# Patient Record
Sex: MALE | Race: WHITE | Employment: OTHER | URBAN - METROPOLITAN AREA
[De-identification: names, ages, dates, MRNs, and addresses within clinical notes are randomized per-mention and may not be internally consistent; named-entity substitution may affect disease eponyms.]

---

## 2017-09-22 ENCOUNTER — HOSPITAL ENCOUNTER (OUTPATIENT)
Age: 67
Setting detail: SPECIMEN
Discharge: HOME | End: 2017-09-22
Payer: MEDICARE

## 2017-09-22 ENCOUNTER — OFFICE VISIT (OUTPATIENT)
Dept: INTERNAL MEDICINE CLINIC | Age: 67
End: 2017-09-22
Payer: MEDICARE

## 2017-09-22 VITALS
HEART RATE: 78 BPM | BODY MASS INDEX: 36.05 KG/M2 | DIASTOLIC BLOOD PRESSURE: 78 MMHG | SYSTOLIC BLOOD PRESSURE: 130 MMHG | HEIGHT: 73 IN | WEIGHT: 272 LBS | OXYGEN SATURATION: 96 %

## 2017-09-22 DIAGNOSIS — Z00.00 ROUTINE HEALTH MAINTENANCE: ICD-10-CM

## 2017-09-22 DIAGNOSIS — E66.01 MORBID OBESITY, UNSPECIFIED OBESITY TYPE (HCC): ICD-10-CM

## 2017-09-22 DIAGNOSIS — Z23 NEED FOR VACCINATION: Primary | ICD-10-CM

## 2017-09-22 DIAGNOSIS — J44.9 CHRONIC OBSTRUCTIVE PULMONARY DISEASE, UNSPECIFIED COPD TYPE (HCC): ICD-10-CM

## 2017-09-22 DIAGNOSIS — E11.9 TYPE 2 DIABETES MELLITUS WITHOUT COMPLICATION, WITHOUT LONG-TERM CURRENT USE OF INSULIN (HCC): ICD-10-CM

## 2017-09-22 DIAGNOSIS — I25.5 ISCHEMIC CARDIOMYOPATHY: ICD-10-CM

## 2017-09-22 DIAGNOSIS — F17.200 SMOKING: ICD-10-CM

## 2017-09-22 DIAGNOSIS — Z13.6 ENCOUNTER FOR ABDOMINAL AORTIC ANEURYSM (AAA) SCREENING: ICD-10-CM

## 2017-09-22 DIAGNOSIS — I50.22 CHRONIC SYSTOLIC CONGESTIVE HEART FAILURE (HCC): ICD-10-CM

## 2017-09-22 LAB
ALBUMIN SERPL-MCNC: 4.2 G/DL (ref 3.5–5.2)
ALBUMIN/GLOBULIN RATIO: 1.4 (ref 1–2.5)
ALP BLD-CCNC: 73 U/L (ref 40–129)
ALT SERPL-CCNC: 27 U/L (ref 5–41)
ANION GAP SERPL CALCULATED.3IONS-SCNC: 17 MMOL/L (ref 9–17)
AST SERPL-CCNC: 24 U/L
BILIRUB SERPL-MCNC: 0.92 MG/DL (ref 0.3–1.2)
BUN BLDV-MCNC: 11 MG/DL (ref 8–23)
BUN/CREAT BLD: ABNORMAL (ref 9–20)
CALCIUM SERPL-MCNC: 9.4 MG/DL (ref 8.6–10.4)
CHLORIDE BLD-SCNC: 98 MMOL/L (ref 98–107)
CHOLESTEROL/HDL RATIO: 2
CHOLESTEROL: 107 MG/DL
CO2: 22 MMOL/L (ref 20–31)
CREAT SERPL-MCNC: 0.88 MG/DL (ref 0.7–1.2)
GFR AFRICAN AMERICAN: >60 ML/MIN
GFR NON-AFRICAN AMERICAN: >60 ML/MIN
GFR SERPL CREATININE-BSD FRML MDRD: ABNORMAL ML/MIN/{1.73_M2}
GFR SERPL CREATININE-BSD FRML MDRD: ABNORMAL ML/MIN/{1.73_M2}
GLUCOSE BLD-MCNC: 116 MG/DL (ref 70–99)
HCT VFR BLD CALC: 46 % (ref 41–53)
HDLC SERPL-MCNC: 53 MG/DL
HEMOGLOBIN: 15.6 G/DL (ref 13.5–17.5)
LDL CHOLESTEROL: 35 MG/DL (ref 0–130)
MCH RBC QN AUTO: 33.4 PG (ref 26–34)
MCHC RBC AUTO-ENTMCNC: 33.9 G/DL (ref 31–37)
MCV RBC AUTO: 98.4 FL (ref 80–100)
PDW BLD-RTO: 15.2 % (ref 12.5–15.4)
PLATELET # BLD: 149 K/UL (ref 140–450)
PMV BLD AUTO: 9.2 FL (ref 6–12)
POTASSIUM SERPL-SCNC: 4.4 MMOL/L (ref 3.7–5.3)
RBC # BLD: 4.68 M/UL (ref 4.5–5.9)
SODIUM BLD-SCNC: 137 MMOL/L (ref 135–144)
TOTAL PROTEIN: 7.2 G/DL (ref 6.4–8.3)
TRIGL SERPL-MCNC: 97 MG/DL
VLDLC SERPL CALC-MCNC: NORMAL MG/DL (ref 1–30)
WBC # BLD: 10 K/UL (ref 3.5–11)

## 2017-09-22 PROCEDURE — G0009 ADMIN PNEUMOCOCCAL VACCINE: HCPCS | Performed by: INTERNAL MEDICINE

## 2017-09-22 PROCEDURE — G0439 PPPS, SUBSEQ VISIT: HCPCS | Performed by: INTERNAL MEDICINE

## 2017-09-22 PROCEDURE — 90662 IIV NO PRSV INCREASED AG IM: CPT | Performed by: INTERNAL MEDICINE

## 2017-09-22 PROCEDURE — G0008 ADMIN INFLUENZA VIRUS VAC: HCPCS | Performed by: INTERNAL MEDICINE

## 2017-09-22 PROCEDURE — 90732 PPSV23 VACC 2 YRS+ SUBQ/IM: CPT | Performed by: INTERNAL MEDICINE

## 2017-09-22 RX ORDER — GLUCOSAMINE HCL/CHONDROITIN SU 500-400 MG
1 CAPSULE ORAL 2 TIMES DAILY
Qty: 100 STRIP | Refills: 11 | Status: SHIPPED | OUTPATIENT
Start: 2017-09-22 | End: 2017-12-15 | Stop reason: SDUPTHER

## 2017-09-22 ASSESSMENT — ENCOUNTER SYMPTOMS
BACK PAIN: 0
ABDOMINAL PAIN: 0
FACIAL SWELLING: 0
APNEA: 0
SHORTNESS OF BREATH: 0
COLOR CHANGE: 0
WHEEZING: 0
CONSTIPATION: 0
DIARRHEA: 0
COUGH: 0
CHEST TIGHTNESS: 0
ABDOMINAL DISTENTION: 0

## 2017-09-22 ASSESSMENT — PATIENT HEALTH QUESTIONNAIRE - PHQ9: SUM OF ALL RESPONSES TO PHQ QUESTIONS 1-9: 0

## 2017-10-02 ENCOUNTER — HOSPITAL ENCOUNTER (OUTPATIENT)
Dept: VASCULAR LAB | Age: 67
Discharge: HOME | End: 2017-10-02
Payer: MEDICARE

## 2017-10-02 DIAGNOSIS — Z13.6 ENCOUNTER FOR ABDOMINAL AORTIC ANEURYSM (AAA) SCREENING: ICD-10-CM

## 2017-10-02 PROCEDURE — 93978 VASCULAR STUDY: CPT

## 2017-12-15 ENCOUNTER — OFFICE VISIT (OUTPATIENT)
Dept: INTERNAL MEDICINE CLINIC | Age: 67
End: 2017-12-15
Payer: MEDICARE

## 2017-12-15 VITALS
OXYGEN SATURATION: 95 % | HEART RATE: 89 BPM | SYSTOLIC BLOOD PRESSURE: 126 MMHG | WEIGHT: 271 LBS | DIASTOLIC BLOOD PRESSURE: 62 MMHG | HEIGHT: 73 IN | BODY MASS INDEX: 35.92 KG/M2

## 2017-12-15 DIAGNOSIS — I10 ESSENTIAL HYPERTENSION: Primary | ICD-10-CM

## 2017-12-15 DIAGNOSIS — I50.22 CHRONIC SYSTOLIC CONGESTIVE HEART FAILURE (HCC): ICD-10-CM

## 2017-12-15 DIAGNOSIS — E11.9 TYPE 2 DIABETES MELLITUS WITHOUT COMPLICATION, WITHOUT LONG-TERM CURRENT USE OF INSULIN (HCC): ICD-10-CM

## 2017-12-15 DIAGNOSIS — F17.200 SMOKING: ICD-10-CM

## 2017-12-15 LAB — HBA1C MFR BLD: 6.1 %

## 2017-12-15 PROCEDURE — 83036 HEMOGLOBIN GLYCOSYLATED A1C: CPT | Performed by: INTERNAL MEDICINE

## 2017-12-15 PROCEDURE — 99214 OFFICE O/P EST MOD 30 MIN: CPT | Performed by: INTERNAL MEDICINE

## 2017-12-15 RX ORDER — LANCETS
EACH MISCELLANEOUS
COMMUNITY
Start: 2017-09-29 | End: 2017-12-15 | Stop reason: SDUPTHER

## 2017-12-15 RX ORDER — GLUCOSAMINE HCL/CHONDROITIN SU 500-400 MG
1 CAPSULE ORAL 2 TIMES DAILY
Qty: 100 STRIP | Refills: 11 | Status: SHIPPED | OUTPATIENT
Start: 2017-12-15 | End: 2018-09-28

## 2017-12-15 RX ORDER — LISINOPRIL 5 MG/1
5 TABLET ORAL DAILY
COMMUNITY
End: 2019-08-29 | Stop reason: ALTCHOICE

## 2017-12-15 RX ORDER — LANCETS
100 EACH MISCELLANEOUS 2 TIMES DAILY
Qty: 100 EACH | Refills: 3 | Status: SHIPPED | OUTPATIENT
Start: 2017-12-15 | End: 2018-09-28

## 2017-12-15 RX ORDER — CARVEDILOL 12.5 MG/1
12.5 TABLET ORAL 2 TIMES DAILY
Status: ON HOLD | COMMUNITY
End: 2018-12-10 | Stop reason: HOSPADM

## 2017-12-15 ASSESSMENT — ENCOUNTER SYMPTOMS
DIARRHEA: 0
CONSTIPATION: 0
APNEA: 0
COUGH: 0
COLOR CHANGE: 0
FACIAL SWELLING: 0
ABDOMINAL PAIN: 0
CHEST TIGHTNESS: 0
ABDOMINAL DISTENTION: 0
BACK PAIN: 0
SHORTNESS OF BREATH: 0
WHEEZING: 0

## 2017-12-15 NOTE — PROGRESS NOTES
Subjective:      Patient ID: Hal Doe is a 79 y.o. male. Chronic Disease Visit Information    BP Readings from Last 3 Encounters:   12/15/17 126/62   09/22/17 130/78   12/07/16 130/80          Hemoglobin A1C (%)   Date Value   12/07/2016 6.5 (H)   09/07/2014 5.6     LDL Cholesterol (mg/dL)   Date Value   09/22/2017 35     HDL (mg/dL)   Date Value   09/22/2017 53     BUN (mg/dL)   Date Value   09/22/2017 11     CREATININE (mg/dL)   Date Value   09/22/2017 0.88     Glucose (mg/dL)   Date Value   09/22/2017 116 (H)            Have you changed or started any medications since your last visit including any over-the-counter medicines, vitamins, or herbal medicines? no   Are you having any side effects from any of your medications? -  no  Have you stopped taking any of your medications? Is so, why? -  no    Have you seen any other physician or provider since your last visit? No  Have you had any other diagnostic tests since your last visit? No  Have you been seen in the emergency room and/or had an admission to a hospital since we last saw you? No  Have you had your annual diabetic retinal (eye) exam? Yes - Records Obtained  Have you had your routine dental cleaning in the past 6 months? yes - Fall 2017    Have you activated your [employer] account? If not, what are your barriers?  Yes     Patient Care Team:  Gela Cervantes MD as PCP - Yin Ley MD as PCP - S Attributed Provider  Jesenia Orozco MD as Consulting Physician (Gastroenterology)         Medical History Review  Past Medical, Family, and Social History reviewed and does not contribute to the patient presenting condition    Health Maintenance   Topic Date Due    DTaP/Tdap/Td vaccine (1 - Tdap) 08/15/1969    Colon Cancer Screen FIT/FOBT  09/09/2015    Diabetes screen  12/07/2019    Lipid screen  09/22/2022    Zostavax vaccine  Addressed    Flu vaccine  Completed    Pneumococcal low/med risk  Completed    AAA screen  Completed    Hepatitis C screen  Completed     Chief Complaint   Patient presents with    Diabetes     management pt due for A1C check result was 6.1    Results     pt had abdominal us done in October HPI-patient is here for follow-up examination. He has multiple medical problems which include diabetes, hypertension, hyperlipidemia. He had fractured bones of his right forearm, had a plate put in. He is going to have revision surgery in [place] . He still feels pain and swelling in his right forearm. No other complaints    Review of Systems   Constitutional: Negative for activity change, appetite change, chills and diaphoresis. HENT: Negative for congestion, dental problem, ear discharge, facial swelling and hearing loss. Respiratory: Negative for apnea, cough, chest tightness, shortness of breath and wheezing. Cardiovascular: Negative for chest pain and leg swelling. Gastrointestinal: Negative for abdominal distention, abdominal pain, constipation and diarrhea. Genitourinary: Negative for difficulty urinating, dysuria, enuresis, flank pain and frequency. Musculoskeletal: Positive for arthralgias (Pain present in the right wrist) and joint swelling (Right wrist joint). Negative for back pain and gait problem. Skin: Negative for color change, pallor and rash. Neurological: Negative for dizziness, seizures, facial asymmetry, light-headedness, numbness and headaches. Psychiatric/Behavioral: Negative for agitation, behavioral problems, confusion, decreased concentration and dysphoric mood. Objective:   Physical Exam   Constitutional: He is oriented to person, place, and time. He appears well-developed and well-nourished. No distress. HENT:   Head: Normocephalic and atraumatic. Mouth/Throat: No oropharyngeal exudate. Eyes: Conjunctivae are normal. Pupils are equal, round, and reactive to light. Right eye exhibits no discharge. Left eye exhibits no discharge. No scleral icterus.    Neck:

## 2017-12-15 NOTE — PROGRESS NOTES
Subjective:      Patient ID: Mehrdad Aguilar is a 79 y.o. male. HPI-  Patient is here for Follow up examination. He has DM, HTN, CHF . Patient mentioned that his symptoms are stable , No new complains . He had US of abdomen for AAA , which was negative     Review of Systems   Constitutional: Negative for activity change, appetite change, chills and diaphoresis. HENT: Negative for congestion, dental problem, ear discharge, facial swelling and hearing loss. Respiratory: Negative for apnea, cough, chest tightness, shortness of breath and wheezing. Cardiovascular: Negative for chest pain and leg swelling. Gastrointestinal: Negative for abdominal distention, abdominal pain, constipation and diarrhea. Genitourinary: Negative for difficulty urinating, dysuria, enuresis, flank pain and frequency. Musculoskeletal: Negative for arthralgias, back pain, gait problem and joint swelling. Skin: Negative for color change, pallor and rash. Neurological: Negative for dizziness, seizures, facial asymmetry, light-headedness, numbness and headaches. Psychiatric/Behavioral: Negative for agitation, behavioral problems, confusion, decreased concentration and dysphoric mood. Objective:   Physical Exam   Constitutional: He is oriented to person, place, and time. He appears well-developed and well-nourished. No distress. HENT:   Head: Normocephalic and atraumatic. Mouth/Throat: No oropharyngeal exudate. Eyes: Conjunctivae are normal. Pupils are equal, round, and reactive to light. Right eye exhibits no discharge. Left eye exhibits no discharge. No scleral icterus. Neck: Normal range of motion. Neck supple. No JVD present. No tracheal deviation present. No thyromegaly present. Cardiovascular: Normal rate and normal heart sounds. Exam reveals no gallop. No murmur heard. Pulmonary/Chest: Effort normal and breath sounds normal. No stridor. No respiratory distress. He has no wheezes. He has no rales.

## 2018-05-04 ENCOUNTER — TELEPHONE (OUTPATIENT)
Dept: INTERNAL MEDICINE CLINIC | Age: 68
End: 2018-05-04

## 2018-05-15 DIAGNOSIS — E11.9 TYPE 2 DIABETES MELLITUS WITHOUT COMPLICATION, WITHOUT LONG-TERM CURRENT USE OF INSULIN (HCC): ICD-10-CM

## 2018-06-15 ENCOUNTER — HOSPITAL ENCOUNTER (OUTPATIENT)
Age: 68
Setting detail: SPECIMEN
Discharge: HOME | End: 2018-06-15
Payer: MEDICARE

## 2018-06-15 ENCOUNTER — OFFICE VISIT (OUTPATIENT)
Dept: INTERNAL MEDICINE CLINIC | Age: 68
End: 2018-06-15
Payer: MEDICARE

## 2018-06-15 VITALS
HEART RATE: 76 BPM | DIASTOLIC BLOOD PRESSURE: 70 MMHG | BODY MASS INDEX: 35.52 KG/M2 | WEIGHT: 268 LBS | HEIGHT: 73 IN | SYSTOLIC BLOOD PRESSURE: 110 MMHG

## 2018-06-15 DIAGNOSIS — I50.22 CHRONIC SYSTOLIC CONGESTIVE HEART FAILURE (HCC): ICD-10-CM

## 2018-06-15 DIAGNOSIS — L84 CORN OF FOOT: ICD-10-CM

## 2018-06-15 DIAGNOSIS — J44.9 CHRONIC OBSTRUCTIVE PULMONARY DISEASE, UNSPECIFIED COPD TYPE (HCC): ICD-10-CM

## 2018-06-15 DIAGNOSIS — F17.200 SMOKING: ICD-10-CM

## 2018-06-15 DIAGNOSIS — E11.9 TYPE 2 DIABETES MELLITUS WITHOUT COMPLICATION, WITHOUT LONG-TERM CURRENT USE OF INSULIN (HCC): ICD-10-CM

## 2018-06-15 DIAGNOSIS — I10 ESSENTIAL HYPERTENSION: Primary | ICD-10-CM

## 2018-06-15 DIAGNOSIS — Z12.5 PROSTATE CANCER SCREENING: ICD-10-CM

## 2018-06-15 DIAGNOSIS — F10.10 ALCOHOL ABUSE: ICD-10-CM

## 2018-06-15 DIAGNOSIS — N40.0 BENIGN PROSTATIC HYPERPLASIA WITHOUT LOWER URINARY TRACT SYMPTOMS: ICD-10-CM

## 2018-06-15 LAB
FOLATE: 18.9 NG/ML
HBA1C MFR BLD: 6.6 %
PROSTATE SPECIFIC ANTIGEN: 3.44 UG/L
VITAMIN B-12: 862 PG/ML (ref 232–1245)

## 2018-06-15 PROCEDURE — 3288F FALL RISK ASSESSMENT DOCD: CPT | Performed by: INTERNAL MEDICINE

## 2018-06-15 PROCEDURE — 99214 OFFICE O/P EST MOD 30 MIN: CPT | Performed by: INTERNAL MEDICINE

## 2018-06-15 PROCEDURE — G8510 SCR DEP NEG, NO PLAN REQD: HCPCS | Performed by: INTERNAL MEDICINE

## 2018-06-15 PROCEDURE — 83036 HEMOGLOBIN GLYCOSYLATED A1C: CPT | Performed by: INTERNAL MEDICINE

## 2018-06-15 RX ORDER — TAMSULOSIN HYDROCHLORIDE 0.4 MG/1
0.4 CAPSULE ORAL DAILY
Qty: 30 CAPSULE | Refills: 3 | Status: SHIPPED | OUTPATIENT
Start: 2018-06-15 | End: 2018-09-28 | Stop reason: SDUPTHER

## 2018-06-15 ASSESSMENT — PATIENT HEALTH QUESTIONNAIRE - PHQ9
2. FEELING DOWN, DEPRESSED OR HOPELESS: 0
SUM OF ALL RESPONSES TO PHQ QUESTIONS 1-9: 0
1. LITTLE INTEREST OR PLEASURE IN DOING THINGS: 0
SUM OF ALL RESPONSES TO PHQ9 QUESTIONS 1 & 2: 0

## 2018-06-15 ASSESSMENT — ENCOUNTER SYMPTOMS
APNEA: 0
DIARRHEA: 0
ABDOMINAL DISTENTION: 0
WHEEZING: 0
CONSTIPATION: 0
COLOR CHANGE: 0
BACK PAIN: 0
CHEST TIGHTNESS: 0
COUGH: 0
FACIAL SWELLING: 0
ABDOMINAL PAIN: 0
SHORTNESS OF BREATH: 1

## 2018-07-23 ENCOUNTER — OFFICE VISIT (OUTPATIENT)
Dept: PODIATRY | Age: 68
End: 2018-07-23
Payer: MEDICARE

## 2018-07-23 VITALS
BODY MASS INDEX: 34.85 KG/M2 | HEIGHT: 73 IN | WEIGHT: 263 LBS | HEART RATE: 71 BPM | SYSTOLIC BLOOD PRESSURE: 121 MMHG | DIASTOLIC BLOOD PRESSURE: 71 MMHG

## 2018-07-23 DIAGNOSIS — M79.672 LEFT FOOT PAIN: ICD-10-CM

## 2018-07-23 DIAGNOSIS — L84 CORNS AND CALLOSITIES: ICD-10-CM

## 2018-07-23 DIAGNOSIS — M79.674 PAIN OF TOES OF BOTH FEET: ICD-10-CM

## 2018-07-23 DIAGNOSIS — E11.42 DIABETIC POLYNEUROPATHY ASSOCIATED WITH TYPE 2 DIABETES MELLITUS (HCC): ICD-10-CM

## 2018-07-23 DIAGNOSIS — M24.572 EQUINUS CONTRACTURE OF LEFT ANKLE: ICD-10-CM

## 2018-07-23 DIAGNOSIS — E11.51 TYPE 2 DIABETES MELLITUS WITH PERIPHERAL VASCULAR DISEASE (HCC): ICD-10-CM

## 2018-07-23 DIAGNOSIS — M76.62 ACHILLES TENDINITIS OF LEFT LOWER EXTREMITY: Primary | ICD-10-CM

## 2018-07-23 DIAGNOSIS — M79.675 PAIN OF TOES OF BOTH FEET: ICD-10-CM

## 2018-07-23 DIAGNOSIS — M79.671 PAIN IN RIGHT FOOT: ICD-10-CM

## 2018-07-23 DIAGNOSIS — B35.1 ONYCHOMYCOSIS OF TOENAIL: ICD-10-CM

## 2018-07-23 DIAGNOSIS — M77.30 RETROCALCANEAL BONE SPUR: ICD-10-CM

## 2018-07-23 PROCEDURE — 11056 PARNG/CUTG B9 HYPRKR LES 2-4: CPT | Performed by: PODIATRIST

## 2018-07-23 PROCEDURE — 73630 X-RAY EXAM OF FOOT: CPT | Performed by: PODIATRIST

## 2018-07-23 PROCEDURE — 11721 DEBRIDE NAIL 6 OR MORE: CPT | Performed by: PODIATRIST

## 2018-07-23 PROCEDURE — 99203 OFFICE O/P NEW LOW 30 MIN: CPT | Performed by: PODIATRIST

## 2018-07-23 RX ORDER — TIZANIDINE 4 MG/1
4 TABLET ORAL EVERY 6 HOURS PRN
COMMUNITY
End: 2019-02-28

## 2018-07-23 ASSESSMENT — ENCOUNTER SYMPTOMS
BACK PAIN: 0
NAUSEA: 0
COLOR CHANGE: 0
DIARRHEA: 0
SHORTNESS OF BREATH: 0

## 2018-07-23 NOTE — PROGRESS NOTES
hallux of the right foot. There is no prominence noted to the first metatarsal head without abduction of the hallux of the left foot. There is soft tissue swelling to the posterior aspect of the left calcaneus at the insertion of the achilles tendon. There is pain with palpation to this area. There is no palpable defect noted to the left achilles tendon. Shoe examination was performed. Biomechanical Exam: normal bilaterally. X-ray's taken: Lateral, Lateral Oblique, and Medial Oblique of the left foot. Findings: There is only a slight amount of bone spur noted to the posterior aspect of the calcaneus. Asessment: Patient is a 79 y.o. male with:    Diagnosis Orders   1. Achilles tendinitis of left lower extremity  Ambulatory referral to Physical Therapy    XR FOOT LEFT (MIN 3 VIEWS)   2. Equinus contracture of left ankle  Ambulatory referral to Physical Therapy    XR FOOT LEFT (MIN 3 VIEWS)   3. Left foot pain  Ambulatory referral to Physical Therapy    XR FOOT LEFT (MIN 3 VIEWS)   4. Retrocalcaneal bone spur  XR FOOT LEFT (MIN 3 VIEWS)   5. Onychomycosis of toenail  UT DEBRIDEMENT OF NAILS, 6 OR MORE    HM DIABETES FOOT EXAM   6. Corns and callosities  TRIM BENIGN HYPERKERATOTIC SKIN LESION,2-4   7. Pain of toes of both feet  UT DEBRIDEMENT OF NAILS, 6 OR MORE    HM DIABETES FOOT EXAM   8. Pain in right foot  TRIM BENIGN HYPERKERATOTIC SKIN LESION,2-4   9. Type 2 diabetes mellitus with peripheral vascular disease (HCC)  UT DEBRIDEMENT OF NAILS, 6 OR MORE    HM DIABETES FOOT EXAM    TRIM BENIGN HYPERKERATOTIC SKIN LESION,2-4   10. Diabetic polyneuropathy associated with type 2 diabetes mellitus (HCC)  UT DEBRIDEMENT OF NAILS, 6 OR MORE    HM DIABETES FOOT EXAM    TRIM BENIGN HYPERKERATOTIC SKIN LESION,2-4       Plan:  1. Clinical evaluation of the patient. 2. There is pain with palpation and debridement of toenails 1-5 of the right foot and 1-5 of the left foot.  The lesion(s) to the right foot debrided with a 15 blade without event. Patient instructed on proper diabetic foot care. Patient informed that I am recommending diabetic shoes and he will be contacted for fitting. Patient given a referral for physical therapy for his achilles tendonitis. 3. Contact office with any questions/problems/concerns. Return in about 9 weeks (around 9/24/2018) for At risk diabetic foot care.    7/23/2018      Berta Souza DPM

## 2018-07-23 NOTE — LETTER
identifying patients at risk and referring them for footwear and inserts when indicated. Sincerely,          [facility]  Karyna Becker D.P.M. Yesenia Monreal D.P.M.  [address]   [address]  Ph: [phone]  Fax: [phone]    Statement of Certifying Physician    [address] [place] [address]     9/18/7603  I certify that all of the following statements are true:    1. This patient has diabetes mellitus. ICD-10 Code: E11.9 (250.00)    2. This patient has one of the following conditions: (check all that may apply)     []  History of partial or complete amputation of the foot     [x]  Peripheral neuropathy with evidence of callus formation E11.42 (250.60)      []  History of previous foot ulceration Z86.31     []  Foot deformity M21.969 (736.70)    []  History of pre-ulcerative callus     [x]  Poor circulation E11.59 (250.70)    3. I am treating this patient under a comprehensive plan and care for  his/her diabetes. 4. This patient needs special shoes (depth or custom-molded) and/or  inserts because of his/her diabetic condition.     Certifying Physician Information: (must be signed by a MD or DO)       Signature:_____________________________  Date:___________      Name:________________________________________________

## 2018-07-26 ENCOUNTER — HOSPITAL ENCOUNTER (OUTPATIENT)
Dept: PHYSICAL THERAPY | Age: 68
Setting detail: THERAPIES SERIES
Discharge: HOME | End: 2018-07-26
Payer: MEDICARE

## 2018-07-26 PROCEDURE — G8979 MOBILITY GOAL STATUS: HCPCS

## 2018-07-26 PROCEDURE — 97110 THERAPEUTIC EXERCISES: CPT

## 2018-07-26 PROCEDURE — 97016 VASOPNEUMATIC DEVICE THERAPY: CPT

## 2018-07-26 PROCEDURE — G8978 MOBILITY CURRENT STATUS: HCPCS

## 2018-07-26 PROCEDURE — 97161 PT EVAL LOW COMPLEX 20 MIN: CPT

## 2018-07-26 NOTE — CONSULTS
Tender left AT insert at calc   Sensation [] [] []    Edema [] [x] [] Bilateral ankle   Neurological [] [] []    Patellar Mobility [] [] []    Patellar Orientation [] [] []    Gait [] [] [] Analysis:  Antalgic gait due to left AT pain         FUNCTION Normal Difficult Unable   Sitting [] [] []   Standing [] [x] []   Ambulation [] [x] []   Groom/Dress [] [] []   Lift/Carry [] [] []   Stairs [] [x] []   Bending [] [] []   Squat [] [] []   Kneel [] [] []           Comments:  Assessment:  Problems:    [x] ? Pain:  Left AT pain   [x] ? ROM: Limited LE ROM and flexibility   [x] ? Strength: Decrease bilateral LE strength   [x] ? Function: LEFS 62%   [x] ? Balance   [x] Edema:  [] Postural Deviations  [x] Gait Deviations  [] Other:      STG: (to be met in 10 treatments)  1. ? Pain:  Left AT pain 2/10 walking more than 150 feet  2. ? ROM:  Left ankle, hip ROM 90% normal  3. ? Strength:  Bilateral LE and core 5/5  4. ? Function:  LEFS 50%  5. Independent with Home Exercise Programs  6. Demonstrate Knowledge of fall prevention  LTG: (to be met in 24 treatments)  1. AT pain 0-2/10 with cutting lawn and shopping  2. LEFS 25% or less                   Patient goals:  Do grocery chopping and cut lawn without limping or pain    G-CODE    Functional Limitation: Mobility walking and moving around  Functional Assessment Used: LEFS  Current Status Modifier: CL  Goal Status Modifier: CI  Discharge Status Modifier:     Rehab Potential:  [] Good  [x] Fair  [] Poor  Chronicity, decreased blood supply at AT  Suggested Professional Referral:  [x] No  [] Yes:  Barriers to Goal Achievement[de-identified]  [x] No  [] Yes:  Domestic Concerns:  [x] No  [] Yes:    Pt. Education:  [x] Plans/Goals, Risks/Benefits discussed  [] Home exercise program    Method of Education: [x] Verbal  [x] Demo  [x] Written  Comprehension of Education:  [x] Verbalizes understanding. [x] Demonstrates understanding. [x] Needs Review.   [] Demonstrates/verbalizes understanding of

## 2018-07-26 NOTE — FLOWSHEET NOTE
[facility] Fall Risk Assessment    Patient Name:  Jonathan Mendez  : 1950        Risk Factor Scale  Score   History of Falls [] Yes  [x] No 25  0    Secondary Diagnosis [] Yes  [x] No 15  0    Ambulatory Aid [] Furniture  [] Crutches/cane/walker  [] None/bedrest/wheelchair/nurse 30  15  0    IV/Heparin Lock [] Yes  [x] No 20  0    Gait/Transferring [] Impaired  [] Weak  [x] Normal/bedrest/immobile 20  10  0    Mental Status [] Forgets limitations  [x] Oriented to own ability 15  0       Total: 0     Based on the Assessment score: check the appropriate box.     [x]  No intervention needed   Low =   Score of 0-24    []  Use standard prevention interventions Moderate =  Score of 24-44   [] Give patient handout and discuss fall prevention strategies   [] Establish goal of education for patient/family RE: fall prevention strategies    []  Use high risk prevention interventions High = Score of 45 and higher   [] Give patient handout and discuss fall prevention strategies   [] Establish goal of education for patient/family Re: fall prevention strategies   [] Discuss lifeline / other resources    Electronically signed by:   Tatiana Calvo, PT  Date: 2018 127

## 2018-08-01 ENCOUNTER — HOSPITAL ENCOUNTER (OUTPATIENT)
Dept: PHYSICAL THERAPY | Age: 68
Setting detail: THERAPIES SERIES
Discharge: HOME | End: 2018-08-01
Payer: MEDICARE

## 2018-08-01 PROCEDURE — 97140 MANUAL THERAPY 1/> REGIONS: CPT

## 2018-08-01 PROCEDURE — 97016 VASOPNEUMATIC DEVICE THERAPY: CPT

## 2018-08-01 PROCEDURE — 97110 THERAPEUTIC EXERCISES: CPT

## 2018-08-03 ENCOUNTER — HOSPITAL ENCOUNTER (OUTPATIENT)
Dept: PHYSICAL THERAPY | Age: 68
Setting detail: THERAPIES SERIES
Discharge: HOME | End: 2018-08-03
Payer: MEDICARE

## 2018-08-03 PROCEDURE — 97016 VASOPNEUMATIC DEVICE THERAPY: CPT

## 2018-08-03 PROCEDURE — 97110 THERAPEUTIC EXERCISES: CPT

## 2018-08-07 ENCOUNTER — HOSPITAL ENCOUNTER (OUTPATIENT)
Dept: PHYSICAL THERAPY | Age: 68
Setting detail: THERAPIES SERIES
Discharge: HOME | End: 2018-08-07
Payer: MEDICARE

## 2018-08-07 PROCEDURE — 97140 MANUAL THERAPY 1/> REGIONS: CPT

## 2018-08-07 PROCEDURE — 97016 VASOPNEUMATIC DEVICE THERAPY: CPT

## 2018-08-07 PROCEDURE — 97110 THERAPEUTIC EXERCISES: CPT

## 2018-08-07 NOTE — FLOWSHEET NOTE
Supraorbital []  []  []  []  []  []    Infraorbital  []  []  []  []  []  []    Lateral Pectoral []  []  []  []  []  []    Saphenous  []  []  []  []  []  []    Common Fibular (Peroneal) []  [x]  []  [x]  []  []    Tibial []  [x]  []  [x]  []  []    Deep Fibular (Peroneal) []  []  []  []  []  []    Greater Occipital []  []  []  []  []  []    Greater Auricular []  []  []  []  []  []    Spinal Accessory []  []  []  []  []  []    Dorsal Scapular []  []  []  []  []  []    Suprascapular (Infraspinatus) []  []  []  []  []  []    Lateral Antebrachial Cutaneous  []  []  []  []  []  []    Deep Radial []  []  []  []  []  []    Superior Cluneal []  []  []  []  []  []    Inferior Gluteal  []  []  []  []  []  []    Superficial Radial []  []  []  []  []  []    Iliotibial []  []  []  []  []  []    Lateral Popliteal  []  []  []  []  []  []    Sural []  [x]  []  [x]  []  []    Posterior Cutaneous of T6  []  []  []  []  []  []    Spinous Process of T7 -- centrally placed   [] []  []  []  []    Posterior Cutaneous of L2 []  []  []  []  []  []    Posterior Cutaneous L5 []  []  []  []  []  []        Symptomatic Points Right Left 0.5\" needle 1.0\" needle 2.0\" needle 3.0\" needle   Left AT and Joseph symptomatic  5 needles []  [x]  [x]  []  []  []     []  []  []  []  []  []     []  []  []  []  []  []     []  []  []  []  []  []     []  []  []  []  []  []     []  []  []  []  []  []     []  []  []  []  []  []     []  []  []  []  []  []     []  []  []  []  []  []     []  []  []  []  []  []     []  []  []  []  []  []              Specific Instructions for next treatment:  DN, Xfx massage left AT, KT, left ankle bilateral LE strength, flexibility, proprioception/balance       Treatment Charges: Mins Units   [x]  Modalities vasocompression 15 1   [x]  Ther Exercise 15 1   [x]  Manual Therapy 30 2   []  Ther Activities     []  Aquatics     []  Neuromuscular     []  Other     Total Treatment time 60 4       Assessment: [x] Progressing toward goals. Patient states left ankle and AT doesn't bother him as much as lack of mobility. Tolerated initial DN no complaints. Supination emphasis with mobility in weight bear to reduce everted foot. Continue joint mobs, flexibility, balance, added ankle strength but needs cues for eversion and inversion. Left AT pain post tx:  0/10        [] No change. [] Other:    STG/LTG     STG: (to be met in 10 treatments)  1. ? Pain:  Left AT pain 2/10 walking more than 150 feet  2. ? ROM:  Left ankle, hip ROM 90% normal  3. ? Strength:  Bilateral LE and core 5/5  4. ? Function:  LEFS 50%  5. Independent with Home Exercise Programs  6. Demonstrate Knowledge of fall prevention  LTG: (to be met in 24 treatments)  1. AT pain 0-2/10 with cutting lawn and shopping  2. LEFS 25% or less                    Patient goals:  Do grocery shopping and cut lawn without limping or pain     G-CODE     Functional Limitation: Mobility walking and moving around  Functional Assessment Used: LEFS  Current Status Modifier: CL  Goal Status Modifier: CI  Discharge Status Modifier:        Pt. Education:  [x] Yes  [] No  [x] Reviewed Prior HEP/Ed. Patient states he does not perform HEP at home. Will work with him to transition to HEP with reinforcement, education on progression and benefit. Method of Education: [x] Verbal  [] Demo  [] Written  Comprehension of Education:  [x] Verbalizes understanding. [] Demonstrates understanding. [x] Needs review. [] Demonstrates/verbalizes HEP/Ed previously given. Plan: [x] Continue per plan of care.    [] Other:      Time [id number]           Time Out: 10 Carlita Francisco    Electronically signed by:  Helena Castro, PT

## 2018-08-09 ENCOUNTER — HOSPITAL ENCOUNTER (OUTPATIENT)
Dept: PHYSICAL THERAPY | Age: 68
Setting detail: THERAPIES SERIES
Discharge: HOME | End: 2018-08-09
Payer: MEDICARE

## 2018-08-09 PROCEDURE — 97110 THERAPEUTIC EXERCISES: CPT

## 2018-08-09 PROCEDURE — 97016 VASOPNEUMATIC DEVICE THERAPY: CPT

## 2018-08-09 PROCEDURE — 97140 MANUAL THERAPY 1/> REGIONS: CPT

## 2018-08-09 NOTE — FLOWSHEET NOTE
[]  []    Lateral Pectoral []  []  []  []  []  []    Saphenous  []  []  []  []  []  []    Common Fibular (Peroneal) []  [x]  []  [x]  []  []    Tibial []  [x]  []  [x]  []  []    Deep Fibular (Peroneal) []  []  []  []  []  []    Greater Occipital []  []  []  []  []  []    Greater Auricular []  []  []  []  []  []    Spinal Accessory []  []  []  []  []  []    Dorsal Scapular []  []  []  []  []  []    Suprascapular (Infraspinatus) []  []  []  []  []  []    Lateral Antebrachial Cutaneous  []  []  []  []  []  []    Deep Radial []  []  []  []  []  []    Superior Cluneal []  []  []  []  []  []    Inferior Gluteal  []  []  []  []  []  []    Superficial Radial []  []  []  []  []  []    Iliotibial []  []  []  []  []  []    Lateral Popliteal  []  []  []  []  []  []    Sural []  [x]  []  [x]  []  []    Posterior Cutaneous of T6  []  []  []  []  []  []    Spinous Process of T7 -- centrally placed   [] []  []  []  []    Posterior Cutaneous of L2 []  []  []  []  []  []    Posterior Cutaneous L5 []  []  []  []  []  []        Symptomatic Points Right Left 0.5\" needle 1.0\" needle 2.0\" needle 3.0\" needle   Left AT and Joseph symptomatic  6 needles []  [x]  [x]  []  []  []     []  []  []  []  []  []     []  []  []  []  []  []     []  []  []  []  []  []     []  []  []  []  []  []     []  []  []  []  []  []     []  []  []  []  []  []     []  []  []  []  []  []     []  []  []  []  []  []     []  []  []  []  []  []     []  []  []  []  []  []        Specific Instructions for next treatment:  DN, Xfx massage left AT, KT, left ankle bilateral LE strength, flexibility, proprioception/balance       Treatment Charges: Mins Units   [x]  Modalities vasocompression 15 1   [x]  Ther Exercise 15 1   [x]  Manual Therapy 30 2   []  Ther Activities     []  Aquatics     []  Neuromuscular     []  Other     Total Treatment time 60 4       Assessment: [x] Progressing toward goals.    Patient states left ankle and AT doesn't bother him as much as lack of

## 2018-08-13 ENCOUNTER — HOSPITAL ENCOUNTER (OUTPATIENT)
Dept: PHYSICAL THERAPY | Age: 68
Setting detail: THERAPIES SERIES
Discharge: HOME | End: 2018-08-13
Payer: MEDICARE

## 2018-08-13 PROCEDURE — 97140 MANUAL THERAPY 1/> REGIONS: CPT

## 2018-08-13 PROCEDURE — 97016 VASOPNEUMATIC DEVICE THERAPY: CPT

## 2018-08-13 PROCEDURE — 97110 THERAPEUTIC EXERCISES: CPT

## 2018-08-13 NOTE — FLOWSHEET NOTE
[address] [facility]   [address] [facility] [address]   Phone: [phone]   Fax: [phone]    Physical Therapy Daily Treatment Note      Date:  2018  Patient Name:  Jonathan Mendez    :    MRN: [id number]  Physician: Smita Edwards DPM                         Insurance: [place] [employer]              Eligibility Status: Percell Odor     DOS: 18  # of visits allowed/remaining: based on medical necessity  Source: Phone  Spoke Shreya Nava [phone]  Reference: [id number]  Auth: NPRe     Electronically signed by Humphrey Gordon on 18 at 2:32 PM   Medical Diagnosis: Rehab Codes:   M76.62 (ICD-10-CM) - Achilles tendinitis of left lower extremity   M24.572 (ICD-10-CM) - Equinus contracture of left ankle   M79.672 (ICD-10-CM) - Left foot pain                                Onset date: 17               Next Dr's appt.: 18    Visit# / total visits: -  Cancels/No Shows: 0/0    MEDICARE CAP VISIT MAINTAINED IN NAVIGATOR    Subjective:  Still have restricted left ankle. Not so much pain as restricted ankle. Pain:  [] Yes  [] No Location: Left AT, Foot N/A Pain Rating: (0-10 scale) 1/10  Pain altered Tx:  [] No  [] Yes  Action:  Comments:  DN POC signed. DNA signed and witnessed.     Objective:  Modalities: Vasocompression left ankle, ankle sleeve, 34 degrees F, 15 min  Precautions:  Pacemaker -   Exercises:  Exercise Reps/ Time Weight/ Level Comments   NuStep      Eccentric Heel raise and lower 8 count x 10                 Triceps surae stretch 30 sec    KE/KF   Halo CW/CCW 3 each        3 way ankle  15    red band c cues   Lunge forward 15   c inversion          Manual:  TCJ, STJ, 1st MTP, distal tib fib grade 3  Dry Needle (see below)      Other: Supination emphasis with stretches and lunges for ankle mob      Homeostatic Point Right Left 0.5\" needle 1.0\" needle 2.0\" needle 3.0\" needle   Supraorbital []  []  []  []  []  []    Infraorbital  [] []  []  []  []  []    Lateral Pectoral []  []  []  []  []  []    Saphenous  []  []  []  []  []  []    Common Fibular (Peroneal) []  [x]  []  [x]  []  []    Tibial []  [x]  []  [x]  []  []    Deep Fibular (Peroneal) []  []  []  []  []  []    Greater Occipital []  []  []  []  []  []    Greater Auricular []  []  []  []  []  []    Spinal Accessory []  []  []  []  []  []    Dorsal Scapular []  []  []  []  []  []    Suprascapular (Infraspinatus) []  []  []  []  []  []    Lateral Antebrachial Cutaneous  []  []  []  []  []  []    Deep Radial []  []  []  []  []  []    Superior Cluneal []  []  []  []  []  []    Inferior Gluteal  []  []  []  []  []  []    Superficial Radial []  []  []  []  []  []    Iliotibial []  []  []  []  []  []    Lateral Popliteal  []  []  []  []  []  []    Sural []  [x]  []  [x]  []  []    Posterior Cutaneous of T6  []  []  []  []  []  []    Spinous Process of T7 -- centrally placed   [] []  []  []  []    Posterior Cutaneous of L2 []  []  []  []  []  []    Posterior Cutaneous L5 []  []  []  []  []  []        Symptomatic Points Right Left 0.5\" needle 1.0\" needle 2.0\" needle 3.0\" needle   Left AT and Joseph symptomatic  6 needles []  [x]  [x]  []  []  []     []  []  []  []  []  []     []  []  []  []  []  []     []  []  []  []  []  []     []  []  []  []  []  []     []  []  []  []  []  []     []  []  []  []  []  []     []  []  []  []  []  []     []  []  []  []  []  []     []  []  []  []  []  []     []  []  []  []  []  []        Specific Instructions for next treatment:  DN, Xfx massage left AT, KT, left ankle bilateral LE strength, flexibility, proprioception/balance       Treatment Charges: Mins Units   [x]  Modalities vasocompression 15 1   [x]  Ther Exercise 15 1   [x]  Manual Therapy 25 2   []  Ther Activities     []  Aquatics     []  Neuromuscular     []  Other     Total Treatment time 60 4       Assessment: [x] Progressing toward goals. Improved TCJ mobs. Tolerated  DN no complaints.  Added 3 way

## 2018-08-15 ENCOUNTER — HOSPITAL ENCOUNTER (OUTPATIENT)
Dept: PHYSICAL THERAPY | Age: 68
Setting detail: THERAPIES SERIES
Discharge: HOME | End: 2018-08-15
Payer: MEDICARE

## 2018-08-15 PROCEDURE — 97016 VASOPNEUMATIC DEVICE THERAPY: CPT

## 2018-08-15 PROCEDURE — 97110 THERAPEUTIC EXERCISES: CPT

## 2018-08-15 PROCEDURE — 97140 MANUAL THERAPY 1/> REGIONS: CPT

## 2018-08-15 NOTE — FLOWSHEET NOTE
[]  []    Lateral Pectoral []  []  []  []  []  []    Saphenous  []  []  []  []  []  []    Common Fibular (Peroneal) []  [x]  []  [x]  []  []    Tibial []  [x]  []  [x]  []  []    Deep Fibular (Peroneal) []  []  []  []  []  []    Greater Occipital []  []  []  []  []  []    Greater Auricular []  []  []  []  []  []    Spinal Accessory []  []  []  []  []  []    Dorsal Scapular []  []  []  []  []  []    Suprascapular (Infraspinatus) []  []  []  []  []  []    Lateral Antebrachial Cutaneous  []  []  []  []  []  []    Deep Radial []  []  []  []  []  []    Superior Cluneal []  []  []  []  []  []    Inferior Gluteal  []  []  []  []  []  []    Superficial Radial []  []  []  []  []  []    Iliotibial []  []  []  []  []  []    Lateral Popliteal  []  []  []  []  []  []    Sural []  [x]  []  [x]  []  []    Posterior Cutaneous of T6  []  []  []  []  []  []    Spinous Process of T7 -- centrally placed   [] []  []  []  []    Posterior Cutaneous of L2 []  []  []  []  []  []    Posterior Cutaneous L5 []  []  []  []  []  []        Symptomatic Points Right Left 0.5\" needle 1.0\" needle 2.0\" needle 3.0\" needle   Left AT and Joseph symptomatic  6 needles []  [x]  [x]  []  []  []     []  []  []  []  []  []     []  []  []  []  []  []     []  []  []  []  []  []     []  []  []  []  []  []     []  []  []  []  []  []     []  []  []  []  []  []     []  []  []  []  []  []     []  []  []  []  []  []     []  []  []  []  []  []     []  []  []  []  []  []        Specific Instructions for next treatment:  DN, Xfx massage left AT, KT, left ankle bilateral LE strength, flexibility, proprioception/balance       Treatment Charges: Mins Units   [x]  Modalities vasocompression 15 1   [x]  Ther Exercise 15 1   [x]  Manual Therapy 25 2   []  Ther Activities     []  Aquatics     []  Neuromuscular     []  Other     Total Treatment time 60 4       Assessment: [x] Progressing toward goals. Improved TCJ mobs. Tolerated  DN no complaints.  Cues with 3 way ankle to

## 2018-08-16 DIAGNOSIS — E78.5 HYPERLIPIDEMIA: ICD-10-CM

## 2018-08-16 DIAGNOSIS — I50.32 CHRONIC DIASTOLIC CONGESTIVE HEART FAILURE (HCC): ICD-10-CM

## 2018-08-16 RX ORDER — SPIRONOLACTONE 25 MG/1
TABLET ORAL
Qty: 90 TABLET | Refills: 3 | Status: SHIPPED | OUTPATIENT
Start: 2018-08-16 | End: 2019-07-20 | Stop reason: SDUPTHER

## 2018-08-16 RX ORDER — ATORVASTATIN CALCIUM 40 MG/1
TABLET, FILM COATED ORAL
Qty: 90 TABLET | Refills: 3 | Status: SHIPPED | OUTPATIENT
Start: 2018-08-16 | End: 2019-07-20 | Stop reason: SDUPTHER

## 2018-08-16 RX ORDER — FUROSEMIDE 20 MG/1
TABLET ORAL
Qty: 90 TABLET | Refills: 3 | Status: SHIPPED | OUTPATIENT
Start: 2018-08-16 | End: 2019-05-09 | Stop reason: SDUPTHER

## 2018-08-20 ENCOUNTER — HOSPITAL ENCOUNTER (OUTPATIENT)
Dept: PHYSICAL THERAPY | Age: 68
Setting detail: THERAPIES SERIES
Discharge: HOME | End: 2018-08-20
Payer: MEDICARE

## 2018-08-20 PROCEDURE — 97016 VASOPNEUMATIC DEVICE THERAPY: CPT

## 2018-08-20 PROCEDURE — 97110 THERAPEUTIC EXERCISES: CPT

## 2018-08-20 PROCEDURE — 97140 MANUAL THERAPY 1/> REGIONS: CPT

## 2018-08-20 NOTE — FLOWSHEET NOTE
Lateral Pectoral []  []  []  []  []  []    Saphenous  []  []  []  []  []  []    Common Fibular (Peroneal) []  [x]  []  [x]  []  []    Tibial []  [x]  []  [x]  []  []    Deep Fibular (Peroneal) []  []  []  []  []  []    Greater Occipital []  []  []  []  []  []    Greater Auricular []  []  []  []  []  []    Spinal Accessory []  []  []  []  []  []    Dorsal Scapular []  []  []  []  []  []    Suprascapular (Infraspinatus) []  []  []  []  []  []    Lateral Antebrachial Cutaneous  []  []  []  []  []  []    Deep Radial []  []  []  []  []  []    Superior Cluneal []  []  []  []  []  []    Inferior Gluteal  []  []  []  []  []  []    Superficial Radial []  []  []  []  []  []    Iliotibial []  []  []  []  []  []    Lateral Popliteal  []  []  []  []  []  []    Sural []  [x]  []  [x]  []  []    Posterior Cutaneous of T6  []  []  []  []  []  []    Spinous Process of T7 -- centrally placed   [] []  []  []  []    Posterior Cutaneous of L2 []  []  []  []  []  []    Posterior Cutaneous L5 []  []  []  []  []  []        Symptomatic Points Right Left 0.5\" needle 1.0\" needle 2.0\" needle 3.0\" needle   Left AT and Joseph symptomatic  6 needles []  [x]  [x]  []  []  []     []  []  []  []  []  []     []  []  []  []  []  []     []  []  []  []  []  []     []  []  []  []  []  []     []  []  []  []  []  []     []  []  []  []  []  []     []  []  []  []  []  []     []  []  []  []  []  []     []  []  []  []  []  []     []  []  []  []  []  []        Specific Instructions for next treatment:  DN, Xfx massage left AT, KT, left ankle bilateral LE strength, flexibility, proprioception/balance       Treatment Charges: Mins Units   [x]  Modalities vasocompression 15 1   [x]  Ther Exercise 15 1   [x]  Manual Therapy 25 2   []  Ther Activities     []  Aquatics     []  Neuromuscular     []  Other     Total Treatment time 60 4       Assessment: [x] Progressing toward goals. Improved ankle joint mobility. Soft tissue restrictions need work.   Tolerated  DN no complaints. Left AT pain post tx:  0/10. [] No change. [] Other:    STG/LTG     STG: (to be met in 10 treatments)  1. ? Pain:  Left AT pain 2/10 walking more than 150 feet  2. ? ROM:  Left ankle, hip ROM 90% normal  3. ? Strength:  Bilateral LE and core 5/5  4. ? Function:  LEFS 50%  5. Independent with Home Exercise Programs  6. Demonstrate Knowledge of fall prevention  LTG: (to be met in 24 treatments)  1. AT pain 0-2/10 with cutting lawn and shopping  2. LEFS 25% or less                    Patient goals:  Do grocery shopping and cut lawn without limping or pain     G-CODE     Functional Limitation: Mobility walking and moving around  Functional Assessment Used: LEFS  Current Status Modifier: CL  Goal Status Modifier: CI  Discharge Status Modifier:        Pt. Education:  [x] Yes  [] No  [x] Reviewed Prior HEP/Ed. Patient states he does not perform HEP at home. Will work with him to transition to HEP with reinforcement, education on progression and benefit. Method of Education: [x] Verbal  [] Demo  [] Written  Comprehension of Education:  [x] Verbalizes understanding. [] Demonstrates understanding. [x] Needs review. [] Demonstrates/verbalizes HEP/Ed previously given. Plan: [x] Continue per plan of care.    [] Other:      Time [id number]         Time Out: [facility]    Electronically signed by:  Mathew Strickland, PT

## 2018-08-22 ENCOUNTER — HOSPITAL ENCOUNTER (OUTPATIENT)
Dept: PHYSICAL THERAPY | Age: 68
Setting detail: THERAPIES SERIES
Discharge: HOME | End: 2018-08-22
Payer: MEDICARE

## 2018-08-22 PROCEDURE — 97016 VASOPNEUMATIC DEVICE THERAPY: CPT

## 2018-08-22 PROCEDURE — 97140 MANUAL THERAPY 1/> REGIONS: CPT

## 2018-08-22 PROCEDURE — 97110 THERAPEUTIC EXERCISES: CPT

## 2018-08-27 ENCOUNTER — HOSPITAL ENCOUNTER (OUTPATIENT)
Dept: PHYSICAL THERAPY | Age: 68
Setting detail: THERAPIES SERIES
Discharge: HOME | End: 2018-08-27
Payer: MEDICARE

## 2018-08-27 PROCEDURE — G8978 MOBILITY CURRENT STATUS: HCPCS

## 2018-08-27 PROCEDURE — 97140 MANUAL THERAPY 1/> REGIONS: CPT

## 2018-08-27 PROCEDURE — G8979 MOBILITY GOAL STATUS: HCPCS

## 2018-08-27 PROCEDURE — 97016 VASOPNEUMATIC DEVICE THERAPY: CPT

## 2018-08-27 PROCEDURE — 97110 THERAPEUTIC EXERCISES: CPT

## 2018-08-29 ENCOUNTER — HOSPITAL ENCOUNTER (OUTPATIENT)
Dept: PHYSICAL THERAPY | Age: 68
Setting detail: THERAPIES SERIES
Discharge: HOME | End: 2018-08-29
Payer: MEDICARE

## 2018-08-29 PROCEDURE — 97110 THERAPEUTIC EXERCISES: CPT

## 2018-08-29 PROCEDURE — 97140 MANUAL THERAPY 1/> REGIONS: CPT

## 2018-08-29 PROCEDURE — 97016 VASOPNEUMATIC DEVICE THERAPY: CPT

## 2018-08-29 NOTE — PROGRESS NOTES
[address] [facility]   [address]   Phone: [phone]   Fax: [phone]    Physical Therapy Daily Treatment Note      Date:  2018  Patient Name:  Chintan Ramirez    :  [id number]  MRN: [id number]  Physician: Nicole Rodríguez DPM                         Insurance: [facility] Medicare              Eligibility Status: Fercho Ring     DOS: 18  # of visits allowed/remaining: based on medical necessity  Source: Phone  Spoke Malik Turner [phone]  Reference: [id number]  Auth: NPRe     Electronically signed by Lion Barkley on 18 at 2:32 PM   Medical Diagnosis: Rehab Codes:   M76.62 (ICD-10-CM) - Achilles tendinitis of left lower extremity   M24.572 (ICD-10-CM) - Equinus contracture of left ankle   M79.672 (ICD-10-CM) - Left foot pain                                Onset date: 17               Next Dr's appt.: 18    Visit# / total visits: -  Cancels/No Shows: 0/0    MEDICARE CAP VISIT MAINTAINED IN NAVIGATOR    Subjective:   No falls. Still has mild irritation left AT, restricted ankle with ambulation. Pain:  [x] Yes  [] No Location: Left AT, Foot N/A Pain Rating: (0-10 scale) 1/10  Pain altered Tx:  [] No  [] Yes  Action:  Comments:  DN POC signed. DNA signed and witnessed.     Objective:  Modalities: Vasocompression left ankle, ankle sleeve, 34 degrees F, 15 min  Precautions:  Pacemaker -   Exercises:  Exercise Reps/ Time Weight/ Level Comments   NuStep      Eccentric Heel raise and lower 8 count x 10                 Triceps surae stretch 30 sec    KE/KF   Halo CW/CCW 3 each        3 way ankle  15    red band c cues   Lunge forward 15   c inversion          Manual:  TCJ, STJ, 1st MTP, distal tib fib grade 3  Dry Needle (see below) 25 min     Other:  Cues for supination emphasis with stretches and lunges for ankle mob      Homeostatic Point Right Left 0.5\" needle 1.0\" needle 2.0\" needle 3.0\" needle   Supraorbital []  []  []  [] []  []    Infraorbital  []  []  []  []  []  []    Lateral Pectoral []  []  []  []  []  []    Saphenous  []  []  []  []  []  []    Common Fibular (Peroneal) []  [x]  []  [x]  []  []    Tibial []  [x]  []  [x]  []  []    Deep Fibular (Peroneal) []  []  []  []  []  []    Greater Occipital []  []  []  []  []  []    Greater Auricular []  []  []  []  []  []    Spinal Accessory []  []  []  []  []  []    Dorsal Scapular []  []  []  []  []  []    Suprascapular (Infraspinatus) []  []  []  []  []  []    Lateral Antebrachial Cutaneous  []  []  []  []  []  []    Deep Radial []  []  []  []  []  []    Superior Cluneal []  []  []  []  []  []    Inferior Gluteal  []  []  []  []  []  []    Superficial Radial []  []  []  []  []  []    Iliotibial []  []  []  []  []  []    Lateral Popliteal  []  []  []  []  []  []    Sural []  [x]  []  [x]  []  []    Posterior Cutaneous of T6  []  []  []  []  []  []    Spinous Process of T7 -- centrally placed   [] []  []  []  []    Posterior Cutaneous of L2 []  []  []  []  []  []    Posterior Cutaneous L5 []  []  []  []  []  []        Symptomatic Points Right Left 0.5\" needle 1.0\" needle 2.0\" needle 3.0\" needle   Left AT and Joseph symptomatic  6 needles []  [x]  [x]  []  []  []     []  []  []  []  []  []     []  []  []  []  []  []     []  []  []  []  []  []     []  []  []  []  []  []     []  []  []  []  []  []     []  []  []  []  []  []     []  []  []  []  []  []     []  []  []  []  []  []     []  []  []  []  []  []     []  []  []  []  []  []        Specific Instructions for next treatment:  DN, Xfx massage left AT, KT, left ankle bilateral LE strength, flexibility, proprioception/balance       Treatment Charges: Mins Units   [x]  Modalities vasocompression 15 1   [x]  Ther Exercise 15 1   [x]  Manual Therapy 25 2   []  Ther Activities     []  Aquatics     []  Neuromuscular     []  Other     Total Treatment time 60 4       Assessment: [x] Progressing toward goals. Tolerates treatment well.   Doing well with therex in gym. Soft tissue restrictions, versus TCJ hypomobility. Reinforce importance of HEP compliance. Left AT pain post tx:  0/10. [] No change. [] Other:    STG/LTG     STG: (to be met in 10 treatments)  1. ? Pain:  Left AT pain 2/10 walking more than 150 feet. Goal met  2. ? ROM:  Left ankle, hip ROM 90% normal.  Not met  3. ? Strength:  Bilateral LE and core 5/5. Not met  4. ? Function:  LEFS 50%. Not met  5. Independent with Home Exercise Programs  6. Demonstrate Knowledge of fall prevention  LTG: (to be met in 24 treatments)  1. AT pain 0-2/10 with cutting lawn and shopping. Ongoing  2. LEFS 25% or less. Ongoing                    Patient goals:  Do grocery shopping and cut lawn without limping or pain     G-CODE     Functional Limitation: Mobility walking and moving around  Functional Assessment Used: LEFS - 65%  Current Status Modifier: CL  Goal Status Modifier: CI  Discharge Status Modifier:        Pt. Education:  [x] Yes  [] No  [x] Reviewed Prior HEP/Ed. Patient states he does not perform HEP at home. Will work with him to transition to HEP with reinforcement, education on progression and benefit. Method of Education: [x] Verbal  [] Demo  [] Written  Comprehension of Education:  [x] Verbalizes understanding. [] Demonstrates understanding. [x] Needs review. [] Demonstrates/verbalizes HEP/Ed previously given. Plan: [x] Continue per plan of care.    [] Other:      Time DQ:1904         Time Out: [address]    Electronically signed by:  Jarrell Gaona, PT

## 2018-08-30 ENCOUNTER — APPOINTMENT (OUTPATIENT)
Dept: CARDIAC CATH/INVASIVE PROCEDURES | Age: 68
DRG: 287 | End: 2018-08-30
Payer: MEDICARE

## 2018-08-30 ENCOUNTER — HOSPITAL ENCOUNTER (INPATIENT)
Age: 68
LOS: 2 days | Discharge: HOME | DRG: 287 | End: 2018-09-01
Attending: EMERGENCY MEDICINE | Admitting: INTERNAL MEDICINE
Payer: MEDICARE

## 2018-08-30 ENCOUNTER — APPOINTMENT (OUTPATIENT)
Dept: GENERAL RADIOLOGY | Age: 68
DRG: 287 | End: 2018-08-30
Payer: MEDICARE

## 2018-08-30 DIAGNOSIS — I47.20 VENTRICULAR TACHYCARDIA: Primary | ICD-10-CM

## 2018-08-30 LAB
ABSOLUTE EOS #: 0.12 K/UL (ref 0–0.44)
ABSOLUTE IMMATURE GRANULOCYTE: 0.05 K/UL (ref 0–0.3)
ABSOLUTE LYMPH #: 2.1 K/UL (ref 1.1–3.7)
ABSOLUTE MONO #: 1.19 K/UL (ref 0.1–1.2)
ALBUMIN SERPL-MCNC: 4.2 G/DL (ref 3.5–5.2)
ALBUMIN/GLOBULIN RATIO: 1.3 (ref 1–2.5)
ALLEN TEST: ABNORMAL
ALP BLD-CCNC: 90 U/L (ref 40–129)
ALT SERPL-CCNC: 30 U/L (ref 5–41)
ANION GAP SERPL CALCULATED.3IONS-SCNC: 16 MMOL/L (ref 9–17)
ANION GAP: 14 MMOL/L (ref 7–16)
AST SERPL-CCNC: 33 U/L
BASOPHILS # BLD: 1 % (ref 0–2)
BASOPHILS ABSOLUTE: 0.07 K/UL (ref 0–0.2)
BILIRUB SERPL-MCNC: 0.61 MG/DL (ref 0.3–1.2)
BILIRUBIN DIRECT: 0.18 MG/DL
BILIRUBIN, INDIRECT: 0.43 MG/DL (ref 0–1)
BNP INTERPRETATION: ABNORMAL
BUN BLDV-MCNC: 20 MG/DL (ref 8–23)
BUN/CREAT BLD: ABNORMAL (ref 9–20)
CALCIUM SERPL-MCNC: 9.9 MG/DL (ref 8.6–10.4)
CHLORIDE BLD-SCNC: 100 MMOL/L (ref 98–107)
CO2: 21 MMOL/L (ref 20–31)
CREAT SERPL-MCNC: 1.1 MG/DL (ref 0.7–1.2)
DIFFERENTIAL TYPE: ABNORMAL
EOSINOPHILS RELATIVE PERCENT: 1 % (ref 1–4)
ESTIMATED AVERAGE GLUCOSE: 143 MG/DL
FIO2: ABNORMAL
GFR AFRICAN AMERICAN: >60 ML/MIN
GFR NON-AFRICAN AMERICAN: >60 ML/MIN
GFR NON-AFRICAN AMERICAN: >60 ML/MIN
GFR SERPL CREATININE-BSD FRML MDRD: >60 ML/MIN
GFR SERPL CREATININE-BSD FRML MDRD: ABNORMAL ML/MIN/{1.73_M2}
GFR SERPL CREATININE-BSD FRML MDRD: ABNORMAL ML/MIN/{1.73_M2}
GFR SERPL CREATININE-BSD FRML MDRD: NORMAL ML/MIN/{1.73_M2}
GLOBULIN: NORMAL G/DL (ref 1.5–3.8)
GLUCOSE BLD-MCNC: 125 MG/DL (ref 75–110)
GLUCOSE BLD-MCNC: 134 MG/DL (ref 75–110)
GLUCOSE BLD-MCNC: 162 MG/DL (ref 74–100)
GLUCOSE BLD-MCNC: 167 MG/DL (ref 70–99)
GLUCOSE BLD-MCNC: 228 MG/DL (ref 75–110)
HBA1C MFR BLD: 6.6 % (ref 4–6)
HCO3 VENOUS: 20.1 MMOL/L (ref 22–29)
HCT VFR BLD CALC: 47.4 % (ref 40.7–50.3)
HEMOGLOBIN: 16 G/DL (ref 13–17)
IMMATURE GRANULOCYTES: 0 %
LYMPHOCYTES # BLD: 14 % (ref 24–43)
MAGNESIUM: 1.9 MG/DL (ref 1.6–2.6)
MCH RBC QN AUTO: 33.7 PG (ref 25.2–33.5)
MCHC RBC AUTO-ENTMCNC: 33.8 G/DL (ref 28.4–34.8)
MCV RBC AUTO: 99.8 FL (ref 82.6–102.9)
MODE: ABNORMAL
MONOCYTES # BLD: 8 % (ref 3–12)
NEGATIVE BASE EXCESS, VEN: 4 (ref 0–2)
NRBC AUTOMATED: 0 PER 100 WBC
O2 DEVICE/FLOW/%: ABNORMAL
O2 SAT, VEN: 84 % (ref 60–85)
PARTIAL THROMBOPLASTIN TIME: 23.3 SEC (ref 20.5–30.5)
PATIENT TEMP: ABNORMAL
PCO2, VEN: 33.8 MM HG (ref 41–51)
PDW BLD-RTO: 13.7 % (ref 11.8–14.4)
PH VENOUS: 7.38 (ref 7.32–7.43)
PLATELET # BLD: 180 K/UL (ref 138–453)
PLATELET ESTIMATE: ABNORMAL
PMV BLD AUTO: 10.7 FL (ref 8.1–13.5)
PO2, VEN: 49.1 MM HG (ref 30–50)
POC CHLORIDE: 106 MMOL/L (ref 98–107)
POC CREATININE: 1.06 MG/DL (ref 0.51–1.19)
POC HEMATOCRIT: 43 % (ref 41–53)
POC HEMOGLOBIN: 14.7 G/DL (ref 13.5–17.5)
POC IONIZED CALCIUM: 1.23 MMOL/L (ref 1.15–1.33)
POC LACTIC ACID: 3.16 MMOL/L (ref 0.56–1.39)
POC PCO2 TEMP: ABNORMAL MM HG
POC PH TEMP: ABNORMAL
POC PO2 TEMP: ABNORMAL MM HG
POC POTASSIUM: 4.7 MMOL/L (ref 3.5–4.5)
POC SODIUM: 140 MMOL/L (ref 138–146)
POC TROPONIN I: 0.99 NG/ML (ref 0–0.1)
POC TROPONIN INTERP: ABNORMAL
POSITIVE BASE EXCESS, VEN: ABNORMAL (ref 0–3)
POTASSIUM SERPL-SCNC: 5.2 MMOL/L (ref 3.7–5.3)
PRO-BNP: 4126 PG/ML
RBC # BLD: 4.75 M/UL (ref 4.21–5.77)
RBC # BLD: ABNORMAL 10*6/UL
SAMPLE SITE: ABNORMAL
SEG NEUTROPHILS: 76 % (ref 36–65)
SEGMENTED NEUTROPHILS ABSOLUTE COUNT: 11.8 K/UL (ref 1.5–8.1)
SODIUM BLD-SCNC: 137 MMOL/L (ref 135–144)
TOTAL CO2, VENOUS: 21 MMOL/L (ref 23–30)
TOTAL PROTEIN: 7.4 G/DL (ref 6.4–8.3)
TROPONIN INTERP: ABNORMAL
TROPONIN INTERP: ABNORMAL
TROPONIN T: 0.1 NG/ML
TROPONIN T: 0.11 NG/ML
WBC # BLD: 15.3 K/UL (ref 3.5–11.3)
WBC # BLD: ABNORMAL 10*3/UL

## 2018-08-30 PROCEDURE — 96375 TX/PRO/DX INJ NEW DRUG ADDON: CPT

## 2018-08-30 PROCEDURE — 93005 ELECTROCARDIOGRAM TRACING: CPT

## 2018-08-30 PROCEDURE — 6360000004 HC RX CONTRAST MEDICATION

## 2018-08-30 PROCEDURE — 80048 BASIC METABOLIC PNL TOTAL CA: CPT

## 2018-08-30 PROCEDURE — C1769 GUIDE WIRE: HCPCS

## 2018-08-30 PROCEDURE — 83880 ASSAY OF NATRIURETIC PEPTIDE: CPT

## 2018-08-30 PROCEDURE — 80076 HEPATIC FUNCTION PANEL: CPT

## 2018-08-30 PROCEDURE — 92960 CARDIOVERSION ELECTRIC EXT: CPT

## 2018-08-30 PROCEDURE — 85014 HEMATOCRIT: CPT

## 2018-08-30 PROCEDURE — 2500000003 HC RX 250 WO HCPCS: Performed by: EMERGENCY MEDICINE

## 2018-08-30 PROCEDURE — 2580000003 HC RX 258: Performed by: EMERGENCY MEDICINE

## 2018-08-30 PROCEDURE — 94762 N-INVAS EAR/PLS OXIMTRY CONT: CPT

## 2018-08-30 PROCEDURE — 6370000000 HC RX 637 (ALT 250 FOR IP): Performed by: STUDENT IN AN ORGANIZED HEALTH CARE EDUCATION/TRAINING PROGRAM

## 2018-08-30 PROCEDURE — 84295 ASSAY OF SERUM SODIUM: CPT

## 2018-08-30 PROCEDURE — 83036 HEMOGLOBIN GLYCOSYLATED A1C: CPT

## 2018-08-30 PROCEDURE — 84484 ASSAY OF TROPONIN QUANT: CPT

## 2018-08-30 PROCEDURE — 93458 L HRT ARTERY/VENTRICLE ANGIO: CPT | Performed by: INTERNAL MEDICINE

## 2018-08-30 PROCEDURE — C1894 INTRO/SHEATH, NON-LASER: HCPCS

## 2018-08-30 PROCEDURE — B2151ZZ FLUOROSCOPY OF LEFT HEART USING LOW OSMOLAR CONTRAST: ICD-10-PCS | Performed by: INTERNAL MEDICINE

## 2018-08-30 PROCEDURE — 96365 THER/PROPH/DIAG IV INF INIT: CPT

## 2018-08-30 PROCEDURE — 85730 THROMBOPLASTIN TIME PARTIAL: CPT

## 2018-08-30 PROCEDURE — 82565 ASSAY OF CREATININE: CPT

## 2018-08-30 PROCEDURE — 6360000002 HC RX W HCPCS: Performed by: INTERNAL MEDICINE

## 2018-08-30 PROCEDURE — 6360000002 HC RX W HCPCS: Performed by: EMERGENCY MEDICINE

## 2018-08-30 PROCEDURE — 83605 ASSAY OF LACTIC ACID: CPT

## 2018-08-30 PROCEDURE — 4A023N7 MEASUREMENT OF CARDIAC SAMPLING AND PRESSURE, LEFT HEART, PERCUTANEOUS APPROACH: ICD-10-PCS | Performed by: INTERNAL MEDICINE

## 2018-08-30 PROCEDURE — 99285 EMERGENCY DEPT VISIT HI MDM: CPT

## 2018-08-30 PROCEDURE — 6370000000 HC RX 637 (ALT 250 FOR IP): Performed by: INTERNAL MEDICINE

## 2018-08-30 PROCEDURE — 5A2204Z RESTORATION OF CARDIAC RHYTHM, SINGLE: ICD-10-PCS | Performed by: EMERGENCY MEDICINE

## 2018-08-30 PROCEDURE — 85025 COMPLETE CBC W/AUTO DIFF WBC: CPT

## 2018-08-30 PROCEDURE — 82435 ASSAY OF BLOOD CHLORIDE: CPT

## 2018-08-30 PROCEDURE — C1760 CLOSURE DEV, VASC: HCPCS

## 2018-08-30 PROCEDURE — 84132 ASSAY OF SERUM POTASSIUM: CPT

## 2018-08-30 PROCEDURE — 6360000002 HC RX W HCPCS

## 2018-08-30 PROCEDURE — 82947 ASSAY GLUCOSE BLOOD QUANT: CPT

## 2018-08-30 PROCEDURE — 2500000003 HC RX 250 WO HCPCS

## 2018-08-30 PROCEDURE — 2709999900 HC NON-CHARGEABLE SUPPLY

## 2018-08-30 PROCEDURE — 82803 BLOOD GASES ANY COMBINATION: CPT

## 2018-08-30 PROCEDURE — 2060000000 HC ICU INTERMEDIATE R&B

## 2018-08-30 PROCEDURE — B2111ZZ FLUOROSCOPY OF MULTIPLE CORONARY ARTERIES USING LOW OSMOLAR CONTRAST: ICD-10-PCS | Performed by: INTERNAL MEDICINE

## 2018-08-30 PROCEDURE — 96367 TX/PROPH/DG ADDL SEQ IV INF: CPT

## 2018-08-30 PROCEDURE — 83735 ASSAY OF MAGNESIUM: CPT

## 2018-08-30 PROCEDURE — 71045 X-RAY EXAM CHEST 1 VIEW: CPT

## 2018-08-30 PROCEDURE — 82330 ASSAY OF CALCIUM: CPT

## 2018-08-30 RX ORDER — PROPOFOL 10 MG/ML
40 INJECTION, EMULSION INTRAVENOUS ONCE
Status: DISCONTINUED | OUTPATIENT
Start: 2018-08-30 | End: 2018-08-30

## 2018-08-30 RX ORDER — SOTALOL HYDROCHLORIDE 80 MG/1
80 TABLET ORAL 2 TIMES DAILY
Status: DISCONTINUED | OUTPATIENT
Start: 2018-08-30 | End: 2018-09-01 | Stop reason: HOSPADM

## 2018-08-30 RX ORDER — ACETAMINOPHEN 325 MG/1
650 TABLET ORAL EVERY 4 HOURS PRN
Status: DISCONTINUED | OUTPATIENT
Start: 2018-08-30 | End: 2018-09-01 | Stop reason: HOSPADM

## 2018-08-30 RX ORDER — DEXTROSE MONOHYDRATE 50 MG/ML
100 INJECTION, SOLUTION INTRAVENOUS PRN
Status: DISCONTINUED | OUTPATIENT
Start: 2018-08-30 | End: 2018-09-01 | Stop reason: HOSPADM

## 2018-08-30 RX ORDER — HEPARIN SODIUM 1000 [USP'U]/ML
2000 INJECTION, SOLUTION INTRAVENOUS; SUBCUTANEOUS PRN
Status: DISCONTINUED | OUTPATIENT
Start: 2018-08-30 | End: 2018-08-30

## 2018-08-30 RX ORDER — HEPARIN SODIUM 5000 [USP'U]/ML
5000 INJECTION, SOLUTION INTRAVENOUS; SUBCUTANEOUS EVERY 8 HOURS SCHEDULED
Status: DISCONTINUED | OUTPATIENT
Start: 2018-08-30 | End: 2018-09-01 | Stop reason: HOSPADM

## 2018-08-30 RX ORDER — HEPARIN SODIUM 10000 [USP'U]/100ML
1000 INJECTION, SOLUTION INTRAVENOUS CONTINUOUS
Status: DISCONTINUED | OUTPATIENT
Start: 2018-08-30 | End: 2018-08-30

## 2018-08-30 RX ORDER — HEPARIN SODIUM 1000 [USP'U]/ML
4000 INJECTION, SOLUTION INTRAVENOUS; SUBCUTANEOUS ONCE
Status: COMPLETED | OUTPATIENT
Start: 2018-08-30 | End: 2018-08-30

## 2018-08-30 RX ORDER — MAGNESIUM SULFATE 1 G/100ML
1 INJECTION INTRAVENOUS
Status: DISPENSED | OUTPATIENT
Start: 2018-08-30 | End: 2018-08-30

## 2018-08-30 RX ORDER — NICOTINE POLACRILEX 4 MG
15 LOZENGE BUCCAL PRN
Status: DISCONTINUED | OUTPATIENT
Start: 2018-08-30 | End: 2018-09-01 | Stop reason: HOSPADM

## 2018-08-30 RX ORDER — PROPOFOL 10 MG/ML
40 INJECTION, EMULSION INTRAVENOUS ONCE
Status: COMPLETED | OUTPATIENT
Start: 2018-08-30 | End: 2018-08-30

## 2018-08-30 RX ORDER — METOPROLOL TARTRATE 5 MG/5ML
5 INJECTION INTRAVENOUS ONCE
Status: COMPLETED | OUTPATIENT
Start: 2018-08-30 | End: 2018-08-30

## 2018-08-30 RX ORDER — HEPARIN SODIUM 1000 [USP'U]/ML
4000 INJECTION, SOLUTION INTRAVENOUS; SUBCUTANEOUS PRN
Status: DISCONTINUED | OUTPATIENT
Start: 2018-08-30 | End: 2018-08-30

## 2018-08-30 RX ORDER — DEXTROSE MONOHYDRATE 25 G/50ML
12.5 INJECTION, SOLUTION INTRAVENOUS PRN
Status: DISCONTINUED | OUTPATIENT
Start: 2018-08-30 | End: 2018-09-01 | Stop reason: HOSPADM

## 2018-08-30 RX ADMIN — SOTALOL HYDROCHLORIDE 80 MG: 80 TABLET ORAL at 20:41

## 2018-08-30 RX ADMIN — ACETAMINOPHEN 650 MG: 325 TABLET ORAL at 15:39

## 2018-08-30 RX ADMIN — PROPOFOL 40 MG: 10 INJECTION, EMULSION INTRAVENOUS at 09:49

## 2018-08-30 RX ADMIN — AMIODARONE HYDROCHLORIDE 1 MG/MIN: 50 INJECTION, SOLUTION INTRAVENOUS at 09:13

## 2018-08-30 RX ADMIN — METOPROLOL TARTRATE 5 MG: 5 INJECTION, SOLUTION INTRAVENOUS at 09:02

## 2018-08-30 RX ADMIN — HEPARIN SODIUM 4000 UNITS: 1000 INJECTION, SOLUTION INTRAVENOUS; SUBCUTANEOUS at 09:09

## 2018-08-30 RX ADMIN — HEPARIN SODIUM 5000 UNITS: 5000 INJECTION, SOLUTION INTRAVENOUS; SUBCUTANEOUS at 15:40

## 2018-08-30 RX ADMIN — HEPARIN SODIUM AND DEXTROSE 1000 UNITS/HR: 10000; 5 INJECTION INTRAVENOUS at 09:09

## 2018-08-30 RX ADMIN — MAGNESIUM SULFATE IN DEXTROSE 1 G: 10 INJECTION, SOLUTION INTRAVENOUS at 10:06

## 2018-08-30 RX ADMIN — INSULIN LISPRO 2 UNITS: 100 INJECTION, SOLUTION INTRAVENOUS; SUBCUTANEOUS at 20:44

## 2018-08-30 RX ADMIN — AMIODARONE HYDROCHLORIDE 150 MG: 50 INJECTION, SOLUTION INTRAVENOUS at 09:00

## 2018-08-30 RX ADMIN — HEPARIN SODIUM 5000 UNITS: 5000 INJECTION, SOLUTION INTRAVENOUS; SUBCUTANEOUS at 21:45

## 2018-08-30 ASSESSMENT — ENCOUNTER SYMPTOMS
VOMITING: 0
NAUSEA: 0
EYE DISCHARGE: 0
SORE THROAT: 0
COUGH: 0
EYE PAIN: 0
SHORTNESS OF BREATH: 1
ABDOMINAL PAIN: 0
DIARRHEA: 0

## 2018-08-30 ASSESSMENT — PAIN SCALES - GENERAL
PAINLEVEL_OUTOF10: 4
PAINLEVEL_OUTOF10: 8
PAINLEVEL_OUTOF10: 0

## 2018-08-30 ASSESSMENT — PAIN DESCRIPTION - LOCATION: LOCATION: CHEST

## 2018-08-30 ASSESSMENT — PAIN DESCRIPTION - ORIENTATION: ORIENTATION: MID

## 2018-08-30 ASSESSMENT — PAIN DESCRIPTION - PAIN TYPE: TYPE: ACUTE PAIN

## 2018-08-30 NOTE — ED NOTES
Bed: 12  Expected date:   Expected time:   Means of arrival:   Comments:     Wilton iRley RN  08/30/18 101

## 2018-08-30 NOTE — PLAN OF CARE
Problem: Safety:  Goal: Free from accidental physical injury  Free from accidental physical injury   Outcome: Ongoing  Remains injury free, this shift. Problem: Pain:  Goal: Patient's pain/discomfort is manageable  Patient's pain/discomfort is manageable   Outcome: Ongoing  Medicated for pain, from being shocked, as ordered. Problem: Skin Integrity:  Goal: Skin integrity will stabilize  Skin integrity will stabilize   Outcome: Ongoing  No skin breakdown noted, this shift.

## 2018-08-30 NOTE — OP NOTE
healthcare facility. Witnessed     [] Yes   [x] No     Arrest after arrival of EMS  [] Yes   [x] No   [] Cardiac Arrest at other Facility. [] Yes   [x] No    Pre-Procedure Information. Heart Failure       [x] Yes    [] No    Class  [] I      [x] II  [] III    [] IV. New Diagnosis    [] Yes  [] No    HF Type      [x] Systolic   [] Diastolic          [] Unknown. Diagnostic Test:   EKG       [] Normal   [x] Abnormal    New antiarrhythmia medications:    [] Yes   [] No   New onset atrial fibrillation / Flutter     [] Yes   [] No   ECG Abnormalities:      [] V. Fib   [x] Dorita V. Tach           [] NS V. T   [] New LBBB           [] T. Inv  []  ST dev > 0.5 mm         [] PVC's freq  [] PVC's infrequent  Stress Test:   Type:    [] Stress Echo   [] Exercise Stress Test (no imaging)      [] Stress Nuclear  [] Stress Imaging   Results  [] Negative   [] Positive        [] Indeterminate  [] Unavailable     If Positive/ Risk / Extent of Ischemia:       [] Low  [] Intermediate         [] High  [] Unavailable      Cardiac CTA Performed:   Results   [] CAD   [] Non obstructive CAD      [] No CAD   [] Uncertain      [] Unknown   [] Structural Disease. Pre Procedure Medications:      [x] ASA [x] Beta Blockers      [] Nitrate [] Ca Channel Blockers      [] Ranolazine [x] Statin       [] Plavix/Others antiplatelets        Electronically signed by Ninfa Yu MD on 8/30/2018 at 11:40 AM  Cardiology Fellow. Attending Physician Statement  I have discussed the case of Regulo Wynne including pertinent history and exam findings with the resident. I have seen and examined the patient and the key elements of the encounter have been performed by me. I agree with the assessment, plan and orders as documented by the resident With changes made to the note. Procedure performed by me.     Electronically signed by La Bishop MD on 8/31/2018 at 10:33 AM.    [facility]      [phone]

## 2018-08-30 NOTE — ED NOTES
Awake alert and orient. No chest pressure and feels much better. To go to cath lab this evening. Awaiting bed number.   [facility]  08/30/18 1016

## 2018-08-30 NOTE — ED PROVIDER NOTES
pitting edema  No calf tenderness   Neurological: He is alert and oriented to person, place, and time. He exhibits normal muscle tone. 5/5 strength and sensation intact to bilateral upper and lower extremities   Skin: Skin is warm and dry. No rash noted. Psychiatric: He has a normal mood and affect. Thought content normal.   Nursing note and vitals reviewed.       DIFFERENTIAL  DIAGNOSIS     PLAN (LABS / IMAGING / EKG):  Orders Placed This Encounter   Procedures    XR CHEST PORTABLE    CBC WITH AUTO DIFFERENTIAL    BASIC METABOLIC PANEL    HEPATIC FUNCTION PANEL    Brain Natriuretic Peptide    Magnesium    Troponin    Hemoglobin and hematocrit, blood    SODIUM (POC)    POTASSIUM (POC)    CHLORIDE (POC)    CALCIUM, IONIC (POC)    APTT    Troponin    Troponin    Hemoglobin A1C    DIET CARB CONTROL;    Telemetry monitoring    Notify Provider    HYPOGLYCEMIA TREATMENT: blood glucose less than 50 mg/dL and patient  ALERT and TOLERATING PO    HYPOGLYCEMIA TREATMENT: blood glucose less than 70 mg/dL and patient ALERT and TOLERATING PO    HYPOGLYCEMIA TREATMENT: blood glucose less than 70 mg/dL and patient NOT ALERT or NPO    Inpatient consult to Cardiology    Inpatient consult to Cardiology    Pulse oximetry, continuous    POCT Troponin I    POC Blood Gas and Chemistry    POCT troponin    Venous Blood Gas, POC    Creatinine W/GFR Point of Care    Lactic Acid, POC    POCT Glucose    Anion Gap (Calc) POC    POCT glucose    POCT Glucose    EKG 12 Lead    EKG 12 Lead    EKG 12 Lead    ECHO Complete 2D W Doppler W Color    Insert peripheral IV    PATIENT STATUS (FROM ED OR OR/PROCEDURAL) Inpatient       MEDICATIONS ORDERED:  Orders Placed This Encounter   Medications    amiodarone (CORDARONE) 150 mg in dextrose 5 % 100 mL bolus    heparin (porcine) injection 4,000 Units    DISCONTD: heparin (porcine) injection 4,000 Units    DISCONTD: heparin (porcine) injection 2,000 Units    BUN 20 8 - 23 mg/dL    CREATININE 1.10 0.70 - 1.20 mg/dL    Bun/Cre Ratio NOT REPORTED 9 - 20    Calcium 9.9 8.6 - 10.4 mg/dL    Sodium 137 135 - 144 mmol/L    Potassium 5.2 3.7 - 5.3 mmol/L    Chloride 100 98 - 107 mmol/L    CO2 21 20 - 31 mmol/L    Anion Gap 16 9 - 17 mmol/L    GFR Non-African American >60 >60 mL/min    GFR African American >60 >60 mL/min    GFR Comment          GFR Staging NOT REPORTED    HEPATIC FUNCTION PANEL   Result Value Ref Range    Alb 4.2 3.5 - 5.2 g/dL    Alkaline Phosphatase 90 40 - 129 U/L    ALT 30 5 - 41 U/L    AST 33 <40 U/L    Total Bilirubin 0.61 0.3 - 1.2 mg/dL    Bilirubin, Direct 0.18 <0.31 mg/dL    Bilirubin, Indirect 0.43 0.00 - 1.00 mg/dL    Total Protein 7.4 6.4 - 8.3 g/dL    Globulin NOT REPORTED 1.5 - 3.8 g/dL    Albumin/Globulin Ratio 1.3 1.0 - 2.5   Brain Natriuretic Peptide   Result Value Ref Range    Pro-BNP 4,126 (H) <300 pg/mL    BNP Interpretation         Magnesium   Result Value Ref Range    Magnesium 1.9 1.6 - 2.6 mg/dL   Hemoglobin and hematocrit, blood   Result Value Ref Range    POC Hemoglobin 14.7 13.5 - 17.5 g/dL    POC Hematocrit 43 41 - 53 %   SODIUM (POC)   Result Value Ref Range    POC Sodium 140 138 - 146 mmol/L   POTASSIUM (POC)   Result Value Ref Range    POC Potassium 4.7 (H) 3.5 - 4.5 mmol/L   CHLORIDE (POC)   Result Value Ref Range    POC Chloride 106 98 - 107 mmol/L   CALCIUM, IONIC (POC)   Result Value Ref Range    POC Ionized Calcium 1.23 1.15 - 1.33 mmol/L   APTT   Result Value Ref Range    PTT 23.3 20.5 - 30.5 sec   Troponin   Result Value Ref Range    Troponin T 0.10 (HH) <0.03 ng/mL    Troponin Interp         Troponin   Result Value Ref Range    Troponin T 0.11 (HH) <0.03 ng/mL    Troponin Interp         Hemoglobin A1C   Result Value Ref Range    Hemoglobin A1C 6.6 (H) 4.0 - 6.0 %    Estimated Avg Glucose 143 mg/dL   POCT troponin   Result Value Ref Range    POC Troponin I 0.99 () 0.00 - 0.10 ng/mL    POC Troponin Interp       The

## 2018-08-30 NOTE — H&P
history. He has never used smokeless tobacco. He reports that he does not drink alcohol or use drugs. Family History: family history includes Diabetes in his father; Heart Disease in his father, maternal aunt, maternal uncle, paternal aunt, and paternal uncle; High Blood Pressure in his father. No h/o sudden cardiac death. No for premature CAD    REVIEW OF SYSTEMS:    · Constitutional: there has been no unanticipated weight loss. There's been No change in energy level, No change in activity level. · Eyes: No visual changes or diplopia. No scleral icterus. · ENT: No Headaches  · Cardiovascular: Remaining as above  · Respiratory: No previous pulmonary problems, No cough  · Gastrointestinal: No abdominal pain. No change in bowel or bladder habits. · Genitourinary: No dysuria, trouble voiding, or hematuria. · Musculoskeletal:  No gait disturbance, No weakness or joint complaints. · Integumentary: No rash or pruritis. · Neurological: No headache, diplopia, change in muscle strength, numbness or tingling. No change in gait, balance, coordination, mood, affect, memory, mentation, behavior. · Psychiatric: No anxiety, or depression. · Endocrine: No temperature intolerance. No excessive thirst, fluid intake, or urination. No tremor. · Hematologic/Lymphatic: No abnormal bruising or bleeding, blood clots or swollen lymph nodes. · Allergic/Immunologic: No nasal congestion or hives. PHYSICAL EXAM:      /83   Pulse 73   Temp 98.1 °F (36.7 °C) (Oral)   Resp 13   Wt 269 lb (122 kg)   SpO2 96%   BMI 35.66 kg/m²    No intake or output data in the 24 hours ending 08/30/18 1132      Constitutional and General Appearance: alert, cooperative, no distress and appears stated age  HEENT: PERRL, no cervical lymphadenopathy. No masses palpable. Normal oral mucosa  Respiratory:  · Normal excursion and expansion without use of accessory muscles  · Resp Auscultation: Good respiratory effort.  No for increased work of breathing. On auscultation: clear to auscultation bilaterally  Cardiovascular:  · The apical impulse is not displaced  · Heart tones are crisp and normal. regular S1 and S2.  · Jugular venous pulsation Normal  · The carotid upstroke is normal in amplitude and contour without delay or bruit  · Peripheral pulses are symmetrical and full     Abdomen:   · No masses or tenderness  · Bowel sounds present  Extremities:  ·  No Cyanosis or Clubbing  ·  Lower extremity edema: No  ·  Skin: Warm and dry  Neurological:  · Alert and oriented. · Moves all extremities well  · No abnormalities of mood, affect, memory, mentation, or behavior are noted    DATA:    Diagnostics:    EKG: Monomorphic VT  NSR after cardioversion      ECHO: 9/8/14  Left ventricular dilatation. Left ventricular systolic function was hard to  assess but appears severely reduced. Global LV dysfunction. Ejection  fraction is estimated at 25%. Stress Test: Oct 2017  Infarct with no reversible ischemia  Cardiac Angiography: 9/9/2014  2v CAD  LMCA: distal 20%     LAD: has ostial/proximal 100% occlusion . It gets left to left and right to  left collaterals  The D1 is seen via collaterals.     LCx: The distal LCX/LPL is occluded and seen via collaterals. The proximal  and mid portion is normal.  The OM1 is normal.        Labs:     CBC:   Recent Labs      08/30/18   0746   WBC  15.3*   HGB  16.0   HCT  47.4   PLT  180     BMP:   Recent Labs      08/30/18   0746  08/30/18   0810   NA  137   --    K  5.2   --    CO2  21   --    BUN  20   --    CREATININE  1.10  1.06   LABGLOM  >60  >60   GLUCOSE  167*   --      Pro-BNP:    Recent Labs      08/30/18   0746   PROBNP  4,126*     BNP: No results for input(s): BNP in the last 72 hours. PT/INR: No results for input(s): PROTIME, INR in the last 72 hours.   APTT:  Recent Labs      08/30/18   0746   APTT  23.3     CARDIAC ENZYMES:  Recent Labs      08/30/18   0728   TROPONINI  0.99*     Recent Labs      08/30/18

## 2018-08-31 LAB
ANION GAP SERPL CALCULATED.3IONS-SCNC: 13 MMOL/L (ref 9–17)
BUN BLDV-MCNC: 16 MG/DL (ref 8–23)
BUN/CREAT BLD: ABNORMAL (ref 9–20)
CALCIUM SERPL-MCNC: 8.7 MG/DL (ref 8.6–10.4)
CHLORIDE BLD-SCNC: 101 MMOL/L (ref 98–107)
CO2: 21 MMOL/L (ref 20–31)
CREAT SERPL-MCNC: 0.9 MG/DL (ref 0.7–1.2)
EKG ATRIAL RATE: 159 BPM
EKG ATRIAL RATE: 63 BPM
EKG ATRIAL RATE: 65 BPM
EKG ATRIAL RATE: 67 BPM
EKG ATRIAL RATE: 74 BPM
EKG P AXIS: 45 DEGREES
EKG P AXIS: 59 DEGREES
EKG P AXIS: 6 DEGREES
EKG P-R INTERVAL: 212 MS
EKG P-R INTERVAL: 212 MS
EKG P-R INTERVAL: 230 MS
EKG P-R INTERVAL: 238 MS
EKG Q-T INTERVAL: 336 MS
EKG Q-T INTERVAL: 382 MS
EKG Q-T INTERVAL: 432 MS
EKG Q-T INTERVAL: 436 MS
EKG Q-T INTERVAL: 450 MS
EKG QRS DURATION: 156 MS
EKG QRS DURATION: 92 MS
EKG QRS DURATION: 96 MS
EKG QRS DURATION: 98 MS
EKG QRS DURATION: 98 MS
EKG QTC CALCULATION (BAZETT): 424 MS
EKG QTC CALCULATION (BAZETT): 453 MS
EKG QTC CALCULATION (BAZETT): 456 MS
EKG QTC CALCULATION (BAZETT): 460 MS
EKG QTC CALCULATION (BAZETT): 543 MS
EKG R AXIS: -141 DEGREES
EKG R AXIS: 137 DEGREES
EKG R AXIS: 15 DEGREES
EKG R AXIS: 20 DEGREES
EKG R AXIS: 34 DEGREES
EKG T AXIS: -104 DEGREES
EKG T AXIS: -162 DEGREES
EKG T AXIS: -69 DEGREES
EKG T AXIS: 30 DEGREES
EKG T AXIS: 46 DEGREES
EKG VENTRICULAR RATE: 157 BPM
EKG VENTRICULAR RATE: 63 BPM
EKG VENTRICULAR RATE: 65 BPM
EKG VENTRICULAR RATE: 67 BPM
EKG VENTRICULAR RATE: 74 BPM
GFR AFRICAN AMERICAN: >60 ML/MIN
GFR NON-AFRICAN AMERICAN: >60 ML/MIN
GFR SERPL CREATININE-BSD FRML MDRD: ABNORMAL ML/MIN/{1.73_M2}
GFR SERPL CREATININE-BSD FRML MDRD: ABNORMAL ML/MIN/{1.73_M2}
GLUCOSE BLD-MCNC: 110 MG/DL (ref 75–110)
GLUCOSE BLD-MCNC: 132 MG/DL (ref 75–110)
GLUCOSE BLD-MCNC: 143 MG/DL (ref 70–99)
GLUCOSE BLD-MCNC: 148 MG/DL (ref 75–110)
GLUCOSE BLD-MCNC: 162 MG/DL (ref 75–110)
GLUCOSE BLD-MCNC: 172 MG/DL (ref 75–110)
HCT VFR BLD CALC: 41.6 % (ref 40.7–50.3)
HEMOGLOBIN: 13.9 G/DL (ref 13–17)
LV EF: 25 %
LVEF MODALITY: NORMAL
MCH RBC QN AUTO: 33.7 PG (ref 25.2–33.5)
MCHC RBC AUTO-ENTMCNC: 33.4 G/DL (ref 28.4–34.8)
MCV RBC AUTO: 101 FL (ref 82.6–102.9)
NRBC AUTOMATED: 0 PER 100 WBC
PDW BLD-RTO: 13.7 % (ref 11.8–14.4)
PLATELET # BLD: 120 K/UL (ref 138–453)
PMV BLD AUTO: 10.4 FL (ref 8.1–13.5)
POTASSIUM SERPL-SCNC: 4.4 MMOL/L (ref 3.7–5.3)
RBC # BLD: 4.12 M/UL (ref 4.21–5.77)
SODIUM BLD-SCNC: 135 MMOL/L (ref 135–144)
TROPONIN INTERP: ABNORMAL
TROPONIN T: 0.31 NG/ML
WBC # BLD: 9.6 K/UL (ref 3.5–11.3)

## 2018-08-31 PROCEDURE — 84484 ASSAY OF TROPONIN QUANT: CPT

## 2018-08-31 PROCEDURE — 2060000000 HC ICU INTERMEDIATE R&B

## 2018-08-31 PROCEDURE — 82947 ASSAY GLUCOSE BLOOD QUANT: CPT

## 2018-08-31 PROCEDURE — 6370000000 HC RX 637 (ALT 250 FOR IP): Performed by: INTERNAL MEDICINE

## 2018-08-31 PROCEDURE — 6360000002 HC RX W HCPCS: Performed by: INTERNAL MEDICINE

## 2018-08-31 PROCEDURE — 94762 N-INVAS EAR/PLS OXIMTRY CONT: CPT

## 2018-08-31 PROCEDURE — 93306 TTE W/DOPPLER COMPLETE: CPT

## 2018-08-31 PROCEDURE — 85027 COMPLETE CBC AUTOMATED: CPT

## 2018-08-31 PROCEDURE — 80048 BASIC METABOLIC PNL TOTAL CA: CPT

## 2018-08-31 PROCEDURE — 93005 ELECTROCARDIOGRAM TRACING: CPT

## 2018-08-31 PROCEDURE — 2580000003 HC RX 258: Performed by: INTERNAL MEDICINE

## 2018-08-31 PROCEDURE — 36415 COLL VENOUS BLD VENIPUNCTURE: CPT

## 2018-08-31 PROCEDURE — 6370000000 HC RX 637 (ALT 250 FOR IP): Performed by: STUDENT IN AN ORGANIZED HEALTH CARE EDUCATION/TRAINING PROGRAM

## 2018-08-31 RX ORDER — SODIUM CHLORIDE 0.9 % (FLUSH) 0.9 %
10 SYRINGE (ML) INJECTION PRN
Status: DISCONTINUED | OUTPATIENT
Start: 2018-08-31 | End: 2018-09-01 | Stop reason: HOSPADM

## 2018-08-31 RX ORDER — CARVEDILOL 12.5 MG/1
12.5 TABLET ORAL 2 TIMES DAILY WITH MEALS
Status: DISCONTINUED | OUTPATIENT
Start: 2018-08-31 | End: 2018-09-01 | Stop reason: HOSPADM

## 2018-08-31 RX ORDER — SPIRONOLACTONE 25 MG/1
25 TABLET ORAL DAILY
Status: DISCONTINUED | OUTPATIENT
Start: 2018-08-31 | End: 2018-09-01 | Stop reason: HOSPADM

## 2018-08-31 RX ORDER — ACETAMINOPHEN 325 MG/1
650 TABLET ORAL EVERY 4 HOURS PRN
Status: DISCONTINUED | OUTPATIENT
Start: 2018-08-31 | End: 2018-09-01 | Stop reason: HOSPADM

## 2018-08-31 RX ORDER — ONDANSETRON 2 MG/ML
4 INJECTION INTRAMUSCULAR; INTRAVENOUS EVERY 6 HOURS PRN
Status: DISCONTINUED | OUTPATIENT
Start: 2018-08-31 | End: 2018-08-31

## 2018-08-31 RX ORDER — ASPIRIN 81 MG/1
81 TABLET, CHEWABLE ORAL DAILY
Status: DISCONTINUED | OUTPATIENT
Start: 2018-08-31 | End: 2018-09-01 | Stop reason: HOSPADM

## 2018-08-31 RX ORDER — SODIUM CHLORIDE 0.9 % (FLUSH) 0.9 %
10 SYRINGE (ML) INJECTION EVERY 12 HOURS SCHEDULED
Status: DISCONTINUED | OUTPATIENT
Start: 2018-08-31 | End: 2018-09-01 | Stop reason: HOSPADM

## 2018-08-31 RX ORDER — NITROGLYCERIN 0.4 MG/1
0.4 TABLET SUBLINGUAL EVERY 5 MIN PRN
Status: DISCONTINUED | OUTPATIENT
Start: 2018-08-31 | End: 2018-09-01 | Stop reason: HOSPADM

## 2018-08-31 RX ORDER — CARVEDILOL 12.5 MG/1
12.5 TABLET ORAL 2 TIMES DAILY
Status: DISCONTINUED | OUTPATIENT
Start: 2018-08-31 | End: 2018-08-31

## 2018-08-31 RX ORDER — LISINOPRIL 5 MG/1
5 TABLET ORAL DAILY
Status: DISCONTINUED | OUTPATIENT
Start: 2018-08-31 | End: 2018-09-01 | Stop reason: HOSPADM

## 2018-08-31 RX ORDER — TAMSULOSIN HYDROCHLORIDE 0.4 MG/1
0.4 CAPSULE ORAL DAILY
Status: DISCONTINUED | OUTPATIENT
Start: 2018-08-31 | End: 2018-09-01 | Stop reason: HOSPADM

## 2018-08-31 RX ORDER — ATORVASTATIN CALCIUM 40 MG/1
40 TABLET, FILM COATED ORAL NIGHTLY
Status: DISCONTINUED | OUTPATIENT
Start: 2018-08-31 | End: 2018-09-01 | Stop reason: HOSPADM

## 2018-08-31 RX ORDER — FUROSEMIDE 20 MG/1
20 TABLET ORAL DAILY
Status: DISCONTINUED | OUTPATIENT
Start: 2018-08-31 | End: 2018-09-01 | Stop reason: HOSPADM

## 2018-08-31 RX ORDER — SODIUM CHLORIDE 9 MG/ML
INJECTION, SOLUTION INTRAVENOUS CONTINUOUS
Status: DISCONTINUED | OUTPATIENT
Start: 2018-08-31 | End: 2018-09-01 | Stop reason: HOSPADM

## 2018-08-31 RX ORDER — TIZANIDINE 4 MG/1
4 TABLET ORAL EVERY 6 HOURS PRN
Status: DISCONTINUED | OUTPATIENT
Start: 2018-08-31 | End: 2018-09-01 | Stop reason: HOSPADM

## 2018-08-31 RX ADMIN — HEPARIN SODIUM 5000 UNITS: 5000 INJECTION, SOLUTION INTRAVENOUS; SUBCUTANEOUS at 21:22

## 2018-08-31 RX ADMIN — SOTALOL HYDROCHLORIDE 80 MG: 80 TABLET ORAL at 10:31

## 2018-08-31 RX ADMIN — METFORMIN HYDROCHLORIDE 500 MG: 500 TABLET ORAL at 10:30

## 2018-08-31 RX ADMIN — DESMOPRESSIN ACETATE 40 MG: 0.2 TABLET ORAL at 21:22

## 2018-08-31 RX ADMIN — LISINOPRIL 5 MG: 5 TABLET ORAL at 10:30

## 2018-08-31 RX ADMIN — INSULIN LISPRO 2 UNITS: 100 INJECTION, SOLUTION INTRAVENOUS; SUBCUTANEOUS at 10:37

## 2018-08-31 RX ADMIN — CARVEDILOL 12.5 MG: 12.5 TABLET, FILM COATED ORAL at 18:12

## 2018-08-31 RX ADMIN — Medication 10 ML: at 21:22

## 2018-08-31 RX ADMIN — FUROSEMIDE 20 MG: 20 TABLET ORAL at 10:31

## 2018-08-31 RX ADMIN — SOTALOL HYDROCHLORIDE 80 MG: 80 TABLET ORAL at 21:22

## 2018-08-31 RX ADMIN — TIZANIDINE 4 MG: 4 TABLET ORAL at 10:30

## 2018-08-31 RX ADMIN — HEPARIN SODIUM 5000 UNITS: 5000 INJECTION, SOLUTION INTRAVENOUS; SUBCUTANEOUS at 14:49

## 2018-08-31 RX ADMIN — CARVEDILOL 12.5 MG: 12.5 TABLET, FILM COATED ORAL at 10:35

## 2018-08-31 RX ADMIN — HEPARIN SODIUM 5000 UNITS: 5000 INJECTION, SOLUTION INTRAVENOUS; SUBCUTANEOUS at 05:18

## 2018-08-31 RX ADMIN — SPIRONOLACTONE 25 MG: 25 TABLET ORAL at 10:30

## 2018-08-31 RX ADMIN — ASPIRIN 81 MG: 81 TABLET, CHEWABLE ORAL at 10:30

## 2018-08-31 RX ADMIN — TAMSULOSIN HYDROCHLORIDE 0.4 MG: 0.4 CAPSULE ORAL at 14:49

## 2018-08-31 ASSESSMENT — PAIN SCALES - GENERAL
PAINLEVEL_OUTOF10: 0
PAINLEVEL_OUTOF10: 0

## 2018-08-31 NOTE — PROGRESS NOTES
[facility]   Progress Note                    Date:   8/31/2018  Patient name:  Aaliyah Stovall  Date of admission:  8/30/2018  7:16 AM  MRN:   [id number]  YOB: 1950  PCP:    Devonte Pop MD    Reason for Admission:  Ventricular tachyarrhythmia ([address] [I47.2]    Subjective:       Clinical Changes / Abnormalities: The patient was seen and examined . Doing well this am, no further episodes of VT overnight. I/O last 3 completed shifts: In: 1160 [P.O.:450; I.V.:710]  Out: 1275 [Urine:1275]  No intake/output data recorded. In: 1060 [P.O.:450;  I.V.:610]  Out: 800 [Urine:800]      Intake/Output Summary (Last 24 hours) at 08/31/18 0843  Last data filed at 08/31/18 0520   Gross per 24 hour   Intake             1160 ml   Output             1275 ml   Net             -115 ml         I/O since admission:  liters    Medications:   Scheduled Meds:   sodium chloride flush  10 mL Intravenous 2 times per day    aspirin  81 mg Oral Daily    lisinopril  5 mg Oral Daily    metFORMIN  500 mg Oral BID WC    tamsulosin  0.4 mg Oral Daily    furosemide  20 mg Oral Daily    atorvastatin  40 mg Oral Nightly    spironolactone  25 mg Oral Daily    sodium chloride flush  10 mL Intravenous 2 times per day    carvedilol  12.5 mg Oral BID WC    heparin (porcine)  5,000 Units Subcutaneous 3 times per day    insulin lispro  0-12 Units Subcutaneous TID WC    insulin lispro  0-6 Units Subcutaneous Nightly    sotalol  80 mg Oral BID     Continuous Infusions:   sodium chloride      dextrose       CBC:   Recent Labs      08/30/18   0746   WBC  15.3*   HGB  16.0   PLT  180     BMP:  Recent Labs      08/30/18   0746  08/30/18   0810   NA  137   --    K  5.2   --    CL  100   --    CO2  21   --    BUN  20   --    CREATININE  1.10  1.06   GLUCOSE  167*   --      Hepatic: Recent Labs      08/30/18   0746   AST  33   ALT  30   BILITOT  0.61   ALKPHOS  90     Troponin:   Recent Labs 9/15/14: Medtronic- Primary prophylaxis. YK     CATH 9/09/14: Left main: Distal 20%.             LAD: Very proximal/ostial 100%, L-L and R-L collaterals. D1 seen via collaterals.              LCX: LPL occlusion (collaterals seen). OM1 normal.              RCA: NL.              The LV gram was performed in the MONDRAGON 30 position.               LVEF: 15%. LV Wall Motion: Severely global hypo to akinesia.      TTE 9/08/14: EF 25%. LA mildly dilated. Mild MR. Patient's Active Problem List   Active Problems:    Ventricular tachyarrhythmia ([address]  Resolved Problems:    * No resolved hospital problems. *        Assessment / Acute Cardiac Problems:   1. Sustained monomorphic ventricula r tachycardia, resistant to amio bolus s/p successful cardioversion with synchronized 250 J  2. Severe ischemic cardiomyopathy with last LVEF 25%   3. Coronary artery disease with proximal 100% occlusion of LAD supplied by collaterals, nonobstructive disease in RCA, diag and OM  4. Status post single-chamber Medtronic ICD in situ with a single VT detection zone of 188  5. Troponin elevation, mild, likely type 2 MI from tachycardia         Plan of Treatment:   1. S/p cardiac cath yesterday with no change since 2014  2. Seen by Dr Kym Alexis yesterday  3. Started on Sotalol 80 mg bid  4. Continue home Coreg 12.5 mg bid  5. Device has been reprogrammed to a two-zone detection scheme ( 430/ 320 msec) to allow for detection of slower VT, with initial therapies including antitachycardia pacing  6.    2D echo pending  7. Discharge tomorrow if no further episodes of VT, outpatient follow up with Dr Kym Alexis in one week        Dhruv Restrepo MD  Fellow, [address]            Please note that part of this chart were generated using voice recognition  dictation software.   Although every effort was made to ensure the accuracy of this automated transcription, some errors in transcription may have occurred. Attending Physician Statement  I have discussed the care of [facility] AND [facility], including pertinent history and exam findings,  with the resident. I have seen and examined the patient and the key elements of all parts of the encounter have been performed by me. I agree with the assessment, plan and orders as documented by the resident.

## 2018-08-31 NOTE — PLAN OF CARE
Problem: Safety:  Goal: Free from accidental physical injury  Free from accidental physical injury   Outcome: Ongoing  Up to bathroom with standby assist, no injury incured    Problem: Pain:  Goal: Patient's pain/discomfort is manageable  Patient's pain/discomfort is manageable   Outcome: Ongoing  medicate for comfort    Problem: Skin Integrity:  Goal: Skin integrity will stabilize  Skin integrity will stabilize   Outcome: Not Met This Shift  No new skin breakdown noted

## 2018-08-31 NOTE — CONSULTS
allergies. Social History:   reports that he has been smoking Cigarettes. He has a 55.00 pack-year smoking history. He has never used smokeless tobacco. He reports that he does not drink alcohol or use drugs. Family History:   Positive for early CAD    REVIEW OF SYSTEMS:    · Constitutional: there has been no unanticipated weight loss. There's been No change in energy level, No change in activity level. · Eyes: No visual changes or diplopia. No scleral icterus. · ENT: No Headaches, hearing loss or vertigo. No mouth sores or sore throat. · Cardiovascular: No problem  · Respiratory: No previous reported problems  · Gastrointestinal: No abdominal pain, appetite loss, blood in stools. No change in bowel or bladder habits. · Genitourinary: No dysuria, trouble voiding, or hematuria. · Musculoskeletal:  No gait disturbance, No weakness or joint complaints. · Integumentary: No rash or pruritis. · Neurological: No headache, diplopia, change in muscle strength, numbness or tingling. No change in gait, balance, coordination, mood, affect, memory, mentation, behavior. · Psychiatric: No anxiety, or depression. · Endocrine: No temperature intolerance. No excessive thirst, fluid intake, or urination. No tremor. · Hematologic/Lymphatic: No abnormal bruising or bleeding, blood clots or swollen lymph nodes. · Allergic/Immunologic: No nasal congestion or hives. PHYSICAL EXAM:    Physical Examination:    BP (!) 129/115   Pulse 62   Temp 97.9 °F (36.6 °C) (Oral)   Resp 15   Wt 269 lb (122 kg)   SpO2 94%   BMI 35.66 kg/m²    Constitutional and General Appearance: alert, cooperative, no distress and appears stated age  HEENT: PERRL, no cervical lymphadenopathy. No masses palpable. Normal oral mucosa  Respiratory:  · Normal excursion and expansion without use of accessory muscles  · Resp Auscultation: Good respiratory effort. No for increased work of breathing.  On auscultation: Decreased throughout but clear

## 2018-09-01 VITALS
RESPIRATION RATE: 18 BRPM | WEIGHT: 264.55 LBS | SYSTOLIC BLOOD PRESSURE: 116 MMHG | DIASTOLIC BLOOD PRESSURE: 67 MMHG | BODY MASS INDEX: 35.07 KG/M2 | HEART RATE: 56 BPM | TEMPERATURE: 98.4 F | OXYGEN SATURATION: 94 %

## 2018-09-01 LAB
GLUCOSE BLD-MCNC: 145 MG/DL (ref 75–110)
GLUCOSE BLD-MCNC: 180 MG/DL (ref 75–110)
TROPONIN INTERP: ABNORMAL
TROPONIN T: 0.35 NG/ML
TROPONIN T: 0.36 NG/ML
TROPONIN T: 0.39 NG/ML

## 2018-09-01 PROCEDURE — 6360000002 HC RX W HCPCS: Performed by: INTERNAL MEDICINE

## 2018-09-01 PROCEDURE — 6370000000 HC RX 637 (ALT 250 FOR IP): Performed by: INTERNAL MEDICINE

## 2018-09-01 PROCEDURE — 2580000003 HC RX 258: Performed by: INTERNAL MEDICINE

## 2018-09-01 PROCEDURE — 6370000000 HC RX 637 (ALT 250 FOR IP): Performed by: STUDENT IN AN ORGANIZED HEALTH CARE EDUCATION/TRAINING PROGRAM

## 2018-09-01 PROCEDURE — 84484 ASSAY OF TROPONIN QUANT: CPT

## 2018-09-01 PROCEDURE — 82947 ASSAY GLUCOSE BLOOD QUANT: CPT

## 2018-09-01 PROCEDURE — 36415 COLL VENOUS BLD VENIPUNCTURE: CPT

## 2018-09-01 PROCEDURE — 93005 ELECTROCARDIOGRAM TRACING: CPT

## 2018-09-01 RX ORDER — SOTALOL HYDROCHLORIDE 80 MG/1
80 TABLET ORAL 2 TIMES DAILY
Qty: 60 TABLET | Refills: 3 | Status: ON HOLD | OUTPATIENT
Start: 2018-09-01 | End: 2018-12-10 | Stop reason: HOSPADM

## 2018-09-01 RX ADMIN — CARVEDILOL 12.5 MG: 12.5 TABLET, FILM COATED ORAL at 08:46

## 2018-09-01 RX ADMIN — FUROSEMIDE 20 MG: 20 TABLET ORAL at 08:46

## 2018-09-01 RX ADMIN — TIZANIDINE 4 MG: 4 TABLET ORAL at 08:47

## 2018-09-01 RX ADMIN — LISINOPRIL 5 MG: 5 TABLET ORAL at 08:46

## 2018-09-01 RX ADMIN — Medication 10 ML: at 08:51

## 2018-09-01 RX ADMIN — SOTALOL HYDROCHLORIDE 80 MG: 80 TABLET ORAL at 08:49

## 2018-09-01 RX ADMIN — TAMSULOSIN HYDROCHLORIDE 0.4 MG: 0.4 CAPSULE ORAL at 08:49

## 2018-09-01 RX ADMIN — SPIRONOLACTONE 25 MG: 25 TABLET ORAL at 08:46

## 2018-09-01 RX ADMIN — ASPIRIN 81 MG: 81 TABLET, CHEWABLE ORAL at 08:48

## 2018-09-01 RX ADMIN — Medication 10 ML: at 08:55

## 2018-09-01 RX ADMIN — HEPARIN SODIUM 5000 UNITS: 5000 INJECTION, SOLUTION INTRAVENOUS; SUBCUTANEOUS at 06:07

## 2018-09-01 ASSESSMENT — PAIN SCALES - GENERAL: PAINLEVEL_OUTOF10: 0

## 2018-09-01 NOTE — DISCHARGE INSTR - DIET
 Good nutrition is important when healing from an illness, injury, or surgery. Follow any nutrition recommendations given to you during your hospital stay.  If you were given an oral nutrition supplement while in the hospital, continue to take this supplement at home. You can take it with meals, in-between meals, and/or before bedtime. These supplements can be purchased at most local grocery stores, pharmacies, and chain super-stores.  If you have any questions about your diet or nutrition, call the hospital and ask for the dietitian. Heart-Healthy Diet   Sodium, Fat, and Cholesterol Controlled Diet       What Is a Heart Healthy Diet? A heart-healthy diet is one that limits sodium , certain types of fat , and cholesterol . This type of diet is recommended for:   · People with any form of cardiovascular disease (eg, coronary heart disease , peripheral vascular disease , previous heart attack , previous stroke )   · People with risk factors for cardiovascular disease, such as high blood pressure , high cholesterol , or diabetes   · Anyone who wants to lower their risk of developing cardiovascular disease   Sodium    Sodium is a mineral found in many foods. In general, most people consume much more sodium than they need. Diets high in sodium can increase blood pressure and lead to edema (water retention). On a heart-healthy diet, you should consume no more than 2,300 mg (milligrams) of sodium per dayabout the amount in one teaspoon of table salt. The foods highest in sodium include table salt (about 50% sodium), processed foods, convenience foods, and preserved foods. Cholesterol    Cholesterol is a fat-like, waxy substance in your blood. Our bodies make some cholesterol. It is also found in animal products, with the highest amounts in fatty meat, egg yolks, whole milk, cheese, shellfish, and organ meats. On a heart-healthy diet, you should limit your cholesterol intake to less than 200 mg per day.    It is normal and important to have some cholesterol in your bloodstream. But too much cholesterol can cause plaque to build up within your arteries, which can eventually lead to a heart attack or stroke. The two types of cholesterol that are most commonly referred to are:   · Low-density lipoprotein (LDL) cholesterol  Also known as bad cholesterol, this is the cholesterol that tends to build up along your arteries. Bad cholesterol levels are increased by eating fats that are saturated or hydrogenated. Optimal level of this cholesterol is less than 100. Over 130 starts to get risky for heart disease. · High-density lipoprotein (HDL) cholesterol  Also known as good cholesterol, this type of cholesterol actually carries cholesterol away from your arteries and may, therefore, help lower your risk of having a heart attack. You want this level to be high (ideally greater than 60). It is a risk to have a level less than 40. You can raise this good cholesterol by eating olive oil, canola oil, avocados, or nuts. Exercise raises this level, too. Fat    Fat is calorie dense and packs a lot of calories into a small amount of food. Even though fats should be limited due to their high calorie content, not all fats are bad. In fact, some fats are quite healthful. Fat can be broken down into four main types.    · The good-for-you fats are:   ¨ Monounsaturated fat  found in oils such as olive and canola, avocados, and nuts and natural nut butters; can decrease cholesterol levels, while keeping levels of HDL cholesterol high   ¨ Polyunsaturated fat  found in oils such as safflower, sunflower, soybean, corn, and sesame; can decrease total cholesterol and LDL cholesterol   ¨ Omega-3 fatty acids  particularly those found in fatty fish (such as salmon, trout, tuna, mackerel, herring, and sardines); can decrease risk of arrhythmias, decrease triglyceride levels, and slightly lower blood pressure   · The fats that you want to limit are: ¨ Saturated fat  found in animal products, many fast foods, and a few vegetables; increases total blood cholesterol, including LDL levels   § Animal fats that are saturated include: butter, lard, whole-milk dairy products, meat fat, and poultry skin   § Vegetable fats that are saturated include: hydrogenated shortening, palm oil, coconut oil, cocoa butter   ¨ Hydrogenated or trans fat  found in margarine and vegetable shortening, most shelf stable snack foods, and fried foods; increases LDL and decreases HDL     It is generally recommended that you limit your total fat for the day to less than 30% of your total calories. If you follow an 1800-calorie heart healthy diet, for example, this would mean 60 grams of fat or less per day. Saturated fat and trans fat in your diet raises your blood cholesterol the most, much more than dietary cholesterol does. For this reason, on a heart-healthy diet, less than 7% of your calories should come from saturated fat and ideally 0% from trans fat. On an 1800-calorie diet, this translates into less than 14 grams of saturated fat per day, leaving 46 grams of fat to come from mono- and polyunsaturated fats.    Food Choices on a Heart Healthy Diet   Food Category   Foods Recommended   Foods to Avoid   Grains   Breads and rolls without salted tops Most dry and cooked cereals Unsalted crackers and breadsticks Low-sodium or homemade breadcrumbs or stuffing All rice and pastas   Breads, rolls, and crackers with salted tops High-fat baked goods (eg, muffins, donuts, pastries) Quick breads, self-rising flour, and biscuit mixes Regular bread crumbs Instant hot cereals Commercially prepared rice, pasta, or stuffing mixes   Vegetables   Most fresh, frozen, and low-sodium canned vegetables Low-sodium and salt-free vegetable juices Canned vegetables if unsalted or rinsed   Regular canned vegetables and juices, including sauerkraut and pickled vegetables Frozen vegetables with sauces

## 2018-09-01 NOTE — DISCHARGE SUMMARY
[facility]  Discharge Note                 Name:  David Carrizales  YOB: 1950  Social Security Number:  [id number]  Medical Record Number:  [id number]    Date of Admission:  8/30/2018  Date of Discharge:  9/1/2018    Admitting physician: Michelle Mendez MD    Discharge Attending: Chaitanya Forman MD  Primary Care Physician: John Arias MD  Consultants: Cardiology  Discharge to [address] [place] [address] LIST:  Patient Active Problem List   Diagnosis    Unstable angina ([address]    Alcohol abuse    Smoking    CHF (congestive heart failure) ([address]    COPD (chronic obstructive pulmonary disease) ([address]    Obesity    Shortness of breath    Ischemic cardiomyopathy    SOB (shortness of breath)    Anxiety    Hoarseness    Colon polyps    Back pain    Essential hypertension    Type 2 diabetes mellitus without complication, without long-term current use of insulin ([address]    Ventricular tachyarrhythmia [facility])         Procedures:cardiac catheterization    HOSPITAL COURSE :   The patient was admitted for: Monomorphic VT  Hospital Procedures if any: Cardiac Cath  Medications changes recommendation: As noted below  Follow Up Plan: In clinic in 1 week    H/O severe ischemic cardiomyopathy s/p single chamber ICD placement in 2014. Presented with palpitations and lightheadedness. Noted to be in sustained monomorphic VT with rate in the 150s in the ER. Underwent synchronized cardioversion with a single 250 J shock. Underwent cardiac cath which showed unchanged disease from last cath in 2014 (% stenosis). Seen by Dr Brock Vieyra, who added Sotalol. Device was reprogrammed to allow detection of slower VT.  TTE was unchanged from prior study. No further VT, with rare PVCs. Being discharged in stable condition with F/U in clinic in 1 week.       Discharge exam:   Vitals:    09/01/18 0732   BP: 116/67   Pulse: 56   Resp: 18   Temp: 98.4 °F (36.9 °C)   SpO2: 94%     Neuro: normal  Chest: Clear to ausculation. No wheezing. Cardiac: Cor RRR  Abdomen/groin: soft, non-tender, without masses or organomegaly  Lower extremity edema: none     Follow up with primary care provider 1 week  Follow up with cardiology 4 weeks  Follow up with other consultant physicians at their directions. Discharge Medications:   Liliya Gibson   Home Medication Instructions DMM:[id number]    Printed on:09/01/18 9684   Medication Information                      ACCU-CHEK SOFTCLIX LANCETS MISC  100 each by In Vitro route 2 times daily             aspirin 81 MG tablet  Take 81 mg by mouth daily. atorvastatin (LIPITOR) 40 MG tablet  TAKE 1 TABLET DAILY             carvedilol (COREG) 12.5 MG tablet  Take 12.5 mg by mouth 2 times daily             furosemide (LASIX) 20 MG tablet  TAKE 1 TABLET DAILY             Glucose Blood (BLOOD GLUCOSE TEST STRIPS) STRP  1 Device by In Vitro route 2 times daily             Lancets Misc. (ACCU-CHEK MULTICLIX LANCET DEV) KIT  1 Device by Does not apply route 2 times daily             lisinopril (PRINIVIL;ZESTRIL) 5 MG tablet  Take 5 mg by mouth daily             metFORMIN (GLUCOPHAGE) 500 MG tablet  Take 1 tablet by mouth 2 times daily (with meals)             nitroGLYCERIN (NITROSTAT) 0.4 MG SL tablet  Place 1 tablet under the tongue every 5 minutes as needed for Chest pain.              spironolactone (ALDACTONE) 25 MG tablet  TAKE 1 TABLET DAILY             tamsulosin (FLOMAX) 0.4 MG capsule  Take 1 capsule by mouth daily             tiZANidine (ZANAFLEX) 4 MG tablet  Take 4 mg by mouth every 6 hours as needed                Current Facility-Administered Medications: sodium chloride flush 0.9 % injection 10 mL, 10 mL, Intravenous, 2 times per day  sodium chloride flush 0.9 % injection 10 mL, 10 mL, Intravenous, PRN  magnesium hydroxide (MILK OF MAGNESIA) 400 MG/5ML suspension 30 mL, 30 mL, Oral, Daily PRN  aspirin chewable tablet 81 mg, 81 mg, Oral, Daily  nitroGLYCERIN (NITROSTAT) SL tablet 0.4 mg, 0.4 mg, Sublingual, Q5 Min PRN  lisinopril (PRINIVIL;ZESTRIL) tablet 5 mg, 5 mg, Oral, Daily  metFORMIN (GLUCOPHAGE) tablet 500 mg, 500 mg, Oral, BID WC  tamsulosin (FLOMAX) capsule 0.4 mg, 0.4 mg, Oral, Daily  tiZANidine (ZANAFLEX) tablet 4 mg, 4 mg, Oral, Q6H PRN  furosemide (LASIX) tablet 20 mg, 20 mg, Oral, Daily  atorvastatin (LIPITOR) tablet 40 mg, 40 mg, Oral, Nightly  spironolactone (ALDACTONE) tablet 25 mg, 25 mg, Oral, Daily  0.9 % sodium chloride infusion, , Intravenous, Continuous  sodium chloride flush 0.9 % injection 10 mL, 10 mL, Intravenous, 2 times per day  sodium chloride flush 0.9 % injection 10 mL, 10 mL, Intravenous, PRN  acetaminophen (TYLENOL) tablet 650 mg, 650 mg, Oral, Q4H PRN  magnesium hydroxide (MILK OF MAGNESIA) 400 MG/5ML suspension 30 mL, 30 mL, Oral, Daily PRN  carvedilol (COREG) tablet 12.5 mg, 12.5 mg, Oral, BID WC  heparin (porcine) injection 5,000 Units, 5,000 Units, Subcutaneous, 3 times per day  glucose (GLUTOSE) 40 % oral gel 15 g, 15 g, Oral, PRN  dextrose 50 % solution 12.5 g, 12.5 g, Intravenous, PRN  glucagon (rDNA) injection 1 mg, 1 mg, Intramuscular, PRN  dextrose 5 % solution, 100 mL/hr, Intravenous, PRN  insulin lispro (HUMALOG) injection vial 0-12 Units, 0-12 Units, Subcutaneous, TID WC  insulin lispro (HUMALOG) injection vial 0-6 Units, 0-6 Units, Subcutaneous, Nightly  sotalol (BETAPACE) tablet 80 mg, 80 mg, Oral, BID  acetaminophen (TYLENOL) tablet 650 mg, 650 mg, Oral, Q4H PRN    Coronary Discharge Core Measure: Please indicate the medication given by X, and if not the reasons not given:    Not Given Reason  Given      Beta Blockers Y      ACE-I Y      Statins Y      ASA Y    No current indication, stable chronic CAD. OAP (Plavix/Effient/Brilinta) N       Will discuss with rounding attending Dr. Yisel Mc for final recommendations.     Sheldon Hagen MD  Cardiology Fellow    Attending Cardiologist Addendum: I have reviewed and performed the history, physical, subjective, objective, assessment, and plan with the resident/fellow and agree with the note. I performed the history and physical personally. I have made changes to the note above as needed. Thank you for allowing me to participate in the care of this patient, please do not hesitate to call if you have any questions. Aaron Kennedy DO, [facility], [address] [facility]  [facility]  [phone]

## 2018-09-01 NOTE — CARE COORDINATION
Discharge [facility] Case Management Department  Written by: Wendie Pate RN    Patient Name: Girma Stubbs  Attending Provider: No att. providers found  Admit Date: 2018  7:16 AM  MRN: [id number]  Account: [de-identified]                     : 1950  Discharge Date: 2018      Disposition: home    Wendie Pate RN

## 2018-09-02 ENCOUNTER — CARE COORDINATION (OUTPATIENT)
Dept: CASE MANAGEMENT | Age: 68
End: 2018-09-02

## 2018-09-02 DIAGNOSIS — I47.20 VENTRICULAR TACHYARRHYTHMIA: Primary | ICD-10-CM

## 2018-09-02 NOTE — CARE COORDINATION
[address] Transitions Initial Follow Up Call    Call within 2 business days of discharge: Yes    Patient: Regulo Wynne Patient : 1950   MRN: <[id number]>  Reason for Admission: -2018 [facility]  Ventricular tachyarrhythmia  Discharge Date: 18 RARS: Readmission Risk Score: 12 CM 4     Spoke with: Casimiro Winters report no concerning symptoms. He is in good spirits and grateful for his hospital care. Denies any chest pain/pressure, palpitations, or dizziness. States he always has some exertional SOB and at baseline. Is performing daily home BP checks. Reviewed what to report to physician. Reports he went to ED because he checked his vitals and found his HR at 160. He states he had no symptoms at the time but later did develop some chest discomfort. Started home Sotalol and having no concerning side effects. TC appt  and will self schedule cardiology appt after the holiday. Has his meds 7 no cost concerns. Denies any home needs. Spouse is helping. Med rec & 1111F comcpleted. START taking:  sotalol (BETAPACE)    Non-face-to-face services provided:  Obtained and reviewed discharge summary and/or continuity of care documents  Education of patient/family/caregiver/guardian to support self-management-Reviewed s/s arrhythmia to report to physician. Reviewed s/s Acute Mi to seek emergent attention for. Reviewed sotalol.     Care Transitions 24 Hour Call    Do you have any ongoing symptoms?:  No  Do you have a copy of your discharge instructions?:  Yes  Do you have all of your prescriptions and are they filled?:  Yes  Have you been contacted by a [address]?:  No  Have you scheduled your follow up appointment?:  Yes  How are you going to get to your appointment?:  Car - family or friend to transport  Were you discharged with any Home Care or Post Acute Services:  No  Do you feel like you have everything you need to keep you well at home?:  Yes  Care Transitions Interventions

## 2018-09-05 ENCOUNTER — HOSPITAL ENCOUNTER (OUTPATIENT)
Dept: PHYSICAL THERAPY | Age: 68
Setting detail: THERAPIES SERIES
Discharge: HOME | End: 2018-09-05
Payer: MEDICARE

## 2018-09-05 LAB
EKG ATRIAL RATE: 53 BPM
EKG P AXIS: 27 DEGREES
EKG P-R INTERVAL: 220 MS
EKG Q-T INTERVAL: 446 MS
EKG QRS DURATION: 104 MS
EKG QTC CALCULATION (BAZETT): 418 MS
EKG R AXIS: 20 DEGREES
EKG T AXIS: 85 DEGREES
EKG VENTRICULAR RATE: 53 BPM

## 2018-09-05 PROCEDURE — 97110 THERAPEUTIC EXERCISES: CPT

## 2018-09-05 PROCEDURE — 97016 VASOPNEUMATIC DEVICE THERAPY: CPT

## 2018-09-05 PROCEDURE — 97140 MANUAL THERAPY 1/> REGIONS: CPT

## 2018-09-05 NOTE — PROGRESS NOTES
[facility]   [address]   Phone: [phone]   Fax: [phone]    Physical Therapy Daily Treatment Note      Date:  2018  Patient Name:  Osiel Yin    :  [birth year]  MRN: [id number]  Physician: Jarrod Hammond DPM                         Insurance: [place] [employer]              Eligibility Status: Janet Abdulaziz     DOS: 18  # of visits allowed/remaining: based on medical necessity  Source: Phone  Spoke June Flower [phone]  Reference: [id number]  Auth: NPRe     Electronically signed by Maria Teresa Nj on 18 at 2:32 PM   Medical Diagnosis: Rehab Codes:   M76.62 (ICD-10-CM) - Achilles tendinitis of left lower extremity   M24.572 (ICD-10-CM) - Equinus contracture of left ankle   M79.672 (ICD-10-CM) - Left foot pain                                Onset date: 17               Next Dr's appt.: 18    Visit# / total visits: -  Cancels/No Shows: 0/0    MEDICARE CAP VISIT MAINTAINED IN NAVIGATOR    Subjective:   No falls. Went through heart cath over the weekend. Will proceed cautionsly with anything that can aggravate port insertion site. Pain:  [x] Yes  [] No Location: Left AT, Foot N/A Pain Rating: (0-10 scale) 1/10  Pain altered Tx:  [] No  [] Yes  Action:  Comments:  DN POC signed. DNA signed and witnessed.     Objective:  Modalities: Vasocompression left ankle, ankle sleeve, 34 degrees F, 15 min  Precautions:  Pacemaker -   Exercises:  Exercise Reps/ Time Weight/ Level Comments   NuStep      Eccentric Heel raise and lower 8 count x 10                 Triceps surae stretch 30 sec    KE/KF   Halo CW/CCW 3 each        3 way ankle  15    red band c cues   Lunge forward 15   c inversion          Manual:  TCJ, STJ, 1st MTP, distal tib fib grade 3  Dry Needle (see below) 25 min     Other:  Cues for supination emphasis with stretches and lunges for ankle mob      Homeostatic Point Right Left 0.5\" needle 1.0\" needle 2.0\"

## 2018-09-06 ENCOUNTER — TELEPHONE (OUTPATIENT)
Dept: INTERNAL MEDICINE CLINIC | Age: 68
End: 2018-09-06

## 2018-09-07 ENCOUNTER — HOSPITAL ENCOUNTER (OUTPATIENT)
Dept: PHYSICAL THERAPY | Age: 68
Setting detail: THERAPIES SERIES
Discharge: HOME | End: 2018-09-07
Payer: MEDICARE

## 2018-09-07 ENCOUNTER — CARE COORDINATION (OUTPATIENT)
Dept: CASE MANAGEMENT | Age: 68
End: 2018-09-07

## 2018-09-07 PROCEDURE — 97016 VASOPNEUMATIC DEVICE THERAPY: CPT

## 2018-09-07 PROCEDURE — 97110 THERAPEUTIC EXERCISES: CPT

## 2018-09-07 PROCEDURE — 97140 MANUAL THERAPY 1/> REGIONS: CPT

## 2018-09-07 NOTE — PROGRESS NOTES
[address] [facility] to [facility]   [facility] [address] [facility] [address]   Phone: [phone]   Fax: [phone]    Physical Therapy Daily Treatment Note      Date:  2018  Patient Name:  Girma Stubbs    :    MRN: [id number]  Physician: Bianca Anders DPM                         Insurance: [place] Medicare              Eligibility Status: Caity Sin     DOS: 18  # of visits allowed/remaining: based on medical necessity  Source: Phone  Spoke Siobhan Garcia [phone]  Reference: [id number]  Auth: NPRe     Electronically signed by Antonio Soto on 18 at 2:32 PM   Medical Diagnosis: Rehab Codes:   M76.62 (ICD-10-CM) - Achilles tendinitis of left lower extremity   M24.572 (ICD-10-CM) - Equinus contracture of left ankle   M79.672 (ICD-10-CM) - Left foot pain                                Onset date: 17               Next Dr's appt.: 18    Visit# / total visits: -  Cancels/No Shows: 0/0    MEDICARE CAP VISIT MAINTAINED IN NAVIGATOR    Subjective:   No falls. No complication from heart cath from the weekend. Still on hold for driving. No other complaints. Pain:  [x] Yes  [] No Location: Left AT, Foot N/A Pain Rating: (0-10 scale) 1/10  Pain altered Tx:  [] No  [] Yes  Action:  Comments:  DN POC signed. DNA signed and witnessed.     Objective:  Modalities: Vasocompression left ankle, ankle sleeve, 34 degrees F, 15 min  Precautions:  Pacemaker -   Exercises:  Exercise Reps/ Time Weight/ Level Comments   NuStep      Eccentric Heel raise and lower 8 count x 10                 Triceps surae stretch 30 sec    KE/KF   Halo CW/CCW 3 each        3 way ankle  15    red band c cues   Lunge forward 15   c inversion          Manual:  TCJ, STJ, 1st MTP, distal tib fib grade 3  Dry Needle (see below) 25 min     Other:  Cues for supination emphasis with stretches and lunges for ankle mob      Homeostatic Point Right Left 0.5\" needle 1.0\" needle 2.0\" needle 3.0\" needle Supraorbital []  []  []  []  []  []    Infraorbital  []  []  []  []  []  []    Lateral Pectoral []  []  []  []  []  []    Saphenous  []  []  []  []  []  []    Common Fibular (Peroneal) []  [x]  []  [x]  []  []    Tibial []  [x]  []  [x]  []  []    Deep Fibular (Peroneal) []  []  []  []  []  []    Greater Occipital []  []  []  []  []  []    Greater Auricular []  []  []  []  []  []    Spinal Accessory []  []  []  []  []  []    Dorsal Scapular []  []  []  []  []  []    Suprascapular (Infraspinatus) []  []  []  []  []  []    Lateral Antebrachial Cutaneous  []  []  []  []  []  []    Deep Radial []  []  []  []  []  []    Superior Cluneal []  []  []  []  []  []    Inferior Gluteal  []  []  []  []  []  []    Superficial Radial []  []  []  []  []  []    Iliotibial []  []  []  []  []  []    Lateral Popliteal  []  []  []  []  []  []    Sural []  [x]  []  [x]  []  []    Posterior Cutaneous of T6  []  []  []  []  []  []    Spinous Process of T7 -- centrally placed   [] []  []  []  []    Posterior Cutaneous of L2 []  []  []  []  []  []    Posterior Cutaneous L5 []  []  []  []  []  []        Symptomatic Points Right Left 0.5\" needle 1.0\" needle 2.0\" needle 3.0\" needle   Left AT and Joseph symptomatic  6 needles []  [x]  [x]  []  []  []     []  []  []  []  []  []     []  []  []  []  []  []     []  []  []  []  []  []     []  []  []  []  []  []     []  []  []  []  []  []     []  []  []  []  []  []     []  []  []  []  []  []     []  []  []  []  []  []     []  []  []  []  []  []     []  []  []  []  []  []        Specific Instructions for next treatment:  DN, Xfx massage left AT, KT, left ankle bilateral LE strength, flexibility, proprioception/balance       Treatment Charges: Mins Units   [x]  Modalities vasocompression 15 1   [x]  Ther Exercise 15 1   [x]  Manual Therapy 25 2   []  Ther Activities     []  Aquatics     []  Neuromuscular     []  Other     Total Treatment time 55 4       Assessment: [x] Progressing toward goals. Tolerates treatment well. Performed ankle therex and manual gastroc stetch. Added STM gastrocs, along margins of AT. Good tolerance with treatment. Left AT pain post tx:  0/10. [] No change. [] Other:    STG/LTG     STG: (to be met in 10 treatments)  1. ? Pain:  Left AT pain 2/10 walking more than 150 feet. Goal met  2. ? ROM:  Left ankle, hip ROM 90% normal.  Not met  3. ? Strength:  Bilateral LE and core 5/5. Not met  4. ? Function:  LEFS 50%. Not met  5. Independent with Home Exercise Programs  6. Demonstrate Knowledge of fall prevention  LTG: (to be met in 24 treatments)  1. AT pain 0-2/10 with cutting lawn and shopping. Ongoing  2. LEFS 25% or less. Ongoing                    Patient goals:  Do grocery shopping and cut lawn without limping or pain     G-CODE     Functional Limitation: Mobility walking and moving around  Functional Assessment Used: LEFS - 65%  Current Status Modifier: CL  Goal Status Modifier: CI  Discharge Status Modifier:        Pt. Education:  [x] Yes  [] No  [x] Reviewed Prior HEP/Ed. Patient states he does not perform HEP at home. Will work with him to transition to HEP with reinforcement, education on progression and benefit. Method of Education: [x] Verbal  [] Demo  [] Written  Comprehension of Education:  [x] Verbalizes understanding. [] Demonstrates understanding. [x] Needs review. [] Demonstrates/verbalizes HEP/Ed previously given. Plan: [x] Continue per plan of care.    [] Other:      Time SA:3527         Time Out: 8854    Electronically signed by:  Pierre Owens PT

## 2018-09-10 ENCOUNTER — HOSPITAL ENCOUNTER (OUTPATIENT)
Dept: PHYSICAL THERAPY | Age: 68
Setting detail: THERAPIES SERIES
Discharge: HOME | End: 2018-09-10
Payer: MEDICARE

## 2018-09-10 PROCEDURE — 97016 VASOPNEUMATIC DEVICE THERAPY: CPT

## 2018-09-10 PROCEDURE — 97110 THERAPEUTIC EXERCISES: CPT

## 2018-09-10 PROCEDURE — 97140 MANUAL THERAPY 1/> REGIONS: CPT

## 2018-09-10 RX ORDER — CARVEDILOL 25 MG/1
TABLET ORAL
Qty: 180 TABLET | Refills: 3 | Status: SHIPPED | OUTPATIENT
Start: 2018-09-10 | End: 2018-09-24 | Stop reason: DRUGHIGH

## 2018-09-10 NOTE — PROGRESS NOTES
[address] [facility]   [address]   Phone: [phone]   Fax: [phone]    Physical Therapy Daily Treatment Note      Date:  9/10/2018  Patient Name:  Girma Stubbs    :    MRN: [id number]  Physician: Bianca Anders DPM                         Insurance: [employer] Medicare              Eligibility Status: Caity Sin     DOS: 18  # of visits allowed/remaining: based on medical necessity  Source: Phone  Spoke Siobhan Garcia [phone]  Reference: [id number]  Auth: NPRe     Electronically signed by Antonio Soto on 18 at 2:32 PM   Medical Diagnosis: Rehab Codes:   M76.62 (ICD-10-CM) - Achilles tendinitis of left lower extremity   M24.572 (ICD-10-CM) - Equinus contracture of left ankle   M79.672 (ICD-10-CM) - Left foot pain                                Onset date: 17               Next Dr's appt.: 18    Visit# / total visits: -  Cancels/No Shows: 0/0    MEDICARE CAP VISIT MAINTAINED IN NAVIGATOR    Subjective:   No falls. Very sedentary this weekend. No new complaints. Pain:  [x] Yes  [] No Location: Left AT, Foot N/A Pain Rating: (0-10 scale) 1/10  Pain altered Tx:  [] No  [] Yes  Action:  Comments:  DN POC signed. DNA signed and witnessed.     Objective:  Modalities: Vasocompression left ankle, ankle sleeve, 34 degrees F, 15 min  Precautions:  Pacemaker -   Exercises:  Exercise Reps/ Time Weight/ Level Comments   NuStep      Eccentric Heel raise and lower 8 count x 10                 Triceps surae stretch 30 sec    KE/KF   Halo CW/CCW 3 each        3 way ankle  15    red band c cues   Lunge forward 15   c inversion          Manual:  TCJ, STJ, 1st MTP, distal tib fib grade 3  Dry Needle (see below) 25 min     Other:  Cues for supination emphasis with stretches and lunges for ankle mob      Homeostatic Point Right Left 0.5\" needle 1.0\" needle 2.0\" needle 3.0\" needle   Supraorbital []  []  []  []  []  [] Infraorbital  []  []  []  []  []  []    Lateral Pectoral []  []  []  []  []  []    Saphenous  []  []  []  []  []  []    Common Fibular (Peroneal) []  [x]  []  [x]  []  []    Tibial []  [x]  []  [x]  []  []    Deep Fibular (Peroneal) []  []  []  []  []  []    Greater Occipital []  []  []  []  []  []    Greater Auricular []  []  []  []  []  []    Spinal Accessory []  []  []  []  []  []    Dorsal Scapular []  []  []  []  []  []    Suprascapular (Infraspinatus) []  []  []  []  []  []    Lateral Antebrachial Cutaneous  []  []  []  []  []  []    Deep Radial []  []  []  []  []  []    Superior Cluneal []  []  []  []  []  []    Inferior Gluteal  []  []  []  []  []  []    Superficial Radial []  []  []  []  []  []    Iliotibial []  []  []  []  []  []    Lateral Popliteal  []  []  []  []  []  []    Sural []  [x]  []  [x]  []  []    Posterior Cutaneous of T6  []  []  []  []  []  []    Spinous Process of T7 -- centrally placed   [] []  []  []  []    Posterior Cutaneous of L2 []  []  []  []  []  []    Posterior Cutaneous L5 []  []  []  []  []  []        Symptomatic Points Right Left 0.5\" needle 1.0\" needle 2.0\" needle 3.0\" needle   Left AT and Joseph symptomatic  6 needles []  [x]  [x]  []  []  []     []  []  []  []  []  []     []  []  []  []  []  []     []  []  []  []  []  []     []  []  []  []  []  []     []  []  []  []  []  []     []  []  []  []  []  []     []  []  []  []  []  []     []  []  []  []  []  []     []  []  []  []  []  []     []  []  []  []  []  []        Specific Instructions for next treatment:  DN, Xfx massage left AT, KT, left ankle bilateral LE strength, flexibility, proprioception/balance       Treatment Charges: Mins Units   [x]  Modalities vasocompression 15 1   [x]  Ther Exercise 15 1   [x]  Manual Therapy 25 2   []  Ther Activities     []  Aquatics     []  Neuromuscular     []  Other     Total Treatment time 55 4       Assessment: [x] Progressing toward goals. Tolerates treatment well.   Performed ankle

## 2018-09-12 ENCOUNTER — HOSPITAL ENCOUNTER (OUTPATIENT)
Dept: PHYSICAL THERAPY | Age: 68
Setting detail: THERAPIES SERIES
Discharge: HOME | End: 2018-09-12
Payer: MEDICARE

## 2018-09-12 PROCEDURE — G8980 MOBILITY D/C STATUS: HCPCS

## 2018-09-12 PROCEDURE — G8979 MOBILITY GOAL STATUS: HCPCS

## 2018-09-12 PROCEDURE — 97140 MANUAL THERAPY 1/> REGIONS: CPT

## 2018-09-12 PROCEDURE — G8978 MOBILITY CURRENT STATUS: HCPCS

## 2018-09-12 PROCEDURE — 97016 VASOPNEUMATIC DEVICE THERAPY: CPT

## 2018-09-12 PROCEDURE — 97110 THERAPEUTIC EXERCISES: CPT

## 2018-09-12 NOTE — PROGRESS NOTES
education on progression and benefit. Method of Education: [x] Verbal  [] Demo  [] Written  Comprehension of Education:  [x] Verbalizes understanding. [] Demonstrates understanding. [x] Needs review. [] Demonstrates/verbalizes HEP/Ed previously given. Plan: [x] Continue per plan of care. [x] Other:  See assessment - Please advise. Recommend DC to independent HEP and return to PT should he have recurrence of symptoms.       Time In:1030         Time Out: 1130    Electronically signed by:  Fawn Leblanc PT

## 2018-09-24 ENCOUNTER — OFFICE VISIT (OUTPATIENT)
Dept: PODIATRY | Age: 68
End: 2018-09-24
Payer: MEDICARE

## 2018-09-24 VITALS
HEIGHT: 73 IN | SYSTOLIC BLOOD PRESSURE: 93 MMHG | HEART RATE: 67 BPM | WEIGHT: 265 LBS | BODY MASS INDEX: 35.12 KG/M2 | DIASTOLIC BLOOD PRESSURE: 61 MMHG

## 2018-09-24 DIAGNOSIS — M76.62 ACHILLES TENDINITIS OF LEFT LOWER EXTREMITY: ICD-10-CM

## 2018-09-24 DIAGNOSIS — M77.30 RETROCALCANEAL BONE SPUR: ICD-10-CM

## 2018-09-24 DIAGNOSIS — M79.675 PAIN OF TOES OF BOTH FEET: ICD-10-CM

## 2018-09-24 DIAGNOSIS — B35.1 ONYCHOMYCOSIS OF TOENAIL: ICD-10-CM

## 2018-09-24 DIAGNOSIS — M24.572 EQUINUS CONTRACTURE OF LEFT ANKLE: ICD-10-CM

## 2018-09-24 DIAGNOSIS — M79.671 PAIN IN RIGHT FOOT: ICD-10-CM

## 2018-09-24 DIAGNOSIS — M79.672 LEFT FOOT PAIN: ICD-10-CM

## 2018-09-24 DIAGNOSIS — M79.674 PAIN OF TOES OF BOTH FEET: ICD-10-CM

## 2018-09-24 DIAGNOSIS — E11.42 DIABETIC POLYNEUROPATHY ASSOCIATED WITH TYPE 2 DIABETES MELLITUS (HCC): ICD-10-CM

## 2018-09-24 DIAGNOSIS — L84 CORNS AND CALLOSITIES: ICD-10-CM

## 2018-09-24 DIAGNOSIS — E11.51 TYPE 2 DIABETES MELLITUS WITH PERIPHERAL VASCULAR DISEASE (HCC): Primary | ICD-10-CM

## 2018-09-24 PROCEDURE — 11056 PARNG/CUTG B9 HYPRKR LES 2-4: CPT | Performed by: PODIATRIST

## 2018-09-24 PROCEDURE — 11721 DEBRIDE NAIL 6 OR MORE: CPT | Performed by: PODIATRIST

## 2018-09-24 PROCEDURE — 99213 OFFICE O/P EST LOW 20 MIN: CPT | Performed by: PODIATRIST

## 2018-09-24 ASSESSMENT — ENCOUNTER SYMPTOMS
BACK PAIN: 0
NAUSEA: 0
DIARRHEA: 0
SHORTNESS OF BREATH: 0
COLOR CHANGE: 0

## 2018-09-28 ENCOUNTER — HOSPITAL ENCOUNTER (OUTPATIENT)
Age: 68
Setting detail: SPECIMEN
Discharge: HOME | End: 2018-09-28
Payer: MEDICARE

## 2018-09-28 ENCOUNTER — OFFICE VISIT (OUTPATIENT)
Dept: INTERNAL MEDICINE CLINIC | Age: 68
End: 2018-09-28
Payer: MEDICARE

## 2018-09-28 VITALS
WEIGHT: 271 LBS | SYSTOLIC BLOOD PRESSURE: 112 MMHG | BODY MASS INDEX: 35.92 KG/M2 | DIASTOLIC BLOOD PRESSURE: 75 MMHG | HEIGHT: 73 IN

## 2018-09-28 DIAGNOSIS — Z13.220 SCREENING FOR HYPERLIPIDEMIA: Primary | ICD-10-CM

## 2018-09-28 DIAGNOSIS — Z23 NEED FOR VACCINATION: ICD-10-CM

## 2018-09-28 DIAGNOSIS — N40.0 BENIGN PROSTATIC HYPERPLASIA WITHOUT LOWER URINARY TRACT SYMPTOMS: ICD-10-CM

## 2018-09-28 DIAGNOSIS — I47.20 VENTRICULAR TACHYARRHYTHMIA: ICD-10-CM

## 2018-09-28 DIAGNOSIS — E11.9 TYPE 2 DIABETES MELLITUS WITHOUT COMPLICATION, WITHOUT LONG-TERM CURRENT USE OF INSULIN (HCC): ICD-10-CM

## 2018-09-28 DIAGNOSIS — Z13.220 SCREENING FOR HYPERLIPIDEMIA: ICD-10-CM

## 2018-09-28 LAB
CHOLESTEROL/HDL RATIO: 2.2
CHOLESTEROL: 101 MG/DL
CREATININE URINE: 103.8 MG/DL (ref 39–259)
HDLC SERPL-MCNC: 46 MG/DL
LDL CHOLESTEROL: 34 MG/DL (ref 0–130)
MICROALBUMIN/CREAT 24H UR: 49 MG/L
MICROALBUMIN/CREAT UR-RTO: 47 MCG/MG CREAT
TRIGL SERPL-MCNC: 106 MG/DL
VLDLC SERPL CALC-MCNC: NORMAL MG/DL (ref 1–30)

## 2018-09-28 PROCEDURE — 99214 OFFICE O/P EST MOD 30 MIN: CPT | Performed by: INTERNAL MEDICINE

## 2018-09-28 PROCEDURE — G0008 ADMIN INFLUENZA VIRUS VAC: HCPCS | Performed by: INTERNAL MEDICINE

## 2018-09-28 PROCEDURE — 90662 IIV NO PRSV INCREASED AG IM: CPT | Performed by: INTERNAL MEDICINE

## 2018-09-28 RX ORDER — TAMSULOSIN HYDROCHLORIDE 0.4 MG/1
0.4 CAPSULE ORAL DAILY
Qty: 90 CAPSULE | Refills: 3 | Status: SHIPPED | OUTPATIENT
Start: 2018-09-28 | End: 2019-01-07 | Stop reason: ALTCHOICE

## 2018-09-28 ASSESSMENT — ENCOUNTER SYMPTOMS
COUGH: 0
APNEA: 0
DIARRHEA: 0
BACK PAIN: 0
ABDOMINAL PAIN: 0
CONSTIPATION: 0
WHEEZING: 0
COLOR CHANGE: 0
FACIAL SWELLING: 0
CHEST TIGHTNESS: 0
SHORTNESS OF BREATH: 1
ABDOMINAL DISTENTION: 0

## 2018-09-28 NOTE — PROGRESS NOTES
Subjective:      Patient ID: Parrish Kruse is a 76 y.o. male. Chronic Disease Visit Information    BP Readings from Last 3 Encounters:   09/24/18 93/61   09/01/18 116/67   07/23/18 121/71          Hemoglobin A1C (%)   Date Value   08/30/2018 6.6 (H)   06/15/2018 6.6   12/15/2017 6.1     LDL Cholesterol (mg/dL)   Date Value   09/22/2017 35     HDL (mg/dL)   Date Value   09/22/2017 53     BUN (mg/dL)   Date Value   08/31/2018 16     CREATININE (mg/dL)   Date Value   08/31/2018 0.90     Glucose (mg/dL)   Date Value   08/31/2018 143 (H)            Have you changed or started any medications since your last visit including any over-the-counter medicines, vitamins, or herbal medicines? no   Are you having any side effects from any of your medications? -  no  Have you stopped taking any of your medications? Is so, why? -  no    Have you seen any other physician or provider since your last visit? Yes - Records Obtained  Have you had any other diagnostic tests since your last visit? Yes - Records Obtained  Have you been seen in the emergency room and/or had an admission to a hospital since we last saw you? Yes - Records Obtained  Have you had your annual diabetic retinal (eye) exam? No  Have you had your routine dental cleaning in the past 6 months? no    Have you activated your [employer] account? If not, what are your barriers?  Yes     Patient Care Team:  Shivam Blankenship MD as PCP - Yelena Cade MD as PCP - S Attributed Provider  Osiel Torres MD as Consulting Physician (Gastroenterology)         Medical History Review  Past Medical, Family, and Social History reviewed and does contribute to the patient presenting condition    Health Maintenance   Topic Date Due    Diabetic retinal exam  08/15/1960    Diabetic microalbuminuria test  08/15/1968    DTaP/Tdap/Td vaccine (1 - Tdap) 08/15/1969    Shingles Vaccine (1 of 2 - 2 Dose Series) 08/15/2000    Low dose CT lung screening  08/15/2005    Flu

## 2018-10-17 ENCOUNTER — HOSPITAL ENCOUNTER (OUTPATIENT)
Dept: PHYSICAL THERAPY | Age: 68
Setting detail: THERAPIES SERIES
Discharge: HOME | End: 2018-10-17
Payer: MEDICARE

## 2018-10-17 NOTE — DISCHARGE SUMMARY
[] [facility] ([facility] @ [facility]  [address], [address] [address]  Phone [phone]  Fax [phone]    Physical Therapy Discharge Note    Date: 10/17/2018      Patient: Sam Harrington  :   MRN: [id number]    Physician: Jolie Montes DPM                       Insurance: [employer] Medicare              Eligibility Status: Chilo Horne     DOS: 18  # of visits allowed/remaining: based on medical necessity  Source: Phone  Spoke Ivan Rasmussen [phone]  Reference: [id number]  Auth: NPRe     Electronically signed by Maurilio Laura on 18 at 2:32 PM   Medical Diagnosis: Rehab Codes:   M76.62 (ICD-10-CM) - Achilles tendinitis of left lower extremity   M24.572 (ICD-10-CM) - Equinus contracture of left ankle   M79.672 (ICD-10-CM) - Left foot pain                                Onset date: 17               Next Dr's appt.: 18       Total visits attended: 15  Cancels/No shows: 0/0  Date of initial visit:       18            [] Patient recovered from conditions. Treatment goals were met. [] Patient received maximum benefit. No further therapy indicated at this time. [] Patient demonstrated improvement from condition with  ** Of  ** Short term goals met. []Patient demonstrated improvement from condition with **   Of **  Long term goals met. [] Patient to continue exercise/home instructions independently. [] Therapy interrupted due to:    [] Patient has 2 or more no shows/cancels, is discontinued per our policy. [] Patient has completed prescribed number of treatment sessions. [] Other:      Pain level at evaluation was       /10 and at discharge was       /10    It Is My Understanding That The:  [] Patient returned to work. [] Patient demonstrated improved level of function. [] Patient returned to previous functional level.   [] Patient's current functional status is unknown due to no-shows  [] Other:     Recommendations/Comments:     Assessment:  [x] Progressing toward goals.            Patient seen for initial evaluation 7/26/18      dx  Medical Diagnosis: Rehab Codes:   M76.62 (ICD-10-CM) - Achilles tendinitis of left lower extremity   M24.572 (ICD-10-CM) - Equinus contracture of left ankle   M79.672 (ICD-10-CM) - Left foot pain      Patient seen for 15 visits per POC. Patient states he feels he's progressed with PT. He states he has not pain left ankle/   He still has a limp with ambulation more related to soft tissue restriction. He has HEP for stretching, but is probably not consistent with compliance per his admission. Patient to followup with Dr. Cristo Urena. Will likely DC PT at this time to HEP unless there are any other deficits/impairments PT needs to address. Please advise.     Left AT pain post tx:  0/10. STG: (to be met in 10 treatments)  1. ? Pain:  Left AT pain 2/10 walking more than 150 feet. Goal met  2. ? ROM:  Left ankle, hip ROM 90% normal.  Not met, DF still restricted more soft tissue than TCJ, STJ.  3. ? Strength:  Bilateral LE and core 5/5. Goal met  4. ? Function:  LEFS 50%. Not met  5. Independent with Home Exercise Programs. Goal met  6. Demonstrate Knowledge of fall prevention  LTG: (to be met in 24 treatments)  1. AT pain 0-2/10 with cutting lawn and shopping. Goal met  2. LEFS 25% or less.   Ongoing        Patient goals:  Do grocery shopping and cut lawn without limping or pain     G-CODE     Functional Limitation: Mobility walking and moving around  Functional Assessment Used: LEFS - 65%  Current Status Modifier: CL  Goal Status Modifier: CI  Discharge Status Modifier:          Treatment Included:  [x] Therapeutic Exercise             [x] Modalities:  [] Therapeutic Activity               [] Ultrasound                  [] Electrical Stimulation  [x] Gait Training                          [] Massage        [] Lumbar/Cervical Traction  [x] Neuromuscular

## 2018-11-26 ENCOUNTER — OFFICE VISIT (OUTPATIENT)
Dept: PODIATRY | Age: 68
End: 2018-11-26
Payer: MEDICARE

## 2018-11-26 VITALS — WEIGHT: 270 LBS | HEIGHT: 73 IN | BODY MASS INDEX: 35.78 KG/M2

## 2018-11-26 DIAGNOSIS — M79.674 PAIN OF TOES OF BOTH FEET: ICD-10-CM

## 2018-11-26 DIAGNOSIS — M79.671 PAIN IN RIGHT FOOT: ICD-10-CM

## 2018-11-26 DIAGNOSIS — B35.1 ONYCHOMYCOSIS OF TOENAIL: Primary | ICD-10-CM

## 2018-11-26 DIAGNOSIS — E11.42 DIABETIC POLYNEUROPATHY ASSOCIATED WITH TYPE 2 DIABETES MELLITUS (HCC): ICD-10-CM

## 2018-11-26 DIAGNOSIS — E11.51 TYPE 2 DIABETES MELLITUS WITH PERIPHERAL VASCULAR DISEASE (HCC): ICD-10-CM

## 2018-11-26 DIAGNOSIS — M79.675 PAIN OF TOES OF BOTH FEET: ICD-10-CM

## 2018-11-26 DIAGNOSIS — L84 CORNS AND CALLOSITIES: ICD-10-CM

## 2018-11-26 PROCEDURE — 99999 PR OFFICE/OUTPT VISIT,PROCEDURE ONLY: CPT | Performed by: PODIATRIST

## 2018-11-26 PROCEDURE — 11056 PARNG/CUTG B9 HYPRKR LES 2-4: CPT | Performed by: PODIATRIST

## 2018-11-26 PROCEDURE — 11721 DEBRIDE NAIL 6 OR MORE: CPT | Performed by: PODIATRIST

## 2018-11-26 RX ORDER — AMOXICILLIN 500 MG/1
500 CAPSULE ORAL 3 TIMES DAILY
Status: ON HOLD | COMMUNITY
End: 2018-12-10 | Stop reason: HOSPADM

## 2018-11-26 RX ORDER — GUAIFENESIN 400 MG/1
400 TABLET ORAL 4 TIMES DAILY PRN
COMMUNITY
End: 2019-02-28

## 2018-11-26 RX ORDER — BUDESONIDE AND FORMOTEROL FUMARATE DIHYDRATE 80; 4.5 UG/1; UG/1
2 AEROSOL RESPIRATORY (INHALATION) 2 TIMES DAILY
COMMUNITY
End: 2019-03-14 | Stop reason: SDUPTHER

## 2018-11-28 ASSESSMENT — ENCOUNTER SYMPTOMS
DIARRHEA: 0
BACK PAIN: 0
NAUSEA: 0
SHORTNESS OF BREATH: 0
COLOR CHANGE: 0

## 2018-12-03 ENCOUNTER — APPOINTMENT (OUTPATIENT)
Dept: GENERAL RADIOLOGY | Age: 68
DRG: 309 | End: 2018-12-03
Payer: MEDICARE

## 2018-12-03 ENCOUNTER — HOSPITAL ENCOUNTER (INPATIENT)
Age: 68
LOS: 7 days | Discharge: HOME HEALTH | DRG: 309 | End: 2018-12-10
Attending: EMERGENCY MEDICINE | Admitting: INTERNAL MEDICINE
Payer: MEDICARE

## 2018-12-03 DIAGNOSIS — I47.20 V-TACH: Primary | ICD-10-CM

## 2018-12-03 DIAGNOSIS — I51.3 LV (LEFT VENTRICULAR) MURAL THROMBUS: ICD-10-CM

## 2018-12-03 PROBLEM — N17.9 AKI (ACUTE KIDNEY INJURY) (HCC): Status: ACTIVE | Noted: 2018-12-03

## 2018-12-03 LAB
ABSOLUTE EOS #: 0.09 K/UL (ref 0–0.44)
ABSOLUTE IMMATURE GRANULOCYTE: 0.04 K/UL (ref 0–0.3)
ABSOLUTE LYMPH #: 1.73 K/UL (ref 1.1–3.7)
ABSOLUTE MONO #: 0.68 K/UL (ref 0.1–1.2)
AMPHETAMINE SCREEN URINE: NEGATIVE
ANION GAP SERPL CALCULATED.3IONS-SCNC: 12 MMOL/L (ref 9–17)
BARBITURATE SCREEN URINE: NEGATIVE
BASOPHILS # BLD: 1 % (ref 0–2)
BASOPHILS ABSOLUTE: 0.06 K/UL (ref 0–0.2)
BENZODIAZEPINE SCREEN, URINE: POSITIVE
BNP INTERPRETATION: ABNORMAL
BUN BLDV-MCNC: 23 MG/DL (ref 8–23)
BUN/CREAT BLD: ABNORMAL (ref 9–20)
BUPRENORPHINE URINE: ABNORMAL
CALCIUM SERPL-MCNC: 9.7 MG/DL (ref 8.6–10.4)
CANNABINOID SCREEN URINE: NEGATIVE
CHLORIDE BLD-SCNC: 101 MMOL/L (ref 98–107)
CO2: 23 MMOL/L (ref 20–31)
COCAINE METABOLITE, URINE: NEGATIVE
CREAT SERPL-MCNC: 1.21 MG/DL (ref 0.7–1.2)
DIFFERENTIAL TYPE: ABNORMAL
EOSINOPHILS RELATIVE PERCENT: 1 % (ref 1–4)
GFR AFRICAN AMERICAN: >60 ML/MIN
GFR NON-AFRICAN AMERICAN: 60 ML/MIN
GFR SERPL CREATININE-BSD FRML MDRD: ABNORMAL ML/MIN/{1.73_M2}
GFR SERPL CREATININE-BSD FRML MDRD: ABNORMAL ML/MIN/{1.73_M2}
GLUCOSE BLD-MCNC: 111 MG/DL (ref 75–110)
GLUCOSE BLD-MCNC: 183 MG/DL (ref 70–99)
GLUCOSE BLD-MCNC: 98 MG/DL (ref 75–110)
HCT VFR BLD CALC: 43.5 % (ref 40.7–50.3)
HEMOGLOBIN: 15.2 G/DL (ref 13–17)
IMMATURE GRANULOCYTES: 0 %
LYMPHOCYTES # BLD: 15 % (ref 24–43)
MAGNESIUM: 1.6 MG/DL (ref 1.6–2.6)
MCH RBC QN AUTO: 34.5 PG (ref 25.2–33.5)
MCHC RBC AUTO-ENTMCNC: 34.9 G/DL (ref 28.4–34.8)
MCV RBC AUTO: 98.9 FL (ref 82.6–102.9)
MDMA URINE: ABNORMAL
METHADONE SCREEN, URINE: NEGATIVE
METHAMPHETAMINE, URINE: ABNORMAL
MONOCYTES # BLD: 6 % (ref 3–12)
NRBC AUTOMATED: 0 PER 100 WBC
OPIATES, URINE: NEGATIVE
OXYCODONE SCREEN URINE: NEGATIVE
PDW BLD-RTO: 13.3 % (ref 11.8–14.4)
PHENCYCLIDINE, URINE: NEGATIVE
PLATELET # BLD: 152 K/UL (ref 138–453)
PLATELET ESTIMATE: ABNORMAL
PMV BLD AUTO: 10.7 FL (ref 8.1–13.5)
POC TROPONIN I: 0.06 NG/ML (ref 0–0.1)
POC TROPONIN I: 0.07 NG/ML (ref 0–0.1)
POC TROPONIN INTERP: NORMAL
POC TROPONIN INTERP: NORMAL
POTASSIUM SERPL-SCNC: 4.7 MMOL/L (ref 3.7–5.3)
PRO-BNP: 5153 PG/ML
PROPOXYPHENE, URINE: ABNORMAL
RBC # BLD: 4.4 M/UL (ref 4.21–5.77)
RBC # BLD: ABNORMAL 10*6/UL
SEG NEUTROPHILS: 77 % (ref 36–65)
SEGMENTED NEUTROPHILS ABSOLUTE COUNT: 9.37 K/UL (ref 1.5–8.1)
SODIUM BLD-SCNC: 136 MMOL/L (ref 135–144)
TEST INFORMATION: ABNORMAL
TRICYCLIC ANTIDEPRESSANTS, UR: ABNORMAL
TROPONIN INTERP: NORMAL
TROPONIN INTERP: NORMAL
TROPONIN T: <0.03 NG/ML
TROPONIN T: <0.03 NG/ML
TSH SERPL DL<=0.05 MIU/L-ACNC: 3.16 MIU/L (ref 0.3–5)
WBC # BLD: 12 K/UL (ref 3.5–11.3)
WBC # BLD: ABNORMAL 10*3/UL

## 2018-12-03 PROCEDURE — 85025 COMPLETE CBC W/AUTO DIFF WBC: CPT

## 2018-12-03 PROCEDURE — 99285 EMERGENCY DEPT VISIT HI MDM: CPT

## 2018-12-03 PROCEDURE — 83880 ASSAY OF NATRIURETIC PEPTIDE: CPT

## 2018-12-03 PROCEDURE — 6360000002 HC RX W HCPCS

## 2018-12-03 PROCEDURE — 6370000000 HC RX 637 (ALT 250 FOR IP): Performed by: HOSPITALIST

## 2018-12-03 PROCEDURE — 83735 ASSAY OF MAGNESIUM: CPT

## 2018-12-03 PROCEDURE — 82947 ASSAY GLUCOSE BLOOD QUANT: CPT

## 2018-12-03 PROCEDURE — 84484 ASSAY OF TROPONIN QUANT: CPT

## 2018-12-03 PROCEDURE — 84443 ASSAY THYROID STIM HORMONE: CPT

## 2018-12-03 PROCEDURE — 2000000000 HC ICU R&B

## 2018-12-03 PROCEDURE — 6360000002 HC RX W HCPCS: Performed by: EMERGENCY MEDICINE

## 2018-12-03 PROCEDURE — 93005 ELECTROCARDIOGRAM TRACING: CPT

## 2018-12-03 PROCEDURE — 6360000002 HC RX W HCPCS: Performed by: HOSPITALIST

## 2018-12-03 PROCEDURE — 80307 DRUG TEST PRSMV CHEM ANLYZR: CPT

## 2018-12-03 PROCEDURE — 80048 BASIC METABOLIC PNL TOTAL CA: CPT

## 2018-12-03 PROCEDURE — 6370000000 HC RX 637 (ALT 250 FOR IP): Performed by: EMERGENCY MEDICINE

## 2018-12-03 PROCEDURE — 71045 X-RAY EXAM CHEST 1 VIEW: CPT

## 2018-12-03 RX ORDER — NICOTINE POLACRILEX 4 MG
15 LOZENGE BUCCAL PRN
Status: DISCONTINUED | OUTPATIENT
Start: 2018-12-03 | End: 2018-12-10 | Stop reason: HOSPADM

## 2018-12-03 RX ORDER — TAMSULOSIN HYDROCHLORIDE 0.4 MG/1
0.4 CAPSULE ORAL DAILY
Status: DISCONTINUED | OUTPATIENT
Start: 2018-12-03 | End: 2018-12-10 | Stop reason: HOSPADM

## 2018-12-03 RX ORDER — MIDAZOLAM HYDROCHLORIDE 1 MG/ML
1 INJECTION INTRAMUSCULAR; INTRAVENOUS ONCE
Status: COMPLETED | OUTPATIENT
Start: 2018-12-03 | End: 2018-12-03

## 2018-12-03 RX ORDER — MAGNESIUM SULFATE 1 G/100ML
1 INJECTION INTRAVENOUS PRN
Status: DISCONTINUED | OUTPATIENT
Start: 2018-12-03 | End: 2018-12-10 | Stop reason: HOSPADM

## 2018-12-03 RX ORDER — ATORVASTATIN CALCIUM 80 MG/1
40 TABLET, FILM COATED ORAL DAILY
Status: DISCONTINUED | OUTPATIENT
Start: 2018-12-03 | End: 2018-12-10 | Stop reason: HOSPADM

## 2018-12-03 RX ORDER — NITROGLYCERIN 0.4 MG/1
0.4 TABLET SUBLINGUAL EVERY 5 MIN PRN
Status: DISCONTINUED | OUTPATIENT
Start: 2018-12-03 | End: 2018-12-10 | Stop reason: HOSPADM

## 2018-12-03 RX ORDER — POTASSIUM CHLORIDE 20MEQ/15ML
40 LIQUID (ML) ORAL PRN
Status: DISCONTINUED | OUTPATIENT
Start: 2018-12-03 | End: 2018-12-04

## 2018-12-03 RX ORDER — SODIUM CHLORIDE 0.9 % (FLUSH) 0.9 %
10 SYRINGE (ML) INJECTION EVERY 12 HOURS SCHEDULED
Status: DISCONTINUED | OUTPATIENT
Start: 2018-12-03 | End: 2018-12-10 | Stop reason: HOSPADM

## 2018-12-03 RX ORDER — SOTALOL HYDROCHLORIDE 80 MG/1
80 TABLET ORAL 2 TIMES DAILY
Status: DISCONTINUED | OUTPATIENT
Start: 2018-12-03 | End: 2018-12-03

## 2018-12-03 RX ORDER — POTASSIUM CHLORIDE 20MEQ/15ML
40 LIQUID (ML) ORAL PRN
Status: DISCONTINUED | OUTPATIENT
Start: 2018-12-03 | End: 2018-12-10 | Stop reason: HOSPADM

## 2018-12-03 RX ORDER — ALBUTEROL SULFATE 90 UG/1
2 AEROSOL, METERED RESPIRATORY (INHALATION) EVERY 6 HOURS PRN
Status: DISCONTINUED | OUTPATIENT
Start: 2018-12-03 | End: 2018-12-10 | Stop reason: HOSPADM

## 2018-12-03 RX ORDER — DEXTROSE MONOHYDRATE 25 G/50ML
12.5 INJECTION, SOLUTION INTRAVENOUS PRN
Status: DISCONTINUED | OUTPATIENT
Start: 2018-12-03 | End: 2018-12-10 | Stop reason: HOSPADM

## 2018-12-03 RX ORDER — CARVEDILOL 12.5 MG/1
6.25 TABLET ORAL 2 TIMES DAILY
Status: DISCONTINUED | OUTPATIENT
Start: 2018-12-04 | End: 2018-12-06

## 2018-12-03 RX ORDER — POTASSIUM CHLORIDE 7.45 MG/ML
10 INJECTION INTRAVENOUS PRN
Status: DISCONTINUED | OUTPATIENT
Start: 2018-12-03 | End: 2018-12-10 | Stop reason: HOSPADM

## 2018-12-03 RX ORDER — POTASSIUM CHLORIDE 7.45 MG/ML
10 INJECTION INTRAVENOUS PRN
Status: DISCONTINUED | OUTPATIENT
Start: 2018-12-03 | End: 2018-12-04

## 2018-12-03 RX ORDER — POTASSIUM CHLORIDE 20 MEQ/1
40 TABLET, EXTENDED RELEASE ORAL PRN
Status: DISCONTINUED | OUTPATIENT
Start: 2018-12-03 | End: 2018-12-10 | Stop reason: HOSPADM

## 2018-12-03 RX ORDER — ASPIRIN 81 MG/1
81 TABLET ORAL DAILY
Status: DISCONTINUED | OUTPATIENT
Start: 2018-12-03 | End: 2018-12-10 | Stop reason: HOSPADM

## 2018-12-03 RX ORDER — SODIUM CHLORIDE 0.9 % (FLUSH) 0.9 %
10 SYRINGE (ML) INJECTION PRN
Status: DISCONTINUED | OUTPATIENT
Start: 2018-12-03 | End: 2018-12-10 | Stop reason: HOSPADM

## 2018-12-03 RX ORDER — LIDOCAINE HYDROCHLORIDE ANHYDROUS AND DEXTROSE MONOHYDRATE .4; 5 G/100ML; G/100ML
1 INJECTION, SOLUTION INTRAVENOUS CONTINUOUS
Status: DISCONTINUED | OUTPATIENT
Start: 2018-12-03 | End: 2018-12-04

## 2018-12-03 RX ORDER — LABETALOL HYDROCHLORIDE 5 MG/ML
10 INJECTION, SOLUTION INTRAVENOUS EVERY 4 HOURS PRN
Status: DISCONTINUED | OUTPATIENT
Start: 2018-12-03 | End: 2018-12-10 | Stop reason: HOSPADM

## 2018-12-03 RX ORDER — POTASSIUM CHLORIDE 20 MEQ/1
40 TABLET, EXTENDED RELEASE ORAL PRN
Status: DISCONTINUED | OUTPATIENT
Start: 2018-12-03 | End: 2018-12-04

## 2018-12-03 RX ORDER — NICOTINE 21 MG/24HR
1 PATCH, TRANSDERMAL 24 HOURS TRANSDERMAL DAILY
Status: DISCONTINUED | OUTPATIENT
Start: 2018-12-03 | End: 2018-12-10 | Stop reason: HOSPADM

## 2018-12-03 RX ORDER — SPIRONOLACTONE 25 MG/1
25 TABLET ORAL DAILY
Status: DISCONTINUED | OUTPATIENT
Start: 2018-12-04 | End: 2018-12-10 | Stop reason: HOSPADM

## 2018-12-03 RX ORDER — MAGNESIUM SULFATE 1 G/100ML
1 INJECTION INTRAVENOUS ONCE
Status: COMPLETED | OUTPATIENT
Start: 2018-12-03 | End: 2018-12-03

## 2018-12-03 RX ORDER — FUROSEMIDE 20 MG/1
20 TABLET ORAL DAILY
Status: DISCONTINUED | OUTPATIENT
Start: 2018-12-03 | End: 2018-12-10 | Stop reason: HOSPADM

## 2018-12-03 RX ORDER — DEXTROSE MONOHYDRATE 50 MG/ML
100 INJECTION, SOLUTION INTRAVENOUS PRN
Status: DISCONTINUED | OUTPATIENT
Start: 2018-12-03 | End: 2018-12-10 | Stop reason: HOSPADM

## 2018-12-03 RX ORDER — IBUPROFEN 800 MG/1
800 TABLET ORAL ONCE
Status: COMPLETED | OUTPATIENT
Start: 2018-12-03 | End: 2018-12-03

## 2018-12-03 RX ADMIN — IBUPROFEN 800 MG: 800 TABLET ORAL at 07:02

## 2018-12-03 RX ADMIN — FUROSEMIDE 20 MG: 20 TABLET ORAL at 15:27

## 2018-12-03 RX ADMIN — MAGNESIUM SULFATE HEPTAHYDRATE 1 G: 1 INJECTION, SOLUTION INTRAVENOUS at 08:54

## 2018-12-03 RX ADMIN — TAMSULOSIN HYDROCHLORIDE 0.4 MG: 0.4 CAPSULE ORAL at 15:27

## 2018-12-03 RX ADMIN — MIDAZOLAM HYDROCHLORIDE 2 MG: 1 INJECTION, SOLUTION INTRAMUSCULAR; INTRAVENOUS at 06:44

## 2018-12-03 RX ADMIN — ENOXAPARIN SODIUM 30 MG: 30 INJECTION SUBCUTANEOUS at 15:28

## 2018-12-03 RX ADMIN — MAGNESIUM SULFATE HEPTAHYDRATE 1 G: 1 INJECTION, SOLUTION INTRAVENOUS at 15:28

## 2018-12-03 RX ADMIN — LIDOCAINE HYDROCHLORIDE 150 MG: 20 INJECTION INTRAVENOUS at 06:25

## 2018-12-03 RX ADMIN — LIDOCAINE HYDROCHLORIDE 1 MG/MIN: 4 INJECTION, SOLUTION INTRAVENOUS at 07:08

## 2018-12-03 RX ADMIN — ASPIRIN 81 MG: 81 TABLET ORAL at 15:27

## 2018-12-03 ASSESSMENT — PAIN SCALES - GENERAL: PAINLEVEL_OUTOF10: 5

## 2018-12-03 NOTE — ED PROVIDER NOTES
Colonoscopy (3/2015). Social History     Social History    Marital status:      Spouse name: N/A    Number of children: N/A    Years of education: N/A     Occupational History    Not on file. Social History Main Topics    Smoking status: Current Every Day Smoker     Packs/day: 1.50     Years: 55.00     Types: Cigarettes     Start date: 4/1/1964     Last attempt to quit: 9/9/2014    Smokeless tobacco: Never Used      Comment: There have been smokeless periods during lifetime    Alcohol use 0.0 oz/week     6 Cans of beer per week      Comment: quit drinking     Drug use: No    Sexual activity: Not on file     Other Topics Concern    Not on file     Social History Narrative    No narrative on file       Family History   Problem Relation Age of Onset    Heart Disease Father     High Blood Pressure Father     Diabetes Father     Heart Disease Maternal Aunt     Heart Disease Maternal Uncle     Heart Disease Paternal Aunt     Heart Disease Paternal Uncle        Allergies:  Patient has no known allergies. Home Medications:  Prior to Admission medications    Medication Sig Start Date End Date Taking? Authorizing Provider   Acetaminophen 500 MG CAPS Take by mouth    Historical Provider, MD   guaiFENesin 400 MG tablet Take 400 mg by mouth 4 times daily as needed for Cough    Historical Provider, MD   budesonide-formoterol (SYMBICORT) 80-4.5 MCG/ACT AERO Inhale 2 puffs into the lungs 2 times daily    Historical Provider, MD   amoxicillin (AMOXIL) 500 MG capsule Take 500 mg by mouth 3 times daily    Historical Provider, MD   traMADol-acetaminophen (ULTRACET) 37.5-325 MG per tablet Take 1 tablet by mouth every 6 hours as needed for Pain. Kailash Fear     Historical Provider, MD   tamsulosin (FLOMAX) 0.4 MG capsule Take 1 capsule by mouth daily 9/28/18   Jersey Benjamin MD   sotalol (BETAPACE) 80 MG tablet Take 1 tablet by mouth 2 times daily 9/1/18   Yanci Whitten MD   furosemide (LASIX) 20 MG tablet TAKE 1 Ur NEGATIVE NEG    Benzodiazepine Screen, Urine POSITIVE (A) NEG    Cocaine Metabolite, Urine NEGATIVE NEG    Methadone Screen, Urine NEGATIVE NEG    Opiates, Urine NEGATIVE NEG    Phencyclidine, Urine NEGATIVE NEG    Propoxyphene, Urine NOT REPORTED NEG    Cannabinoid Scrn, Ur NEGATIVE NEG    Oxycodone Screen, Ur NEGATIVE NEG    Methamphetamine, Urine NOT REPORTED NEG    Tricyclic Antidepressants, Urine NOT REPORTED NEG    MDMA, Urine NOT REPORTED NEG    Buprenorphine Urine NOT REPORTED NEG    Test Information       Assay provides medical screening only. The absence of expected drug(s) and/or   TSH with Reflex   Result Value Ref Range    TSH 3.16 0.30 - 5.00 mIU/L   Troponin   Result Value Ref Range    Troponin T <0.03 <0.03 ng/mL    Troponin Interp         POCT troponin   Result Value Ref Range    POC Troponin I 0.06 0.00 - 0.10 ng/mL    POC Troponin Interp       The Troponin-I (POC) results cannot be compared to the Troponin-T results. POCT troponin   Result Value Ref Range    POC Troponin I 0.07 0.00 - 0.10 ng/mL    POC Troponin Interp       The Troponin-I (POC) results cannot be compared to the Troponin-T results. IMPRESSION: Ventricular tachycardia status post electrical cardioversion into normal sinus rhythm    RADIOLOGY:  No results found. Xr Chest Portable    Result Date: 12/3/2018  EXAMINATION: SINGLE XRAY VIEW OF THE CHEST 12/3/2018 7:26 am COMPARISON: None. HISTORY: ORDERING SYSTEM PROVIDED HISTORY: r/o fluid overload TECHNOLOGIST PROVIDED HISTORY: r/o fluid overload FINDINGS: Single lead left transvenous pacemaker remains in place. Stable cardiomediastinal silhouette. Borderline cardiomegaly. No focal consolidation. Pulmonary vascularity normal.  No pleural effusion or pneumothorax. Bones grossly intact. Old right lower rib fractures noted. Borderline cardiomegaly unchanged. No pulmonary edema.        EKG  Ventricular tachycardia rate of 132    All EKG's are interpreted by the mis-transcribed.  Whenever words are used in this note in any gender, they shall be construed as though they were used in the gender appropriate to the circumstances; and whenever words are used in this note in the singular or plural form, they shall be construed as though they were used in the form appropriate to the circumstances.)       Karen Alvarado,   Resident  12/03/18 7019

## 2018-12-03 NOTE — H&P
S2 normal, no murmur  · Abdomen: soft, non-tender; bowel sounds normal; no masses,  no organomegaly  · Extremities: extremities normal, atraumatic, no cyanosis or edema  · Neurological:  Awake, alert, oriented to name, place and time. Cranial nerves II-XII are grossly intact. Reflexes normal and symmetric. Sensation grossly normal  · Eye no icterus no redness  · Psych-normal affect       LABS:  CBC:   Recent Labs      12/03/18 0627   WBC  12.0*   RBC  4.40   HGB  15.2   HCT  43.5   MCV  98.9   RDW  13.3   PLT  152     BMP:   Recent Labs      12/03/18 0627   NA  136   K  4.7   CL  101   CO2  23   BUN  23   CREATININE  1.21*     ANEMIA STUDIES  No results for input(s): LABIRON, TIBC, FERRITIN, [id number], FOLATE, OCCULTBLD in the last 72 hours. BNP: No results for input(s): BNP in the last 72 hours. PT/INR: No results for input(s): PROTIME, INR in the last 72 hours. APTT: No results for input(s): APTT in the last 72 hours. CARDIAC ENZYMES:   Recent Labs      12/03/18   0623 12/03/18   0854   TROPONINI  0.06  0.07     FASTING LIPID PANEL:  Lab Results   Component Value Date    CHOL 101 09/28/2018    HDL 46 09/28/2018    TRIG 106 09/28/2018     LFTS  No results for input(s): ALKPHOS, ALT, AST, BILITOT, BILIDIR, LABALBU in the last 72 hours. AMYLASE/LIPASE/AMMONIA  No results for input(s): AMYLASE, LIPASE, AMMONIA in the last 72 hours. ASSESSMENT:     Principal Problem:    V tach ([facility] [address]  Active Problems:    Smoking    CHF (congestive heart failure) ([facility])    COPD (chronic obstructive pulmonary disease) ([facility])    Ischemic cardiomyopathy    Type 2 diabetes mellitus without complication, without long-term current use of insulin ([facility])    MADISON (acute kidney injury) ([facility] [address]  Resolved Problems:    * No resolved hospital problems. *        PLAN:     Sustained monomorphic V tach ([facility])Status post cardioversion: Continue home medications including Coreg 12.5 mg. Cardiology consult. Continuous telemetry.   Admit to cardiac ICU. Give 2 g of mag and follow Mag tomorrow. Trend troponins. Continue IV lidocaine for now and hold sotalol while on lidocaine. EP evaluation for possible ablation. Ischemic cardiomyopathy with last EF 25% in Aug S/P AICD placement in 2014: Resume home dose Lasix 20 mg. Continue aspirin, statin, beta blocker, Aldactone. Hold lisinopril because MADISON, monitor blood pressure and creatinine. MADISON (acute kidney injury) likely prerenal because of hypotension: Monitor creatinine. Hold lisinopril. Chroic systolic CHF (congestive heart failure)-stable: Start on Lasix from tomorrow. Monitor blood pressure. Type 2 diabetes mellitus without complication, without long-term current use of insulin: Hold metformin, medium dose sliding scale. By mouth CT glucose before meals and at bedtime. COPD (chronic obstructive pulmonary disease): Albuterol PRN, patient states he is not taking Symbicort anymore and he will discuss with his PCP to restart Symbicort. Patient states he never had PFTs done. Smoking: He quit in patch    ? Sleep apnea: patient relates sleep study outpatient    DVT prophylaxis: lovenox  Diet: Cardiac  PT/OT evaluation  Disposition: Home pending current evaluation and recommendations    Greyson Aiken MD  PGY-3, Internal medicine resident  [place], [place]  12/3/2018, 3:05 PM

## 2018-12-03 NOTE — ED NOTES
Writer introduced self to pt. Report received from [facility]. Pt resting on cot, alert, oriented, no distress noted, wife at bedside. Pt states he is feeling much better, comfortable. Pt updated on plan for admission, will continue to monitor.       Bishop Maria Fernanda RN  12/03/18 4000

## 2018-12-03 NOTE — ED NOTES
Pt wife arrives back to bedside. Pt updated on bed change from [facility] 1, awaiting new bed placement. Meal tray ordered, pt denies any further needs, will continue to monitor.       Marla Del Angel RN  12/03/18 [id number]

## 2018-12-04 PROBLEM — I47.20 V-TACH (HCC): Status: ACTIVE | Noted: 2018-12-04

## 2018-12-04 LAB
ABSOLUTE EOS #: 0.1 K/UL (ref 0–0.44)
ABSOLUTE IMMATURE GRANULOCYTE: <0.03 K/UL (ref 0–0.3)
ABSOLUTE LYMPH #: 1.49 K/UL (ref 1.1–3.7)
ABSOLUTE MONO #: 0.68 K/UL (ref 0.1–1.2)
ALBUMIN SERPL-MCNC: 3.9 G/DL (ref 3.5–5.2)
ALBUMIN/GLOBULIN RATIO: 1.6 (ref 1–2.5)
ALP BLD-CCNC: 64 U/L (ref 40–129)
ALT SERPL-CCNC: 22 U/L (ref 5–41)
ANION GAP SERPL CALCULATED.3IONS-SCNC: 10 MMOL/L (ref 9–17)
AST SERPL-CCNC: 19 U/L
BASOPHILS # BLD: 1 % (ref 0–2)
BASOPHILS ABSOLUTE: 0.04 K/UL (ref 0–0.2)
BILIRUB SERPL-MCNC: 0.98 MG/DL (ref 0.3–1.2)
BILIRUBIN DIRECT: 0.31 MG/DL
BILIRUBIN, INDIRECT: 0.67 MG/DL (ref 0–1)
BUN BLDV-MCNC: 26 MG/DL (ref 8–23)
BUN/CREAT BLD: ABNORMAL (ref 9–20)
CALCIUM SERPL-MCNC: 8.7 MG/DL (ref 8.6–10.4)
CHLORIDE BLD-SCNC: 104 MMOL/L (ref 98–107)
CO2: 25 MMOL/L (ref 20–31)
CREAT SERPL-MCNC: 1.14 MG/DL (ref 0.7–1.2)
DIFFERENTIAL TYPE: ABNORMAL
EKG ATRIAL RATE: 51 BPM
EKG ATRIAL RATE: 66 BPM
EKG P AXIS: 37 DEGREES
EKG P-R INTERVAL: 236 MS
EKG Q-T INTERVAL: 390 MS
EKG Q-T INTERVAL: 412 MS
EKG QRS DURATION: 160 MS
EKG QRS DURATION: 92 MS
EKG QTC CALCULATION (BAZETT): 431 MS
EKG QTC CALCULATION (BAZETT): 577 MS
EKG R AXIS: -116 DEGREES
EKG R AXIS: 11 DEGREES
EKG T AXIS: -52 DEGREES
EKG T AXIS: 67 DEGREES
EKG VENTRICULAR RATE: 132 BPM
EKG VENTRICULAR RATE: 66 BPM
EOSINOPHILS RELATIVE PERCENT: 1 % (ref 1–4)
GFR AFRICAN AMERICAN: >60 ML/MIN
GFR NON-AFRICAN AMERICAN: >60 ML/MIN
GFR SERPL CREATININE-BSD FRML MDRD: ABNORMAL ML/MIN/{1.73_M2}
GFR SERPL CREATININE-BSD FRML MDRD: ABNORMAL ML/MIN/{1.73_M2}
GLOBULIN: ABNORMAL G/DL (ref 1.5–3.8)
GLUCOSE BLD-MCNC: 139 MG/DL (ref 75–110)
GLUCOSE BLD-MCNC: 144 MG/DL (ref 70–99)
GLUCOSE BLD-MCNC: 144 MG/DL (ref 75–110)
GLUCOSE BLD-MCNC: 154 MG/DL (ref 75–110)
GLUCOSE BLD-MCNC: 194 MG/DL (ref 75–110)
GLUCOSE BLD-MCNC: 215 MG/DL (ref 75–110)
HCT VFR BLD CALC: 40.2 % (ref 40.7–50.3)
HEMOGLOBIN: 13.6 G/DL (ref 13–17)
IMMATURE GRANULOCYTES: 0 %
LYMPHOCYTES # BLD: 21 % (ref 24–43)
MAGNESIUM: 2 MG/DL (ref 1.6–2.6)
MCH RBC QN AUTO: 33.7 PG (ref 25.2–33.5)
MCHC RBC AUTO-ENTMCNC: 33.8 G/DL (ref 28.4–34.8)
MCV RBC AUTO: 99.5 FL (ref 82.6–102.9)
MONOCYTES # BLD: 10 % (ref 3–12)
MRSA, DNA, NASAL: NORMAL
NRBC AUTOMATED: 0 PER 100 WBC
PARTIAL THROMBOPLASTIN TIME: 26.4 SEC (ref 20.5–30.5)
PDW BLD-RTO: 13.2 % (ref 11.8–14.4)
PLATELET # BLD: 123 K/UL (ref 138–453)
PLATELET ESTIMATE: ABNORMAL
PMV BLD AUTO: 10.9 FL (ref 8.1–13.5)
POTASSIUM SERPL-SCNC: 4.5 MMOL/L (ref 3.7–5.3)
RBC # BLD: 4.04 M/UL (ref 4.21–5.77)
RBC # BLD: ABNORMAL 10*6/UL
SEG NEUTROPHILS: 67 % (ref 36–65)
SEGMENTED NEUTROPHILS ABSOLUTE COUNT: 4.64 K/UL (ref 1.5–8.1)
SODIUM BLD-SCNC: 139 MMOL/L (ref 135–144)
SPECIMEN DESCRIPTION: NORMAL
TOTAL PROTEIN: 6.4 G/DL (ref 6.4–8.3)
WBC # BLD: 7 K/UL (ref 3.5–11.3)
WBC # BLD: ABNORMAL 10*3/UL

## 2018-12-04 PROCEDURE — 6370000000 HC RX 637 (ALT 250 FOR IP): Performed by: HOSPITALIST

## 2018-12-04 PROCEDURE — 85730 THROMBOPLASTIN TIME PARTIAL: CPT

## 2018-12-04 PROCEDURE — 93308 TTE F-UP OR LMTD: CPT

## 2018-12-04 PROCEDURE — 97530 THERAPEUTIC ACTIVITIES: CPT

## 2018-12-04 PROCEDURE — 2580000003 HC RX 258: Performed by: HOSPITALIST

## 2018-12-04 PROCEDURE — G8980 MOBILITY D/C STATUS: HCPCS

## 2018-12-04 PROCEDURE — 6360000002 HC RX W HCPCS: Performed by: HOSPITALIST

## 2018-12-04 PROCEDURE — 2580000003 HC RX 258: Performed by: EMERGENCY MEDICINE

## 2018-12-04 PROCEDURE — 36415 COLL VENOUS BLD VENIPUNCTURE: CPT

## 2018-12-04 PROCEDURE — 2580000003 HC RX 258: Performed by: INTERNAL MEDICINE

## 2018-12-04 PROCEDURE — G8978 MOBILITY CURRENT STATUS: HCPCS

## 2018-12-04 PROCEDURE — 2060000000 HC ICU INTERMEDIATE R&B

## 2018-12-04 PROCEDURE — 6360000002 HC RX W HCPCS: Performed by: INTERNAL MEDICINE

## 2018-12-04 PROCEDURE — 97161 PT EVAL LOW COMPLEX 20 MIN: CPT

## 2018-12-04 PROCEDURE — G8979 MOBILITY GOAL STATUS: HCPCS

## 2018-12-04 PROCEDURE — 99291 CRITICAL CARE FIRST HOUR: CPT | Performed by: INTERNAL MEDICINE

## 2018-12-04 PROCEDURE — 80048 BASIC METABOLIC PNL TOTAL CA: CPT

## 2018-12-04 PROCEDURE — 87641 MR-STAPH DNA AMP PROBE: CPT

## 2018-12-04 PROCEDURE — 6370000000 HC RX 637 (ALT 250 FOR IP): Performed by: EMERGENCY MEDICINE

## 2018-12-04 PROCEDURE — 85025 COMPLETE CBC W/AUTO DIFF WBC: CPT

## 2018-12-04 PROCEDURE — 83735 ASSAY OF MAGNESIUM: CPT

## 2018-12-04 PROCEDURE — 80076 HEPATIC FUNCTION PANEL: CPT

## 2018-12-04 PROCEDURE — 6360000002 HC RX W HCPCS

## 2018-12-04 RX ORDER — HEPARIN SODIUM 10000 [USP'U]/100ML
18 INJECTION, SOLUTION INTRAVENOUS CONTINUOUS
Status: DISCONTINUED | OUTPATIENT
Start: 2018-12-04 | End: 2018-12-10

## 2018-12-04 RX ORDER — THIAMINE MONONITRATE (VIT B1) 100 MG
100 TABLET ORAL DAILY
Status: DISCONTINUED | OUTPATIENT
Start: 2018-12-04 | End: 2018-12-10 | Stop reason: HOSPADM

## 2018-12-04 RX ORDER — HEPARIN SODIUM 1000 [USP'U]/ML
80 INJECTION, SOLUTION INTRAVENOUS; SUBCUTANEOUS PRN
Status: DISCONTINUED | OUTPATIENT
Start: 2018-12-04 | End: 2018-12-10 | Stop reason: HOSPADM

## 2018-12-04 RX ORDER — FOLIC ACID 1 MG/1
1 TABLET ORAL DAILY
Status: DISCONTINUED | OUTPATIENT
Start: 2018-12-04 | End: 2018-12-10 | Stop reason: HOSPADM

## 2018-12-04 RX ORDER — HEPARIN SODIUM 1000 [USP'U]/ML
40 INJECTION, SOLUTION INTRAVENOUS; SUBCUTANEOUS PRN
Status: DISCONTINUED | OUTPATIENT
Start: 2018-12-04 | End: 2018-12-10 | Stop reason: HOSPADM

## 2018-12-04 RX ADMIN — Medication 10 ML: at 10:04

## 2018-12-04 RX ADMIN — TAMSULOSIN HYDROCHLORIDE 0.4 MG: 0.4 CAPSULE ORAL at 10:01

## 2018-12-04 RX ADMIN — ASPIRIN 81 MG: 81 TABLET ORAL at 10:01

## 2018-12-04 RX ADMIN — AMIODARONE HYDROCHLORIDE 0.5 MG/MIN: 50 INJECTION, SOLUTION INTRAVENOUS at 18:53

## 2018-12-04 RX ADMIN — FUROSEMIDE 20 MG: 20 TABLET ORAL at 10:01

## 2018-12-04 RX ADMIN — ATORVASTATIN CALCIUM 40 MG: 80 TABLET, FILM COATED ORAL at 10:02

## 2018-12-04 RX ADMIN — CARVEDILOL 6.25 MG: 12.5 TABLET, FILM COATED ORAL at 20:36

## 2018-12-04 RX ADMIN — SPIRONOLACTONE 25 MG: 25 TABLET ORAL at 10:55

## 2018-12-04 RX ADMIN — INSULIN LISPRO 1 UNITS: 100 INJECTION, SOLUTION INTRAVENOUS; SUBCUTANEOUS at 20:36

## 2018-12-04 RX ADMIN — ENOXAPARIN SODIUM 30 MG: 30 INJECTION SUBCUTANEOUS at 01:06

## 2018-12-04 RX ADMIN — Medication 10 ML: at 10:03

## 2018-12-04 RX ADMIN — INSULIN LISPRO 2 UNITS: 100 INJECTION, SOLUTION INTRAVENOUS; SUBCUTANEOUS at 18:56

## 2018-12-04 RX ADMIN — AMIODARONE HYDROCHLORIDE 150 MG: 50 INJECTION, SOLUTION INTRAVENOUS at 13:30

## 2018-12-04 RX ADMIN — ENOXAPARIN SODIUM 30 MG: 30 INJECTION SUBCUTANEOUS at 09:59

## 2018-12-04 RX ADMIN — INSULIN LISPRO 4 UNITS: 100 INJECTION, SOLUTION INTRAVENOUS; SUBCUTANEOUS at 13:22

## 2018-12-04 RX ADMIN — FOLIC ACID 1 MG: 1 TABLET ORAL at 10:01

## 2018-12-04 RX ADMIN — Medication 10 ML: at 20:36

## 2018-12-04 RX ADMIN — AMIODARONE HYDROCHLORIDE 1 MG/MIN: 50 INJECTION, SOLUTION INTRAVENOUS at 14:06

## 2018-12-04 RX ADMIN — Medication 10 ML: at 20:44

## 2018-12-04 RX ADMIN — HEPARIN SODIUM 9820 UNITS: 1000 INJECTION, SOLUTION INTRAVENOUS; SUBCUTANEOUS at 20:27

## 2018-12-04 RX ADMIN — HEPARIN SODIUM AND DEXTROSE 16 UNITS/KG/HR: 10000; 5 INJECTION INTRAVENOUS at 20:27

## 2018-12-04 RX ADMIN — Medication 100 MG: at 10:01

## 2018-12-04 RX ADMIN — CARVEDILOL 6.25 MG: 12.5 TABLET, FILM COATED ORAL at 10:03

## 2018-12-04 ASSESSMENT — PAIN SCALES - GENERAL
PAINLEVEL_OUTOF10: 0

## 2018-12-04 NOTE — PROGRESS NOTES
Physical Therapy    [address]  Initial Assessment    NAME: [facility]  :   MRN: [id number]    Date of Service: 2018   The patient is a 76 y.o.  male who is admitted to the hospital for evaluation of elevated HR. The patient was apparently raking his leaves all day on Saturday and felt tired doing it. He felt like he \"hit a wall' a couple times but denies having any palpitations, lightheadedness, dizziness, or syncope on that day. He woke up the next morning and, as he does every morning, took his vitals and his pulse rate was in the 130s according to him. He was completely asymptomatic at that time. He was brought over to the ED, where he was found to be monomorphic VT at a rate of 130. He was given a lidocaine bolus wihtout any change of rhythm so he was cardioverted in the ED with 200 J synchronized with restoration of sinus rhythm. At that point, he was started on lidocaine infusion at 1 mg/min. Acute MI has been ruled out with serial troponins. His initial magnesium was 1.6 and that has been corrected. Discharge Recommendations:  Home with assist PRN   PT Equipment Recommendations  Equipment Needed: No    Patient Diagnosis(es): The encounter diagnosis was V-tach [facility]). has a past medical history of Alcohol abuse; Anxiety; CHF (congestive heart failure) ([address] Colon polyps; COPD (chronic obstructive pulmonary disease) ([address] Diabetes mellitus ([address] Dupuytren contracture; Hypertension; ICD (implantable cardioverter-defibrillator) battery depletion; Ischemic cardiomyopathy; Obesity; SOB (shortness of breath); Tendonitis, Achilles, left; Unstable angina ([address] and Ventricular tachyarrhythmia ([address] has a past surgical history that includes Cardiac defibrillator placement; Cardiac catheterization; Cardiac defibrillator placement (Left, 2014); and Colonoscopy (3/2015).     Restrictions  Restrictions/Precautions  Restrictions/Precautions: Up as Tolerated,

## 2018-12-04 NOTE — H&P
270 lb 1 oz (122.5 kg)   SpO2 96%   BMI 35.79 kg/m²     Body weight:   Wt Readings from Last 3 Encounters:   12/03/18 270 lb 1 oz (122.5 kg)   11/26/18 270 lb (122.5 kg)   09/28/18 271 lb (122.9 kg)       Body Mass Index : Body mass index is 35.79 kg/m². PHYSICAL EXAMINATION :  Constitutional: Appears well, in no distress  EENT: PERRLA, EOMI, sclera clear, anicteric, oropharynx clear, no lesions, neck supple with midline trachea. Neck: Supple, symmetrical, trachea midline, no adenopathy, thyroid symmetric, no jvd skin normal  Respiratory: clear to auscultation, no wheezes or rales and unlabored breathing. No intercostal tenderness  Cardiovascular: regular rate and rhythm, normal S1, S2, no murmur noted and 2+ pulses throughout  Abdomen: soft, nontender, nondistended, no masses or organomegaly  Neurological:  Extremities:  peripheral pulses normal, no pedal edema, no clubbing or cyanosis      Laboratory findings:-    CBC:   Recent Labs      12/03/18   0627   WBC  12.0*   HGB  15.2   PLT  152     BMP:    Recent Labs      12/03/18   0627   NA  136   K  4.7   CL  101   CO2  23   BUN  23   CREATININE  1.21*   GLUCOSE  183*     S. Calcium:  Recent Labs      12/03/18 0627   CALCIUM  9.7     S. Ionized Calcium:No results for input(s): IONCA in the last 72 hours. S. Magnesium:  Recent Labs      12/03/18   0627   MG  1.6     S. Phosphorus:No results for input(s): PHOS in the last 72 hours. S. Glucose:  Recent Labs      12/03/18   1533  12/03/18   2317   POCGLU  98  111*     Glycosylated hemoglobin A1C: No results for input(s): LABA1C in the last 72 hours. INR: No results for input(s): INR in the last 72 hours. Hepatic functions: No results for input(s): ALKPHOS, ALT, AST, PROT, BILITOT, BILIDIR, LABALBU in the last 72 hours. Pancreatic functions:No results for input(s): LACTA, AMYLASE in the last 72 hours. S. Lactic Acid: No results for input(s): LACTA in the last 72 hours.   Cardiac enzymes:  Recent Labs 12/03/18   0623  12/03/18   0854   TROPONINI  0.06  0.07     BNP:No results for input(s): BNP in the last 72 hours. Lipid profile: No results for input(s): CHOL, TRIG, HDL, LDLCALC in the last 72 hours. Invalid input(s): LDL  Blood Gases: No results found for: PH, PCO2, PO2, HCO3, O2SAT  Thyroid functions:   Lab Results   Component Value Date    TSH 3.16 12/03/2018        Urinalysis:     Microbiology:  Cultures during this admission:     Blood cultures:                 [x] None drawn      [] Negative             []  Positive (Details:  )  Urine Culture:                   [x] None drawn      [] Negative             []  Positive (Details:  )  Sputum Culture:               [x] None drawn       [] Negative             []  Positive (Details:  )   Endotracheal aspirate:     [x] None drawn       [] Negative             []  Positive (Details:  )         -----------------------------------------------------------------  Radiological reports:    Xr Chest Portable    Result Date: 12/3/2018  EXAMINATION: SINGLE XRAY VIEW OF THE CHEST 12/3/2018 7:26 am COMPARISON: None. HISTORY: ORDERING SYSTEM PROVIDED HISTORY: r/o fluid overload TECHNOLOGIST PROVIDED HISTORY: r/o fluid overload FINDINGS: Single lead left transvenous pacemaker remains in place. Stable cardiomediastinal silhouette. Borderline cardiomegaly. No focal consolidation. Pulmonary vascularity normal.  No pleural effusion or pneumothorax. Bones grossly intact. Old right lower rib fractures noted. Borderline cardiomegaly unchanged. No pulmonary edema.             Assessment and Plan     Patient Active Problem List   Diagnosis    Unstable angina (HCC)    Alcohol abuse    Smoking    CHF (congestive heart failure) (HCC)    COPD (chronic obstructive pulmonary disease) (HCC)    Obesity    Shortness of breath    Ischemic cardiomyopathy    SOB (shortness of breath)    Anxiety    Hoarseness    Colon polyps    Back pain    Essential hypertension

## 2018-12-04 NOTE — ED NOTES
Admitting orders reviewed. Admitting meds reviewed and requested from pharmacy.        Chema Sequeira RN  12/03/18 6796

## 2018-12-04 NOTE — CONSULTS
Dr. Cosmo Araujo. In August of 2018, he also presented with monomorphic VT to the ED with a HR of 150. At that time, he also required DC cardioversion with 250 J. He was then started on Sotalol 80 mg BID and his device was re-programmed to two-zone detection scheme (430/320 msec). He had repeat echocardiogram and coronary angiography that were essentially unchanged. Device interrogation during this admission showed 9 episodes of V-tach since 11/19/18, the longest being 5 hours and 27 minutes. Past Medical History:   has a past medical history of Alcohol abuse; Anxiety; CHF (congestive heart failure) ([address] Colon polyps; COPD (chronic obstructive pulmonary disease) ([address] Diabetes mellitus ([address] Dupuytren contracture; Hypertension; ICD (implantable cardioverter-defibrillator) battery depletion; Ischemic cardiomyopathy; Obesity; SOB (shortness of breath); Tendonitis, Achilles, left; Unstable angina ([address] and Ventricular tachyarrhythmia ([facility] Past Surgical History:   has a past surgical history that includes Cardiac defibrillator placement; Cardiac catheterization; Cardiac defibrillator placement (Left, 9/2014); and Colonoscopy (3/2015). Home Medications:    Prior to Admission medications    Medication Sig Start Date End Date Taking? Authorizing Provider   Acetaminophen 500 MG CAPS Take by mouth    Historical Provider, MD   guaiFENesin 400 MG tablet Take 400 mg by mouth 4 times daily as needed for Cough    Historical Provider, MD   budesonide-formoterol (SYMBICORT) 80-4.5 MCG/ACT AERO Inhale 2 puffs into the lungs 2 times daily    Historical Provider, MD   amoxicillin (AMOXIL) 500 MG capsule Take 500 mg by mouth 3 times daily    Historical Provider, MD   traMADol-acetaminophen (ULTRACET) 37.5-325 MG per tablet Take 1 tablet by mouth every 6 hours as needed for Pain. Kailash Fear     Historical Provider, MD   tamsulosin (FLOMAX) 0.4 MG capsule Take 1 capsule by mouth daily 9/28/18   Jersey Benjamin MD   sotalol g, 12.5 g, Intravenous, PRN  glucagon (rDNA) injection 1 mg, 1 mg, Intramuscular, PRN  dextrose 5 % solution, 100 mL/hr, Intravenous, PRN  nitroGLYCERIN (NITROSTAT) SL tablet 0.4 mg, 0.4 mg, Sublingual, Q5 Min PRN  spironolactone (ALDACTONE) tablet 25 mg, 25 mg, Oral, Daily  tamsulosin (FLOMAX) capsule 0.4 mg, 0.4 mg, Oral, Daily  sodium chloride flush 0.9 % injection 10 mL, 10 mL, Intravenous, 2 times per day  sodium chloride flush 0.9 % injection 10 mL, 10 mL, Intravenous, PRN  magnesium hydroxide (MILK OF MAGNESIA) 400 MG/5ML suspension 30 mL, 30 mL, Oral, Daily PRN  potassium chloride (KLOR-CON M) extended release tablet 40 mEq, 40 mEq, Oral, PRN **OR** potassium chloride 20 MEQ/15ML (10%) oral solution 40 mEq, 40 mEq, Oral, PRN **OR** potassium chloride 10 mEq/100 mL IVPB (Peripheral Line), 10 mEq, Intravenous, PRN  magnesium sulfate 1 g in dextrose 5% 100 mL IVPB, 1 g, Intravenous, PRN  nicotine (NICODERM CQ) 14 MG/24HR 1 patch, 1 patch, Transdermal, Daily  enoxaparin (LOVENOX) injection 30 mg, 30 mg, Subcutaneous, BID  albuterol sulfate  (90 Base) MCG/ACT inhaler 2 puff, 2 puff, Inhalation, Q6H PRN    Allergies:  Patient has no known allergies. Social History:   reports that he has been smoking Cigarettes. He started smoking about 54 years ago. He has a 82.50 pack-year smoking history. He has never used smokeless tobacco. He reports that he drinks alcohol. He reports that he does not use drugs. Family History: family history includes Diabetes in his father; Heart Disease in his father, maternal aunt, maternal uncle, paternal aunt, and paternal uncle; High Blood Pressure in his father. No h/o sudden cardiac death. Yes for premature CAD    REVIEW OF SYSTEMS:    · Constitutional: there has been no unanticipated weight loss. There's been a change in energy level, No change in activity level. · Eyes: No visual changes or diplopia. No scleral icterus. · ENT: No Headaches  · Cardiovascular:   As

## 2018-12-04 NOTE — CARE COORDINATION
Case Management Initial Discharge Plan  [address] [facility],             Met with:patient to discuss discharge plans. Information verified: address, contacts, phone number, , insurance Yes  PCP: Maxim Saucedo MD  Date of last visit: past  [facility]. Provider: [employer]    Discharge Planning    Living Arrangements:      Support Systems:       Home has 1 1/2 stories  few stairs to climb to get into front door, partial flight stairs to climb to reach second floor  Location of bedroom/bathroom in home main    Patient able to perform ADL's:Independent    Current Services (outpatient & in home) none   DME equipment: none  DME provider:  n/a    Ph[facility]: [facility]   Potential Assistance Purchasing Medications:     Does patient want to participate in local refill/ meds to beds program?       Potential Assistance Needed:       Patient agreeable to home care: No  [place] of choice provided:  n/a    Prior SNF/Rehab Placement and Facility:   Agreeable to SNF/Rehab: No  [place] of choice provided: n/a   Evaluation: no    Expected Discharge date:     Patient expects to be discharged to: Follow Up Appointment: Best Day/ Time:      Transportation provider: family  Transportation arrangements needed for discharge: Yes    Readmission Risk              Risk of Unplanned Readmission:        16             Does patient have a readmission risk score greater than 14?: Yes  If yes, follow-up appointment must be made within 7 days of discharge.      Discharge Plan: home independently          Electronically signed by Coates RN on 18 at 1:06 PM

## 2018-12-04 NOTE — ED NOTES
Patient up to [facility] (it was too small for him) for BM, he was unable to go. Transported to ICU.       Jennifer Encinas RN  12/04/18 4350

## 2018-12-04 NOTE — ED NOTES
Requested a hospital bed from housekeeping and charge nurse multiple times since 7pm.  Sts there are no beds available due to all of the admits in ER.       Haydee Townsend RN  12/04/18 1437

## 2018-12-05 LAB
ABSOLUTE EOS #: 0.21 K/UL (ref 0–0.44)
ABSOLUTE IMMATURE GRANULOCYTE: <0.03 K/UL (ref 0–0.3)
ABSOLUTE LYMPH #: 2.12 K/UL (ref 1.1–3.7)
ABSOLUTE MONO #: 0.7 K/UL (ref 0.1–1.2)
ANION GAP SERPL CALCULATED.3IONS-SCNC: 12 MMOL/L (ref 9–17)
BASOPHILS # BLD: 1 % (ref 0–2)
BASOPHILS ABSOLUTE: 0.05 K/UL (ref 0–0.2)
BUN BLDV-MCNC: 19 MG/DL (ref 8–23)
BUN/CREAT BLD: ABNORMAL (ref 9–20)
CALCIUM IONIZED: 1.13 MMOL/L (ref 1.13–1.33)
CALCIUM SERPL-MCNC: 8.7 MG/DL (ref 8.6–10.4)
CHLORIDE BLD-SCNC: 105 MMOL/L (ref 98–107)
CO2: 21 MMOL/L (ref 20–31)
CREAT SERPL-MCNC: 0.95 MG/DL (ref 0.7–1.2)
DIFFERENTIAL TYPE: ABNORMAL
EOSINOPHILS RELATIVE PERCENT: 3 % (ref 1–4)
GFR AFRICAN AMERICAN: >60 ML/MIN
GFR NON-AFRICAN AMERICAN: >60 ML/MIN
GFR SERPL CREATININE-BSD FRML MDRD: ABNORMAL ML/MIN/{1.73_M2}
GFR SERPL CREATININE-BSD FRML MDRD: ABNORMAL ML/MIN/{1.73_M2}
GLUCOSE BLD-MCNC: 137 MG/DL (ref 75–110)
GLUCOSE BLD-MCNC: 149 MG/DL (ref 75–110)
GLUCOSE BLD-MCNC: 154 MG/DL (ref 70–99)
GLUCOSE BLD-MCNC: 160 MG/DL (ref 75–110)
HCT VFR BLD CALC: 41.3 % (ref 40.7–50.3)
HEMOGLOBIN: 13.7 G/DL (ref 13–17)
IMMATURE GRANULOCYTES: 0 %
LYMPHOCYTES # BLD: 30 % (ref 24–43)
MAGNESIUM: 1.8 MG/DL (ref 1.6–2.6)
MCH RBC QN AUTO: 33.3 PG (ref 25.2–33.5)
MCHC RBC AUTO-ENTMCNC: 33.2 G/DL (ref 28.4–34.8)
MCV RBC AUTO: 100.2 FL (ref 82.6–102.9)
MONOCYTES # BLD: 10 % (ref 3–12)
NRBC AUTOMATED: 0 PER 100 WBC
PARTIAL THROMBOPLASTIN TIME: 70.5 SEC (ref 20.5–30.5)
PARTIAL THROMBOPLASTIN TIME: 72.3 SEC (ref 20.5–30.5)
PARTIAL THROMBOPLASTIN TIME: >120 SEC (ref 20.5–30.5)
PDW BLD-RTO: 13.2 % (ref 11.8–14.4)
PHOSPHORUS: 3.1 MG/DL (ref 2.5–4.5)
PLATELET # BLD: 127 K/UL (ref 138–453)
PLATELET ESTIMATE: ABNORMAL
PMV BLD AUTO: 10.8 FL (ref 8.1–13.5)
POTASSIUM SERPL-SCNC: 4.4 MMOL/L (ref 3.7–5.3)
RBC # BLD: 4.12 M/UL (ref 4.21–5.77)
RBC # BLD: ABNORMAL 10*6/UL
SEG NEUTROPHILS: 56 % (ref 36–65)
SEGMENTED NEUTROPHILS ABSOLUTE COUNT: 3.98 K/UL (ref 1.5–8.1)
SODIUM BLD-SCNC: 138 MMOL/L (ref 135–144)
WBC # BLD: 7.1 K/UL (ref 3.5–11.3)
WBC # BLD: ABNORMAL 10*3/UL

## 2018-12-05 PROCEDURE — 2580000003 HC RX 258: Performed by: INTERNAL MEDICINE

## 2018-12-05 PROCEDURE — 85025 COMPLETE CBC W/AUTO DIFF WBC: CPT

## 2018-12-05 PROCEDURE — G8988 SELF CARE GOAL STATUS: HCPCS

## 2018-12-05 PROCEDURE — 85730 THROMBOPLASTIN TIME PARTIAL: CPT

## 2018-12-05 PROCEDURE — 94762 N-INVAS EAR/PLS OXIMTRY CONT: CPT

## 2018-12-05 PROCEDURE — 99233 SBSQ HOSP IP/OBS HIGH 50: CPT | Performed by: INTERNAL MEDICINE

## 2018-12-05 PROCEDURE — 82947 ASSAY GLUCOSE BLOOD QUANT: CPT

## 2018-12-05 PROCEDURE — 97165 OT EVAL LOW COMPLEX 30 MIN: CPT

## 2018-12-05 PROCEDURE — 80048 BASIC METABOLIC PNL TOTAL CA: CPT

## 2018-12-05 PROCEDURE — 6360000002 HC RX W HCPCS: Performed by: INTERNAL MEDICINE

## 2018-12-05 PROCEDURE — 6370000000 HC RX 637 (ALT 250 FOR IP): Performed by: HOSPITALIST

## 2018-12-05 PROCEDURE — 2060000000 HC ICU INTERMEDIATE R&B

## 2018-12-05 PROCEDURE — 82330 ASSAY OF CALCIUM: CPT

## 2018-12-05 PROCEDURE — 83735 ASSAY OF MAGNESIUM: CPT

## 2018-12-05 PROCEDURE — 6370000000 HC RX 637 (ALT 250 FOR IP): Performed by: INTERNAL MEDICINE

## 2018-12-05 PROCEDURE — 84100 ASSAY OF PHOSPHORUS: CPT

## 2018-12-05 PROCEDURE — G8989 SELF CARE D/C STATUS: HCPCS

## 2018-12-05 PROCEDURE — 6360000002 HC RX W HCPCS: Performed by: EMERGENCY MEDICINE

## 2018-12-05 PROCEDURE — 6370000000 HC RX 637 (ALT 250 FOR IP): Performed by: EMERGENCY MEDICINE

## 2018-12-05 PROCEDURE — 97535 SELF CARE MNGMENT TRAINING: CPT

## 2018-12-05 PROCEDURE — G8987 SELF CARE CURRENT STATUS: HCPCS

## 2018-12-05 PROCEDURE — 36415 COLL VENOUS BLD VENIPUNCTURE: CPT

## 2018-12-05 PROCEDURE — 2580000003 HC RX 258: Performed by: HOSPITALIST

## 2018-12-05 PROCEDURE — 2000000000 HC ICU R&B

## 2018-12-05 PROCEDURE — 2580000003 HC RX 258: Performed by: EMERGENCY MEDICINE

## 2018-12-05 RX ORDER — MULTIVITAMIN WITH FOLIC ACID 400 MCG
1 TABLET ORAL DAILY
Status: DISCONTINUED | OUTPATIENT
Start: 2018-12-05 | End: 2018-12-10 | Stop reason: HOSPADM

## 2018-12-05 RX ORDER — WARFARIN SODIUM 5 MG/1
5 TABLET ORAL DAILY
Status: DISCONTINUED | OUTPATIENT
Start: 2018-12-05 | End: 2018-12-10

## 2018-12-05 RX ORDER — LISINOPRIL 5 MG/1
5 TABLET ORAL DAILY
Status: DISCONTINUED | OUTPATIENT
Start: 2018-12-05 | End: 2018-12-10 | Stop reason: HOSPADM

## 2018-12-05 RX ORDER — MAGNESIUM SULFATE 1 G/100ML
1 INJECTION INTRAVENOUS ONCE
Status: COMPLETED | OUTPATIENT
Start: 2018-12-05 | End: 2018-12-05

## 2018-12-05 RX ADMIN — WARFARIN SODIUM 5 MG: 5 TABLET ORAL at 18:30

## 2018-12-05 RX ADMIN — TAMSULOSIN HYDROCHLORIDE 0.4 MG: 0.4 CAPSULE ORAL at 09:36

## 2018-12-05 RX ADMIN — SPIRONOLACTONE 25 MG: 25 TABLET ORAL at 09:36

## 2018-12-05 RX ADMIN — CARVEDILOL 6.25 MG: 12.5 TABLET, FILM COATED ORAL at 09:37

## 2018-12-05 RX ADMIN — CARVEDILOL 6.25 MG: 12.5 TABLET, FILM COATED ORAL at 21:05

## 2018-12-05 RX ADMIN — HEPARIN SODIUM AND DEXTROSE 12 UNITS/KG/HR: 10000; 5 INJECTION INTRAVENOUS at 09:53

## 2018-12-05 RX ADMIN — LISINOPRIL 5 MG: 5 TABLET ORAL at 21:05

## 2018-12-05 RX ADMIN — AMIODARONE HYDROCHLORIDE 0.5 MG/MIN: 50 INJECTION, SOLUTION INTRAVENOUS at 02:20

## 2018-12-05 RX ADMIN — FOLIC ACID 1 MG: 1 TABLET ORAL at 09:36

## 2018-12-05 RX ADMIN — Medication 10 ML: at 21:07

## 2018-12-05 RX ADMIN — Medication 100 MG: at 09:55

## 2018-12-05 RX ADMIN — INSULIN LISPRO 2 UNITS: 100 INJECTION, SOLUTION INTRAVENOUS; SUBCUTANEOUS at 09:52

## 2018-12-05 RX ADMIN — MAGNESIUM SULFATE HEPTAHYDRATE 1 G: 1 INJECTION, SOLUTION INTRAVENOUS at 09:37

## 2018-12-05 RX ADMIN — FUROSEMIDE 20 MG: 20 TABLET ORAL at 09:36

## 2018-12-05 RX ADMIN — ASPIRIN 81 MG: 81 TABLET ORAL at 09:37

## 2018-12-05 RX ADMIN — THERA TABS 1 TABLET: TAB at 09:36

## 2018-12-05 RX ADMIN — INSULIN LISPRO 2 UNITS: 100 INJECTION, SOLUTION INTRAVENOUS; SUBCUTANEOUS at 17:13

## 2018-12-05 RX ADMIN — ATORVASTATIN CALCIUM 40 MG: 80 TABLET, FILM COATED ORAL at 09:37

## 2018-12-05 RX ADMIN — Medication 10 ML: at 09:55

## 2018-12-05 RX ADMIN — AMIODARONE HYDROCHLORIDE 0.5 MG/MIN: 50 INJECTION, SOLUTION INTRAVENOUS at 18:32

## 2018-12-05 ASSESSMENT — PAIN SCALES - GENERAL
PAINLEVEL_OUTOF10: 0
PAINLEVEL_OUTOF10: 0

## 2018-12-05 NOTE — PLAN OF CARE
Problem: Falls - Risk of:  Goal: Will remain free from falls  Will remain free from falls   Outcome: Ongoing    Goal: Absence of physical injury  Absence of physical injury   Outcome: Ongoing      Problem: Discharge Planning:  Goal: Participates in care planning  Participates in care planning   Outcome: Ongoing      Problem: Cardiac Output - Decreased:  Goal: Hemodynamic stability will improve  Hemodynamic stability will improve   Outcome: Ongoing      Problem: Musculor/Skeletal Functional Status  Goal: Highest potential functional level  Outcome: Ongoing

## 2018-12-05 NOTE — PROGRESS NOTES
: [address]    Therapy Time   Individual Concurrent Group Co-treatment   Time In 7057         Time Out 6863         Minutes 25               Discharge recommendations discussed with patient during initial evaluation.   Love Espinoza, OT/S

## 2018-12-05 NOTE — PROGRESS NOTES
last 72 hours. Objective:   Vitals: /65   Pulse 66   Temp 98.6 °F (37 °C) (Oral)   Resp 15   Ht 6' 0.83\" (1.85 m)   Wt 270 lb 8.1 oz (122.7 kg)   SpO2 95%   BMI 35.85 kg/m²     General appearance: awake, alert, in no apparent respiratory distress   HEENT: Head: Normocephalic, no lesions, without obvious abnormality  Neck: no JVD  Lungs: clear to auscultation bilaterally, mild basilar rales, no wheezing   Heart: regular rate and rhythm, S1, S2 normal, no murmur, click, rub or gallop  Abdomen: soft, non-tender; bowel sounds normal  Extremities: trace LE edema  Neurologic: Mental status: Alert, oriented. Motor and sensory not done. Echocardiogram 12/4/2018:  Limited study with Definity to evaluate for LV thrombus. Severely reduced LV Systolic function with Estimated left ventricular  ejection fraction of 30%  Akinesis of apex, apical anteroseptal, inferoseptal and anterolateral walls. Preserved basal segments as seen on previous echo. Rounded echogenic density in LV [place] which appears as filling defect with  contrast suggestive of an organized thrombus      Coronary Angiography 8/30/2018:    Chronic LAD occlusion with collaterals.   Otherwise, normal LCX and RCA.   Severe LV dysfunction. Assessment:   1. Recurrent Sustained monomorphic ventricular tachycardia s/p cardioversion   2. Chronic systolic HFrEF due to Severe ischemic cardiomyopathy (LVEF 25-30%), clinically compensated   3. Coronary artery disease with  LAD supplied by collaterals, nonobstructive disease in RCA and LCX (last cath in aug 2018)  4. Status post single-chamber Medtronic ICD in situ with a single VT detection zone of 188 which was changed to double zone in Aug 2018       Treatment Plan:   1. Cont IV amio for now as per EP recommendation  2. Started on IV heparin for LV Thrombus detected on contrast echo, will need AC with coumadin, will start today with close monitoring on INR.    3. Cont rest of HF meds, resume

## 2018-12-06 LAB
ABSOLUTE EOS #: 0.22 K/UL (ref 0–0.44)
ABSOLUTE IMMATURE GRANULOCYTE: 0.03 K/UL (ref 0–0.3)
ABSOLUTE LYMPH #: 1.47 K/UL (ref 1.1–3.7)
ABSOLUTE MONO #: 0.67 K/UL (ref 0.1–1.2)
ANION GAP SERPL CALCULATED.3IONS-SCNC: 12 MMOL/L (ref 9–17)
ANION GAP SERPL CALCULATED.3IONS-SCNC: 13 MMOL/L (ref 9–17)
BASOPHILS # BLD: 1 % (ref 0–2)
BASOPHILS ABSOLUTE: 0.06 K/UL (ref 0–0.2)
BUN BLDV-MCNC: 15 MG/DL (ref 8–23)
BUN BLDV-MCNC: 17 MG/DL (ref 8–23)
BUN/CREAT BLD: ABNORMAL (ref 9–20)
BUN/CREAT BLD: ABNORMAL (ref 9–20)
CALCIUM IONIZED: 1.16 MMOL/L (ref 1.13–1.33)
CALCIUM IONIZED: 1.21 MMOL/L (ref 1.13–1.33)
CALCIUM SERPL-MCNC: 8.8 MG/DL (ref 8.6–10.4)
CALCIUM SERPL-MCNC: 9.2 MG/DL (ref 8.6–10.4)
CHLORIDE BLD-SCNC: 104 MMOL/L (ref 98–107)
CHLORIDE BLD-SCNC: 105 MMOL/L (ref 98–107)
CO2: 21 MMOL/L (ref 20–31)
CO2: 23 MMOL/L (ref 20–31)
CREAT SERPL-MCNC: 0.87 MG/DL (ref 0.7–1.2)
CREAT SERPL-MCNC: 1.03 MG/DL (ref 0.7–1.2)
DIFFERENTIAL TYPE: ABNORMAL
EOSINOPHILS RELATIVE PERCENT: 3 % (ref 1–4)
GFR AFRICAN AMERICAN: >60 ML/MIN
GFR AFRICAN AMERICAN: >60 ML/MIN
GFR NON-AFRICAN AMERICAN: >60 ML/MIN
GFR NON-AFRICAN AMERICAN: >60 ML/MIN
GFR SERPL CREATININE-BSD FRML MDRD: ABNORMAL ML/MIN/{1.73_M2}
GLUCOSE BLD-MCNC: 127 MG/DL (ref 75–110)
GLUCOSE BLD-MCNC: 155 MG/DL (ref 75–110)
GLUCOSE BLD-MCNC: 169 MG/DL (ref 70–99)
GLUCOSE BLD-MCNC: 172 MG/DL (ref 75–110)
GLUCOSE BLD-MCNC: 173 MG/DL (ref 75–110)
GLUCOSE BLD-MCNC: 177 MG/DL (ref 70–99)
HCT VFR BLD CALC: 42.2 % (ref 40.7–50.3)
HEMOGLOBIN: 14 G/DL (ref 13–17)
IMMATURE GRANULOCYTES: 0 %
INR BLD: 1.1
LYMPHOCYTES # BLD: 18 % (ref 24–43)
MAGNESIUM: 1.8 MG/DL (ref 1.6–2.6)
MAGNESIUM: 1.9 MG/DL (ref 1.6–2.6)
MCH RBC QN AUTO: 33.5 PG (ref 25.2–33.5)
MCHC RBC AUTO-ENTMCNC: 33.2 G/DL (ref 28.4–34.8)
MCV RBC AUTO: 101 FL (ref 82.6–102.9)
MONOCYTES # BLD: 8 % (ref 3–12)
NRBC AUTOMATED: 0 PER 100 WBC
PARTIAL THROMBOPLASTIN TIME: 72.2 SEC (ref 20.5–30.5)
PDW BLD-RTO: 13.4 % (ref 11.8–14.4)
PHOSPHORUS: 2.9 MG/DL (ref 2.5–4.5)
PLATELET # BLD: 122 K/UL (ref 138–453)
PLATELET ESTIMATE: ABNORMAL
PMV BLD AUTO: 10.5 FL (ref 8.1–13.5)
POTASSIUM SERPL-SCNC: 4.2 MMOL/L (ref 3.7–5.3)
POTASSIUM SERPL-SCNC: 4.2 MMOL/L (ref 3.7–5.3)
PROTHROMBIN TIME: 12.1 SEC (ref 9–12)
RBC # BLD: 4.18 M/UL (ref 4.21–5.77)
RBC # BLD: ABNORMAL 10*6/UL
SEG NEUTROPHILS: 70 % (ref 36–65)
SEGMENTED NEUTROPHILS ABSOLUTE COUNT: 5.66 K/UL (ref 1.5–8.1)
SODIUM BLD-SCNC: 139 MMOL/L (ref 135–144)
SODIUM BLD-SCNC: 139 MMOL/L (ref 135–144)
TROPONIN INTERP: NORMAL
TROPONIN T: <0.03 NG/ML
WBC # BLD: 8.1 K/UL (ref 3.5–11.3)
WBC # BLD: ABNORMAL 10*3/UL

## 2018-12-06 PROCEDURE — 82330 ASSAY OF CALCIUM: CPT

## 2018-12-06 PROCEDURE — 6360000002 HC RX W HCPCS: Performed by: INTERNAL MEDICINE

## 2018-12-06 PROCEDURE — 85025 COMPLETE CBC W/AUTO DIFF WBC: CPT

## 2018-12-06 PROCEDURE — 85610 PROTHROMBIN TIME: CPT

## 2018-12-06 PROCEDURE — 85730 THROMBOPLASTIN TIME PARTIAL: CPT

## 2018-12-06 PROCEDURE — 6370000000 HC RX 637 (ALT 250 FOR IP): Performed by: INTERNAL MEDICINE

## 2018-12-06 PROCEDURE — 6360000002 HC RX W HCPCS: Performed by: STUDENT IN AN ORGANIZED HEALTH CARE EDUCATION/TRAINING PROGRAM

## 2018-12-06 PROCEDURE — 80048 BASIC METABOLIC PNL TOTAL CA: CPT

## 2018-12-06 PROCEDURE — 83735 ASSAY OF MAGNESIUM: CPT

## 2018-12-06 PROCEDURE — 6370000000 HC RX 637 (ALT 250 FOR IP): Performed by: HOSPITALIST

## 2018-12-06 PROCEDURE — 2580000003 HC RX 258: Performed by: INTERNAL MEDICINE

## 2018-12-06 PROCEDURE — 84100 ASSAY OF PHOSPHORUS: CPT

## 2018-12-06 PROCEDURE — 99233 SBSQ HOSP IP/OBS HIGH 50: CPT | Performed by: INTERNAL MEDICINE

## 2018-12-06 PROCEDURE — 2000000000 HC ICU R&B

## 2018-12-06 PROCEDURE — 84484 ASSAY OF TROPONIN QUANT: CPT

## 2018-12-06 PROCEDURE — 82947 ASSAY GLUCOSE BLOOD QUANT: CPT

## 2018-12-06 PROCEDURE — 36415 COLL VENOUS BLD VENIPUNCTURE: CPT

## 2018-12-06 PROCEDURE — 93005 ELECTROCARDIOGRAM TRACING: CPT

## 2018-12-06 PROCEDURE — 6370000000 HC RX 637 (ALT 250 FOR IP): Performed by: EMERGENCY MEDICINE

## 2018-12-06 RX ORDER — AMIODARONE HYDROCHLORIDE 200 MG/1
200 TABLET ORAL 2 TIMES DAILY
Status: DISCONTINUED | OUTPATIENT
Start: 2018-12-06 | End: 2018-12-07

## 2018-12-06 RX ORDER — AMIODARONE HYDROCHLORIDE 200 MG/1
200 TABLET ORAL DAILY
Status: DISCONTINUED | OUTPATIENT
Start: 2018-12-11 | End: 2018-12-07

## 2018-12-06 RX ORDER — METOPROLOL TARTRATE 50 MG/1
50 TABLET, FILM COATED ORAL 2 TIMES DAILY
Status: DISCONTINUED | OUTPATIENT
Start: 2018-12-06 | End: 2018-12-07

## 2018-12-06 RX ORDER — MAGNESIUM SULFATE 1 G/100ML
1 INJECTION INTRAVENOUS
Status: COMPLETED | OUTPATIENT
Start: 2018-12-06 | End: 2018-12-06

## 2018-12-06 RX ORDER — CARVEDILOL 12.5 MG/1
12.5 TABLET ORAL 2 TIMES DAILY
Status: DISCONTINUED | OUTPATIENT
Start: 2018-12-06 | End: 2018-12-06

## 2018-12-06 RX ADMIN — INSULIN LISPRO 2 UNITS: 100 INJECTION, SOLUTION INTRAVENOUS; SUBCUTANEOUS at 07:51

## 2018-12-06 RX ADMIN — INSULIN LISPRO 2 UNITS: 100 INJECTION, SOLUTION INTRAVENOUS; SUBCUTANEOUS at 18:25

## 2018-12-06 RX ADMIN — CARVEDILOL 6.25 MG: 12.5 TABLET, FILM COATED ORAL at 08:02

## 2018-12-06 RX ADMIN — METOPROLOL TARTRATE 50 MG: 50 TABLET, FILM COATED ORAL at 22:00

## 2018-12-06 RX ADMIN — THERA TABS 1 TABLET: TAB at 08:03

## 2018-12-06 RX ADMIN — AMIODARONE HYDROCHLORIDE 1 MG/MIN: 50 INJECTION, SOLUTION INTRAVENOUS at 20:16

## 2018-12-06 RX ADMIN — HEPARIN SODIUM AND DEXTROSE 12 UNITS/KG/HR: 10000; 5 INJECTION INTRAVENOUS at 21:31

## 2018-12-06 RX ADMIN — Medication 100 MG: at 08:03

## 2018-12-06 RX ADMIN — MAGNESIUM SULFATE HEPTAHYDRATE 1 G: 1 INJECTION, SOLUTION INTRAVENOUS at 20:12

## 2018-12-06 RX ADMIN — HEPARIN SODIUM AND DEXTROSE 12 UNITS/KG/HR: 10000; 5 INJECTION INTRAVENOUS at 06:38

## 2018-12-06 RX ADMIN — ATORVASTATIN CALCIUM 40 MG: 80 TABLET, FILM COATED ORAL at 08:03

## 2018-12-06 RX ADMIN — LISINOPRIL 5 MG: 5 TABLET ORAL at 08:03

## 2018-12-06 RX ADMIN — INSULIN LISPRO 1 UNITS: 100 INJECTION, SOLUTION INTRAVENOUS; SUBCUTANEOUS at 21:31

## 2018-12-06 RX ADMIN — AMIODARONE HYDROCHLORIDE 150 MG: 50 INJECTION, SOLUTION INTRAVENOUS at 20:12

## 2018-12-06 RX ADMIN — SPIRONOLACTONE 25 MG: 25 TABLET ORAL at 08:03

## 2018-12-06 RX ADMIN — AMIODARONE HYDROCHLORIDE 200 MG: 200 TABLET ORAL at 21:29

## 2018-12-06 RX ADMIN — FOLIC ACID 1 MG: 1 TABLET ORAL at 08:03

## 2018-12-06 RX ADMIN — AMIODARONE HYDROCHLORIDE 200 MG: 200 TABLET ORAL at 13:00

## 2018-12-06 RX ADMIN — WARFARIN SODIUM 5 MG: 5 TABLET ORAL at 18:18

## 2018-12-06 RX ADMIN — TAMSULOSIN HYDROCHLORIDE 0.4 MG: 0.4 CAPSULE ORAL at 08:03

## 2018-12-06 RX ADMIN — FUROSEMIDE 20 MG: 20 TABLET ORAL at 08:03

## 2018-12-06 RX ADMIN — ASPIRIN 81 MG: 81 TABLET ORAL at 08:03

## 2018-12-06 RX ADMIN — AMIODARONE HYDROCHLORIDE 0.5 MG/MIN: 50 INJECTION, SOLUTION INTRAVENOUS at 06:38

## 2018-12-06 RX ADMIN — MAGNESIUM SULFATE HEPTAHYDRATE 1 G: 1 INJECTION, SOLUTION INTRAVENOUS at 18:19

## 2018-12-06 ASSESSMENT — PAIN SCALES - GENERAL
PAINLEVEL_OUTOF10: 0

## 2018-12-07 LAB
ABSOLUTE EOS #: 0.19 K/UL (ref 0–0.44)
ABSOLUTE IMMATURE GRANULOCYTE: <0.03 K/UL (ref 0–0.3)
ABSOLUTE LYMPH #: 1.35 K/UL (ref 1.1–3.7)
ABSOLUTE MONO #: 0.86 K/UL (ref 0.1–1.2)
ANION GAP SERPL CALCULATED.3IONS-SCNC: 14 MMOL/L (ref 9–17)
BASOPHILS # BLD: 0 % (ref 0–2)
BASOPHILS ABSOLUTE: 0.04 K/UL (ref 0–0.2)
BUN BLDV-MCNC: 14 MG/DL (ref 8–23)
BUN/CREAT BLD: ABNORMAL (ref 9–20)
CALCIUM IONIZED: 1.19 MMOL/L (ref 1.13–1.33)
CALCIUM SERPL-MCNC: 9 MG/DL (ref 8.6–10.4)
CHLORIDE BLD-SCNC: 105 MMOL/L (ref 98–107)
CO2: 21 MMOL/L (ref 20–31)
CREAT SERPL-MCNC: 0.8 MG/DL (ref 0.7–1.2)
DIFFERENTIAL TYPE: ABNORMAL
EKG ATRIAL RATE: 65 BPM
EKG ATRIAL RATE: 69 BPM
EKG ATRIAL RATE: 83 BPM
EKG P AXIS: 37 DEGREES
EKG P AXIS: 43 DEGREES
EKG P-R INTERVAL: 218 MS
EKG P-R INTERVAL: 258 MS
EKG Q-T INTERVAL: 374 MS
EKG Q-T INTERVAL: 402 MS
EKG Q-T INTERVAL: 410 MS
EKG QRS DURATION: 102 MS
EKG QRS DURATION: 152 MS
EKG QRS DURATION: 94 MS
EKG QTC CALCULATION (BAZETT): 426 MS
EKG QTC CALCULATION (BAZETT): 439 MS
EKG QTC CALCULATION (BAZETT): 600 MS
EKG R AXIS: -123 DEGREES
EKG R AXIS: 10 DEGREES
EKG R AXIS: 2 DEGREES
EKG T AXIS: 22 DEGREES
EKG T AXIS: 57 DEGREES
EKG T AXIS: 97 DEGREES
EKG VENTRICULAR RATE: 134 BPM
EKG VENTRICULAR RATE: 65 BPM
EKG VENTRICULAR RATE: 83 BPM
EOSINOPHILS RELATIVE PERCENT: 2 % (ref 1–4)
GFR AFRICAN AMERICAN: >60 ML/MIN
GFR NON-AFRICAN AMERICAN: >60 ML/MIN
GFR SERPL CREATININE-BSD FRML MDRD: ABNORMAL ML/MIN/{1.73_M2}
GFR SERPL CREATININE-BSD FRML MDRD: ABNORMAL ML/MIN/{1.73_M2}
GLUCOSE BLD-MCNC: 125 MG/DL (ref 75–110)
GLUCOSE BLD-MCNC: 149 MG/DL (ref 70–99)
GLUCOSE BLD-MCNC: 177 MG/DL (ref 75–110)
HCT VFR BLD CALC: 41.6 % (ref 40.7–50.3)
HEMOGLOBIN: 14.2 G/DL (ref 13–17)
IMMATURE GRANULOCYTES: 0 %
INR BLD: 1.1
LYMPHOCYTES # BLD: 14 % (ref 24–43)
MAGNESIUM: 1.9 MG/DL (ref 1.6–2.6)
MCH RBC QN AUTO: 33.4 PG (ref 25.2–33.5)
MCHC RBC AUTO-ENTMCNC: 34.1 G/DL (ref 28.4–34.8)
MCV RBC AUTO: 97.9 FL (ref 82.6–102.9)
MONOCYTES # BLD: 9 % (ref 3–12)
NRBC AUTOMATED: 0 PER 100 WBC
PARTIAL THROMBOPLASTIN TIME: 68.9 SEC (ref 20.5–30.5)
PDW BLD-RTO: 13.2 % (ref 11.8–14.4)
PHOSPHORUS: 3.1 MG/DL (ref 2.5–4.5)
PLATELET # BLD: ABNORMAL K/UL (ref 138–453)
PLATELET ESTIMATE: ABNORMAL
PLATELET, FLUORESCENCE: 140 K/UL (ref 138–453)
PLATELET, IMMATURE FRACTION: 3.6 % (ref 1.1–10.3)
PMV BLD AUTO: ABNORMAL FL (ref 8.1–13.5)
POTASSIUM SERPL-SCNC: 4.4 MMOL/L (ref 3.7–5.3)
PROTHROMBIN TIME: 12.1 SEC (ref 9–12)
RBC # BLD: 4.25 M/UL (ref 4.21–5.77)
RBC # BLD: ABNORMAL 10*6/UL
SEG NEUTROPHILS: 74 % (ref 36–65)
SEGMENTED NEUTROPHILS ABSOLUTE COUNT: 7.02 K/UL (ref 1.5–8.1)
SODIUM BLD-SCNC: 140 MMOL/L (ref 135–144)
TROPONIN INTERP: NORMAL
TROPONIN T: <0.03 NG/ML
WBC # BLD: 9.5 K/UL (ref 3.5–11.3)
WBC # BLD: ABNORMAL 10*3/UL

## 2018-12-07 PROCEDURE — 2000000000 HC ICU R&B

## 2018-12-07 PROCEDURE — 6370000000 HC RX 637 (ALT 250 FOR IP): Performed by: INTERNAL MEDICINE

## 2018-12-07 PROCEDURE — 80048 BASIC METABOLIC PNL TOTAL CA: CPT

## 2018-12-07 PROCEDURE — 2580000003 HC RX 258: Performed by: HOSPITALIST

## 2018-12-07 PROCEDURE — 6360000002 HC RX W HCPCS: Performed by: EMERGENCY MEDICINE

## 2018-12-07 PROCEDURE — 36415 COLL VENOUS BLD VENIPUNCTURE: CPT

## 2018-12-07 PROCEDURE — 6360000002 HC RX W HCPCS: Performed by: INTERNAL MEDICINE

## 2018-12-07 PROCEDURE — 6370000000 HC RX 637 (ALT 250 FOR IP): Performed by: EMERGENCY MEDICINE

## 2018-12-07 PROCEDURE — 85610 PROTHROMBIN TIME: CPT

## 2018-12-07 PROCEDURE — 85025 COMPLETE CBC W/AUTO DIFF WBC: CPT

## 2018-12-07 PROCEDURE — 99233 SBSQ HOSP IP/OBS HIGH 50: CPT | Performed by: INTERNAL MEDICINE

## 2018-12-07 PROCEDURE — 2580000003 HC RX 258: Performed by: INTERNAL MEDICINE

## 2018-12-07 PROCEDURE — 85730 THROMBOPLASTIN TIME PARTIAL: CPT

## 2018-12-07 PROCEDURE — 82330 ASSAY OF CALCIUM: CPT

## 2018-12-07 PROCEDURE — 82947 ASSAY GLUCOSE BLOOD QUANT: CPT

## 2018-12-07 PROCEDURE — 84100 ASSAY OF PHOSPHORUS: CPT

## 2018-12-07 PROCEDURE — 85055 RETICULATED PLATELET ASSAY: CPT

## 2018-12-07 PROCEDURE — 84484 ASSAY OF TROPONIN QUANT: CPT

## 2018-12-07 PROCEDURE — 6370000000 HC RX 637 (ALT 250 FOR IP): Performed by: HOSPITALIST

## 2018-12-07 PROCEDURE — 83735 ASSAY OF MAGNESIUM: CPT

## 2018-12-07 RX ORDER — AMIODARONE HYDROCHLORIDE 200 MG/1
400 TABLET ORAL 2 TIMES DAILY
Status: DISCONTINUED | OUTPATIENT
Start: 2018-12-07 | End: 2018-12-10 | Stop reason: HOSPADM

## 2018-12-07 RX ORDER — AMIODARONE HYDROCHLORIDE 200 MG/1
200 TABLET ORAL DAILY
Status: DISCONTINUED | OUTPATIENT
Start: 2018-12-16 | End: 2018-12-10 | Stop reason: HOSPADM

## 2018-12-07 RX ORDER — DIPHENHYDRAMINE HCL 25 MG
25 TABLET ORAL EVERY 6 HOURS PRN
Status: DISCONTINUED | OUTPATIENT
Start: 2018-12-07 | End: 2018-12-10 | Stop reason: HOSPADM

## 2018-12-07 RX ORDER — MAGNESIUM SULFATE 1 G/100ML
1 INJECTION INTRAVENOUS ONCE
Status: COMPLETED | OUTPATIENT
Start: 2018-12-07 | End: 2018-12-07

## 2018-12-07 RX ADMIN — WARFARIN SODIUM 5 MG: 5 TABLET ORAL at 17:51

## 2018-12-07 RX ADMIN — AMIODARONE HYDROCHLORIDE 0.5 MG/MIN: 50 INJECTION, SOLUTION INTRAVENOUS at 09:00

## 2018-12-07 RX ADMIN — SPIRONOLACTONE 25 MG: 25 TABLET ORAL at 08:59

## 2018-12-07 RX ADMIN — AMIODARONE HYDROCHLORIDE 400 MG: 200 TABLET ORAL at 20:33

## 2018-12-07 RX ADMIN — ATORVASTATIN CALCIUM 40 MG: 80 TABLET, FILM COATED ORAL at 08:56

## 2018-12-07 RX ADMIN — MAGNESIUM SULFATE HEPTAHYDRATE 1 G: 1 INJECTION, SOLUTION INTRAVENOUS at 20:42

## 2018-12-07 RX ADMIN — THERA TABS 1 TABLET: TAB at 08:59

## 2018-12-07 RX ADMIN — Medication 100 MG: at 08:57

## 2018-12-07 RX ADMIN — FOLIC ACID 1 MG: 1 TABLET ORAL at 08:56

## 2018-12-07 RX ADMIN — DIPHENHYDRAMINE HCL 25 MG: 25 TABLET ORAL at 18:42

## 2018-12-07 RX ADMIN — FUROSEMIDE 20 MG: 20 TABLET ORAL at 08:56

## 2018-12-07 RX ADMIN — Medication 10 ML: at 08:59

## 2018-12-07 RX ADMIN — Medication 10 ML: at 20:35

## 2018-12-07 RX ADMIN — INSULIN LISPRO 2 UNITS: 100 INJECTION, SOLUTION INTRAVENOUS; SUBCUTANEOUS at 12:21

## 2018-12-07 RX ADMIN — ASPIRIN 81 MG: 81 TABLET ORAL at 08:59

## 2018-12-07 RX ADMIN — AMIODARONE HYDROCHLORIDE 400 MG: 200 TABLET ORAL at 08:57

## 2018-12-07 RX ADMIN — TAMSULOSIN HYDROCHLORIDE 0.4 MG: 0.4 CAPSULE ORAL at 08:59

## 2018-12-07 RX ADMIN — HEPARIN SODIUM AND DEXTROSE 12 UNITS/KG/HR: 10000; 5 INJECTION INTRAVENOUS at 16:14

## 2018-12-07 ASSESSMENT — PAIN SCALES - GENERAL
PAINLEVEL_OUTOF10: 0
PAINLEVEL_OUTOF10: 0

## 2018-12-07 NOTE — PLAN OF CARE
Problem: Cardiac Output - Decreased:  Goal: Hemodynamic stability will improve  Hemodynamic stability will improve   Outcome: Ongoing      Problem: Tissue Perfusion - Cardiopulmonary, Altered:  Goal: Absence of angina  Absence of angina  Outcome: Ongoing

## 2018-12-07 NOTE — PLAN OF CARE
Problem: Falls - Risk of:  Goal: Will remain free from falls  Will remain free from falls   Outcome: Met This Shift    Goal: Absence of physical injury  Absence of physical injury   Outcome: Met This Shift      Problem: Discharge Planning:  Goal: Participates in care planning  Participates in care planning   Outcome: Met This Shift      Problem: Cardiac Output - Decreased:  Goal: Hemodynamic stability will improve  Hemodynamic stability will improve   Outcome: Met This Shift      Problem: Musculor/Skeletal Functional Status  Goal: Highest potential functional level  Outcome: Met This Shift      Problem: Tissue Perfusion - Cardiopulmonary, Altered:  Goal: Absence of angina  Absence of angina   Outcome: Met This Shift

## 2018-12-07 NOTE — PROGRESS NOTES
12/05/2018     Ionized Calcium:   Lab Results   Component Value Date    CAION 1.19 12/07/2018    CAION 1.21 12/06/2018    CAION 1.16 12/06/2018        Urinalysis:   Lab Results   Component Value Date    NITRU NEGATIVE 06/25/2015    COLORU ANGELICA 06/25/2015    PHUR 5.0 06/25/2015    WBCUA 5 TO 10 06/25/2015    RBCUA 5 TO 10 06/25/2015    MUCUS 1+ 06/25/2015    TRICHOMONAS NOT REPORTED 06/25/2015    YEAST NOT REPORTED 06/25/2015    BACTERIA FEW 06/25/2015    SPECGRAV 1.024 06/25/2015    LEUKOCYTESUR SMALL 06/25/2015    UROBILINOGEN Normal 06/25/2015    BILIRUBINUR NEGATIVE 06/25/2015    GLUCOSEU NEGATIVE 06/25/2015    KETUA NEGATIVE 06/25/2015    AMORPHOUS NOT REPORTED 06/25/2015       HgBA1c:    Lab Results   Component Value Date    LABA1C 6.6 08/30/2018     TSH:    Lab Results   Component Value Date    TSH 3.16 12/03/2018     Lactic Acid: No results found for: LACTA   Troponin:   No results for input(s): TROPONINI in the last 72 hours.     Microbiology:  Cultures during this admission:      Blood cultures:                 [x] None drawn      [] Negative             []  Positive (Details:  )  Urine Culture:                   [x] None drawn      [] Negative             []  Positive (Details:  )  Sputum Culture:               [x] None drawn       [] Negative             []  Positive (Details:  )   Endotracheal aspirate:     [x] None drawn       [] Negative             []  Positive (Details:  )      Radiology/Imaging:  No new imaging in last 24 hours    ASSESSMENT:     Patient Active Problem List    Diagnosis Date Noted    V-t[address] ([facility] 12/04/2018    V tach ([address] 12/03/2018    MADISON (acute kidney injury) ([address] 12/03/2018    Ventricular tachyarrhythmia ([address] 08/30/2018    Type 2 diabetes mellitus without complication, without long-term current use of insulin ([address] 06/15/2018    Essential hypertension 12/15/2017    Back pain 07/07/2015    Colon polyps 04/08/2015    Hoarseness 02/19/2015    Anxiety 01/15/2015    SOB

## 2018-12-07 NOTE — PROGRESS NOTES
this admission to be functioning well  5. COPD    Plan of Treatment:   1. In setting of recurrent V tach on medical therapy, consider re-discussion of VT ablation. 2. Hold ACEI in setting borderline BP, BB decreased to 25mg BID and parameters added. 3. Continue PO and IV amiodarone. 4. Check electrolytes and keep K+>4 and Mg>2  5. Will follow with you. Plan to be discussed with attending cardiologist.     Electronically signed by Chayo Dunham MD on 12/7/2018 at 9:29 AM  Internal Medicine Resident   [phone]      Attending Physician Statement  I have discussed the care of [facility], including pertinent history and exam findings,  with the cardiology fellow/resident. I have seen and examined the patient and the key elements of all parts of the encounter have been performed by me.   I agree with the assessment, plan and orders as documented by the resident with additional recommendations as below:     Patient had another episode of monomorphic VT last night @ 130/min , he was restarted on amio drip, now stable and in SR, remains asymptomatic, will cont iv amio for another 24 hours and then switch to PO amio 400 mg bid x 1 week with slow taper.     Foreign meet Vania Winslow [id number]  [address], [place]  [phone]

## 2018-12-08 LAB
ABSOLUTE EOS #: 0.25 K/UL (ref 0–0.44)
ABSOLUTE IMMATURE GRANULOCYTE: <0.03 K/UL (ref 0–0.3)
ABSOLUTE LYMPH #: 1.35 K/UL (ref 1.1–3.7)
ABSOLUTE MONO #: 0.79 K/UL (ref 0.1–1.2)
ANION GAP SERPL CALCULATED.3IONS-SCNC: 11 MMOL/L (ref 9–17)
BASOPHILS # BLD: 1 % (ref 0–2)
BASOPHILS ABSOLUTE: 0.05 K/UL (ref 0–0.2)
BUN BLDV-MCNC: 13 MG/DL (ref 8–23)
BUN/CREAT BLD: ABNORMAL (ref 9–20)
CALCIUM IONIZED: 1.18 MMOL/L (ref 1.13–1.33)
CALCIUM SERPL-MCNC: 9 MG/DL (ref 8.6–10.4)
CHLORIDE BLD-SCNC: 102 MMOL/L (ref 98–107)
CO2: 23 MMOL/L (ref 20–31)
CREAT SERPL-MCNC: 0.81 MG/DL (ref 0.7–1.2)
DIFFERENTIAL TYPE: ABNORMAL
EOSINOPHILS RELATIVE PERCENT: 3 % (ref 1–4)
GFR AFRICAN AMERICAN: >60 ML/MIN
GFR NON-AFRICAN AMERICAN: >60 ML/MIN
GFR SERPL CREATININE-BSD FRML MDRD: ABNORMAL ML/MIN/{1.73_M2}
GFR SERPL CREATININE-BSD FRML MDRD: ABNORMAL ML/MIN/{1.73_M2}
GLUCOSE BLD-MCNC: 102 MG/DL (ref 75–110)
GLUCOSE BLD-MCNC: 139 MG/DL (ref 75–110)
GLUCOSE BLD-MCNC: 142 MG/DL (ref 70–99)
GLUCOSE BLD-MCNC: 147 MG/DL (ref 75–110)
GLUCOSE BLD-MCNC: 168 MG/DL (ref 75–110)
GLUCOSE BLD-MCNC: 205 MG/DL (ref 75–110)
GLUCOSE BLD-MCNC: 239 MG/DL (ref 75–110)
HCT VFR BLD CALC: 42.3 % (ref 40.7–50.3)
HEMOGLOBIN: 14.9 G/DL (ref 13–17)
IMMATURE GRANULOCYTES: 0 %
INR BLD: 1.7
LYMPHOCYTES # BLD: 16 % (ref 24–43)
MAGNESIUM: 1.8 MG/DL (ref 1.6–2.6)
MCH RBC QN AUTO: 34 PG (ref 25.2–33.5)
MCHC RBC AUTO-ENTMCNC: 35.2 G/DL (ref 28.4–34.8)
MCV RBC AUTO: 96.6 FL (ref 82.6–102.9)
MONOCYTES # BLD: 10 % (ref 3–12)
NRBC AUTOMATED: 0 PER 100 WBC
PARTIAL THROMBOPLASTIN TIME: 60.6 SEC (ref 20.5–30.5)
PARTIAL THROMBOPLASTIN TIME: 72.1 SEC (ref 20.5–30.5)
PARTIAL THROMBOPLASTIN TIME: 89.4 SEC (ref 20.5–30.5)
PDW BLD-RTO: 13.2 % (ref 11.8–14.4)
PHOSPHORUS: 2.9 MG/DL (ref 2.5–4.5)
PLATELET # BLD: 120 K/UL (ref 138–453)
PLATELET ESTIMATE: ABNORMAL
PMV BLD AUTO: 10 FL (ref 8.1–13.5)
POTASSIUM SERPL-SCNC: 4.3 MMOL/L (ref 3.7–5.3)
PROTHROMBIN TIME: 17.1 SEC (ref 9–12)
RBC # BLD: 4.38 M/UL (ref 4.21–5.77)
RBC # BLD: ABNORMAL 10*6/UL
SEG NEUTROPHILS: 70 % (ref 36–65)
SEGMENTED NEUTROPHILS ABSOLUTE COUNT: 5.86 K/UL (ref 1.5–8.1)
SODIUM BLD-SCNC: 136 MMOL/L (ref 135–144)
WBC # BLD: 8.3 K/UL (ref 3.5–11.3)
WBC # BLD: ABNORMAL 10*3/UL

## 2018-12-08 PROCEDURE — 2580000003 HC RX 258: Performed by: INTERNAL MEDICINE

## 2018-12-08 PROCEDURE — 6360000002 HC RX W HCPCS: Performed by: INTERNAL MEDICINE

## 2018-12-08 PROCEDURE — 6370000000 HC RX 637 (ALT 250 FOR IP): Performed by: EMERGENCY MEDICINE

## 2018-12-08 PROCEDURE — 2580000003 HC RX 258: Performed by: HOSPITALIST

## 2018-12-08 PROCEDURE — 99233 SBSQ HOSP IP/OBS HIGH 50: CPT | Performed by: INTERNAL MEDICINE

## 2018-12-08 PROCEDURE — 2060000000 HC ICU INTERMEDIATE R&B

## 2018-12-08 PROCEDURE — 85025 COMPLETE CBC W/AUTO DIFF WBC: CPT

## 2018-12-08 PROCEDURE — 85610 PROTHROMBIN TIME: CPT

## 2018-12-08 PROCEDURE — 83735 ASSAY OF MAGNESIUM: CPT

## 2018-12-08 PROCEDURE — 82947 ASSAY GLUCOSE BLOOD QUANT: CPT

## 2018-12-08 PROCEDURE — 6370000000 HC RX 637 (ALT 250 FOR IP): Performed by: INTERNAL MEDICINE

## 2018-12-08 PROCEDURE — 84100 ASSAY OF PHOSPHORUS: CPT

## 2018-12-08 PROCEDURE — 82330 ASSAY OF CALCIUM: CPT

## 2018-12-08 PROCEDURE — 36415 COLL VENOUS BLD VENIPUNCTURE: CPT

## 2018-12-08 PROCEDURE — 85730 THROMBOPLASTIN TIME PARTIAL: CPT

## 2018-12-08 PROCEDURE — 6370000000 HC RX 637 (ALT 250 FOR IP): Performed by: HOSPITALIST

## 2018-12-08 PROCEDURE — 6360000002 HC RX W HCPCS: Performed by: EMERGENCY MEDICINE

## 2018-12-08 PROCEDURE — 80048 BASIC METABOLIC PNL TOTAL CA: CPT

## 2018-12-08 RX ORDER — PREDNISONE 20 MG/1
40 TABLET ORAL DAILY
Status: DISCONTINUED | OUTPATIENT
Start: 2018-12-08 | End: 2018-12-10 | Stop reason: HOSPADM

## 2018-12-08 RX ORDER — MAGNESIUM SULFATE 1 G/100ML
1 INJECTION INTRAVENOUS ONCE
Status: COMPLETED | OUTPATIENT
Start: 2018-12-08 | End: 2018-12-08

## 2018-12-08 RX ORDER — FAMOTIDINE 20 MG/1
20 TABLET, FILM COATED ORAL 2 TIMES DAILY
Status: DISCONTINUED | OUTPATIENT
Start: 2018-12-08 | End: 2018-12-10

## 2018-12-08 RX ORDER — IODINE/SODIUM IODIDE 2 %
1 TINCTURE TOPICAL PRN
Status: DISCONTINUED | OUTPATIENT
Start: 2018-12-08 | End: 2018-12-10 | Stop reason: HOSPADM

## 2018-12-08 RX ORDER — DIAPER,BRIEF,INFANT-TODD,DISP
EACH MISCELLANEOUS 2 TIMES DAILY
Status: DISCONTINUED | OUTPATIENT
Start: 2018-12-08 | End: 2018-12-10 | Stop reason: HOSPADM

## 2018-12-08 RX ADMIN — HEPARIN SODIUM AND DEXTROSE 10 UNITS/KG/HR: 10000; 5 INJECTION INTRAVENOUS at 11:32

## 2018-12-08 RX ADMIN — FAMOTIDINE 20 MG: 20 TABLET, FILM COATED ORAL at 20:29

## 2018-12-08 RX ADMIN — INSULIN LISPRO 2 UNITS: 100 INJECTION, SOLUTION INTRAVENOUS; SUBCUTANEOUS at 20:39

## 2018-12-08 RX ADMIN — INSULIN LISPRO 2 UNITS: 100 INJECTION, SOLUTION INTRAVENOUS; SUBCUTANEOUS at 11:15

## 2018-12-08 RX ADMIN — AMIODARONE HYDROCHLORIDE 400 MG: 200 TABLET ORAL at 09:08

## 2018-12-08 RX ADMIN — ATORVASTATIN CALCIUM 40 MG: 80 TABLET, FILM COATED ORAL at 09:09

## 2018-12-08 RX ADMIN — FUROSEMIDE 20 MG: 20 TABLET ORAL at 09:07

## 2018-12-08 RX ADMIN — METOPROLOL TARTRATE 25 MG: 25 TABLET, FILM COATED ORAL at 20:28

## 2018-12-08 RX ADMIN — AMIODARONE HYDROCHLORIDE 0.5 MG/MIN: 50 INJECTION, SOLUTION INTRAVENOUS at 01:30

## 2018-12-08 RX ADMIN — ASPIRIN 81 MG: 81 TABLET ORAL at 09:08

## 2018-12-08 RX ADMIN — THERA TABS 1 TABLET: TAB at 09:07

## 2018-12-08 RX ADMIN — FAMOTIDINE 20 MG: 20 TABLET, FILM COATED ORAL at 09:07

## 2018-12-08 RX ADMIN — TAMSULOSIN HYDROCHLORIDE 0.4 MG: 0.4 CAPSULE ORAL at 09:07

## 2018-12-08 RX ADMIN — INSULIN LISPRO 2 UNITS: 100 INJECTION, SOLUTION INTRAVENOUS; SUBCUTANEOUS at 15:56

## 2018-12-08 RX ADMIN — FERRIC OXIDE RED 1 APPLICATOR: 8; 8 LOTION TOPICAL at 15:43

## 2018-12-08 RX ADMIN — Medication 10 ML: at 20:30

## 2018-12-08 RX ADMIN — METOPROLOL TARTRATE 25 MG: 25 TABLET, FILM COATED ORAL at 09:07

## 2018-12-08 RX ADMIN — FOLIC ACID 1 MG: 1 TABLET ORAL at 09:07

## 2018-12-08 RX ADMIN — DIPHENHYDRAMINE HCL 25 MG: 25 TABLET ORAL at 21:45

## 2018-12-08 RX ADMIN — DIPHENHYDRAMINE HCL 25 MG: 25 TABLET ORAL at 15:43

## 2018-12-08 RX ADMIN — LISINOPRIL 5 MG: 5 TABLET ORAL at 09:07

## 2018-12-08 RX ADMIN — AMIODARONE HYDROCHLORIDE 400 MG: 200 TABLET ORAL at 20:29

## 2018-12-08 RX ADMIN — MAGNESIUM SULFATE HEPTAHYDRATE 1 G: 1 INJECTION, SOLUTION INTRAVENOUS at 09:19

## 2018-12-08 RX ADMIN — PREDNISONE 40 MG: 20 TABLET ORAL at 14:16

## 2018-12-08 RX ADMIN — HYDROCORTISONE: 10 CREAM TOPICAL at 20:29

## 2018-12-08 RX ADMIN — INSULIN LISPRO 4 UNITS: 100 INJECTION, SOLUTION INTRAVENOUS; SUBCUTANEOUS at 09:34

## 2018-12-08 RX ADMIN — DIPHENHYDRAMINE HCL 25 MG: 25 TABLET ORAL at 09:42

## 2018-12-08 RX ADMIN — SPIRONOLACTONE 25 MG: 25 TABLET ORAL at 09:07

## 2018-12-08 RX ADMIN — WARFARIN SODIUM 5 MG: 5 TABLET ORAL at 17:58

## 2018-12-08 RX ADMIN — HYDROCORTISONE: 10 CREAM TOPICAL at 08:49

## 2018-12-08 RX ADMIN — Medication 100 MG: at 09:07

## 2018-12-08 RX ADMIN — DIPHENHYDRAMINE HCL 25 MG: 25 TABLET ORAL at 03:32

## 2018-12-08 ASSESSMENT — PAIN SCALES - GENERAL
PAINLEVEL_OUTOF10: 0

## 2018-12-08 NOTE — PROGRESS NOTES
229  (1.9)   O  U  T  P  U  T   Urine  (mL/kg/hr) 225   225    Shift Total  (mL/kg) 225  (1.9)   225  (1.9)   Weight (kg) 120.7 120.7 120.7 120.7     Wt Readings from Last 3 Encounters:   12/07/18 266 lb 1.5 oz (120.7 kg)   11/26/18 270 lb (122.5 kg)   09/28/18 271 lb (122.9 kg)     Body mass index is 35.27 kg/m². PHYSICAL EXAM:  Constitutional: Appears well, in no distress, sitting up in bed  EENT: PERRLA, EOMI, sclera clear, anicteric, oropharynx clear, no lesions, neck supple with midline trachea. Neck: Supple, symmetrical, trachea midline, no adenopathy, thyroid symmetric, no jvd skin normal  Respiratory: clear to auscultation, no wheezes or rales and unlabored breathing. No intercostal tenderness  Cardiovascular: regular rate and rhythm, normal S1, S2, no murmur noted and 2+ pulses throughout  Abdomen: soft, nontender, nondistended, no masses or organomegaly  Neurological: Awake and alert. Following commands. Extremities:  peripheral pulses normal, no pedal edema, no clubbing or cyanosis. Maculopapular rash over left arm and on right arm. Rash developing over left knee as well.      MEDICATIONS:  Scheduled Meds:   hydrocortisone   Topical BID    famotidine  20 mg Oral BID    magnesium sulfate  1 g Intravenous Once    amiodarone  400 mg Oral BID    [START ON 12/16/2018] amiodarone  200 mg Oral Daily    metoprolol tartrate  25 mg Oral BID    multivitamin  1 tablet Oral Daily    warfarin  5 mg Oral Daily    lisinopril  5 mg Oral Daily    thiamine  100 mg Oral Daily    folic acid  1 mg Oral Daily    sodium chloride flush  10 mL Intravenous 2 times per day    aspirin  81 mg Oral Daily    atorvastatin  40 mg Oral Daily    furosemide  20 mg Oral Daily    insulin lispro  0-12 Units Subcutaneous TID     insulin lispro  0-6 Units Subcutaneous Nightly    spironolactone  25 mg Oral Daily    tamsulosin  0.4 mg Oral Daily    sodium chloride flush  10 mL Intravenous 2 times per day    nicotine  1

## 2018-12-08 NOTE — PLAN OF CARE
Problem: Falls - Risk of:  Goal: Will remain free from falls  Will remain free from falls   Outcome: Ongoing    Goal: Absence of physical injury  Absence of physical injury   Outcome: Ongoing      Problem: Discharge Planning:  Goal: Participates in care planning  Participates in care planning   Outcome: Ongoing      Problem: Cardiac Output - Decreased:  Goal: Hemodynamic stability will improve  Hemodynamic stability will improve   Outcome: Ongoing      Problem: Musculor/Skeletal Functional Status  Goal: Highest potential functional level  Outcome: Ongoing      Problem: Tissue Perfusion - Cardiopulmonary, Altered:  Goal: Absence of angina  Absence of angina   Outcome: Ongoing

## 2018-12-08 NOTE — PROGRESS NOTES
139  140  136   K  4.2  4.4  4.3   CL  104  105  102   CO2  23  21  23   BUN  17  14  13   CREATININE  1.03  0.80  0.81   GLUCOSE  177*  149*  142*     Hepatic: No results for input(s): AST, ALT, ALB, BILITOT, ALKPHOS in the last 72 hours. Troponin: No results for input(s): TROPONINI in the last 72 hours. BNP: No results for input(s): BNP in the last 72 hours. Lipids: No results for input(s): CHOL, HDL in the last 72 hours. Invalid input(s): LDLCALCU  INR:   Recent Labs      12/06/18   0440  12/07/18   0536  12/08/18   0551   INR  1.1  1.1  1.7       Objective:   Vitals: /61   Pulse 63   Temp 98.2 °F (36.8 °C) (Oral)   Resp 13   Ht 6' 0.83\" (1.85 m)   Wt 266 lb 1.5 oz (120.7 kg)   SpO2 96%   BMI 35.27 kg/m²   General appearance: alert and cooperative with exam  HEENT: Head: Normocephalic, no lesions, without obvious abnormality. Lungs: clear to auscultation bilaterally  Heart: sinus bradycardia with HR 56, S1, S2 normal, no murmur noted  Abdomen: soft, bowel sounds normal  Extremities: extremities normal, atraumatic, no cyanosis or pitting edema appreciated  Neurologic: Mental status: Alert, thought content appropriate    12/6/18  EKG: wide-complex tachycardia with   EKG2: sinus rhythm with PVCs  ECHO:   Limited study with Definity to evaluate for LV thrombus. Severely reduced LV Systolic function with Estimated left ventricular  ejection fraction of 30%  Akinesis of apex, apical anteroseptal, inferoseptal and anterolateral walls. Preserved basal segments as seen on previous echo. Rounded echogenic density in LV [place] which appears as filling defect with  contrast suggestive of an organized thrombus. Assessment / Acute Cardiac Problems:   1. Recurrent sustained monomorphic V tach s/p conversion to NSR, and then sinus bradycardia. VT ablation discussed however patient had wanted to discuss with Dr. Maged Juan as outpatient.   2. Chronic HFrEF secondary to ischemic cardiomyopathy, EF 25-30%, appears compensated at this time. 3. CAD with chronic LAD disease, non-obstructive disease in RCA and LCX  4. S/p single-chamber ICD, interrogated this admission to be functioning well  5. COPD    Plan of Treatment:   1. In setting of recurrent V tach on medical therapy, consider re-discussion of VT ablation. 2. Continue to hold ACEI in setting borderline BP, continue BB at decreased dose of 25mg BID. 3. Continue PO and IV amiodarone. 4. Plan to switch to PO amiodarone 400 mg BID for 1 week with a slow taper  5. Check electrolytes and keep K+>4 and Mg>2  6. Will follow with you. Plan to be discussed with attending cardiologist.     Electronically signed by Uma Fong MD on 12/8/2018 at 8:28 AM  Family Medicine Resident   [phone]  Attending Physician Statement  I have discussed the care of the patient, including pertinent history and exam findings, with the resident. I have seen and examined the patient and the key elements of all parts of the encounter have been performed by me. I agree with the assessment, plan and orders as documented by the resident.     Tony Norris MD

## 2018-12-09 LAB
ABSOLUTE EOS #: <0.03 K/UL (ref 0–0.44)
ABSOLUTE IMMATURE GRANULOCYTE: 0.07 K/UL (ref 0–0.3)
ABSOLUTE LYMPH #: 1.61 K/UL (ref 1.1–3.7)
ABSOLUTE MONO #: 1.07 K/UL (ref 0.1–1.2)
ANION GAP SERPL CALCULATED.3IONS-SCNC: 17 MMOL/L (ref 9–17)
BASOPHILS # BLD: 0 % (ref 0–2)
BASOPHILS ABSOLUTE: 0.06 K/UL (ref 0–0.2)
BUN BLDV-MCNC: 18 MG/DL (ref 8–23)
BUN/CREAT BLD: ABNORMAL (ref 9–20)
CALCIUM IONIZED: 1.17 MMOL/L (ref 1.13–1.33)
CALCIUM SERPL-MCNC: 10 MG/DL (ref 8.6–10.4)
CHLORIDE BLD-SCNC: 98 MMOL/L (ref 98–107)
CO2: 22 MMOL/L (ref 20–31)
CREAT SERPL-MCNC: 1.1 MG/DL (ref 0.7–1.2)
DIFFERENTIAL TYPE: ABNORMAL
EOSINOPHILS RELATIVE PERCENT: 0 % (ref 1–4)
GFR AFRICAN AMERICAN: >60 ML/MIN
GFR NON-AFRICAN AMERICAN: >60 ML/MIN
GFR SERPL CREATININE-BSD FRML MDRD: ABNORMAL ML/MIN/{1.73_M2}
GFR SERPL CREATININE-BSD FRML MDRD: ABNORMAL ML/MIN/{1.73_M2}
GLUCOSE BLD-MCNC: 142 MG/DL (ref 70–99)
GLUCOSE BLD-MCNC: 156 MG/DL (ref 75–110)
GLUCOSE BLD-MCNC: 173 MG/DL (ref 75–110)
GLUCOSE BLD-MCNC: 223 MG/DL (ref 75–110)
GLUCOSE BLD-MCNC: 263 MG/DL (ref 75–110)
HCT VFR BLD CALC: 48.7 % (ref 40.7–50.3)
HEMOGLOBIN: 16.4 G/DL (ref 13–17)
IMMATURE GRANULOCYTES: 0 %
INR BLD: 2.8
LYMPHOCYTES # BLD: 9 % (ref 24–43)
MAGNESIUM: 1.9 MG/DL (ref 1.6–2.6)
MCH RBC QN AUTO: 32.9 PG (ref 25.2–33.5)
MCHC RBC AUTO-ENTMCNC: 33.7 G/DL (ref 28.4–34.8)
MCV RBC AUTO: 97.6 FL (ref 82.6–102.9)
MONOCYTES # BLD: 6 % (ref 3–12)
NRBC AUTOMATED: 0 PER 100 WBC
PARTIAL THROMBOPLASTIN TIME: 69.2 SEC (ref 20.5–30.5)
PDW BLD-RTO: 13.1 % (ref 11.8–14.4)
PHOSPHORUS: 3.6 MG/DL (ref 2.5–4.5)
PLATELET # BLD: 181 K/UL (ref 138–453)
PLATELET ESTIMATE: ABNORMAL
PMV BLD AUTO: 10.1 FL (ref 8.1–13.5)
POTASSIUM SERPL-SCNC: 4 MMOL/L (ref 3.7–5.3)
PROTHROMBIN TIME: 27.6 SEC (ref 9–12)
RBC # BLD: 4.99 M/UL (ref 4.21–5.77)
RBC # BLD: ABNORMAL 10*6/UL
SEG NEUTROPHILS: 84 % (ref 36–65)
SEGMENTED NEUTROPHILS ABSOLUTE COUNT: 14.32 K/UL (ref 1.5–8.1)
SODIUM BLD-SCNC: 137 MMOL/L (ref 135–144)
WBC # BLD: 17.1 K/UL (ref 3.5–11.3)
WBC # BLD: ABNORMAL 10*3/UL

## 2018-12-09 PROCEDURE — 85610 PROTHROMBIN TIME: CPT

## 2018-12-09 PROCEDURE — 6370000000 HC RX 637 (ALT 250 FOR IP): Performed by: INTERNAL MEDICINE

## 2018-12-09 PROCEDURE — 36415 COLL VENOUS BLD VENIPUNCTURE: CPT

## 2018-12-09 PROCEDURE — 6360000002 HC RX W HCPCS: Performed by: STUDENT IN AN ORGANIZED HEALTH CARE EDUCATION/TRAINING PROGRAM

## 2018-12-09 PROCEDURE — 83735 ASSAY OF MAGNESIUM: CPT

## 2018-12-09 PROCEDURE — 6360000002 HC RX W HCPCS: Performed by: HOSPITALIST

## 2018-12-09 PROCEDURE — 6370000000 HC RX 637 (ALT 250 FOR IP): Performed by: HOSPITALIST

## 2018-12-09 PROCEDURE — 85025 COMPLETE CBC W/AUTO DIFF WBC: CPT

## 2018-12-09 PROCEDURE — 99233 SBSQ HOSP IP/OBS HIGH 50: CPT | Performed by: INTERNAL MEDICINE

## 2018-12-09 PROCEDURE — 6360000002 HC RX W HCPCS: Performed by: INTERNAL MEDICINE

## 2018-12-09 PROCEDURE — 80048 BASIC METABOLIC PNL TOTAL CA: CPT

## 2018-12-09 PROCEDURE — 93005 ELECTROCARDIOGRAM TRACING: CPT

## 2018-12-09 PROCEDURE — 6370000000 HC RX 637 (ALT 250 FOR IP): Performed by: EMERGENCY MEDICINE

## 2018-12-09 PROCEDURE — 94640 AIRWAY INHALATION TREATMENT: CPT

## 2018-12-09 PROCEDURE — 82330 ASSAY OF CALCIUM: CPT

## 2018-12-09 PROCEDURE — 5A2204Z RESTORATION OF CARDIAC RHYTHM, SINGLE: ICD-10-PCS | Performed by: INTERNAL MEDICINE

## 2018-12-09 PROCEDURE — 84100 ASSAY OF PHOSPHORUS: CPT

## 2018-12-09 PROCEDURE — 85730 THROMBOPLASTIN TIME PARTIAL: CPT

## 2018-12-09 PROCEDURE — 2000000000 HC ICU R&B

## 2018-12-09 PROCEDURE — 2580000003 HC RX 258: Performed by: STUDENT IN AN ORGANIZED HEALTH CARE EDUCATION/TRAINING PROGRAM

## 2018-12-09 PROCEDURE — 82947 ASSAY GLUCOSE BLOOD QUANT: CPT

## 2018-12-09 RX ORDER — MAGNESIUM SULFATE 1 G/100ML
1 INJECTION INTRAVENOUS
Status: COMPLETED | OUTPATIENT
Start: 2018-12-09 | End: 2018-12-09

## 2018-12-09 RX ORDER — MIDAZOLAM HYDROCHLORIDE 1 MG/ML
INJECTION INTRAMUSCULAR; INTRAVENOUS
Status: DISPENSED
Start: 2018-12-09 | End: 2018-12-09

## 2018-12-09 RX ORDER — LIDOCAINE HYDROCHLORIDE ANHYDROUS AND DEXTROSE MONOHYDRATE .4; 5 G/100ML; G/100ML
1 INJECTION, SOLUTION INTRAVENOUS CONTINUOUS
Status: DISCONTINUED | OUTPATIENT
Start: 2018-12-09 | End: 2018-12-10

## 2018-12-09 RX ORDER — MAGNESIUM SULFATE 1 G/100ML
1 INJECTION INTRAVENOUS ONCE
Status: COMPLETED | OUTPATIENT
Start: 2018-12-09 | End: 2018-12-09

## 2018-12-09 RX ADMIN — FUROSEMIDE 20 MG: 20 TABLET ORAL at 09:32

## 2018-12-09 RX ADMIN — MAGNESIUM SULFATE HEPTAHYDRATE 1 G: 1 INJECTION, SOLUTION INTRAVENOUS at 06:36

## 2018-12-09 RX ADMIN — PREDNISONE 40 MG: 20 TABLET ORAL at 09:31

## 2018-12-09 RX ADMIN — AMIODARONE HYDROCHLORIDE 0.5 MG/MIN: 50 INJECTION, SOLUTION INTRAVENOUS at 15:25

## 2018-12-09 RX ADMIN — MAGNESIUM SULFATE HEPTAHYDRATE 1 G: 1 INJECTION, SOLUTION INTRAVENOUS at 10:09

## 2018-12-09 RX ADMIN — METOPROLOL TARTRATE 25 MG: 25 TABLET, FILM COATED ORAL at 20:28

## 2018-12-09 RX ADMIN — INSULIN LISPRO 2 UNITS: 100 INJECTION, SOLUTION INTRAVENOUS; SUBCUTANEOUS at 08:53

## 2018-12-09 RX ADMIN — DEXTROSE 150 MG: 50 INJECTION, SOLUTION INTRAVENOUS at 07:25

## 2018-12-09 RX ADMIN — FOLIC ACID 1 MG: 1 TABLET ORAL at 09:32

## 2018-12-09 RX ADMIN — HYDROCORTISONE: 10 CREAM TOPICAL at 09:12

## 2018-12-09 RX ADMIN — ATORVASTATIN CALCIUM 40 MG: 80 TABLET, FILM COATED ORAL at 09:32

## 2018-12-09 RX ADMIN — DEXTROSE 1 MG/MIN: 5 SOLUTION INTRAVENOUS at 06:59

## 2018-12-09 RX ADMIN — Medication 100 MG: at 09:32

## 2018-12-09 RX ADMIN — LISINOPRIL 5 MG: 5 TABLET ORAL at 09:32

## 2018-12-09 RX ADMIN — SPIRONOLACTONE 25 MG: 25 TABLET ORAL at 09:31

## 2018-12-09 RX ADMIN — TAMSULOSIN HYDROCHLORIDE 0.4 MG: 0.4 CAPSULE ORAL at 09:31

## 2018-12-09 RX ADMIN — METOPROLOL TARTRATE 25 MG: 25 TABLET, FILM COATED ORAL at 09:32

## 2018-12-09 RX ADMIN — LIDOCAINE HYDROCHLORIDE 100 MG: 20 INJECTION, SOLUTION INTRAVENOUS at 09:03

## 2018-12-09 RX ADMIN — THERA TABS 1 TABLET: TAB at 09:33

## 2018-12-09 RX ADMIN — MAGNESIUM SULFATE HEPTAHYDRATE 1 G: 1 INJECTION, SOLUTION INTRAVENOUS at 08:55

## 2018-12-09 RX ADMIN — FERRIC OXIDE RED 1 APPLICATOR: 8; 8 LOTION TOPICAL at 00:10

## 2018-12-09 RX ADMIN — INSULIN LISPRO 2 UNITS: 100 INJECTION, SOLUTION INTRAVENOUS; SUBCUTANEOUS at 12:45

## 2018-12-09 RX ADMIN — FAMOTIDINE 20 MG: 20 TABLET, FILM COATED ORAL at 09:32

## 2018-12-09 RX ADMIN — LIDOCAINE HYDROCHLORIDE 1 MG/MIN: 4 INJECTION, SOLUTION INTRAVENOUS at 07:36

## 2018-12-09 RX ADMIN — AMIODARONE HYDROCHLORIDE 400 MG: 200 TABLET ORAL at 09:32

## 2018-12-09 RX ADMIN — INSULIN LISPRO 6 UNITS: 100 INJECTION, SOLUTION INTRAVENOUS; SUBCUTANEOUS at 16:35

## 2018-12-09 RX ADMIN — DIPHENHYDRAMINE HCL 25 MG: 25 TABLET ORAL at 20:40

## 2018-12-09 RX ADMIN — HEPARIN SODIUM AND DEXTROSE 10 UNITS/KG/HR: 10000; 5 INJECTION INTRAVENOUS at 06:25

## 2018-12-09 RX ADMIN — INSULIN LISPRO 2 UNITS: 100 INJECTION, SOLUTION INTRAVENOUS; SUBCUTANEOUS at 20:49

## 2018-12-09 RX ADMIN — MOMETASONE FUROATE AND FORMOTEROL FUMARATE DIHYDRATE 2 PUFF: 100; 5 AEROSOL RESPIRATORY (INHALATION) at 20:52

## 2018-12-09 RX ADMIN — WARFARIN SODIUM 5 MG: 5 TABLET ORAL at 17:42

## 2018-12-09 RX ADMIN — ASPIRIN 81 MG: 81 TABLET ORAL at 09:32

## 2018-12-09 RX ADMIN — LIDOCAINE HYDROCHLORIDE 100 MG: 20 INJECTION, SOLUTION INTRAVENOUS at 10:23

## 2018-12-09 RX ADMIN — FAMOTIDINE 20 MG: 20 TABLET, FILM COATED ORAL at 20:29

## 2018-12-09 RX ADMIN — MAGNESIUM SULFATE HEPTAHYDRATE 1 G: 1 INJECTION, SOLUTION INTRAVENOUS at 12:44

## 2018-12-09 RX ADMIN — DEXTROSE MONOHYDRATE 150 MG: 50 INJECTION, SOLUTION INTRAVENOUS at 06:42

## 2018-12-09 RX ADMIN — DIPHENHYDRAMINE HCL 25 MG: 25 TABLET ORAL at 09:34

## 2018-12-09 RX ADMIN — HYDROCORTISONE: 10 CREAM TOPICAL at 20:29

## 2018-12-09 ASSESSMENT — PAIN SCALES - GENERAL
PAINLEVEL_OUTOF10: 0

## 2018-12-09 NOTE — PROCEDURES
[facility]   Cardioversion Procedure Note         Today's Date:  12/9/2018  Patient name:  Pete Nelson  MRN:   [id number]  YOB: 1950  PCP:    Bhavani Rock MD    Indication:  Ventricular Tachycardia    Operators:    Primary: Madeleine Duncan MD  Assistant: Mamie Monroe MD (CV Fellow)    Patient seen and examined. History and Physical reviewed. Labs reviewed. After informed consent was obtained with explanation of the risks and benefits including risk of stroke, cardiac arrest, the patient was brought to Cath lab. All sedation was administered by the cardiologist.     Jeremiah Riedel:  After an adequate level of sedation was achieved, 200J in biphasic synchronized delivery was administered. conversion to normal sinus rhythm. The patient awoke without complications. A post procedure 12 L ECG was ordered and reviewed. The patient will continue with the discharge meds and has been instructed to follow-up with Dr. Ame Harden for continued long term care and cardiovascular management. There were no complications encountered. Mamie Monroe MD  Fellow, Cardiovascular Diseases    [address] [place] [address]      Attending Physician Statement  I have discussed the care of the patient, including pertinent history and exam findings, with the resident. I have seen and examined the patient and the key elements of all parts of the encounter have been performed by me. I agree with the assessment, plan and orders as documented by the resident.     Ashish Boyd MD

## 2018-12-09 NOTE — PROGRESS NOTES
with chronic LAD disease, non-obstructive disease in RCA and LCX  5. S/p single-chamber ICD, interrogated this admission to be functioning well  6. COPD    Plan of Treatment:   1. Plan for cardioversion. [employer] to be called for cardioversion through patient's ICD, unless patient becomes hemodynamically unstable will then proceed with immediate cardioversion. 2. Continue IV amiodarone at this time, lidocaine gtt ordered  3. Check electrolytes and keep K+>4 and Mg>2  4. Close monitoring. 5. Discussed with critical care team and RN. Electronically signed by Topher Bauer MD on 12/9/2018 at 7:04 AM  Internal Medicine Resident   [phone]  Attending Physician Statement  I have discussed the care of the patient, including pertinent history and exam findings, with the resident. I have seen and examined the patient and the key elements of all parts of the encounter have been performed by me. I agree with the assessment, plan and orders as documented by the resident.   Pt had slow VT , alrady started amiodarone drip with Bolos  Also started Lidocaine with bolus   Called [employer] for  convertion it did not worked out   Plan to Rebekah Hobbs MD

## 2018-12-09 NOTE — PROGRESS NOTES
SHAYLA sauceda begins shift with Pt connected to defibrillator /fast patches with Dr Abiel Colindres and another critical care resident in room and Pt in V Tach with a pulse A & O x4 good BP. 0725 Dr Elsa Manuel at bedside another 150 amiodarone bolus given and [employer] called. 0736 Lidocaine gtt started . 8266 Tech from [employer] at bedside in contact with Dr Elsa Manuel attempts to burst out of V Tach rhythm at 0822 0823 x2 and at 85 99 60 all unsuccessful.  0900 100mg bolus lidocaine given Dr Kavya Escobar updated and another 100 mg lidocaine given at approx 1020  Dr Elsa Manuel updated approx 1 hr later as ordered. V Tach sustained and pt had 1 episode of not feeling well stating \"i feel like you are going to lose me\" BP high 80's 1145 Dr Elsa Manuel begins procedure after consent and sedation 50 mcg propofol at 1145 50 mcg at 1146 and 200 jouls cardioversion at 1147 all given per Dr Elsa Manuel (Pt on [address] and RT at bedside ) Sa02 good Etco2 good.  Converted to NSR EKG done pt awakes w/o issues

## 2018-12-09 NOTE — PROGRESS NOTES
I  N  T  A  K  E   I.V.  (mL/kg) 85  (0.7)   85  (0.7)    Shift Total  (mL/kg) 85  (0.7)   85  (0.7)   O  U  T  P  U  T   Shift Total  (mL/kg)       Weight (kg) 120.7 120.7 120.7 120.7     Wt Readings from Last 3 Encounters:   12/07/18 266 lb 1.5 oz (120.7 kg)   11/26/18 270 lb (122.5 kg)   09/28/18 271 lb (122.9 kg)     Body mass index is 35.27 kg/m². PHYSICAL EXAM:  Constitutional: Appears well, in no distress, sitting up in bed  EENT: PERRLA, EOMI, sclera clear, anicteric, oropharynx clear, no lesions, neck supple with midline trachea. Neck: Supple, symmetrical, trachea midline, no adenopathy, thyroid symmetric, no jvd skin normal  Respiratory: clear to auscultation, no wheezes or rales and unlabored breathing. No intercostal tenderness  Cardiovascular: regular rate and rhythm, normal S1, S2, no murmur noted and 2+ pulses throughout  Abdomen: soft, nontender, nondistended, no masses or organomegaly  Neurological: Awake and alert. Following commands. Extremities:  peripheral pulses normal, no pedal edema, no clubbing or cyanosis. Maculopapular rash over left arm and on right arm. Rash developing over left knee as well.      MEDICATIONS:  Scheduled Meds:   magnesium sulfate  1 g Intravenous Q1H    hydrocortisone   Topical BID    famotidine  20 mg Oral BID    predniSONE  40 mg Oral Daily    amiodarone  400 mg Oral BID    [START ON 12/16/2018] amiodarone  200 mg Oral Daily    metoprolol tartrate  25 mg Oral BID    multivitamin  1 tablet Oral Daily    warfarin  5 mg Oral Daily    lisinopril  5 mg Oral Daily    thiamine  100 mg Oral Daily    folic acid  1 mg Oral Daily    sodium chloride flush  10 mL Intravenous 2 times per day    aspirin  81 mg Oral Daily    atorvastatin  40 mg Oral Daily    furosemide  20 mg Oral Daily    insulin lispro  0-12 Units Subcutaneous TID WC    insulin lispro  0-6 Units Subcutaneous Nightly    spironolactone  25 mg Oral Daily    tamsulosin  0.4 mg Oral Daily   

## 2018-12-10 VITALS
TEMPERATURE: 98.1 F | HEART RATE: 71 BPM | HEIGHT: 73 IN | WEIGHT: 266.1 LBS | BODY MASS INDEX: 35.27 KG/M2 | SYSTOLIC BLOOD PRESSURE: 125 MMHG | RESPIRATION RATE: 14 BRPM | OXYGEN SATURATION: 96 % | DIASTOLIC BLOOD PRESSURE: 71 MMHG

## 2018-12-10 LAB
ABSOLUTE EOS #: 0.03 K/UL (ref 0–0.44)
ABSOLUTE IMMATURE GRANULOCYTE: 0.08 K/UL (ref 0–0.3)
ABSOLUTE LYMPH #: 1.47 K/UL (ref 1.1–3.7)
ABSOLUTE MONO #: 1.33 K/UL (ref 0.1–1.2)
ANION GAP SERPL CALCULATED.3IONS-SCNC: 13 MMOL/L (ref 9–17)
BASOPHILS # BLD: 0 % (ref 0–2)
BASOPHILS ABSOLUTE: 0.05 K/UL (ref 0–0.2)
BUN BLDV-MCNC: 24 MG/DL (ref 8–23)
BUN/CREAT BLD: ABNORMAL (ref 9–20)
CALCIUM IONIZED: 1.21 MMOL/L (ref 1.13–1.33)
CALCIUM SERPL-MCNC: 9.3 MG/DL (ref 8.6–10.4)
CHLORIDE BLD-SCNC: 102 MMOL/L (ref 98–107)
CO2: 21 MMOL/L (ref 20–31)
CREAT SERPL-MCNC: 1.04 MG/DL (ref 0.7–1.2)
DIFFERENTIAL TYPE: ABNORMAL
EKG ATRIAL RATE: 0 BPM
EKG ATRIAL RATE: 59 BPM
EKG P AXIS: 24 DEGREES
EKG P-R INTERVAL: 278 MS
EKG Q-T INTERVAL: 450 MS
EKG Q-T INTERVAL: 460 MS
EKG QRS DURATION: 100 MS
EKG QRS DURATION: 186 MS
EKG QTC CALCULATION (BAZETT): 455 MS
EKG QTC CALCULATION (BAZETT): 646 MS
EKG R AXIS: -119 DEGREES
EKG R AXIS: 6 DEGREES
EKG T AXIS: 59 DEGREES
EKG T AXIS: 67 DEGREES
EKG VENTRICULAR RATE: 124 BPM
EKG VENTRICULAR RATE: 59 BPM
EOSINOPHILS RELATIVE PERCENT: 0 % (ref 1–4)
GFR AFRICAN AMERICAN: >60 ML/MIN
GFR NON-AFRICAN AMERICAN: >60 ML/MIN
GFR SERPL CREATININE-BSD FRML MDRD: ABNORMAL ML/MIN/{1.73_M2}
GFR SERPL CREATININE-BSD FRML MDRD: ABNORMAL ML/MIN/{1.73_M2}
GLUCOSE BLD-MCNC: 169 MG/DL (ref 70–99)
GLUCOSE BLD-MCNC: 245 MG/DL (ref 75–110)
HCT VFR BLD CALC: 44.5 % (ref 40.7–50.3)
HEMOGLOBIN: 14.8 G/DL (ref 13–17)
IMMATURE GRANULOCYTES: 1 %
INR BLD: 4.8
LYMPHOCYTES # BLD: 10 % (ref 24–43)
MAGNESIUM: 2.2 MG/DL (ref 1.6–2.6)
MCH RBC QN AUTO: 33.6 PG (ref 25.2–33.5)
MCHC RBC AUTO-ENTMCNC: 33.3 G/DL (ref 28.4–34.8)
MCV RBC AUTO: 100.9 FL (ref 82.6–102.9)
MONOCYTES # BLD: 9 % (ref 3–12)
NRBC AUTOMATED: 0 PER 100 WBC
PARTIAL THROMBOPLASTIN TIME: 81.2 SEC (ref 20.5–30.5)
PDW BLD-RTO: 13.2 % (ref 11.8–14.4)
PHOSPHORUS: 3.4 MG/DL (ref 2.5–4.5)
PLATELET # BLD: 183 K/UL (ref 138–453)
PLATELET ESTIMATE: ABNORMAL
PMV BLD AUTO: 10.8 FL (ref 8.1–13.5)
POTASSIUM SERPL-SCNC: 4.5 MMOL/L (ref 3.7–5.3)
PROTHROMBIN TIME: 46.4 SEC (ref 9–12)
RBC # BLD: 4.41 M/UL (ref 4.21–5.77)
RBC # BLD: ABNORMAL 10*6/UL
SEG NEUTROPHILS: 80 % (ref 36–65)
SEGMENTED NEUTROPHILS ABSOLUTE COUNT: 12.08 K/UL (ref 1.5–8.1)
SODIUM BLD-SCNC: 136 MMOL/L (ref 135–144)
WBC # BLD: 15 K/UL (ref 3.5–11.3)
WBC # BLD: ABNORMAL 10*3/UL

## 2018-12-10 PROCEDURE — 6370000000 HC RX 637 (ALT 250 FOR IP): Performed by: EMERGENCY MEDICINE

## 2018-12-10 PROCEDURE — 84100 ASSAY OF PHOSPHORUS: CPT

## 2018-12-10 PROCEDURE — 6370000000 HC RX 637 (ALT 250 FOR IP): Performed by: HOSPITALIST

## 2018-12-10 PROCEDURE — 94640 AIRWAY INHALATION TREATMENT: CPT

## 2018-12-10 PROCEDURE — 6360000002 HC RX W HCPCS: Performed by: STUDENT IN AN ORGANIZED HEALTH CARE EDUCATION/TRAINING PROGRAM

## 2018-12-10 PROCEDURE — 99233 SBSQ HOSP IP/OBS HIGH 50: CPT | Performed by: INTERNAL MEDICINE

## 2018-12-10 PROCEDURE — 82947 ASSAY GLUCOSE BLOOD QUANT: CPT

## 2018-12-10 PROCEDURE — 6370000000 HC RX 637 (ALT 250 FOR IP): Performed by: INTERNAL MEDICINE

## 2018-12-10 PROCEDURE — 85025 COMPLETE CBC W/AUTO DIFF WBC: CPT

## 2018-12-10 PROCEDURE — 83735 ASSAY OF MAGNESIUM: CPT

## 2018-12-10 PROCEDURE — 85610 PROTHROMBIN TIME: CPT

## 2018-12-10 PROCEDURE — 36415 COLL VENOUS BLD VENIPUNCTURE: CPT

## 2018-12-10 PROCEDURE — 82330 ASSAY OF CALCIUM: CPT

## 2018-12-10 PROCEDURE — 2580000003 HC RX 258: Performed by: STUDENT IN AN ORGANIZED HEALTH CARE EDUCATION/TRAINING PROGRAM

## 2018-12-10 PROCEDURE — 85730 THROMBOPLASTIN TIME PARTIAL: CPT

## 2018-12-10 PROCEDURE — 93005 ELECTROCARDIOGRAM TRACING: CPT

## 2018-12-10 PROCEDURE — 80048 BASIC METABOLIC PNL TOTAL CA: CPT

## 2018-12-10 RX ORDER — CALCIUM CARBONATE/VITAMIN D3 500-10/5ML
400 LIQUID (ML) ORAL 2 TIMES DAILY
Qty: 60 CAPSULE | Refills: 6 | Status: SHIPPED | OUTPATIENT
Start: 2018-12-10 | End: 2020-02-05

## 2018-12-10 RX ORDER — FOLIC ACID 1 MG/1
1 TABLET ORAL DAILY
Qty: 30 TABLET | Refills: 3 | Status: SHIPPED | OUTPATIENT
Start: 2018-12-11 | End: 2019-08-29

## 2018-12-10 RX ORDER — AMIODARONE HYDROCHLORIDE 200 MG/1
TABLET ORAL
Qty: 90 TABLET | Refills: 6 | Status: SHIPPED | OUTPATIENT
Start: 2018-12-10

## 2018-12-10 RX ORDER — PREDNISONE 10 MG/1
10 TABLET ORAL DAILY
Qty: 3 TABLET | Refills: 0 | Status: SHIPPED | OUTPATIENT
Start: 2018-12-17 | End: 2018-12-20

## 2018-12-10 RX ORDER — MEXILETINE HYDROCHLORIDE 150 MG/1
150 CAPSULE ORAL 2 TIMES DAILY
Qty: 90 CAPSULE | Refills: 3 | Status: SHIPPED | OUTPATIENT
Start: 2018-12-10 | End: 2019-08-29

## 2018-12-10 RX ORDER — PREDNISONE 1 MG/1
5 TABLET ORAL DAILY
Qty: 3 TABLET | Refills: 0 | Status: SHIPPED | OUTPATIENT
Start: 2018-12-20 | End: 2018-12-23

## 2018-12-10 RX ORDER — MEXILETINE HYDROCHLORIDE 150 MG/1
150 CAPSULE ORAL 2 TIMES DAILY
Status: DISCONTINUED | OUTPATIENT
Start: 2018-12-10 | End: 2018-12-10 | Stop reason: HOSPADM

## 2018-12-10 RX ORDER — MULTIVITAMIN WITH FOLIC ACID 400 MCG
1 TABLET ORAL DAILY
Qty: 30 TABLET | Refills: 3 | Status: SHIPPED | OUTPATIENT
Start: 2018-12-11

## 2018-12-10 RX ORDER — PREDNISONE 10 MG/1
20 TABLET ORAL DAILY
Qty: 6 TABLET | Refills: 0 | Status: SHIPPED | OUTPATIENT
Start: 2018-12-14 | End: 2018-12-17

## 2018-12-10 RX ORDER — WARFARIN SODIUM 5 MG/1
5 TABLET ORAL DAILY
Qty: 30 TABLET | Refills: 3 | Status: SHIPPED | OUTPATIENT
Start: 2018-12-12 | End: 2020-02-05

## 2018-12-10 RX ORDER — IODINE/SODIUM IODIDE 2 %
1 TINCTURE TOPICAL PRN
Qty: 2 BOTTLE | Refills: 2 | Status: SHIPPED | OUTPATIENT
Start: 2018-12-10 | End: 2019-02-28

## 2018-12-10 RX ORDER — LANOLIN ALCOHOL/MO/W.PET/CERES
100 CREAM (GRAM) TOPICAL DAILY
Qty: 30 TABLET | Refills: 3 | Status: SHIPPED | OUTPATIENT
Start: 2018-12-11 | End: 2020-02-05

## 2018-12-10 RX ORDER — PREDNISONE 10 MG/1
30 TABLET ORAL DAILY
Qty: 9 TABLET | Refills: 0 | Status: SHIPPED | OUTPATIENT
Start: 2018-12-11 | End: 2018-12-14

## 2018-12-10 RX ORDER — ALBUTEROL SULFATE 90 UG/1
2 AEROSOL, METERED RESPIRATORY (INHALATION) EVERY 6 HOURS PRN
Qty: 1 INHALER | Refills: 3 | Status: SHIPPED | OUTPATIENT
Start: 2018-12-10 | End: 2020-02-05

## 2018-12-10 RX ORDER — DIAPER,BRIEF,INFANT-TODD,DISP
EACH MISCELLANEOUS
Qty: 1 TUBE | Refills: 1 | Status: SHIPPED | OUTPATIENT
Start: 2018-12-10 | End: 2018-12-17

## 2018-12-10 RX ORDER — LISINOPRIL 10 MG/1
TABLET ORAL
Qty: 90 TABLET | Refills: 3 | Status: SHIPPED | OUTPATIENT
Start: 2018-12-10 | End: 2018-12-10 | Stop reason: HOSPADM

## 2018-12-10 RX ADMIN — TAMSULOSIN HYDROCHLORIDE 0.4 MG: 0.4 CAPSULE ORAL at 10:02

## 2018-12-10 RX ADMIN — LIDOCAINE HYDROCHLORIDE 1 MG/MIN: 4 INJECTION, SOLUTION INTRAVENOUS at 02:53

## 2018-12-10 RX ADMIN — FUROSEMIDE 20 MG: 20 TABLET ORAL at 10:03

## 2018-12-10 RX ADMIN — INSULIN LISPRO 4 UNITS: 100 INJECTION, SOLUTION INTRAVENOUS; SUBCUTANEOUS at 10:29

## 2018-12-10 RX ADMIN — AMIODARONE HYDROCHLORIDE 400 MG: 200 TABLET ORAL at 10:03

## 2018-12-10 RX ADMIN — SPIRONOLACTONE 25 MG: 25 TABLET ORAL at 10:02

## 2018-12-10 RX ADMIN — FOLIC ACID 1 MG: 1 TABLET ORAL at 10:03

## 2018-12-10 RX ADMIN — FAMOTIDINE 20 MG: 20 TABLET, FILM COATED ORAL at 10:03

## 2018-12-10 RX ADMIN — THERA TABS 1 TABLET: TAB at 10:02

## 2018-12-10 RX ADMIN — HYDROCORTISONE: 10 CREAM TOPICAL at 10:01

## 2018-12-10 RX ADMIN — METOPROLOL TARTRATE 25 MG: 25 TABLET, FILM COATED ORAL at 10:05

## 2018-12-10 RX ADMIN — ASPIRIN 81 MG: 81 TABLET ORAL at 10:03

## 2018-12-10 RX ADMIN — PREDNISONE 40 MG: 20 TABLET ORAL at 10:02

## 2018-12-10 RX ADMIN — MOMETASONE FUROATE AND FORMOTEROL FUMARATE DIHYDRATE 2 PUFF: 100; 5 AEROSOL RESPIRATORY (INHALATION) at 08:44

## 2018-12-10 RX ADMIN — LISINOPRIL 5 MG: 5 TABLET ORAL at 10:03

## 2018-12-10 RX ADMIN — AMIODARONE HYDROCHLORIDE 0.5 MG/MIN: 50 INJECTION, SOLUTION INTRAVENOUS at 06:06

## 2018-12-10 RX ADMIN — DIPHENHYDRAMINE HCL 25 MG: 25 TABLET ORAL at 10:03

## 2018-12-10 RX ADMIN — ATORVASTATIN CALCIUM 40 MG: 80 TABLET, FILM COATED ORAL at 10:03

## 2018-12-10 RX ADMIN — Medication 100 MG: at 10:02

## 2018-12-10 ASSESSMENT — PAIN SCALES - GENERAL
PAINLEVEL_OUTOF10: 0

## 2018-12-10 NOTE — PROGRESS NOTES
[facility]   Progress Note                   Date:   12/10/2018  Patient name: Rohini New  Date of admission:  12/3/2018  5:59 AM  MRN:   [id number]  YOB: 1950  PCP: Teagan Alexander MD    Reason for Admission: V tach    Subjective:       Clinical Changes / Abnormalities:  Patient doing well.  No acute events overnight  Denies any CP, SOB, HA, Lightheadedness, palpitations  BP stable, HR in the 60s      Medications:   Scheduled Meds:   mometasone-formoterol  2 puff Inhalation BID    hydrocortisone   Topical BID    famotidine  20 mg Oral BID    predniSONE  40 mg Oral Daily    amiodarone  400 mg Oral BID    [START ON 12/16/2018] amiodarone  200 mg Oral Daily    metoprolol tartrate  25 mg Oral BID    multivitamin  1 tablet Oral Daily    warfarin  5 mg Oral Daily    lisinopril  5 mg Oral Daily    thiamine  100 mg Oral Daily    folic acid  1 mg Oral Daily    sodium chloride flush  10 mL Intravenous 2 times per day    aspirin  81 mg Oral Daily    atorvastatin  40 mg Oral Daily    furosemide  20 mg Oral Daily    insulin lispro  0-12 Units Subcutaneous TID WC    insulin lispro  0-6 Units Subcutaneous Nightly    spironolactone  25 mg Oral Daily    tamsulosin  0.4 mg Oral Daily    sodium chloride flush  10 mL Intravenous 2 times per day    nicotine  1 patch Transdermal Daily     Continuous Infusions:   amiodarone 0.5 mg/min (12/10/18 0606)    lidocaine 1 mg/min (12/10/18 0253)    heparin (porcine) 10 Units/kg/hr (12/09/18 0625)    dextrose       CBC:   Recent Labs      12/08/18   0551  12/09/18   0536  12/10/18   0451   WBC  8.3  17.1*  15.0*   HGB  14.9  16.4  14.8   PLT  120*  181  183     BMP:    Recent Labs      12/08/18   0551  12/09/18   0536  12/10/18   0451   NA  136  137  136   K  4.3  4.0  4.5   CL  102  98  102   CO2  23  22  21   BUN  13  18  24*   CREATININE  0.81  1.10  1.04   GLUCOSE  142*  142*  169*     Hepatic: No results for input(s): AST, ALT,

## 2018-12-10 NOTE — CARE COORDINATION
Discharge [facility] Case Management Department  Written by: Carmelita Mendoza RN    Patient Name: Radha Olivarez  Attending Provider: Mynor Chance MD  Admit Date: 12/3/2018  5:59 AM  MRN: [id number]  Account: [de-identified]                     : 1950  Discharge Date: 12/10      Disposition: home with [facility].     Carmelita Mendoza RN

## 2018-12-10 NOTE — DISCHARGE INSTR - COC
Continuity of Care Form    Patient Name: Paulina Price   :    MRN:  [id number]    Admit date:  12/3/2018  Discharge date:  12/10/2018    Code Status Order: Full Code   Advance Directives:     Admitting Physician:  Genia Moran MD  PCP: Cole Singh MD    Discharging Nurse: Ernestine Santos.   [address] Unit/Room#: 0130/0130-01  Discharging Unit Phone Number: [phone]    Emergency Contact:   Extended Emergency Contact Information  Primary Emergency Contact: Ernestina Allan  [facility] [address] Phone: [phone]  Mobile Phone: [phone]  Relation: Spouse    Past Surgical History:  Past Surgical History:   Procedure Laterality Date    CARDIAC CATHETERIZATION      [place] Left 2014    COLONOSCOPY  3/2015    polyps, bx-serrated adenoma       Immunization History:   Immunization History   Administered Date(s) Administered    Influenza, High Dose (Fluzone 65 yrs and older) 2016, 2017, 2018    Pneumococcal 13-valent Conjugate (Rgyeavv84) 2016    Pneumococcal Polysaccharide (Kazdsprwv18) 2017       Active Problems:  Patient Active Problem List   Diagnosis Code    Unstable angina ([facility] I20.0    Alcohol abuse F10.10    Smoking F17.200    CHF [facility]congestive heart failure) ([facility] I50.9    COPD (chronic obstructive pulmonary disease) ([facility] J44.9    Obesity E66.9    Shortness of breath R06.02    Ischemic cardiomyopathy I25.5    SOB (shortness of breath) R06.02    Anxiety F41.9    Hoarseness R49.0    Colon polyps K63.5    Back pain M54.9    Essential hypertension I10    Type 2 diabetes mellitus without complication, without long-term current use of insulin (HCC) E11.9    Ventricular tachyarrhythmia (HCC) I47.2    V tach (HCC) I47.2    MADISON (acute kidney injury) ([facility] N17.9    V-tach (HCC) I47.2       Isolation/Infection:   Isolation

## 2018-12-10 NOTE — PROGRESS NOTES
EXAM:  Constitutional: Appears well, in no distress, sitting up in bed  EENT: PERRLA, EOMI, sclera clear, anicteric, oropharynx clear, no lesions, neck supple with midline trachea. Neck: Supple, symmetrical, trachea midline, no adenopathy, thyroid symmetric, no jvd skin normal  Respiratory: clear to auscultation, no wheezes or rales and unlabored breathing. No intercostal tenderness  Cardiovascular: regular rate and rhythm, normal S1, S2, no murmur noted and 2+ pulses throughout  Abdomen: soft, nontender, nondistended, no masses or organomegaly  Neurological: Awake and alert. Following commands. Extremities:  peripheral pulses normal, no pedal edema, no clubbing or cyanosis. Maculopapular rash over left arm and on right arm. Rash developing over left knee as well.      MEDICATIONS:  Scheduled Meds:   mometasone-formoterol  2 puff Inhalation BID    hydrocortisone   Topical BID    famotidine  20 mg Oral BID    predniSONE  40 mg Oral Daily    amiodarone  400 mg Oral BID    [START ON 12/16/2018] amiodarone  200 mg Oral Daily    metoprolol tartrate  25 mg Oral BID    multivitamin  1 tablet Oral Daily    warfarin  5 mg Oral Daily    lisinopril  5 mg Oral Daily    thiamine  100 mg Oral Daily    folic acid  1 mg Oral Daily    sodium chloride flush  10 mL Intravenous 2 times per day    aspirin  81 mg Oral Daily    atorvastatin  40 mg Oral Daily    furosemide  20 mg Oral Daily    insulin lispro  0-12 Units Subcutaneous TID WC    insulin lispro  0-6 Units Subcutaneous Nightly    spironolactone  25 mg Oral Daily    tamsulosin  0.4 mg Oral Daily    sodium chloride flush  10 mL Intravenous 2 times per day    nicotine  1 patch Transdermal Daily     Continuous Infusions:   amiodarone 0.5 mg/min (12/10/18 0606)    lidocaine 1 mg/min (12/10/18 0253)    heparin (porcine) 10 Units/kg/hr (12/09/18 0625)    dextrose       PRN Meds:     Calamine 1 applicator PRN   diphenhydrAMINE 25 mg Q6H PRN   heparin 12/08/2018        Urinalysis:   Lab Results   Component Value Date    NITRU NEGATIVE 06/25/2015    COLORU ANGELICA 06/25/2015    PHUR 5.0 06/25/2015    WBCUA 5 TO 10 06/25/2015    RBCUA 5 TO 10 06/25/2015    MUCUS 1+ 06/25/2015    TRICHOMONAS NOT REPORTED 06/25/2015    YEAST NOT REPORTED 06/25/2015    BACTERIA FEW 06/25/2015    SPECGRAV 1.024 06/25/2015    LEUKOCYTESUR SMALL 06/25/2015    UROBILINOGEN Normal 06/25/2015    BILIRUBINUR NEGATIVE 06/25/2015    GLUCOSEU NEGATIVE 06/25/2015    KETUA NEGATIVE 06/25/2015    AMORPHOUS NOT REPORTED 06/25/2015       HgBA1c:    Lab Results   Component Value Date    LABA1C 6.6 08/30/2018     TSH:    Lab Results   Component Value Date    TSH 3.16 12/03/2018     Lactic Acid: No results found for: LACTA   Troponin:   No results for input(s): TROPONINI in the last 72 hours.     Microbiology:  Cultures during this admission:      Blood cultures:                 [x] None drawn      [] Negative             []  Positive (Details:  )  Urine Culture:                   [x] None drawn      [] Negative             []  Positive (Details:  )  Sputum Culture:               [x] None drawn       [] Negative             []  Positive (Details:  )   Endotracheal aspirate:     [x] None drawn       [] Negative             []  Positive (Details:  )      Radiology/Imaging:  No new imaging in last 24 hours    ASSESSMENT:     Patient Active Problem List    Diagnosis Date Noted    [facility] ([facility] 12/04/2018    V tach ([facility] 12/03/2018    MADISON (acute kidney injury) ([address] 12/03/2018    Ventricular tachyarrhythmia ([address] 08/30/2018    Type 2 diabetes mellitus without complication, without long-term current use of insulin ([address] 06/15/2018    Essential hypertension 12/15/2017    Back pain 07/07/2015    Colon polyps 04/08/2015    Hoarseness 02/19/2015    Anxiety 01/15/2015    SOB (shortness of breath)     Ischemic cardiomyopathy 09/15/2014    Shortness of breath     Unstable angina ([address] 09/07/2014    Alcohol abuse 09/07/2014    Smoking 09/07/2014    CHF (congestive heart failure) ([facility] 09/07/2014    COPD (chronic obstructive pulmonary disease) ([facility] 09/07/2014    Obesity 09/07/2014          PLAN:     WEAN PER PROTOCOL:  [] No   [] Yes  [x] N/A    ICU PROPHYLAXIS:  Stress ulcer:  [] PPI Agent  [] L8Orctr [] Sucralfate  [x] Other: Not needed, patient is tolerating PO    VTE:   [] Enoxaparin  [] Unfract. Heparin Subcut  [] EPC Cuffs              [x] Heparin gtt  NUTRITION:  [] NPO [] Tube Feeding (Specify: ) [] TPN  [x] PO    HOME MEDS RECONCILED: [] No  [x] Yes    CONSULTATION NEEDED:  [x] No  [] Yes    FAMILY UPDATED:    [x] No  [] Yes    TRANSFER OUT OF ICU:   [] No  [x] Yes        Additional Assessment:  77 yo male w/ PMH of CHF, COPD, and Ventricular tachyarrhythmia who presented to the ED in the AM of 12/3 for palpitations. Found to be in [place]. Did not conver after lidocaine bolus. Received Versed and was cardioverted succesfully and converted to normal rhythm. AICD interrogated and showed that he was in sustained VTach and the device is functioning but his sustained rate was lower than what it is set to trigger at. Patient was started on lidocaine infusion. Evaluated by cardiology. On 12/4 patient was switched from lidocaine drip to amiodarone. Started on heparin and coumadin due to LV thrombus on  Echo       Plan:  Neuro:  · Awake and alert, not requiring analgesics  · Neuro checks per protocol   · Nicotine patch  Cards:  · Patient is being followed by cardiology. · Cont amiodarone and lidocaine drip. · On oral amiodarone  · Cardiology may plan on cardioverting externally depending upon response to medications. · H/o CHF. Continue Aldactone. · Lopressor 25 mg   · Labetalol prn for HTN  · Continue ASA and statin  · Heparin gtt, coumadin. Stop heparin drip today. · Monitor INR  · EP following. May need to decide between ablation vs sending home on oral antiarrhythmics.    Pulm  · On room

## 2018-12-10 NOTE — DISCHARGE SUMMARY
[address] [place] [address]     Department of Internal Medicine - Staff Internal Medicine Service    INPATIENT DISCHARGE SUMMARY      PATIENT IDENTIFICATION:  NAME:  Pete Nelson   :   [id number]  MRN:    [id number]     Acct:    [de-identified]   Admit Date:  12/3/2018  Discharge date:  No discharge date for patient encounter. Attending Provider: Jason Horne MD                                     REASON FOR HOSPITALIZATION:   Pete Nelson is a 76 y.o. male who presented with palpitations. DIAGNOSES:  Primary:   V tach ([address] [I47.2]  V-tach ([address] [I47.2]    Secondary: Active Hospital Problems    Diagnosis Date Noted    V-tach [facility]) [I47.2] 2018    V tach ([address] [I47.2] 2018    MADISON (acute kidney injury) ([facility] [N17.9] 2018    Type 2 diabetes mellitus without complication, without long-term current use of insulin ([address] [E11.9] 06/15/2018    Ischemic cardiomyopathy [I25.5] 09/15/2014    CHF (congestive heart failure) ([address] [I50.9] 2014    COPD (chronic obstructive pulmonary disease) ([facility] [J44.9] 2014    Smoking [F17.200] 2014       TREATMENT:  Brief Inpatient Course:   Sohail Goodman a 76 y. o. who presented with Yue Aguilar states that he was raking the leaves on  and started having heart palpitations.  He waited for 12 hours before coming to the ED.    When patient arrived to the ED on 12/3 at ~6 am and was found to be in Yue Lauren was given 2 mg of Versed and synchronized cardioverted with subsequent conversion into NSR.  He was started on a lidocaine drip.   Evaluated by cardiology while in the ED. Jennifer Quintana recommended continuing lidocaine gtt.  Possible VT ablation in the future.  Hold sotalol while on lidocaine drip.  Continue ASA, carvedilol 6.25 BID, and atorvastatin 40 mg qd.  Patient was initially admitted under Internal Medicine to the Cardiac ICU, however was boarding in the ED due to lack of available beds. Janel Sage, Do not take Coumadin until contacted by [facility] on Wednesday  Qty: 30 tablet, Refills: 3      Magnesium Oxide 400 MG CAPS Take 400 mg by mouth 2 times daily  Qty: 60 capsule, Refills: 6      mexiletine (MEXITIL) 150 MG capsule Take 1 capsule by mouth 2 times daily  Qty: 90 capsule, Refills: 3      albuterol sulfate  (90 Base) MCG/ACT inhaler Inhale 2 puffs into the lungs every 6 hours as needed for Wheezing  Qty: 1 Inhaler, Refills: 3      Calamine 8-8 % LOTN lotion Apply 1 applicator topically as needed (ithcing)  Qty: 2 Bottle, Refills: 2      folic acid (FOLVITE) 1 MG tablet Take 1 tablet by mouth daily  Qty: 30 tablet, Refills: 3      hydrocortisone 1 % cream Apply topically 2 times daily. Qty: 1 Tube, Refills: 1      Multiple Vitamin (MULTIVITAMIN) tablet Take 1 tablet by mouth daily  Qty: 30 tablet, Refills: 3      vitamin B-1 100 MG tablet Take 1 tablet by mouth daily  Qty: 30 tablet, Refills: 3      !! predniSONE (DELTASONE) 10 MG tablet Take 3 tablets by mouth daily for 3 days  Qty: 9 tablet, Refills: 0      !! predniSONE (DELTASONE) 10 MG tablet Take 2 tablets by mouth daily for 3 days  Qty: 6 tablet, Refills: 0      !! predniSONE (DELTASONE) 10 MG tablet Take 1 tablet by mouth daily for 3 days  Qty: 3 tablet, Refills: 0      !! predniSONE (DELTASONE) 5 MG tablet Take 1 tablet by mouth daily for 3 days  Qty: 3 tablet, Refills: 0       !! - Potential duplicate medications found. Please discuss with provider. CONTINUE these medications which have NOT CHANGED    Details   guaiFENesin 400 MG tablet Take 400 mg by mouth 4 times daily as needed for Cough      budesonide-formoterol (SYMBICORT) 80-4.5 MCG/ACT AERO Inhale 2 puffs into the lungs 2 times daily      traMADol-acetaminophen (ULTRACET) 37.5-325 MG per tablet Take 1 tablet by mouth every 6 hours as needed for Pain. .      tamsulosin (FLOMAX) 0.4 MG capsule Take 1 capsule by mouth daily  Qty: 90 capsule, Refills: 3

## 2018-12-11 ENCOUNTER — CARE COORDINATION (OUTPATIENT)
Dept: CASE MANAGEMENT | Age: 68
End: 2018-12-11

## 2018-12-11 DIAGNOSIS — I47.20 V TACH: Primary | ICD-10-CM

## 2018-12-11 LAB
EKG ATRIAL RATE: 72 BPM
EKG P AXIS: 27 DEGREES
EKG P-R INTERVAL: 244 MS
EKG Q-T INTERVAL: 456 MS
EKG QRS DURATION: 106 MS
EKG QTC CALCULATION (BAZETT): 499 MS
EKG R AXIS: 16 DEGREES
EKG T AXIS: 75 DEGREES
EKG VENTRICULAR RATE: 72 BPM

## 2018-12-11 PROCEDURE — 1111F DSCHRG MED/CURRENT MED MERGE: CPT | Performed by: INTERNAL MEDICINE

## 2018-12-13 ENCOUNTER — HOSPITAL ENCOUNTER (OUTPATIENT)
Age: 68
Setting detail: SPECIMEN
Discharge: HOME | End: 2018-12-13
Payer: MEDICARE

## 2018-12-13 LAB
INR BLD: 1.6
PROTHROMBIN TIME: 16.7 SEC (ref 9–12)

## 2018-12-17 ENCOUNTER — CARE COORDINATION (OUTPATIENT)
Dept: CASE MANAGEMENT | Age: 68
End: 2018-12-17

## 2018-12-18 ENCOUNTER — HOSPITAL ENCOUNTER (OUTPATIENT)
Age: 68
Setting detail: SPECIMEN
Discharge: HOME | End: 2018-12-18
Payer: MEDICARE

## 2018-12-18 LAB
INR BLD: 1.7
PROTHROMBIN TIME: 17.3 SEC (ref 9–12)

## 2018-12-19 ENCOUNTER — CARE COORDINATION (OUTPATIENT)
Dept: CASE MANAGEMENT | Age: 68
End: 2018-12-19

## 2018-12-24 ENCOUNTER — CARE COORDINATION (OUTPATIENT)
Dept: CASE MANAGEMENT | Age: 68
End: 2018-12-24

## 2018-12-26 ENCOUNTER — HOSPITAL ENCOUNTER (OUTPATIENT)
Age: 68
Setting detail: SPECIMEN
Discharge: HOME | End: 2018-12-26
Payer: MEDICARE

## 2018-12-26 LAB
INR BLD: 5.7
PROTHROMBIN TIME: 54.2 SEC (ref 9–12)

## 2019-01-02 ENCOUNTER — HOSPITAL ENCOUNTER (OUTPATIENT)
Age: 69
Setting detail: SPECIMEN
Discharge: HOME | End: 2019-01-02
Payer: MEDICARE

## 2019-01-02 LAB
INR BLD: 4.5
PROTHROMBIN TIME: 43.5 SEC (ref 9–12)

## 2019-01-07 ENCOUNTER — OFFICE VISIT (OUTPATIENT)
Dept: INTERNAL MEDICINE CLINIC | Age: 69
End: 2019-01-07
Payer: MEDICARE

## 2019-01-07 VITALS
DIASTOLIC BLOOD PRESSURE: 60 MMHG | BODY MASS INDEX: 35.12 KG/M2 | HEIGHT: 73 IN | WEIGHT: 265 LBS | SYSTOLIC BLOOD PRESSURE: 100 MMHG

## 2019-01-07 DIAGNOSIS — E11.9 TYPE 2 DIABETES MELLITUS WITHOUT COMPLICATION, WITHOUT LONG-TERM CURRENT USE OF INSULIN (HCC): ICD-10-CM

## 2019-01-07 DIAGNOSIS — I10 ESSENTIAL HYPERTENSION: ICD-10-CM

## 2019-01-07 DIAGNOSIS — I50.9 CONGESTIVE HEART FAILURE, UNSPECIFIED HF CHRONICITY, UNSPECIFIED HEART FAILURE TYPE (HCC): Primary | ICD-10-CM

## 2019-01-07 DIAGNOSIS — I47.20 V TACH: ICD-10-CM

## 2019-01-07 DIAGNOSIS — I50.9 CHRONIC CONGESTIVE HEART FAILURE, UNSPECIFIED HEART FAILURE TYPE (HCC): ICD-10-CM

## 2019-01-07 DIAGNOSIS — J44.9 CHRONIC OBSTRUCTIVE PULMONARY DISEASE, UNSPECIFIED COPD TYPE (HCC): ICD-10-CM

## 2019-01-07 DIAGNOSIS — F17.200 SMOKER: ICD-10-CM

## 2019-01-07 PROCEDURE — 99214 OFFICE O/P EST MOD 30 MIN: CPT | Performed by: INTERNAL MEDICINE

## 2019-01-07 ASSESSMENT — ENCOUNTER SYMPTOMS
CHOKING: 0
BLOOD IN STOOL: 0
ABDOMINAL DISTENTION: 0
EYE ITCHING: 0
ABDOMINAL PAIN: 0
APNEA: 0
CHEST TIGHTNESS: 0
BACK PAIN: 0
EYE REDNESS: 0
SHORTNESS OF BREATH: 1
CONSTIPATION: 0
DIARRHEA: 0
COUGH: 0
COLOR CHANGE: 0
EYE PAIN: 0
EYE DISCHARGE: 0

## 2019-01-07 ASSESSMENT — PATIENT HEALTH QUESTIONNAIRE - PHQ9
SUM OF ALL RESPONSES TO PHQ9 QUESTIONS 1 & 2: 0
2. FEELING DOWN, DEPRESSED OR HOPELESS: 0
SUM OF ALL RESPONSES TO PHQ QUESTIONS 1-9: 0
SUM OF ALL RESPONSES TO PHQ QUESTIONS 1-9: 0
1. LITTLE INTEREST OR PLEASURE IN DOING THINGS: 0

## 2019-01-09 ENCOUNTER — HOSPITAL ENCOUNTER (OUTPATIENT)
Age: 69
Setting detail: SPECIMEN
Discharge: HOME | End: 2019-01-09
Payer: MEDICARE

## 2019-01-09 DIAGNOSIS — E11.9 TYPE 2 DIABETES MELLITUS WITHOUT COMPLICATION, WITHOUT LONG-TERM CURRENT USE OF INSULIN (HCC): ICD-10-CM

## 2019-01-09 LAB
ESTIMATED AVERAGE GLUCOSE: 143 MG/DL
HBA1C MFR BLD: 6.6 % (ref 4–6)
INR BLD: 3.7
PROTHROMBIN TIME: 36.1 SEC (ref 9–12)

## 2019-01-17 ENCOUNTER — HOSPITAL ENCOUNTER (OUTPATIENT)
Age: 69
Setting detail: SPECIMEN
Discharge: HOME | End: 2019-01-17
Payer: MEDICARE

## 2019-01-17 LAB
INR BLD: 3.3
PROTHROMBIN TIME: 32.5 SEC (ref 9–12)

## 2019-02-01 ENCOUNTER — HOSPITAL ENCOUNTER (OUTPATIENT)
Age: 69
Setting detail: SPECIMEN
Discharge: HOME | End: 2019-02-01
Payer: MEDICARE

## 2019-02-01 LAB
INR BLD: 3.2
PROTHROMBIN TIME: 31.3 SEC (ref 9–12)

## 2019-02-21 ENCOUNTER — HOSPITAL ENCOUNTER (OUTPATIENT)
Age: 69
Setting detail: SPECIMEN
Discharge: HOME | End: 2019-02-21
Payer: MEDICARE

## 2019-02-21 LAB
INR BLD: 2.7
PROTHROMBIN TIME: 26.5 SEC (ref 9–12)

## 2019-02-26 ENCOUNTER — PROCEDURE VISIT (OUTPATIENT)
Dept: INTERNAL MEDICINE CLINIC | Age: 69
End: 2019-02-26

## 2019-02-26 DIAGNOSIS — F17.200 SMOKER: ICD-10-CM

## 2019-02-28 ENCOUNTER — OFFICE VISIT (OUTPATIENT)
Dept: PODIATRY | Age: 69
End: 2019-02-28
Payer: MEDICARE

## 2019-02-28 VITALS — HEIGHT: 73 IN | WEIGHT: 264 LBS | BODY MASS INDEX: 34.99 KG/M2

## 2019-02-28 DIAGNOSIS — E11.51 TYPE 2 DIABETES MELLITUS WITH PERIPHERAL VASCULAR DISEASE (HCC): ICD-10-CM

## 2019-02-28 DIAGNOSIS — B35.1 ONYCHOMYCOSIS OF TOENAIL: Primary | ICD-10-CM

## 2019-02-28 DIAGNOSIS — M79.674 PAIN OF TOES OF BOTH FEET: ICD-10-CM

## 2019-02-28 DIAGNOSIS — M79.675 PAIN OF TOES OF BOTH FEET: ICD-10-CM

## 2019-02-28 DIAGNOSIS — E11.42 DIABETIC POLYNEUROPATHY ASSOCIATED WITH TYPE 2 DIABETES MELLITUS (HCC): ICD-10-CM

## 2019-02-28 PROCEDURE — 99999 PR OFFICE/OUTPT VISIT,PROCEDURE ONLY: CPT | Performed by: PODIATRIST

## 2019-02-28 PROCEDURE — 11721 DEBRIDE NAIL 6 OR MORE: CPT | Performed by: PODIATRIST

## 2019-02-28 ASSESSMENT — ENCOUNTER SYMPTOMS
BACK PAIN: 0
NAUSEA: 0
DIARRHEA: 0
SHORTNESS OF BREATH: 0
COLOR CHANGE: 0

## 2019-03-06 ENCOUNTER — HOSPITAL ENCOUNTER (OUTPATIENT)
Age: 69
Setting detail: SPECIMEN
Discharge: HOME | End: 2019-03-06
Payer: MEDICARE

## 2019-03-06 LAB
INR BLD: 2.9
PROTHROMBIN TIME: 27.8 SEC (ref 9–12)

## 2019-03-12 ENCOUNTER — HOSPITAL ENCOUNTER (OUTPATIENT)
Dept: NON INVASIVE DIAGNOSTICS | Age: 69
Discharge: HOME | End: 2019-03-12
Payer: MEDICARE

## 2019-03-12 PROCEDURE — 6360000002 HC RX W HCPCS: Performed by: INTERNAL MEDICINE

## 2019-03-12 PROCEDURE — 93325 DOPPLER ECHO COLOR FLOW MAPG: CPT

## 2019-03-12 PROCEDURE — 93308 TTE F-UP OR LMTD: CPT

## 2019-03-12 PROCEDURE — 6360000002 HC RX W HCPCS

## 2019-03-12 RX ADMIN — PERFLUTREN 1.43 MG: 6.52 INJECTION, SUSPENSION INTRAVENOUS at 11:25

## 2019-03-14 ENCOUNTER — OFFICE VISIT (OUTPATIENT)
Dept: INTERNAL MEDICINE CLINIC | Age: 69
End: 2019-03-14
Payer: MEDICARE

## 2019-03-14 VITALS
WEIGHT: 270 LBS | SYSTOLIC BLOOD PRESSURE: 132 MMHG | HEART RATE: 69 BPM | TEMPERATURE: 98.7 F | BODY MASS INDEX: 35.78 KG/M2 | HEIGHT: 73 IN | DIASTOLIC BLOOD PRESSURE: 72 MMHG | OXYGEN SATURATION: 95 %

## 2019-03-14 DIAGNOSIS — R68.89 FLU-LIKE SYMPTOMS: ICD-10-CM

## 2019-03-14 DIAGNOSIS — F17.200 SMOKER: ICD-10-CM

## 2019-03-14 DIAGNOSIS — E11.9 TYPE 2 DIABETES MELLITUS WITHOUT COMPLICATION, WITHOUT LONG-TERM CURRENT USE OF INSULIN (HCC): ICD-10-CM

## 2019-03-14 DIAGNOSIS — I50.22 CHRONIC SYSTOLIC CONGESTIVE HEART FAILURE (HCC): Primary | ICD-10-CM

## 2019-03-14 DIAGNOSIS — I10 ESSENTIAL HYPERTENSION: ICD-10-CM

## 2019-03-14 DIAGNOSIS — J44.9 CHRONIC OBSTRUCTIVE PULMONARY DISEASE, UNSPECIFIED COPD TYPE (HCC): ICD-10-CM

## 2019-03-14 LAB
INFLUENZA A ANTIBODY: NEGATIVE
INFLUENZA B ANTIBODY: NEGATIVE

## 2019-03-14 PROCEDURE — 99214 OFFICE O/P EST MOD 30 MIN: CPT | Performed by: INTERNAL MEDICINE

## 2019-03-14 PROCEDURE — 87804 INFLUENZA ASSAY W/OPTIC: CPT | Performed by: INTERNAL MEDICINE

## 2019-03-14 RX ORDER — AMOXICILLIN AND CLAVULANATE POTASSIUM 875; 125 MG/1; MG/1
1 TABLET, FILM COATED ORAL 2 TIMES DAILY
Qty: 14 TABLET | Refills: 0 | Status: SHIPPED | OUTPATIENT
Start: 2019-03-14 | End: 2019-03-21

## 2019-03-14 RX ORDER — BUDESONIDE AND FORMOTEROL FUMARATE DIHYDRATE 80; 4.5 UG/1; UG/1
2 AEROSOL RESPIRATORY (INHALATION) 2 TIMES DAILY
Qty: 1 INHALER | Refills: 2 | Status: SHIPPED | OUTPATIENT
Start: 2019-03-14 | End: 2019-03-14 | Stop reason: SDUPTHER

## 2019-03-14 RX ORDER — BUDESONIDE AND FORMOTEROL FUMARATE DIHYDRATE 80; 4.5 UG/1; UG/1
2 AEROSOL RESPIRATORY (INHALATION) 2 TIMES DAILY
Qty: 1 INHALER | Refills: 2 | Status: SHIPPED | OUTPATIENT
Start: 2019-03-14 | End: 2019-05-30

## 2019-03-14 ASSESSMENT — ENCOUNTER SYMPTOMS
DIARRHEA: 0
ABDOMINAL PAIN: 0
ABDOMINAL DISTENTION: 0
BACK PAIN: 0
FACIAL SWELLING: 0
COUGH: 1
RHINORRHEA: 1
WHEEZING: 0
COLOR CHANGE: 0
APNEA: 0
CHEST TIGHTNESS: 0
CONSTIPATION: 0
SHORTNESS OF BREATH: 1

## 2019-03-18 ENCOUNTER — HOSPITAL ENCOUNTER (OUTPATIENT)
Age: 69
Setting detail: SPECIMEN
Discharge: HOME | End: 2019-03-18
Payer: MEDICARE

## 2019-03-18 LAB
INR BLD: 2.3
PROTHROMBIN TIME: 22.9 SEC (ref 9–12)

## 2019-04-04 ENCOUNTER — HOSPITAL ENCOUNTER (OUTPATIENT)
Age: 69
Setting detail: SPECIMEN
Discharge: HOME | End: 2019-04-04
Payer: MEDICARE

## 2019-04-04 LAB
INR BLD: 2.6
PROTHROMBIN TIME: 25.9 SEC (ref 9–12)

## 2019-05-01 ENCOUNTER — HOSPITAL ENCOUNTER (OUTPATIENT)
Age: 69
Setting detail: SPECIMEN
Discharge: HOME | End: 2019-05-01
Payer: MEDICARE

## 2019-05-01 LAB
INR BLD: 2.5
PROTHROMBIN TIME: 24.7 SEC (ref 9–12)

## 2019-05-09 ENCOUNTER — OFFICE VISIT (OUTPATIENT)
Dept: INTERNAL MEDICINE CLINIC | Age: 69
End: 2019-05-09
Payer: MEDICARE

## 2019-05-09 VITALS
HEIGHT: 73 IN | HEART RATE: 62 BPM | BODY MASS INDEX: 32.87 KG/M2 | OXYGEN SATURATION: 95 % | WEIGHT: 248 LBS | SYSTOLIC BLOOD PRESSURE: 128 MMHG | DIASTOLIC BLOOD PRESSURE: 86 MMHG

## 2019-05-09 DIAGNOSIS — E11.9 TYPE 2 DIABETES MELLITUS WITHOUT COMPLICATION, WITHOUT LONG-TERM CURRENT USE OF INSULIN (HCC): Primary | ICD-10-CM

## 2019-05-09 DIAGNOSIS — J44.9 CHRONIC OBSTRUCTIVE PULMONARY DISEASE, UNSPECIFIED COPD TYPE (HCC): ICD-10-CM

## 2019-05-09 DIAGNOSIS — I50.20 SYSTOLIC CONGESTIVE HEART FAILURE, UNSPECIFIED HF CHRONICITY (HCC): ICD-10-CM

## 2019-05-09 DIAGNOSIS — I50.9 CONGESTIVE HEART FAILURE, UNSPECIFIED HF CHRONICITY, UNSPECIFIED HEART FAILURE TYPE (HCC): ICD-10-CM

## 2019-05-09 DIAGNOSIS — I50.32 CHRONIC DIASTOLIC CONGESTIVE HEART FAILURE (HCC): ICD-10-CM

## 2019-05-09 DIAGNOSIS — I10 ESSENTIAL HYPERTENSION: ICD-10-CM

## 2019-05-09 PROCEDURE — 99214 OFFICE O/P EST MOD 30 MIN: CPT | Performed by: INTERNAL MEDICINE

## 2019-05-09 RX ORDER — FUROSEMIDE 40 MG/1
40 TABLET ORAL DAILY
Qty: 30 TABLET | Refills: 2 | Status: SHIPPED | OUTPATIENT
Start: 2019-05-09 | End: 2019-07-18 | Stop reason: SDUPTHER

## 2019-05-09 ASSESSMENT — ENCOUNTER SYMPTOMS
COLOR CHANGE: 0
APNEA: 0
CHEST TIGHTNESS: 0
ABDOMINAL PAIN: 0
COUGH: 0
SHORTNESS OF BREATH: 1
WHEEZING: 1
DIARRHEA: 0
BACK PAIN: 0
FACIAL SWELLING: 0
CONSTIPATION: 0
ABDOMINAL DISTENTION: 0

## 2019-05-09 NOTE — PROGRESS NOTES
Subjective:      Chief Complaint   Patient presents with    Congestive Heart Failure     2 month f/u, Pt states he has been feeling a lot better    Shortness of Breath     Pt states he has been having a lot of SOB        Patient ID: Gustabo Bryant is a 76 y.o. male. Visit Information    Have you changed or started any medications since your last visit including any over-the-counter medicines, vitamins, or herbal medicines? no   Are you having any side effects from any of your medications? -  no  Have you stopped taking any of your medications? Is so, why? -  no    Have you seen any other physician or provider since your last visit? Yes - Records Obtained  Have you had any other diagnostic tests since your last visit? No  Have you been seen in the emergency room and/or had an admission to a hospital since we last saw you? No  Have you had your routine dental cleaning in the past 6 months? no    Have you activated your [employer] account? If not, what are your barriers?  Yes     Patient Care Team:  Noemy Munroe MD as PCP - General (Internal Medicine)  Noemy Munroe MD as PCP - [facility] Attributed Provider  [address] [place] [address] Street, MD as Consulting Physician (Gastroenterology)  Jonny Fisher RN as Care Transition    Medical History Review  Past Medical, Family, and Social History reviewed and does not contribute to the patient presenting condition    Health Maintenance   Topic Date Due    DTaP/Tdap/Td vaccine (1 - Tdap) 08/15/1969    Shingles Vaccine (1 of 2) 08/15/2000    Low dose CT lung screening  08/15/2005    Diabetic retinal exam  08/06/2019    Diabetic microalbuminuria test  09/28/2019    Lipid screen  09/28/2019    TSH testing  12/03/2019    Potassium monitoring  12/10/2019    Creatinine monitoring  12/10/2019    A1C test (Diabetic or Prediabetic)  01/09/2020    Diabetic foot exam  02/28/2020    Colon cancer screen colonoscopy  03/15/2025    Flu vaccine  Completed    Pneumococcal 65+ years Vaccine  Completed    AAA screen  Completed    Hepatitis C screen  Completed     HPI- Patient is here for evaluation Of multiple medical Problems , he has CHF,DM, HTN,  COPD, Mural Thrombus on Coumadin, S/p AICD , follows with Cardiologist   He has SOB with Exertion , he cannot walk one block , hears wheezing , Difficulty in Lying flat   Still Continue to smokes , patient had PFT done , Suggestive of Moderate Degree of COPD   Gain 7 pounds in last few weeks   Was started on amiodarone , new medication     Review of Systems   Constitutional: Positive for unexpected weight change (weight gain ). Negative for activity change, appetite change, chills and diaphoresis. HENT: Negative for congestion, dental problem, ear discharge, facial swelling and hearing loss. Respiratory: Positive for shortness of breath and wheezing. Negative for apnea, cough and chest tightness. Cardiovascular: Negative for chest pain and leg swelling. Gastrointestinal: Negative for abdominal distention, abdominal pain, constipation and diarrhea. Genitourinary: Negative for difficulty urinating, dysuria, enuresis, flank pain and frequency. Musculoskeletal: Negative for arthralgias, back pain, gait problem and joint swelling. Skin: Negative for color change, pallor and rash. Neurological: Negative for dizziness, seizures, facial asymmetry, light-headedness, numbness and headaches. Psychiatric/Behavioral: Negative for agitation, behavioral problems, confusion, decreased concentration and dysphoric mood. Objective:   Physical Exam   Constitutional: He is oriented to person, place, and time. He appears well-developed and well-nourished. No distress. HENT:   Head: Normocephalic and atraumatic. Mouth/Throat: No oropharyngeal exudate. Eyes: Pupils are equal, round, and reactive to light. Conjunctivae are normal. Right eye exhibits no discharge. Left eye exhibits no discharge. No scleral icterus.    Neck: Normal range of

## 2019-05-30 ENCOUNTER — OFFICE VISIT (OUTPATIENT)
Dept: PODIATRY | Age: 69
End: 2019-05-30
Payer: MEDICARE

## 2019-05-30 VITALS — BODY MASS INDEX: 36.44 KG/M2 | HEIGHT: 72 IN | WEIGHT: 269 LBS

## 2019-05-30 DIAGNOSIS — B35.1 ONYCHOMYCOSIS OF TOENAIL: Primary | ICD-10-CM

## 2019-05-30 DIAGNOSIS — E11.42 DIABETIC POLYNEUROPATHY ASSOCIATED WITH TYPE 2 DIABETES MELLITUS (HCC): ICD-10-CM

## 2019-05-30 DIAGNOSIS — M79.675 PAIN OF TOES OF BOTH FEET: ICD-10-CM

## 2019-05-30 DIAGNOSIS — E11.51 TYPE 2 DIABETES MELLITUS WITH PERIPHERAL VASCULAR DISEASE (HCC): ICD-10-CM

## 2019-05-30 DIAGNOSIS — M79.674 PAIN OF TOES OF BOTH FEET: ICD-10-CM

## 2019-05-30 PROCEDURE — 99999 PR OFFICE/OUTPT VISIT,PROCEDURE ONLY: CPT | Performed by: PODIATRIST

## 2019-05-30 PROCEDURE — 11721 DEBRIDE NAIL 6 OR MORE: CPT | Performed by: PODIATRIST

## 2019-05-30 ASSESSMENT — ENCOUNTER SYMPTOMS
NAUSEA: 0
COLOR CHANGE: 0
BACK PAIN: 0
DIARRHEA: 0
SHORTNESS OF BREATH: 0

## 2019-05-30 NOTE — PROGRESS NOTES
SUBJECTIVE: Melita Hennessy is a 76 y.o. male who returns to the office with chief complaint of painful fungal toenails. Patient relates toe nails are thickened/difficult to trim as well as painful with ambulation and with shoe gear. Chief Complaint   Patient presents with    Nail Problem     nail trim/last saw Iva Carter 5/9/19    Diabetes     last a1c 6.5     Review of Systems   Constitutional: Negative for activity change, appetite change, chills, diaphoresis, fatigue and fever. Respiratory: Negative for shortness of breath. Cardiovascular: Negative for leg swelling. Gastrointestinal: Negative for diarrhea and nausea. Endocrine: Negative for cold intolerance, heat intolerance and polyuria. Musculoskeletal: Positive for arthralgias. Negative for back pain, gait problem, joint swelling and myalgias. Skin: Negative for color change, pallor, rash and wound. Allergic/Immunologic: Negative for environmental allergies and food allergies. Neurological: Negative for dizziness, weakness, light-headedness and numbness. Hematological: Does not bruise/bleed easily. Psychiatric/Behavioral: Negative for behavioral problems, confusion and self-injury. The patient is not nervous/anxious. OBJECTIVE: Clinical evaluation of patient reveals nails 1,2,3,4,5 of the right foot and nails 1,2,3,4,5, of the left foot to present with thickness, elongation, discoloration, brittleness, and subungual debris. There was pain with palpation and debridement of the toenails of the bilateral feet. No open lesions noted to either foot today. The right DP pulse is not palpable. The left DP pulse is not palpable. The right PT pulse is not palpable. The left PT pulse is not palpable. Protective sensation is absent to the right plantar foot as noted with a 5.07 [place] monofilament. Protective sensation is absent to the left plantar foot as noted with a 5.07 Arcadia-Rahat monofilament.    HbA1c: 6.5 %.  ASSESSMENT:    Diagnosis Orders   1. Onychomycosis of toenail  CT DEBRIDEMENT OF NAILS, 6 OR MORE    HM DIABETES FOOT EXAM   2. Pain of toes of both feet  CT DEBRIDEMENT OF NAILS, 6 OR MORE    HM DIABETES FOOT EXAM   3. Type 2 diabetes mellitus with peripheral vascular disease (HCC)  CT DEBRIDEMENT OF NAILS, 6 OR MORE    HM DIABETES FOOT EXAM   4. Diabetic polyneuropathy associated with type 2 diabetes mellitus (HCC)  CT DEBRIDEMENT OF NAILS, 6 OR MORE    HM DIABETES FOOT EXAM     PLAN: Toenails 1,2,3,4,5 of the right foot and 1,2,3,4,5 of the left foot were debrided in length and thickness using a nail nipper and a . Return in about 2 months (around 8/1/2019) for At risk diabetic foot care.    5/30/2019      Hamzah Braden DPM

## 2019-06-03 ENCOUNTER — HOSPITAL ENCOUNTER (OUTPATIENT)
Age: 69
Setting detail: SPECIMEN
Discharge: HOME | End: 2019-06-03
Payer: MEDICARE

## 2019-06-03 ENCOUNTER — HOSPITAL ENCOUNTER (OUTPATIENT)
Dept: NON INVASIVE DIAGNOSTICS | Age: 69
Discharge: HOME | End: 2019-06-03
Payer: MEDICARE

## 2019-06-03 LAB
INR BLD: 3.8
PROTHROMBIN TIME: 36 SEC (ref 9–12)

## 2019-06-03 PROCEDURE — 6360000004 HC RX CONTRAST MEDICATION: Performed by: INTERNAL MEDICINE

## 2019-06-03 PROCEDURE — 93308 TTE F-UP OR LMTD: CPT

## 2019-06-03 RX ADMIN — PERFLUTREN 2.2 MG: 6.52 INJECTION, SUSPENSION INTRAVENOUS at 13:25

## 2019-06-10 ENCOUNTER — OFFICE VISIT (OUTPATIENT)
Dept: INTERNAL MEDICINE CLINIC | Age: 69
End: 2019-06-10
Payer: MEDICARE

## 2019-06-10 VITALS
WEIGHT: 270 LBS | HEIGHT: 72 IN | OXYGEN SATURATION: 95 % | SYSTOLIC BLOOD PRESSURE: 102 MMHG | HEART RATE: 64 BPM | DIASTOLIC BLOOD PRESSURE: 72 MMHG | BODY MASS INDEX: 36.57 KG/M2

## 2019-06-10 DIAGNOSIS — F17.200 SMOKER: ICD-10-CM

## 2019-06-10 DIAGNOSIS — I50.20 SYSTOLIC CONGESTIVE HEART FAILURE, UNSPECIFIED HF CHRONICITY (HCC): ICD-10-CM

## 2019-06-10 DIAGNOSIS — I10 ESSENTIAL HYPERTENSION: Primary | ICD-10-CM

## 2019-06-10 DIAGNOSIS — E11.9 TYPE 2 DIABETES MELLITUS WITHOUT COMPLICATION, WITHOUT LONG-TERM CURRENT USE OF INSULIN (HCC): ICD-10-CM

## 2019-06-10 LAB — HBA1C MFR BLD: 6.6 %

## 2019-06-10 PROCEDURE — 99214 OFFICE O/P EST MOD 30 MIN: CPT | Performed by: INTERNAL MEDICINE

## 2019-06-10 PROCEDURE — 83036 HEMOGLOBIN GLYCOSYLATED A1C: CPT | Performed by: INTERNAL MEDICINE

## 2019-06-10 ASSESSMENT — ENCOUNTER SYMPTOMS
EYE DISCHARGE: 0
CONSTIPATION: 0
EYE REDNESS: 0
BACK PAIN: 0
ABDOMINAL DISTENTION: 0
EYE ITCHING: 0
ABDOMINAL PAIN: 0
COLOR CHANGE: 0
DIARRHEA: 0
SHORTNESS OF BREATH: 1
EYE PAIN: 0
BLOOD IN STOOL: 0
APNEA: 0
CHOKING: 0
COUGH: 0
CHEST TIGHTNESS: 0

## 2019-06-10 NOTE — PROGRESS NOTES
Subjective:      Chief Complaint   Patient presents with    Results     Review Echo     Diabetes     No concerns at this time, pt has been checking BS daily        Patient ID: Saeed Langley is a 76 y.o. male. Visit Information    Have you changed or started any medications since your last visit including any over-the-counter medicines, vitamins, or herbal medicines? no   Are you having any side effects from any of your medications? -  no  Have you stopped taking any of your medications? Is so, why? -  no    Have you seen any other physician or provider since your last visit? Yes - Records Obtained  Have you had any other diagnostic tests since your last visit? Yes - Records Obtained  Have you been seen in the emergency room and/or had an admission to a hospital since we last saw you? No  Have you had your routine dental cleaning in the past 6 months? no    Have you activated your [employer] account? If not, what are your barriers?  Yes     Patient Care Team:  Angelica Hayden MD as PCP - General (Internal Medicine)  Angelica Hayden MD as PCP - [facility] Provider  Jas Florez MD as Consulting Physician (Gastroenterology)    Medical History Review  Past Medical, Family, and Social History reviewed and does not contribute to the patient presenting condition    Health Maintenance   Topic Date Due    DTaP/Tdap/Td vaccine (1 - Tdap) 08/15/1969    Shingles Vaccine (1 of 2) 08/15/2000    Low dose CT lung screening  08/15/2005    Diabetic retinal exam  08/06/2019    Diabetic microalbuminuria test  09/28/2019    Lipid screen  09/28/2019    TSH testing  12/03/2019    Potassium monitoring  12/10/2019    Creatinine monitoring  12/10/2019    A1C test (Diabetic or Prediabetic)  01/09/2020    Diabetic foot exam  05/30/2020    Colon cancer screen colonoscopy  03/15/2025    Flu vaccine  Completed    Pneumococcal 65+ years Vaccine  Completed    AAA screen  Completed    Hepatitis C screen  Completed     HPI HPI-   Patient is here for evaluation Of multiple medical Problems , he has CHF,DM, HTN,  COPD, Mural Thrombus on Coumadin, S/p AICD , follows with Cardiologist . He feels that his SOB has Improved , now can walk 1 block, his dose of lasix was increased last visit . He sleeps in recliner . His Swelling in legs has slightly improved   Patient is going to talk with his cardiologist  , next Monday , about entresto  He is still smoking   His Blood sugars are controlled  He had ECHO done in 6/3 and still have small clot in Left ventricle     Review of Systems   Constitutional: Negative for activity change, appetite change, chills, diaphoresis, fatigue and fever. HENT: Negative for congestion, dental problem, drooling and ear discharge. Eyes: Negative for pain, discharge, redness and itching. Respiratory: Positive for shortness of breath (with excertion ). Negative for apnea, cough, choking and chest tightness. Cardiovascular: Positive for leg swelling. Negative for chest pain. Gastrointestinal: Negative for abdominal distention, abdominal pain, blood in stool, constipation and diarrhea. Endocrine: Negative for cold intolerance and heat intolerance. Genitourinary: Negative for difficulty urinating, dysuria, enuresis, flank pain and frequency. Musculoskeletal: Negative for arthralgias, back pain, gait problem and joint swelling. Skin: Negative for color change, pallor and rash. Neurological: Negative for dizziness, facial asymmetry, light-headedness, numbness and headaches. Psychiatric/Behavioral: Negative for agitation, behavioral problems, confusion, decreased concentration and dysphoric mood. Objective:   Physical Exam   Constitutional: He is oriented to person, place, and time. He appears well-developed and well-nourished. No distress. Central obesity    HENT:   Head: Normocephalic and atraumatic. Mouth/Throat: No oropharyngeal exudate.    Eyes: Pupils are equal, round, and reactive to light. Conjunctivae are normal. Right eye exhibits no discharge. Left eye exhibits no discharge. No scleral icterus. Neck: Normal range of motion. Neck supple. No JVD present. No tracheal deviation present. No thyromegaly present. Cardiovascular: Normal rate and normal heart sounds. Exam reveals no gallop. No murmur heard. Pulmonary/Chest: Effort normal and breath sounds normal. No stridor. No respiratory distress. He has no wheezes. He has no rales. Abdominal: Soft. Bowel sounds are normal. He exhibits no distension. There is no tenderness. There is no rebound and no guarding. Musculoskeletal: Normal range of motion. He exhibits no edema or tenderness. Neurological: He is alert and oriented to person, place, and time. Skin: Skin is warm and dry. No rash noted. He is not diaphoretic. No erythema. Nursing note and vitals reviewed. Assessment / Plan:   1. Type 2 diabetes mellitus without complication, without long-term current use of insulin (HCC)  - metFORMIN (GLUCOPHAGE) 500 MG tablet; Take 1 tablet by mouth 2 times daily (with meals)  Dispense: 60 tablet; Refill: 11  - POCT glycosylated hemoglobin (Hb A1C)    2. Essential hypertension  Controlled     3. Systolic congestive heart failure, unspecified HF chronicity ([facility] [address]  Has AICD   Will talk with Cardiologist , next Monday about entresto  - Comprehensive Metabolic Panel; Future    4. Smoker  Does not wants to quits smoking   Wife is in Hospital      · Return in about 3 months (around 9/10/2019). · Reviewed prior labs and health maintenance. · Discussed use, benefit, and side effects of prescribed medications. Barriers to medication compliance addressed. All patient questions answered. Pt voiced understanding. MD JAS[facility]IR [facility]  6/10/2019, 10:45 AM    Please note that this chart was generated using voice recognition Dragon dictation software.   Although every effort was made to ensure the accuracy of this

## 2019-06-13 ENCOUNTER — HOSPITAL ENCOUNTER (OUTPATIENT)
Age: 69
Setting detail: SPECIMEN
Discharge: HOME | End: 2019-06-13
Payer: MEDICARE

## 2019-06-13 DIAGNOSIS — I50.20 SYSTOLIC CONGESTIVE HEART FAILURE, UNSPECIFIED HF CHRONICITY (HCC): ICD-10-CM

## 2019-06-13 LAB
-: NORMAL
INR BLD: 3.2
PROTHROMBIN TIME: 31.1 SEC (ref 9–12)
REASON FOR REJECTION: NORMAL
ZZ NTE CLEAN UP: ORDERED TEST: NORMAL
ZZ NTE WITH NAME CLEAN UP: SPECIMEN SOURCE: NORMAL

## 2019-06-18 ENCOUNTER — HOSPITAL ENCOUNTER (OUTPATIENT)
Age: 69
Setting detail: SPECIMEN
Discharge: HOME | End: 2019-06-18
Payer: MEDICARE

## 2019-06-18 LAB
ALBUMIN SERPL-MCNC: 4.3 G/DL (ref 3.5–5.2)
ALBUMIN/GLOBULIN RATIO: 1.5 (ref 1–2.5)
ALP BLD-CCNC: 98 U/L (ref 40–129)
ALT SERPL-CCNC: 67 U/L (ref 5–41)
ANION GAP SERPL CALCULATED.3IONS-SCNC: 15 MMOL/L (ref 9–17)
AST SERPL-CCNC: 46 U/L
BILIRUB SERPL-MCNC: 0.63 MG/DL (ref 0.3–1.2)
BUN BLDV-MCNC: 34 MG/DL (ref 8–23)
BUN/CREAT BLD: ABNORMAL (ref 9–20)
CALCIUM SERPL-MCNC: 9.8 MG/DL (ref 8.6–10.4)
CHLORIDE BLD-SCNC: 100 MMOL/L (ref 98–107)
CO2: 26 MMOL/L (ref 20–31)
CREAT SERPL-MCNC: 1.44 MG/DL (ref 0.7–1.2)
GFR AFRICAN AMERICAN: 59 ML/MIN
GFR NON-AFRICAN AMERICAN: 49 ML/MIN
GFR SERPL CREATININE-BSD FRML MDRD: ABNORMAL ML/MIN/{1.73_M2}
GFR SERPL CREATININE-BSD FRML MDRD: ABNORMAL ML/MIN/{1.73_M2}
GLUCOSE BLD-MCNC: 108 MG/DL (ref 70–99)
POTASSIUM SERPL-SCNC: 4.6 MMOL/L (ref 3.7–5.3)
SODIUM BLD-SCNC: 141 MMOL/L (ref 135–144)
TOTAL PROTEIN: 7.2 G/DL (ref 6.4–8.3)

## 2019-06-19 ENCOUNTER — TELEPHONE (OUTPATIENT)
Dept: INTERNAL MEDICINE CLINIC | Age: 69
End: 2019-06-19

## 2019-06-19 DIAGNOSIS — E11.9 TYPE 2 DIABETES MELLITUS WITHOUT COMPLICATION, WITHOUT LONG-TERM CURRENT USE OF INSULIN (HCC): Primary | ICD-10-CM

## 2019-06-27 ENCOUNTER — HOSPITAL ENCOUNTER (OUTPATIENT)
Age: 69
Setting detail: SPECIMEN
Discharge: HOME | End: 2019-06-27
Payer: MEDICARE

## 2019-06-27 LAB
ALBUMIN SERPL-MCNC: 4.3 G/DL (ref 3.5–5.2)
ALBUMIN/GLOBULIN RATIO: 1.4 (ref 1–2.5)
ALP BLD-CCNC: 116 U/L (ref 40–129)
ALT SERPL-CCNC: 61 U/L (ref 5–41)
ANION GAP SERPL CALCULATED.3IONS-SCNC: 14 MMOL/L (ref 9–17)
AST SERPL-CCNC: 38 U/L
BILIRUB SERPL-MCNC: 0.46 MG/DL (ref 0.3–1.2)
BUN BLDV-MCNC: 29 MG/DL (ref 8–23)
BUN/CREAT BLD: ABNORMAL (ref 9–20)
CALCIUM SERPL-MCNC: 9.6 MG/DL (ref 8.6–10.4)
CHLORIDE BLD-SCNC: 101 MMOL/L (ref 98–107)
CO2: 25 MMOL/L (ref 20–31)
CREAT SERPL-MCNC: 1.39 MG/DL (ref 0.7–1.2)
GFR AFRICAN AMERICAN: >60 ML/MIN
GFR NON-AFRICAN AMERICAN: 51 ML/MIN
GFR SERPL CREATININE-BSD FRML MDRD: ABNORMAL ML/MIN/{1.73_M2}
GFR SERPL CREATININE-BSD FRML MDRD: ABNORMAL ML/MIN/{1.73_M2}
GLUCOSE BLD-MCNC: 136 MG/DL (ref 70–99)
INR BLD: 2.7
POTASSIUM SERPL-SCNC: 5 MMOL/L (ref 3.7–5.3)
PROTHROMBIN TIME: 26.7 SEC (ref 9–12)
SODIUM BLD-SCNC: 140 MMOL/L (ref 135–144)
TOTAL PROTEIN: 7.4 G/DL (ref 6.4–8.3)

## 2019-07-18 DIAGNOSIS — I50.32 CHRONIC DIASTOLIC CONGESTIVE HEART FAILURE (HCC): ICD-10-CM

## 2019-07-19 RX ORDER — FUROSEMIDE 40 MG/1
TABLET ORAL
Qty: 30 TABLET | Refills: 5 | Status: SHIPPED | OUTPATIENT
Start: 2019-07-19 | End: 2020-02-05 | Stop reason: SDUPTHER

## 2019-07-20 DIAGNOSIS — E78.5 HYPERLIPIDEMIA: ICD-10-CM

## 2019-07-20 DIAGNOSIS — I50.32 CHRONIC DIASTOLIC CONGESTIVE HEART FAILURE (HCC): ICD-10-CM

## 2019-07-22 RX ORDER — ATORVASTATIN CALCIUM 40 MG/1
TABLET, FILM COATED ORAL
Qty: 90 TABLET | Refills: 3 | Status: SHIPPED | OUTPATIENT
Start: 2019-07-22

## 2019-07-22 RX ORDER — SPIRONOLACTONE 25 MG/1
TABLET ORAL
Qty: 90 TABLET | Refills: 3 | Status: SHIPPED | OUTPATIENT
Start: 2019-07-22 | End: 2019-08-29 | Stop reason: ALTCHOICE

## 2019-08-01 ENCOUNTER — TELEPHONE (OUTPATIENT)
Dept: INTERNAL MEDICINE CLINIC | Age: 69
End: 2019-08-01

## 2019-08-05 ENCOUNTER — HOSPITAL ENCOUNTER (OUTPATIENT)
Age: 69
Setting detail: SPECIMEN
Discharge: HOME | End: 2019-08-05
Payer: MEDICARE

## 2019-08-05 LAB
ANION GAP SERPL CALCULATED.3IONS-SCNC: 17 MMOL/L (ref 9–17)
BUN BLDV-MCNC: 28 MG/DL (ref 8–23)
BUN/CREAT BLD: ABNORMAL (ref 9–20)
CALCIUM SERPL-MCNC: 10.2 MG/DL (ref 8.6–10.4)
CHLORIDE BLD-SCNC: 98 MMOL/L (ref 98–107)
CO2: 24 MMOL/L (ref 20–31)
CREAT SERPL-MCNC: 1.5 MG/DL (ref 0.7–1.2)
GFR AFRICAN AMERICAN: 56 ML/MIN
GFR NON-AFRICAN AMERICAN: 47 ML/MIN
GFR SERPL CREATININE-BSD FRML MDRD: ABNORMAL ML/MIN/{1.73_M2}
GFR SERPL CREATININE-BSD FRML MDRD: ABNORMAL ML/MIN/{1.73_M2}
GLUCOSE BLD-MCNC: 130 MG/DL (ref 70–99)
INR BLD: 2.5
POTASSIUM SERPL-SCNC: 5.1 MMOL/L (ref 3.7–5.3)
PROTHROMBIN TIME: 24.3 SEC (ref 9–12)
SODIUM BLD-SCNC: 139 MMOL/L (ref 135–144)

## 2019-08-20 ENCOUNTER — HOSPITAL ENCOUNTER (OUTPATIENT)
Age: 69
Setting detail: SPECIMEN
Discharge: HOME | End: 2019-08-20
Payer: MEDICARE

## 2019-08-20 LAB
ANION GAP SERPL CALCULATED.3IONS-SCNC: 16 MMOL/L (ref 9–17)
BUN BLDV-MCNC: 22 MG/DL (ref 8–23)
BUN/CREAT BLD: ABNORMAL (ref 9–20)
CALCIUM SERPL-MCNC: 9.9 MG/DL (ref 8.6–10.4)
CHLORIDE BLD-SCNC: 99 MMOL/L (ref 98–107)
CO2: 23 MMOL/L (ref 20–31)
CREAT SERPL-MCNC: 1.42 MG/DL (ref 0.7–1.2)
GFR AFRICAN AMERICAN: 60 ML/MIN
GFR NON-AFRICAN AMERICAN: 49 ML/MIN
GFR SERPL CREATININE-BSD FRML MDRD: ABNORMAL ML/MIN/{1.73_M2}
GFR SERPL CREATININE-BSD FRML MDRD: ABNORMAL ML/MIN/{1.73_M2}
GLUCOSE BLD-MCNC: 135 MG/DL (ref 70–99)
POTASSIUM SERPL-SCNC: 4.6 MMOL/L (ref 3.7–5.3)
SODIUM BLD-SCNC: 138 MMOL/L (ref 135–144)

## 2019-08-29 ENCOUNTER — OFFICE VISIT (OUTPATIENT)
Dept: PODIATRY | Age: 69
End: 2019-08-29
Payer: MEDICARE

## 2019-08-29 VITALS — BODY MASS INDEX: 36.7 KG/M2 | WEIGHT: 271 LBS | HEIGHT: 72 IN

## 2019-08-29 DIAGNOSIS — M79.674 PAIN OF TOES OF BOTH FEET: ICD-10-CM

## 2019-08-29 DIAGNOSIS — E11.42 DIABETIC POLYNEUROPATHY ASSOCIATED WITH TYPE 2 DIABETES MELLITUS (HCC): ICD-10-CM

## 2019-08-29 DIAGNOSIS — M79.675 PAIN OF TOES OF BOTH FEET: ICD-10-CM

## 2019-08-29 DIAGNOSIS — E11.51 TYPE 2 DIABETES MELLITUS WITH PERIPHERAL VASCULAR DISEASE (HCC): ICD-10-CM

## 2019-08-29 DIAGNOSIS — B35.1 ONYCHOMYCOSIS OF TOENAIL: Primary | ICD-10-CM

## 2019-08-29 PROCEDURE — 11721 DEBRIDE NAIL 6 OR MORE: CPT | Performed by: PODIATRIST

## 2019-08-29 PROCEDURE — 99999 PR OFFICE/OUTPT VISIT,PROCEDURE ONLY: CPT | Performed by: PODIATRIST

## 2019-08-29 RX ORDER — SACUBITRIL AND VALSARTAN 24; 26 MG/1; MG/1
1 TABLET, FILM COATED ORAL ONCE
COMMUNITY
Start: 2019-08-22

## 2019-08-29 RX ORDER — SPIRONOLACTONE 25 MG/1
25 TABLET ORAL DAILY
COMMUNITY

## 2019-08-29 RX ORDER — METOPROLOL SUCCINATE 50 MG
50 TABLET, EXTENDED RELEASE 24 HR ORAL DAILY
COMMUNITY
Start: 2019-08-20

## 2019-08-29 ASSESSMENT — ENCOUNTER SYMPTOMS
NAUSEA: 0
COLOR CHANGE: 0
SHORTNESS OF BREATH: 0
BACK PAIN: 0
DIARRHEA: 0

## 2019-09-05 ENCOUNTER — OFFICE VISIT (OUTPATIENT)
Dept: INTERNAL MEDICINE CLINIC | Age: 69
End: 2019-09-05
Payer: MEDICARE

## 2019-09-05 VITALS
BODY MASS INDEX: 36.03 KG/M2 | SYSTOLIC BLOOD PRESSURE: 114 MMHG | DIASTOLIC BLOOD PRESSURE: 62 MMHG | HEIGHT: 72 IN | WEIGHT: 266 LBS

## 2019-09-05 DIAGNOSIS — I50.20 SYSTOLIC CONGESTIVE HEART FAILURE, UNSPECIFIED HF CHRONICITY (HCC): ICD-10-CM

## 2019-09-05 DIAGNOSIS — E11.9 TYPE 2 DIABETES MELLITUS WITHOUT COMPLICATION, WITHOUT LONG-TERM CURRENT USE OF INSULIN (HCC): Primary | ICD-10-CM

## 2019-09-05 DIAGNOSIS — Z23 NEED FOR INFLUENZA VACCINATION: ICD-10-CM

## 2019-09-05 DIAGNOSIS — I10 ESSENTIAL HYPERTENSION: ICD-10-CM

## 2019-09-05 LAB — HBA1C MFR BLD: 6.3 %

## 2019-09-05 PROCEDURE — 3288F FALL RISK ASSESSMENT DOCD: CPT | Performed by: INTERNAL MEDICINE

## 2019-09-05 PROCEDURE — 90653 IIV ADJUVANT VACCINE IM: CPT | Performed by: INTERNAL MEDICINE

## 2019-09-05 PROCEDURE — 83036 HEMOGLOBIN GLYCOSYLATED A1C: CPT | Performed by: INTERNAL MEDICINE

## 2019-09-05 PROCEDURE — G0008 ADMIN INFLUENZA VIRUS VAC: HCPCS | Performed by: INTERNAL MEDICINE

## 2019-09-05 PROCEDURE — 99214 OFFICE O/P EST MOD 30 MIN: CPT | Performed by: INTERNAL MEDICINE

## 2019-09-05 ASSESSMENT — ENCOUNTER SYMPTOMS
ABDOMINAL PAIN: 0
CONSTIPATION: 0
COLOR CHANGE: 0
CHEST TIGHTNESS: 0
COUGH: 0
APNEA: 0
DIARRHEA: 0
SHORTNESS OF BREATH: 1
ABDOMINAL DISTENTION: 0
WHEEZING: 0
BACK PAIN: 0
FACIAL SWELLING: 0

## 2019-09-05 NOTE — PROGRESS NOTES
Subjective:      Chief Complaint   Patient presents with    Diabetes     3 month f/u, Pt states BS has been running around 140-150 for the last week     Congestive Heart Failure     3 month f/u     Results     Review Labs      Patient ID: Lauren Benz is a 71 y.o. male. Visit Information    Have you changed or started any medications since your last visit including any over-the-counter medicines, vitamins, or herbal medicines? no   Are you having any side effects from any of your medications? -  no  Have you stopped taking any of your medications? Is so, why? -  no    Have you seen any other physician or provider since your last visit? Yes - Records Obtained  Have you had any other diagnostic tests since your last visit? No  Have you been seen in the emergency room and/or had an admission to a hospital since we last saw you? No  Have you had your routine dental cleaning in the past 6 months? no    Have you activated your [employer] account? If not, what are your barriers?  Yes     Patient Care Team:  Rory Cuadra MD as PCP - General (Internal Medicine)  Royr Cuadra MD as PCP - [facility]  oDm Boston MD as Consulting Physician (Gastroenterology)    Medical History Review  Past Medical, Family, and Social History reviewed and does not contribute to the patient presenting condition    Health Maintenance   Topic Date Due    DTaP/Tdap/Td vaccine (1 - Tdap) 08/15/1969    Shingles Vaccine (1 of 2) 08/15/2000    Low dose CT lung screening  08/15/2005    Annual Wellness Visit (AWV)  09/22/2018    Flu vaccine (1) 09/01/2019    Diabetic retinal exam  08/06/2019    Diabetic microalbuminuria test  09/28/2019    Lipid screen  09/28/2019    TSH testing  12/03/2019    A1C test (Diabetic or Prediabetic)  06/10/2020    Potassium monitoring  08/20/2020    Creatinine monitoring  08/20/2020    Diabetic foot exam  08/29/2020    Colon cancer screen colonoscopy  03/15/2025    Pneumococcal 65+ eye exhibits no discharge. Left eye exhibits no discharge. No scleral icterus. Neck: Normal range of motion. Neck supple. No JVD present. No tracheal deviation present. No thyromegaly present. Cardiovascular: Normal rate and normal heart sounds. Exam reveals no gallop. No murmur heard. Pulmonary/Chest: Effort normal and breath sounds normal. No stridor. No respiratory distress. He has no wheezes. He has no rales. He exhibits no tenderness. Abdominal: Soft. Bowel sounds are normal. He exhibits no distension. There is no tenderness. There is no rebound and no guarding. Musculoskeletal: Normal range of motion. He exhibits no edema. Neurological: He is alert and oriented to person, place, and time. Skin: He is not diaphoretic. Assessment / Plan:   1. Type 2 diabetes mellitus without complication, without long-term current use of insulin (HCC)  Controlled  - POCT glycosylated hemoglobin (Hb A1C)    2. Need for influenza vaccination  - INFLUENZA, TRIV, INACTIVATED, SUBUNIT, ADJUVANTED, 65 YRS AND OLDER, IM, PREFILL SYR, 0.5ML (FLUAD TRIV)    3. Systolic congestive heart failure, unspecified HF chronicity (HCC)  Compensated, losing weight, patient is on Entresto, had recent BMP done    4. Essential hypertension  Controlled    Does not want to quit smoking at this point in time, he mentioned that he has lost multiple family members recently. Does not want to take any medication for his anxiety at this point in time. Has appointment coming with cardiologist soon. Return in about 4 months (around 1/5/2020). · Reviewed prior labs and health maintenance. · Discussed use, benefit, and side effects of prescribed medications. Barriers to medication compliance addressed. All patient questions answered. Pt voiced understanding. MD J[facility]IR [facility]  9/5/2019, 11:00 AM    Please note that this chart was generated using voice recognition Dragon dictation software.

## 2019-09-11 ENCOUNTER — HOSPITAL ENCOUNTER (OUTPATIENT)
Age: 69
Setting detail: SPECIMEN
Discharge: HOME | End: 2019-09-11
Payer: MEDICARE

## 2019-09-11 LAB
INR BLD: 4.1
PROTHROMBIN TIME: 38.4 SEC (ref 9–12)

## 2019-09-16 ENCOUNTER — OFFICE VISIT (OUTPATIENT)
Dept: INTERNAL MEDICINE CLINIC | Age: 69
End: 2019-09-16
Payer: MEDICARE

## 2019-09-16 VITALS
WEIGHT: 266 LBS | BODY MASS INDEX: 36.03 KG/M2 | SYSTOLIC BLOOD PRESSURE: 122 MMHG | HEIGHT: 72 IN | DIASTOLIC BLOOD PRESSURE: 64 MMHG

## 2019-09-16 DIAGNOSIS — Z00.00 ROUTINE GENERAL MEDICAL EXAMINATION AT A HEALTH CARE FACILITY: ICD-10-CM

## 2019-09-16 DIAGNOSIS — Z87.891 PERSONAL HISTORY OF TOBACCO USE: ICD-10-CM

## 2019-09-16 PROCEDURE — G0439 PPPS, SUBSEQ VISIT: HCPCS | Performed by: PHYSICIAN ASSISTANT

## 2019-09-16 PROCEDURE — G0296 VISIT TO DETERM LDCT ELIG: HCPCS | Performed by: PHYSICIAN ASSISTANT

## 2019-09-16 ASSESSMENT — PATIENT HEALTH QUESTIONNAIRE - PHQ9
SUM OF ALL RESPONSES TO PHQ QUESTIONS 1-9: 0
SUM OF ALL RESPONSES TO PHQ QUESTIONS 1-9: 0

## 2019-09-16 NOTE — PROGRESS NOTES
needed for Wheezing Yes Ishmael Hall MD   Multiple Vitamin (MULTIVITAMIN) tablet Take 1 tablet by mouth daily Yes Ishmael Hall MD   vitamin B-1 100 MG tablet Take 1 tablet by mouth daily Yes Ishmael Hall MD   aspirin 81 MG tablet Take 81 mg by mouth daily. Yes Historical Provider, MD       Past Medical History:   Diagnosis Date    Alcohol abuse 09/07/2014    Anxiety     CHF (congestive heart failure) (HCC)     Colon polyps     [facility]ch[facility] obstructive pulmonary disease) ([facility] [address]     Diabetes mellitus ([facility] [address]     Dupuytren contracture 2004    Hypertension     ICD (implantable cardioverter-defibrillator) battery depletion 09/15/2014    Ischemic cardiomyopathy     Obesity     SOB (shortness of breath)     Tendonitis, Achilles, left     Unstable angina ([facility] [address] 09/07/2014    Ventricular tachyarrhythmia ([facility] [address] 08/30/2018     Past Surgical History:   Procedure Laterality Date    CARDIAC CATHETERIZATION      CARDIAC DEFIBRILLATOR PLACEMENT      CARDIAC DEFIBRILLATOR PLACEMENT Left 9/2014    COLONOSCOPY  3/2015    polyps, bx-serrated adenoma       Family History   Problem Relation Age of Onset    Heart Disease Father     High Blood Pressure Father     Diabetes Father     Heart Disease Maternal Aunt     Heart Disease Maternal Uncle     Heart Disease Paternal Aunt     Heart Disease Paternal Uncle      CareTeam (Including outside providers/suppliers regularly involved in providing care):   Patient Care Team:  Aliya White MD as PCP - General (Internal Medicine)  Aliya White MD as PCP - [facility]  Barbara Dee MD as Consulting Physician (Gastroenterology)    Wt Readings from Last 3 Encounters:   09/16/19 266 lb (120.7 kg)   09/05/19 266 lb (120.7 kg)   08/29/19 271 lb (122.9 kg)     Vitals:    09/16/19 0801   BP: 122/64   Weight: 266 lb (120.7 kg)   Height: 6' 0.01\" (1.829 m)     Body mass index is 36.07 kg/m².     Based upon direct observation of the patient, increased physical activity  · Dental exam overdue:  patient encouraged to make appointment with his/her dentist    Hearing/Vision:  No exam data present  Hearing/Vision  Do you or your family notice any trouble with your hearing?: (!) Yes  Do you have difficulty driving, watching TV, or doing any of your daily activities because of your eyesight?: No  Have you had an eye exam within the past year?: Yes  Hearing/Vision Interventions:  · Vision concerns:  patient encouraged to make appointment with his/her eye specialist    Personalized Preventive Plan   Current Health Maintenance Status  Immunization History   Administered Date(s) Administered    Influenza Vaccine, unspecified formulation 11/16/2001, 09/20/2016    Influenza, High Dose (Fluzone 65 yrs and older) 09/20/2016, 09/22/2017, 09/28/2018    Influenza, Triv, inactivated, subunit, adjuvanted, IM (Fluad 65 yrs and older) 09/05/2019    Pneumococcal Conjugate 13-valent ([place] Man[place]) 09/20/2016    Pneumococcal Polysaccharide ([id number]) 09/22/2017      Health Maintenance   Topic Date Due    DTaP/Tdap/Td vaccine (1 - Tdap) 08/15/1969    Shingles Vaccine (1 of 2) 08/15/2000    Low dose CT lung screening  08/15/2005    Annual Wellness Visit (AWV)  05/29/2019    Diabetic retinal exam  08/06/2019    Diabetic microalbuminuria test  09/28/2019    Lipid screen  09/28/2019    TSH testing  12/03/2019    Potassium monitoring  08/20/2020    Creatinine monitoring  08/20/2020    Diabetic foot exam  08/29/2020    A1C test (Diabetic or Prediabetic)  09/05/2020    Colon cancer screen colonoscopy  03/15/2025    Flu vaccine  Completed    Pneumococcal 65+ years Vaccine  Completed    AAA screen  Completed    Hepatitis C screen  Completed     Recommendations for Preventive Services Due: see orders and patient instructions/AVS.    Recommended screening schedule for the next 5-10 years is provided to the patient in written form: see Patient

## 2019-09-18 ENCOUNTER — HOSPITAL ENCOUNTER (OUTPATIENT)
Age: 69
Setting detail: SPECIMEN
Discharge: HOME | End: 2019-09-18
Payer: MEDICARE

## 2019-09-18 LAB
INR BLD: 2.5
PROTHROMBIN TIME: 25 SEC (ref 9–12)

## 2019-09-23 ENCOUNTER — TELEPHONE (OUTPATIENT)
Dept: ONCOLOGY | Age: 69
End: 2019-09-23

## 2019-09-23 DIAGNOSIS — Z87.891 PERSONAL HISTORY OF NICOTINE DEPENDENCE: Primary | ICD-10-CM

## 2019-09-30 ENCOUNTER — HOSPITAL ENCOUNTER (OUTPATIENT)
Dept: CT IMAGING | Age: 69
Discharge: HOME | End: 2019-10-02
Payer: MEDICARE

## 2019-09-30 DIAGNOSIS — Z87.891 PERSONAL HISTORY OF NICOTINE DEPENDENCE: ICD-10-CM

## 2019-09-30 PROCEDURE — G0297 LDCT FOR LUNG CA SCREEN: HCPCS

## 2019-10-11 ENCOUNTER — HOSPITAL ENCOUNTER (OUTPATIENT)
Age: 69
Setting detail: SPECIMEN
Discharge: HOME | End: 2019-10-11
Payer: MEDICARE

## 2019-10-11 LAB
INR BLD: 3.6
PROTHROMBIN TIME: 34.5 SEC (ref 9–12)

## 2019-10-29 ENCOUNTER — HOSPITAL ENCOUNTER (OUTPATIENT)
Age: 69
Setting detail: SPECIMEN
Discharge: HOME | End: 2019-10-29
Payer: MEDICARE

## 2019-10-29 LAB
INR BLD: 2.7
PROTHROMBIN TIME: 26 SEC (ref 9–12)

## 2019-11-13 ENCOUNTER — HOSPITAL ENCOUNTER (OUTPATIENT)
Age: 69
Setting detail: SPECIMEN
Discharge: HOME | End: 2019-11-13
Payer: MEDICARE

## 2019-11-13 LAB
INR BLD: 2.9
PROTHROMBIN TIME: 28.2 SEC (ref 9–12)

## 2019-11-21 ENCOUNTER — OFFICE VISIT (OUTPATIENT)
Dept: PODIATRY | Age: 69
End: 2019-11-21
Payer: MEDICARE

## 2019-11-21 VITALS — HEIGHT: 73 IN | WEIGHT: 267 LBS | BODY MASS INDEX: 35.39 KG/M2

## 2019-11-21 DIAGNOSIS — M79.674 PAIN OF TOES OF BOTH FEET: ICD-10-CM

## 2019-11-21 DIAGNOSIS — E11.42 DIABETIC POLYNEUROPATHY ASSOCIATED WITH TYPE 2 DIABETES MELLITUS (HCC): ICD-10-CM

## 2019-11-21 DIAGNOSIS — B35.1 ONYCHOMYCOSIS OF TOENAIL: Primary | ICD-10-CM

## 2019-11-21 DIAGNOSIS — E11.51 TYPE 2 DIABETES MELLITUS WITH PERIPHERAL VASCULAR DISEASE (HCC): ICD-10-CM

## 2019-11-21 DIAGNOSIS — M79.675 PAIN OF TOES OF BOTH FEET: ICD-10-CM

## 2019-11-21 PROCEDURE — 99999 PR OFFICE/OUTPT VISIT,PROCEDURE ONLY: CPT | Performed by: PODIATRIST

## 2019-11-21 PROCEDURE — 11721 DEBRIDE NAIL 6 OR MORE: CPT | Performed by: PODIATRIST

## 2019-11-21 ASSESSMENT — ENCOUNTER SYMPTOMS
COLOR CHANGE: 0
DIARRHEA: 0
NAUSEA: 0
BACK PAIN: 0
SHORTNESS OF BREATH: 0

## 2019-12-12 ENCOUNTER — HOSPITAL ENCOUNTER (OUTPATIENT)
Age: 69
Setting detail: SPECIMEN
Discharge: HOME | End: 2019-12-12
Payer: MEDICARE

## 2019-12-12 LAB
INR BLD: 3.1
PROTHROMBIN TIME: 30.1 SEC (ref 9–12)

## 2020-01-09 ENCOUNTER — HOSPITAL ENCOUNTER (OUTPATIENT)
Age: 70
Setting detail: SPECIMEN
Discharge: HOME | End: 2020-01-09
Payer: MEDICARE

## 2020-01-09 LAB
INR BLD: 2.8
PROTHROMBIN TIME: 27.2 SEC (ref 9–12)

## 2020-01-18 ENCOUNTER — HOSPITAL ENCOUNTER (EMERGENCY)
Age: 70
Discharge: HOME | End: 2020-01-18
Attending: EMERGENCY MEDICINE
Payer: MEDICARE

## 2020-01-18 ENCOUNTER — APPOINTMENT (OUTPATIENT)
Dept: GENERAL RADIOLOGY | Age: 70
End: 2020-01-18
Payer: MEDICARE

## 2020-01-18 VITALS
OXYGEN SATURATION: 99 % | TEMPERATURE: 97 F | HEART RATE: 62 BPM | DIASTOLIC BLOOD PRESSURE: 66 MMHG | HEIGHT: 73 IN | RESPIRATION RATE: 18 BRPM | BODY MASS INDEX: 35.78 KG/M2 | WEIGHT: 270 LBS | SYSTOLIC BLOOD PRESSURE: 125 MMHG

## 2020-01-18 PROCEDURE — 99283 EMERGENCY DEPT VISIT LOW MDM: CPT

## 2020-01-18 PROCEDURE — 73590 X-RAY EXAM OF LOWER LEG: CPT

## 2020-01-18 ASSESSMENT — ENCOUNTER SYMPTOMS
SHORTNESS OF BREATH: 0
NAUSEA: 0
ABDOMINAL PAIN: 0
VOMITING: 0
COLOR CHANGE: 1

## 2020-01-18 ASSESSMENT — PAIN DESCRIPTION - ORIENTATION: ORIENTATION: LEFT

## 2020-01-18 ASSESSMENT — PAIN DESCRIPTION - LOCATION: LOCATION: LEG

## 2020-01-18 ASSESSMENT — PAIN SCALES - GENERAL: PAINLEVEL_OUTOF10: 3

## 2020-01-18 ASSESSMENT — PAIN DESCRIPTION - DESCRIPTORS: DESCRIPTORS: ACHING

## 2020-01-18 NOTE — ED PROVIDER NOTES
[address] [facility]  ED  Emergency Department Encounter  EmergencyMedicine Resident     Pt Name:Librado Grace  MRN: [id number]  Birthdate 1950  Date of evaluation: 1/18/20  PCP:  Cheryl Smith MD    [address] [place] [address]       Chief Complaint   Patient presents with    Leg Swelling       HISTORY OF PRESENT ILLNESS  (Location/Symptom, Timing/Onset, Context/Setting, Quality, Duration, Modifying Factors, Severity.)      Felicia Soto is a 71 y.o. male who presents with bruising of his left lower leg for 6 days. He has a past history of CHF, COPD, hypertension, diabetes mellitus. He is anticoagulated warfarin due to history of blood clots. He states that he is taking all his medications as prescribed. Last INR 2.89 days prior. He states that he was using his exercise bike 6 days prior and was unable to get off of it due to the height of the bike and attempted to jump over it, he fell and his exercise bike landed on his left leg. He states that he has had continued pain and bruising since that time. He denies any weakness or numbness of his lower extremity. He has used a lidocaine patch for pain but has not taken any other medications. PAST MEDICAL / SURGICAL / SOCIAL / FAMILY HISTORY      has a past medical history of Alcohol abuse, Anxiety, CHF (congestive heart failure) ([address] Colon polyps, COPD (chronic obstructive pulmonary disease) ([address] Diabetes mellitus ([address] Dupuytren contracture, Hypertension, ICD (implantable cardioverter-defibrillator) battery depletion, Ischemic cardiomyopathy, Obesity, SOB (shortness of breath), Tendonitis, Achilles, left, Unstable angina ([address] and Ventricular tachyarrhythmia ([address] has a past surgical history that includes Cardiac defibrillator placement; Cardiac catheterization; Cardiac defibrillator placement (Left, 9/2014); and Colonoscopy (3/2015).     Social History     Socioeconomic History    Marital status:      Spouse name: Not on file    Number of children: Not on file    Years of education: Not on file    Highest education level: Not on file   Occupational History    Not on file   Social Needs    Financial resource strain: Not on file    Food insecurity:     Worry: Not on file     Inability: Not on file    Transportation needs:     Medical: Not on file     Non-medical: Not on file   Tobacco Use    Smoking status: Current Every Day Smoker     Packs/day: 1.00     Years: 58.00     Pack years: 58.00     Types: Cigarettes     Start date: 1959     Last attempt to quit: 2014     Years since quittin.3    Smokeless tobacco: Never Used    Tobacco comment: There have been smokeless periods during lifetime   Substance and Sexual Activity    Alcohol use: Yes     Alcohol/week: 0.0 standard drinks     Comment: NO ALCOHOL SINCE 2018    Drug use: No    Sexual activity: Not on file   Lifestyle    Physical activity:     Days per week: Not on file     Minutes per session: Not on file    Stress: Not on file   Relationships    Social connections:     Talks on phone: Not on file     Gets together: Not on file     Attends Anabaptist service: Not on file     Active member of club or organization: Not on file     Attends meetings of clubs or organizations: Not on file     Relationship status: Not on file    Intimate partner violence:     Fear of current or ex partner: Not on file     Emotionally abused: Not on file     Physically abused: Not on file     Forced sexual activity: Not on file   Other Topics Concern    Not on file   Social History Narrative    Not on file       Family History   Problem Relation Age of Onset    Heart Disease Father     High Blood Pressure Father     Diabetes Father     Heart Disease Maternal Aunt     Heart Disease Maternal Uncle     Heart Disease Paternal Aunt     Heart Disease Paternal Uncle        Allergies:  Patient has no known allergies.     Home Medications:  Prior to Admission medications    Medication

## 2020-01-18 NOTE — ED PROVIDER NOTES
[facility]     Emergency Department     Faculty Attestation    I performed a history and physical examination of the patient and discussed management with the resident. I have reviewed and agree with the residents findings including all diagnostic interpretations, and treatment plans as written at the time of my review. Any areas of disagreement are noted on the chart. I was personally present for the key portions of any procedures. I have documented in the chart those procedures where I was not present during the key portions. For Physician Assistant/ Nurse Practitioner cases/documentation I have personally evaluated this patient and have completed at least one if not all key elements of the E/M (history, physical exam, and MDM). Additional findings are as noted. Primary Care Physician: Lupis Howe MD    History: This is a 71 y.o. male who presents to the Emergency Department with complaint of leg swelling. The patient presents emergency room complaining of left lower leg swelling after he fell off a stationary bike 6 days ago. The patient is on Coumadin. He denies hitting his head or any loss consciousness. He is concerned that his left leg has \"gangrene\". He denies any fever, chills or sweats. He has not been taking any analgesia for the pain. Physical:   height is 6' 1\" (1.854 m) and weight is 270 lb (122.5 kg). His oral temperature is 97 °F (36.1 °C). His blood pressure is 125/66 and his pulse is 62. His respiration is 18 and oxygen saturation is 99%. There is an ecchymotic area on the medial aspect of the left lower leg. There is tenderness to palpation along the lower leg. Pedal pulse is 1/4. Capillary refill less than 2 seconds sensation light touch intact. There does not appear to be a compartment syndrome.           Impression: Left leg hematoma, rule out fracture    Plan: Analgesia, x-ray      (Please note that portions of this note were completed with a voice recognition program.  Efforts were made to edit the dictations but occasionally words are mis-transcribed.)    Paresh Escobedo.  Braden Benito MD, [facility]  Attending Emergency Medicine Physician        Jose Luis Pierce MD  01/18/20 [id number]

## 2020-01-18 NOTE — ED NOTES
Pt to ed with wife from home. Pt states over a week ago he was riding his exercise bike, when he was done he jumped off the bike, his leg was caught, he fell and landed on the basement floor and his bike fell on top on him. Pt c/o left lower leg pain, swelling, ecchymosis. Pt has lidocaine patch applied x 24 hrs that was removed by writer for assessment. Pt states pain is 3/10 when laying down and 7-8 when standing up. Pt is alert, oriented, speaking in full complete sentences.       Lambert Alexander, RN  01/18/20 [id number]

## 2020-02-05 ENCOUNTER — OFFICE VISIT (OUTPATIENT)
Dept: INTERNAL MEDICINE CLINIC | Age: 70
End: 2020-02-05
Payer: MEDICARE

## 2020-02-05 VITALS
DIASTOLIC BLOOD PRESSURE: 64 MMHG | SYSTOLIC BLOOD PRESSURE: 110 MMHG | HEIGHT: 73 IN | HEART RATE: 70 BPM | WEIGHT: 262 LBS | OXYGEN SATURATION: 94 % | BODY MASS INDEX: 34.72 KG/M2

## 2020-02-05 PROCEDURE — 99214 OFFICE O/P EST MOD 30 MIN: CPT | Performed by: INTERNAL MEDICINE

## 2020-02-05 PROCEDURE — G8510 SCR DEP NEG, NO PLAN REQD: HCPCS | Performed by: INTERNAL MEDICINE

## 2020-02-05 RX ORDER — PREDNISONE 20 MG/1
20 TABLET ORAL DAILY
Qty: 5 TABLET | Refills: 0 | Status: SHIPPED | OUTPATIENT
Start: 2020-02-05 | End: 2020-02-10

## 2020-02-05 RX ORDER — WARFARIN SODIUM 2.5 MG/1
TABLET ORAL
COMMUNITY
Start: 2019-12-12 | End: 2022-01-31 | Stop reason: ALTCHOICE

## 2020-02-05 RX ORDER — FUROSEMIDE 20 MG/1
20 TABLET ORAL DAILY
Qty: 90 TABLET | Refills: 3 | Status: SHIPPED | OUTPATIENT
Start: 2020-02-05 | End: 2021-01-07

## 2020-02-05 RX ORDER — AMOXICILLIN AND CLAVULANATE POTASSIUM 875; 125 MG/1; MG/1
1 TABLET, FILM COATED ORAL 2 TIMES DAILY
Qty: 14 TABLET | Refills: 0 | Status: SHIPPED | OUTPATIENT
Start: 2020-02-05 | End: 2020-02-12

## 2020-02-05 ASSESSMENT — PATIENT HEALTH QUESTIONNAIRE - PHQ9
SUM OF ALL RESPONSES TO PHQ QUESTIONS 1-9: 0
2. FEELING DOWN, DEPRESSED OR HOPELESS: 0
SUM OF ALL RESPONSES TO PHQ QUESTIONS 1-9: 0
SUM OF ALL RESPONSES TO PHQ9 QUESTIONS 1 & 2: 0
1. LITTLE INTEREST OR PLEASURE IN DOING THINGS: 0

## 2020-02-05 ASSESSMENT — ENCOUNTER SYMPTOMS
APNEA: 0
COUGH: 1
CONSTIPATION: 0
ABDOMINAL DISTENTION: 0
SHORTNESS OF BREATH: 1
ABDOMINAL PAIN: 0
COLOR CHANGE: 0
WHEEZING: 1
FACIAL SWELLING: 0
DIARRHEA: 0
CHEST TIGHTNESS: 0
BACK PAIN: 0

## 2020-02-11 ENCOUNTER — HOSPITAL ENCOUNTER (OUTPATIENT)
Age: 70
Setting detail: SPECIMEN
Discharge: HOME | End: 2020-02-11
Payer: MEDICARE

## 2020-02-11 LAB
ANION GAP SERPL CALCULATED.3IONS-SCNC: 17 MMOL/L (ref 9–17)
BUN BLDV-MCNC: 22 MG/DL (ref 8–23)
BUN/CREAT BLD: ABNORMAL (ref 9–20)
CALCIUM SERPL-MCNC: 9.5 MG/DL (ref 8.6–10.4)
CHLORIDE BLD-SCNC: 103 MMOL/L (ref 98–107)
CO2: 22 MMOL/L (ref 20–31)
CREAT SERPL-MCNC: 1.14 MG/DL (ref 0.7–1.2)
GFR AFRICAN AMERICAN: >60 ML/MIN
GFR NON-AFRICAN AMERICAN: >60 ML/MIN
GFR SERPL CREATININE-BSD FRML MDRD: ABNORMAL ML/MIN/{1.73_M2}
GFR SERPL CREATININE-BSD FRML MDRD: ABNORMAL ML/MIN/{1.73_M2}
GLUCOSE BLD-MCNC: 137 MG/DL (ref 70–99)
INR BLD: 4.8
POTASSIUM SERPL-SCNC: 4.4 MMOL/L (ref 3.7–5.3)
PROTHROMBIN TIME: 44.5 SEC (ref 9–12)
SODIUM BLD-SCNC: 142 MMOL/L (ref 135–144)

## 2020-02-17 ENCOUNTER — HOSPITAL ENCOUNTER (OUTPATIENT)
Age: 70
Setting detail: SPECIMEN
Discharge: HOME | End: 2020-02-17
Payer: MEDICARE

## 2020-02-17 LAB
INR BLD: 2.2
PROTHROMBIN TIME: 21.8 SEC (ref 9–12)

## 2020-02-20 ENCOUNTER — OFFICE VISIT (OUTPATIENT)
Dept: PODIATRY | Age: 70
End: 2020-02-20
Payer: MEDICARE

## 2020-02-20 VITALS — BODY MASS INDEX: 33.62 KG/M2 | HEIGHT: 74 IN | WEIGHT: 262 LBS

## 2020-02-20 PROCEDURE — 99999 PR OFFICE/OUTPT VISIT,PROCEDURE ONLY: CPT | Performed by: PODIATRIST

## 2020-02-20 PROCEDURE — 11721 DEBRIDE NAIL 6 OR MORE: CPT | Performed by: PODIATRIST

## 2020-02-24 ASSESSMENT — ENCOUNTER SYMPTOMS
NAUSEA: 0
BACK PAIN: 0
DIARRHEA: 0
COLOR CHANGE: 0
SHORTNESS OF BREATH: 0

## 2020-02-24 NOTE — PROGRESS NOTES
SUBJECTIVE: Medardo Sams is a 71 y.o. male who returns to the office with chief complaint of painful fungal toenails. Patient relates toe nails are thickened/difficult to trim as well as painful with ambulation and with shoe gear. Chief Complaint   Patient presents with    Nail Problem     b/l nail trim/ last seen Dr. Dayne Golden 2/05/2020    Diabetes     last a1c 6.7     Review of Systems   Constitutional: Negative for activity change, appetite change, chills, diaphoresis, fatigue and fever. Respiratory: Negative for shortness of breath. Cardiovascular: Negative for leg swelling. Gastrointestinal: Negative for diarrhea and nausea. Endocrine: Negative for cold intolerance, heat intolerance and polyuria. Musculoskeletal: Positive for arthralgias. Negative for back pain, gait problem, joint swelling and myalgias. Skin: Negative for color change, pallor, rash and wound. Allergic/Immunologic: Negative for environmental allergies and food allergies. Neurological: Negative for dizziness, weakness, light-headedness and numbness. Hematological: Does not bruise/bleed easily. Psychiatric/Behavioral: Negative for behavioral problems, confusion and self-injury. The patient is not nervous/anxious. OBJECTIVE: Clinical evaluation of patient reveals nails 1,2,3,4,5 of the right foot and nails 1,2,3,4,5, of the left foot to present with thickness, elongation, discoloration, brittleness, and subungual debris. There was pain with palpation and debridement of the toenails of the bilateral feet. No open lesions noted to either foot today. The right DP pulse is not palpable. The left DP pulse is not palpable. The right PT pulse is not palpable. The left PT pulse is not palpable. Protective sensation is absent to the right plantar foot as noted with a 5.07 [place]-Rahat monofilament.    Protective sensation is absent to the left plantar foot as noted with a 5.07 [place]-Rahat

## 2020-03-03 ENCOUNTER — HOSPITAL ENCOUNTER (OUTPATIENT)
Age: 70
Setting detail: SPECIMEN
Discharge: HOME | End: 2020-03-03
Payer: MEDICARE

## 2020-03-03 LAB
INR BLD: 2.8
PROTHROMBIN TIME: 27.8 SEC (ref 9–12)

## 2020-04-21 ENCOUNTER — TELEPHONE (OUTPATIENT)
Dept: OTHER | Facility: CLINIC | Age: 70
End: 2020-04-21

## 2020-05-20 ENCOUNTER — HOSPITAL ENCOUNTER (OUTPATIENT)
Age: 70
Setting detail: SPECIMEN
Discharge: HOME | End: 2020-05-20
Payer: MEDICARE

## 2020-05-20 LAB
INR BLD: 2.1
PROTHROMBIN TIME: 21.2 SEC (ref 9–12)

## 2020-06-08 ENCOUNTER — HOSPITAL ENCOUNTER (OUTPATIENT)
Age: 70
Setting detail: SPECIMEN
Discharge: HOME | End: 2020-06-08
Payer: MEDICARE

## 2020-06-08 ENCOUNTER — HOSPITAL ENCOUNTER (OUTPATIENT)
Dept: GENERAL RADIOLOGY | Facility: CLINIC | Age: 70
Discharge: HOME | End: 2020-06-10
Payer: MEDICARE

## 2020-06-08 ENCOUNTER — HOSPITAL ENCOUNTER (OUTPATIENT)
Facility: CLINIC | Age: 70
Discharge: HOME | End: 2020-06-10
Payer: MEDICARE

## 2020-06-08 LAB
ALT SERPL-CCNC: 52 U/L (ref 5–41)
AST SERPL-CCNC: 48 U/L
T3 FREE: 2.29 PG/ML (ref 2.02–4.43)
THYROXINE, FREE: 1.68 NG/DL (ref 0.93–1.7)
TSH SERPL DL<=0.05 MIU/L-ACNC: 2.22 MIU/L (ref 0.3–5)

## 2020-06-08 PROCEDURE — 71046 X-RAY EXAM CHEST 2 VIEWS: CPT

## 2020-07-02 ENCOUNTER — HOSPITAL ENCOUNTER (OUTPATIENT)
Age: 70
Setting detail: SPECIMEN
Discharge: HOME | End: 2020-07-02
Payer: MEDICARE

## 2020-07-02 LAB
INR BLD: 2
PROTHROMBIN TIME: 20.9 SEC (ref 9–12)

## 2020-07-20 ENCOUNTER — OFFICE VISIT (OUTPATIENT)
Dept: INTERNAL MEDICINE CLINIC | Age: 70
End: 2020-07-20
Payer: MEDICARE

## 2020-07-20 VITALS
HEART RATE: 61 BPM | OXYGEN SATURATION: 95 % | TEMPERATURE: 97.3 F | WEIGHT: 273 LBS | BODY MASS INDEX: 35.04 KG/M2 | DIASTOLIC BLOOD PRESSURE: 78 MMHG | HEIGHT: 74 IN | SYSTOLIC BLOOD PRESSURE: 136 MMHG

## 2020-07-20 PROCEDURE — 99214 OFFICE O/P EST MOD 30 MIN: CPT | Performed by: INTERNAL MEDICINE

## 2020-07-20 PROCEDURE — G8510 SCR DEP NEG, NO PLAN REQD: HCPCS | Performed by: INTERNAL MEDICINE

## 2020-07-20 RX ORDER — TAMSULOSIN HYDROCHLORIDE 0.4 MG/1
0.4 CAPSULE ORAL DAILY
Qty: 30 CAPSULE | Refills: 0 | Status: SHIPPED | OUTPATIENT
Start: 2020-07-20 | End: 2020-11-02 | Stop reason: ALTCHOICE

## 2020-07-20 ASSESSMENT — ENCOUNTER SYMPTOMS
CHEST TIGHTNESS: 0
FACIAL SWELLING: 0
SHORTNESS OF BREATH: 1
DIARRHEA: 0
COLOR CHANGE: 0
ABDOMINAL DISTENTION: 0
CONSTIPATION: 0
COUGH: 0
WHEEZING: 0
BACK PAIN: 0
ABDOMINAL PAIN: 0
APNEA: 0

## 2020-07-20 ASSESSMENT — PATIENT HEALTH QUESTIONNAIRE - PHQ9
SUM OF ALL RESPONSES TO PHQ QUESTIONS 1-9: 2
2. FEELING DOWN, DEPRESSED OR HOPELESS: 1
1. LITTLE INTEREST OR PLEASURE IN DOING THINGS: 1
SUM OF ALL RESPONSES TO PHQ QUESTIONS 1-9: 2
SUM OF ALL RESPONSES TO PHQ9 QUESTIONS 1 & 2: 2

## 2020-07-20 NOTE — PROGRESS NOTES
Subjective:      Chief Complaint   Patient presents with    Diabetes     Pt has been checking BS he has been staying around 130    Congestive Heart Failure    Results     Review labs     Other     Pt states he is unable to stand up alone when she bend down he has to have help to get back on his feet     Urinary Frequency     Pt states he having issues with going to urinate a lot        Patient ID: Jayme Hernandez is a 71 y.o. male. Visit Information    Have you changed or started any medications since your last visit including any over-the-counter medicines, vitamins, or herbal medicines? no   Are you having any side effects from any of your medications? -  no  Have you stopped taking any of your medications? Is so, why? -  no    Have you seen any other physician or provider since your last visit? Yes - Records Obtained  Have you had any other diagnostic tests since your last visit? Yes - Records Obtained  Have you been seen in the emergency room and/or had an admission to a hospital since we last saw you? No  Have you had your routine dental cleaning in the past 6 months? no    Have you activated your [employer] account? If not, what are your barriers?  Yes     Patient Care Team:  Damaso Dixon MD as PCP - General (Internal Medicine)  Damaso Dixon MD [facility] PCP [facility]  Patti Mccarty MD as Consulting Physician (Gastroenterology)  Montse Arreola RN as Nurse Navigator    Medical History Review  Past Medical, Family, and Social History reviewed and does not contribute to the patient presenting condition    Health Maintenance   Topic Date Due    DTaP/Tdap/Td vaccine (1 - Tdap) 08/15/1969    Shingles Vaccine (1 of 2) 08/15/2000    Diabetic retinal exam  08/06/2019    Diabetic microalbuminuria test  09/28/2019    Lipid screen  09/28/2019    Flu vaccine (1) 09/01/2020    A1C test (Diabetic or Prediabetic)  09/05/2020    Annual Wellness Visit (AWV)  09/16/2020    Low dose CT lung diaphoretic. HENT:      Head: Normocephalic and atraumatic. Mouth/Throat:      Pharynx: No oropharyngeal exudate. Eyes:      General: No scleral icterus. Right eye: No discharge. Left eye: No discharge. Conjunctiva/sclera: Conjunctivae normal.      Pupils: Pupils are equal, round, and reactive to light. Neck:      Musculoskeletal: Normal range of motion and neck supple. Thyroid: No thyromegaly. Vascular: No JVD. Trachea: No tracheal deviation. Cardiovascular:      Rate and Rhythm: Normal rate. Heart sounds: Normal heart sounds. No murmur. No gallop. Pulmonary:      Effort: Pulmonary effort is normal. No respiratory distress. Breath sounds: Normal breath sounds. No stridor. No wheezing or rales. Abdominal:      General: Bowel sounds are normal. There is no distension. Palpations: Abdomen is soft. Tenderness: There is no abdominal tenderness. There is no guarding or rebound. Musculoskeletal: Normal range of motion. General: No tenderness. Right lower leg: Edema present. Left lower leg: Edema present. Skin:     General: Skin is warm and dry. Findings: No erythema or rash. Neurological:      Mental Status: He is alert and oriented to person, place, and time. Assessment / Plan:   1. Type 2 diabetes mellitus without complication, without long-term current use of insulin (HCC)  Advised to quit smoking  - Microalbumin, Ur; Future  - Hemoglobin A1C; Future    2. Systolic congestive heart failure, unspecified HF chronicity ([facility] [address]  Compensated on [place] with cardiology  - Lipid Panel; Future    3. Essential hypertension  Controlled    4. Morbid obesity, unspecified obesity type ([address]  Advised to lose weight    5. Benign prostatic hyperplasia with urinary frequency  - tamsulosin (FLOMAX) 0.4 MG capsule; Take 1 capsule by mouth daily  Dispense: 30 capsule; Refill: 0      · No follow-ups on file.     · Reviewed prior labs and health maintenance. · Discussed use, benefit, and side effects of prescribed medications. Barriers to medication compliance addressed. All patient questions answered. Pt voiced understanding. Martinez Mosley MD  [facility]  7/20/2020, 5:24 PM    Please note that this chart was generated using voice recognition Dragon dictation software. Although every effort was made to ensure the accuracy of this automated transcription, some errors in transcription may have occurred.

## 2020-08-03 ENCOUNTER — HOSPITAL ENCOUNTER (OUTPATIENT)
Age: 70
Setting detail: SPECIMEN
Discharge: HOME | End: 2020-08-03
Payer: MEDICARE

## 2020-08-03 LAB
CHOLESTEROL/HDL RATIO: 2.3
CHOLESTEROL: 117 MG/DL
CREATININE URINE: 39.2 MG/DL (ref 39–259)
ESTIMATED AVERAGE GLUCOSE: 143 MG/DL
HBA1C MFR BLD: 6.6 % (ref 4–6)
HDLC SERPL-MCNC: 51 MG/DL
INR BLD: 1.7
LDL CHOLESTEROL: 51 MG/DL (ref 0–130)
MICROALBUMIN/CREAT 24H UR: <12 MG/L
MICROALBUMIN/CREAT UR-RTO: NORMAL MCG/MG CREAT
PROTHROMBIN TIME: 17.1 SEC (ref 9–12)
TRIGL SERPL-MCNC: 76 MG/DL
VLDLC SERPL CALC-MCNC: NORMAL MG/DL (ref 1–30)

## 2020-08-13 ENCOUNTER — HOSPITAL ENCOUNTER (OUTPATIENT)
Age: 70
Setting detail: SPECIMEN
Discharge: HOME | End: 2020-08-13
Payer: MEDICARE

## 2020-08-13 LAB
INR BLD: 2.1
PROTHROMBIN TIME: 21.8 SEC (ref 9–12)

## 2020-08-28 ENCOUNTER — HOSPITAL ENCOUNTER (OUTPATIENT)
Age: 70
Setting detail: SPECIMEN
Discharge: HOME | End: 2020-08-28
Payer: MEDICARE

## 2020-08-28 LAB
INR BLD: 2.2
PROTHROMBIN TIME: 22.2 SEC (ref 9–12)

## 2020-09-25 ENCOUNTER — HOSPITAL ENCOUNTER (OUTPATIENT)
Age: 70
Setting detail: SPECIMEN
Discharge: HOME | End: 2020-09-25
Payer: MEDICARE

## 2020-09-25 LAB
ALT SERPL-CCNC: 58 U/L (ref 5–41)
AST SERPL-CCNC: 56 U/L
INR BLD: 2.2
PROTHROMBIN TIME: 22.2 SEC (ref 9–12)

## 2020-10-30 ASSESSMENT — PATIENT HEALTH QUESTIONNAIRE - PHQ9
SUM OF ALL RESPONSES TO PHQ QUESTIONS 1-9: 1
2. FEELING DOWN, DEPRESSED OR HOPELESS: 0
SUM OF ALL RESPONSES TO PHQ QUESTIONS 1-9: 1
1. LITTLE INTEREST OR PLEASURE IN DOING THINGS: 1
SUM OF ALL RESPONSES TO PHQ9 QUESTIONS 1 & 2: 1
SUM OF ALL RESPONSES TO PHQ QUESTIONS 1-9: 1

## 2020-10-30 ASSESSMENT — LIFESTYLE VARIABLES
HOW OFTEN DO YOU HAVE SIX OR MORE DRINKS ON ONE OCCASION: 0
HOW OFTEN DO YOU HAVE SIX OR MORE DRINKS ON ONE OCCASION: NEVER
AUDIT TOTAL SCORE: 0
HAS A RELATIVE, FRIEND, DOCTOR, OR ANOTHER HEALTH PROFESSIONAL EXPRESSED CONCERN ABOUT YOUR DRINKING OR SUGGESTED YOU CUT DOWN: NO
HOW OFTEN DURING THE LAST YEAR HAVE YOU NEEDED AN ALCOHOLIC DRINK FIRST THING IN THE MORNING TO GET YOURSELF GOING AFTER A NIGHT OF HEAVY DRINKING: NEVER
HOW OFTEN DURING THE LAST YEAR HAVE YOU FAILED TO DO WHAT WAS NORMALLY EXPECTED FROM YOU BECAUSE OF DRINKING: 0
AUDIT TOTAL SCORE: 4
HOW OFTEN DO YOU HAVE A DRINK CONTAINING ALCOHOL: TWO TO THREE TIMES A WEEK
HOW OFTEN DO YOU HAVE A DRINK CONTAINING ALCOHOL: 3
HOW OFTEN DURING THE LAST YEAR HAVE YOU FOUND THAT YOU WERE NOT ABLE TO STOP DRINKING ONCE YOU HAD STARTED: NEVER
AUDIT-C TOTAL SCORE: 0
HOW MANY STANDARD DRINKS CONTAINING ALCOHOL DO YOU HAVE ON A TYPICAL DAY: 1
HOW OFTEN DURING THE LAST YEAR HAVE YOU NEEDED AN ALCOHOLIC DRINK FIRST THING IN THE MORNING TO GET YOURSELF GOING AFTER A NIGHT OF HEAVY DRINKING: 0
HAS A RELATIVE, FRIEND, DOCTOR, OR ANOTHER HEALTH PROFESSIONAL EXPRESSED CONCERN ABOUT YOUR DRINKING OR SUGGESTED YOU CUT DOWN: 0
HAVE YOU OR SOMEONE ELSE BEEN INJURED AS A RESULT OF YOUR DRINKING: 0
HOW MANY STANDARD DRINKS CONTAINING ALCOHOL DO YOU HAVE ON A TYPICAL DAY: THREE OR FOUR
HOW OFTEN DURING THE LAST YEAR HAVE YOU BEEN UNABLE TO REMEMBER WHAT HAPPENED THE NIGHT BEFORE BECAUSE YOU HAD BEEN DRINKING: NEVER
HOW OFTEN DURING THE LAST YEAR HAVE YOU FAILED TO DO WHAT WAS NORMALLY EXPECTED FROM YOU BECAUSE OF DRINKING: NEVER
HAVE YOU OR SOMEONE ELSE BEEN INJURED AS A RESULT OF YOUR DRINKING: NO
HOW OFTEN DURING THE LAST YEAR HAVE YOU BEEN UNABLE TO REMEMBER WHAT HAPPENED THE NIGHT BEFORE BECAUSE YOU HAD BEEN DRINKING: 0
HOW OFTEN DURING THE LAST YEAR HAVE YOU HAD A FEELING OF GUILT OR REMORSE AFTER DRINKING: NEVER
HOW OFTEN DURING THE LAST YEAR HAVE YOU FOUND THAT YOU WERE NOT ABLE TO STOP DRINKING ONCE YOU HAD STARTED: 0
HOW OFTEN DURING THE LAST YEAR HAVE YOU HAD A FEELING OF GUILT OR REMORSE AFTER DRINKING: 0
AUDIT-C TOTAL SCORE: 4

## 2020-11-02 ENCOUNTER — HOSPITAL ENCOUNTER (OUTPATIENT)
Age: 70
Setting detail: SPECIMEN
Discharge: HOME | End: 2020-11-02
Payer: MEDICARE

## 2020-11-02 ENCOUNTER — OFFICE VISIT (OUTPATIENT)
Dept: INTERNAL MEDICINE CLINIC | Age: 70
End: 2020-11-02
Payer: MEDICARE

## 2020-11-02 VITALS
DIASTOLIC BLOOD PRESSURE: 74 MMHG | OXYGEN SATURATION: 96 % | BODY MASS INDEX: 34.91 KG/M2 | HEART RATE: 65 BPM | TEMPERATURE: 97.3 F | HEIGHT: 74 IN | WEIGHT: 272 LBS | SYSTOLIC BLOOD PRESSURE: 128 MMHG

## 2020-11-02 LAB
INR BLD: 3
PROTHROMBIN TIME: 30.3 SEC (ref 9–12)

## 2020-11-02 PROCEDURE — G0296 VISIT TO DETERM LDCT ELIG: HCPCS | Performed by: INTERNAL MEDICINE

## 2020-11-02 PROCEDURE — G0008 ADMIN INFLUENZA VIRUS VAC: HCPCS | Performed by: INTERNAL MEDICINE

## 2020-11-02 PROCEDURE — G0439 PPPS, SUBSEQ VISIT: HCPCS | Performed by: INTERNAL MEDICINE

## 2020-11-02 PROCEDURE — 90694 VACC AIIV4 NO PRSRV 0.5ML IM: CPT | Performed by: INTERNAL MEDICINE

## 2020-11-02 ASSESSMENT — LIFESTYLE VARIABLES
HAVE YOU OR SOMEONE ELSE BEEN INJURED AS A RESULT OF YOUR DRINKING: 0
HOW MANY STANDARD DRINKS CONTAINING ALCOHOL DO YOU HAVE ON A TYPICAL DAY: 1
HOW OFTEN DURING THE LAST YEAR HAVE YOU HAD A FEELING OF GUILT OR REMORSE AFTER DRINKING: 0
HOW OFTEN DURING THE LAST YEAR HAVE YOU NEEDED AN ALCOHOLIC DRINK FIRST THING IN THE MORNING TO GET YOURSELF GOING AFTER A NIGHT OF HEAVY DRINKING: 0
HOW OFTEN DURING THE LAST YEAR HAVE YOU FOUND THAT YOU WERE NOT ABLE TO STOP DRINKING ONCE YOU HAD STARTED: 0
HOW OFTEN DURING THE LAST YEAR HAVE YOU FAILED TO DO WHAT WAS NORMALLY EXPECTED FROM YOU BECAUSE OF DRINKING: 0
HAS A RELATIVE, FRIEND, DOCTOR, OR ANOTHER HEALTH PROFESSIONAL EXPRESSED CONCERN ABOUT YOUR DRINKING OR SUGGESTED YOU CUT DOWN: 0
AUDIT TOTAL SCORE: 3
HOW OFTEN DO YOU HAVE SIX OR MORE DRINKS ON ONE OCCASION: 0
HOW OFTEN DURING THE LAST YEAR HAVE YOU BEEN UNABLE TO REMEMBER WHAT HAPPENED THE NIGHT BEFORE BECAUSE YOU HAD BEEN DRINKING: 0
AUDIT-C TOTAL SCORE: 3
HOW OFTEN DO YOU HAVE A DRINK CONTAINING ALCOHOL: 2

## 2020-11-02 ASSESSMENT — PATIENT HEALTH QUESTIONNAIRE - PHQ9
2. FEELING DOWN, DEPRESSED OR HOPELESS: 0
SUM OF ALL RESPONSES TO PHQ QUESTIONS 1-9: 0
1. LITTLE INTEREST OR PLEASURE IN DOING THINGS: 0
SUM OF ALL RESPONSES TO PHQ QUESTIONS 1-9: 0
SUM OF ALL RESPONSES TO PHQ9 QUESTIONS 1 & 2: 0
SUM OF ALL RESPONSES TO PHQ QUESTIONS 1-9: 0

## 2020-11-02 NOTE — PATIENT INSTRUCTIONS
What is lung cancer screening? Lung cancer screening is a way in which doctors check the lungs for early signs of cancer in people who have no symptoms of lung cancer. A low-dose CT scan uses much less radiation than a normal CT scan and shows a more detailed image of the lungs than a standard X-ray. The goal of lung cancer screening is to find cancer early, before it has a chance to grow, spread, or cause problems. One large study found that smokers who were screened with low-dose CT scans were less likely to die of lung cancer than those who were screened with standard X-ray. Below is a summary of the things you need to know regarding screening for lung cancer with low-dose computed tomography (LDCT). This is a screening program that involves routine annual screening with LDCT studies of the lung. The LDCTs are done using low-dose radiation that is not thought to increase your cancer risk. If you have other serious medical conditions (other cancers, congestive heart failure) that limit your life expectancy to less than 10 years, you should not undergo lung cancer screening with LDCT. The chance is 20%-60% that the LDCT result will show abnormalities. This would require additional testing which could include repeat imaging or even invasive procedures. Most (about 95%) of \"abnormal\" LDCT results are false in the sense that no lung cancer is ultimately found. Additionally, some (about 10%) of the cancers found would not affect your life expectancy, even if undetected and untreated. If you are still smoking, the single most important thing that you can do to reduce your risk of dying of lung cancer is to quit. For this screening to be covered by Medicare and most other insurers, strict criteria must be met. If you do not meet these criteria, but still wish to undergo LDCT testing, you will be required to sign a waiver indicating your willingness to pay for the scan.   Personalized Preventive Plan for [facility] - 11/2/2020  Medicare offers a range of preventive health benefits. Some of the tests and screenings are paid in full while other may be subject to a deductible, co-insurance, and/or copay. Some of these benefits include a comprehensive review of your medical history including lifestyle, illnesses that may run in your family, and various assessments and screenings as appropriate. After reviewing your medical record and screening and assessments performed today your provider may have ordered immunizations, labs, imaging, and/or referrals for you. A list of these orders (if applicable) as well as your Preventive Care list are included within your After Visit Summary for your review. Other Preventive Recommendations:    · A preventive eye exam performed by an eye specialist is recommended every 1-2 years to screen for glaucoma; cataracts, macular degeneration, and other eye disorders. · A preventive dental visit is recommended every 6 months. · Try to get at least 150 minutes of exercise per week or 10,000 steps per day on a pedometer . · Order or download the FREE \"Exercise & Physical Activity: Your Everyday Guide\" from [employer]. Call 2-568.873.3892 or search [employer] online. · You need 5727-8203 mg of calcium and 8152-4336 IU of vitamin D per day. It is possible to meet your calcium requirement with diet alone, but a vitamin D supplement is usually necessary to meet this goal.  · When exposed to the sun, use a sunscreen that protects against both UVA and UVB radiation with an SPF of 30 or greater. Reapply every 2 to 3 hours or after sweating, drying off with a towel, or swimming. · Always wear a seat belt when traveling in a car. Always wear a helmet when riding a bicycle or motorcycle.

## 2020-11-02 NOTE — PROGRESS NOTES
Subjective:      Patient ID: Ernesto Arciniega is a 79 y.o. male. HPI    Review of Systems    Objective:   Physical Exam    Assessment / Plan:         Visit Information    Have you changed or started any medications since your last visit including any over-the-counter medicines, vitamins, or herbal medicines? no   Are you having any side effects from any of your medications? -  no  Have you stopped taking any of your medications? Is so, why? -  no    Have you seen any other physician or provider since your last visit? Yes - Records Obtained  Have you had any other diagnostic tests since your last visit? Yes - Records Obtained  Have you been seen in the emergency room and/or had an admission to a hospital since we last saw you? No  Have you had your routine dental cleaning in the past 6 months? yes -     Have you activated your [employer] account? If not, what are your barriers?  Yes     Patient Care Team:  Flip Dale MD as PCP - General (Internal Medicine)  Flip Dale MD as PCP - [facility] Provider  Alejandro Nash MD as Consulting Physician (Gastroenterology)  Wallace Lopez RN as Nurse Navigator    Medical History Review  Past Medical, Family, and Social History reviewed and does not contribute to the patient presenting condition    Health Maintenance   Topic Date Due    DTaP/Tdap/Td vaccine (1 - Tdap) 08/15/1969    Shingles Vaccine (1 of 2) 08/15/2000    Annual Wellness Visit (AWV)  05/29/2019    Diabetic retinal exam  08/06/2019    Flu vaccine (1) 09/01/2020    Low dose CT lung screening  09/30/2020    Potassium monitoring  02/11/2021    Creatinine monitoring  02/11/2021    Diabetic foot exam  02/24/2021    TSH testing  06/08/2021    A1C test (Diabetic or Prediabetic)  08/03/2021    Diabetic microalbuminuria test  08/03/2021    Lipid screen  08/03/2021    Colon cancer screen colonoscopy  03/15/2025    Pneumococcal 65+ years Vaccine  Completed    AAA screen  Completed    Hepatitis C screen  Completed    Hepatitis A vaccine  Aged Out    Hib vaccine  Aged Out    Meningococcal (ACWY) vaccine  Aged Out           Low Dose CT (LDCT) Lung Screening criteria met   Age 50-69   Pack year smoking >30   Still smoking or less than 15 year since quit   No sign or symptoms of lung cancer   > 11 months since last LDCT     Risks and benefits of lung cancer screening with LDCT scans discussed:    Significance of positive screen - False-positive LDCT results often occur. 95% of all positive results do not lead to a diagnosis of cancer. Usually further imaging can resolve most false-positive results; however, some patients may require invasive procedures. Over diagnosis risk - 10% to 12% of screen-detected lung cancer cases are over diagnosed--that is, the cancer would not have been detected in the patient's lifetime without the screening. Need for follow up screens annually to continue lung cancer screening effectiveness     Risks associated with radiation from annual LDCT- Radiation exposure is about the same as for a mammogram, which is about 1/3 of the annual background radiation exposure from everyday life. Starting screening at age 54 is not likely to increase cancer risk from radiation exposure. Patients with comorbidities resulting in life expectancy of < 10 years, or that would preclude treatment of an abnormality identified on CT, should not be screened due to lack of benefit.     To obtain maximal benefit from this screening, smoking cessation and long-term abstinence from smoking is critical

## 2020-11-08 NOTE — PROGRESS NOTES
Dupuytren contracture 2004    Hypertension     ICD (implantable cardioverter-defibrillator) battery depletion 09/15/2014    Ischemic cardiomyopathy     Obesity     SOB (shortness of breath)     Tendonitis, Achilles, left     Unstable angina ([facility] [address] 09/07/2014    Ventricular tachyarrhythmia ([facility] [address] 08/30/2018       Past Surgical History:   Procedure Laterality Date    CARDIAC CATHETERIZATION      CARDIAC DEFIBRILLATOR PLACEMENT      CARDIAC DEFIBRILLATOR PLACEMENT Left 9/2014    COLONOSCOPY  3/2015    polyps, bx-serrated adenoma       Family History   Problem Relation Age of Onset    Heart Disease Father     High Blood Pressure Father     Diabetes Father     Heart Disease Maternal Aunt     Heart Disease Maternal Uncle     Heart Disease Paternal Aunt     Heart Disease Paternal Uncle        CareTeam (Including outside providers/suppliers regularly involved in providing care):   Patient Care Team:  Glory Palumbo MD as PCP - General (Internal Medicine)  Glory Palumbo MD as PCP - [facility] Empaneled Provider  Bari Harris MD as Consulting Physician (Gastroenterology)  Duncan Figueroa RN as Nurse Navigator    Wt Readings from Last 3 Encounters:   11/02/20 272 lb (123.4 kg)   07/20/20 273 lb (123.8 kg)   02/20/20 262 lb (118.8 kg)     Vitals:    11/02/20 1039   BP: 128/74   Pulse: 65   Temp: 97.3 °F (36.3 °C)   SpO2: 96%   Weight: 272 lb (123.4 kg)   Height: 6' 1.5\" (1.867 m)     Body mass index is 35.4 kg/m². Based upon direct observation of the patient, evaluation of cognition reveals recent and remote memory intact. HPI-patient is here for annual Medicare wellness exam, patient patient has diabetes, heart failure with reduced ejection fraction.   Compliant with diet and medication    General Appearance: alert and oriented to person, place and time, well developed and well- nourished, in no acute distress  Skin: warm and dry, no rash or erythema  Head: normocephalic and atraumatic  Neck: supple and non-tender without mass, no thyromegaly or thyroid nodules, no cervical lymphadenopathy  Pulmonary/Chest: clear to auscultation bilaterally- no wheezes, rales or rhonchi, normal air movement, no respiratory distress  Cardiovascular: normal rate, regular rhythm, normal S1 and S2, no murmurs, rubs, clicks, or gallops, distal pulses intact, no carotid bruits  Abdomen: soft, non-tender, non-distended, normal bowel sounds, no masses or organomegaly  Extremities: no cyanosis, clubbing or edema  Musculoskeletal: normal range of motion, no joint swelling, deformity or tenderness  Neurologic: reflexes normal and symmetric, no cranial nerve deficit, gait, coordination and speech normal    Patient's complete Health Risk Assessment and screening values have been reviewed and are found in Flowsheets. The following problems were reviewed today and where indicated follow up appointments were made and/or referrals ordered. Positive Risk Factor Screenings with Interventions:     Fall Risk:  2 or more falls in past year?: (!) yes  Fall with injury in past year?: (!) yes  Fall Risk Interventions:    · Home safety tips provided    Substance History:  Social History     Tobacco History     Smoking Status  Current Every Day Smoker Smoking Start Date  1[birth year]1/1959 Last attempt to quit  9/9/2014 Smoking Frequency  1 pack/day for 58 years (62 pk yrs)    Smoking Tobacco Type  Cigarettes    Smokeless Tobacco Use  Never Used    Tobacco Comment  There have been smokeless periods during lifetime          Alcohol History     Alcohol Use Status  Yes Drinks/Week  0 Cans of beer per week Amount  0.0 standard drinks of alcohol/wk Comment  NO ALCOHOL SINCE 08/2018          Drug Use     Drug Use Status  No          Sexual Activity     Sexually Active  Not Asked               Alcohol Screening: Audit-C Score: 3  Total Score: 3    A score of 8 or more is associated with harmful or hazardous drinking.  A score of 13 or more in women, and 15 or more in men, is likely to indicate alcohol dependence. Substance Abuse Interventions:  · Advised patient, about reducing alcohol intake    General Health and ACP:  General  In general, how would you say your health is?: (!) Poor  In the past 7 days, have you experienced any of the following?  New or Increased Pain, New or Increased Fatigue, Loneliness, Social Isolation, Stress or Anger?: (!) Stress, Anger  Do you get the social and emotional support that you need?: Yes  Do you have a Living Will?: Yes  Advance Directives     Power of  Living Will ACP-Advance Directive ACP-Power of     Not on File Not on File [address] Interventions:  · Advised Regular Excercise     Health Habits/Nutrition:  Health Habits/Nutrition  Do you exercise for at least 20 minutes 2-3 times per week?: (!) No  Have you lost any weight without trying in the past 3 months?: No  Do you eat fewer than 2 meals per day?: No  Have you seen a dentist within the past year?: Yes  Body mass index: (!) 35.4  Health Habits/Nutrition Interventions:  · Patient is advised regular exercise, weight loss    Safety:  Safety  Do you have working smoke detectors?: Yes  Have all throw rugs been removed or fastened?: (!) No  Do you have non-slip mats or surfaces in all bathtubs/showers?: Yes  Do all of your stairways have a railing or banister?: Yes  Are your doorways, halls and stairs free of clutter?: Yes  Do you always fasten your seatbelt when you are in a car?: Yes  Safety Interventions:  · Home safety tips provided    Personalized Preventive Plan   Current Health Maintenance Status  Immunization History   Administered Date(s) Administered    Influenza Vaccine, unspecified formulation 11/16/2001, 09/20/2016    Influenza, High Dose (Fluzone 65 yrs and older) 09/20/2016, 09/22/2017, 09/28/2018    Influenza, Quadv, adjuvanted, 65 yrs +, IM, PF (Fluad) 11/02/2020    Influenza, Triv, inactivated, subunit, adjuvanted, IM (Fluad 65 yrs and older) 09/05/2019    Pneumococcal Conjugate 13-valent ([id number]) 09/20/2016    Pneumococcal Polysaccharide ([id number]) 09/22/2017        Health Maintenance   Topic Date Due    DTaP/Tdap/Td vaccine (1 - Tdap) 08/15/1969    Shingles Vaccine (1 of 2) 08/15/2000    Annual Wellness Visit (AWV)  05/29/2019    Diabetic retinal exam  08/06/2019    Low dose CT lung screening  09/30/2020    Potassium monitoring  02/11/2021    Creatinine monitoring  02/11/2021    Diabetic foot exam  02/24/2021    TSH testing  06/08/2021    A1C test (Diabetic or Prediabetic)  08/03/2021    Diabetic microalbuminuria test  08/03/2021    Lipid screen  08/03/2021    Colon cancer screen colonoscopy  03/15/2025    Flu vaccine  Completed    Pneumococcal 65+ years Vaccine  Completed    AAA screen  Completed    Hepatitis C screen  Completed    Hepatitis A vaccine  Aged Out    Hib vaccine  Aged Out    Meningococcal (ACWY) vaccine  Aged Out     Recommendations for [employer] Due: see orders and patient instructions/AVS.  . Recommended screening schedule for the next 5-10 years is provided to the patient in written form: see Patient Instructions/AVS.    Mabel Holbrook was seen today for medicare awv. Diagnoses and all orders for this visit:    Personal history of tobacco use  -     IN VISIT TO DISCUSS LUNG CA SCREEN W LDCT  -     CT Lung Screen (Annual); Future    Influenza vaccine needed  -     INFLUENZA, QUADV, ADJUVANTED, 72 YRS =, IM, PF, PREFILL SYR, 0.5ML (FLUAD)    JAYCE (obstructive sleep apnea)  -     Sleep Study with PAP Titration;  Future

## 2020-11-16 ENCOUNTER — TELEPHONE (OUTPATIENT)
Dept: ONCOLOGY | Age: 70
End: 2020-11-16

## 2020-11-16 NOTE — LETTER
11/16/2020        Denzel Pallas Plock  [address] [address]    Dear Denzel Pallas Plock: Your healthcare provider has ordered a low dose CT scan of the chest for lung cancer screening. You will find enclosed, information about CT lung screening. Please review the statement of understanding, you will be asked to sign a copy of this at the time of your CT scan    If you have not already been contacted to make the appointment for your scan, please call our scheduling department at [phone]    Keep in mind that CT lung screening does not take the place of smoking cessation. If you are a current smoker, you will find enclosed smoking cessation resources. Please do not hesitate to contact me if you have any questions or concerns.     [address],      [address] [facility] Lung Screening Program  [phone]

## 2020-11-18 ENCOUNTER — HOSPITAL ENCOUNTER (OUTPATIENT)
Dept: CT IMAGING | Age: 70
Discharge: HOME | End: 2020-11-20
Payer: MEDICARE

## 2020-11-18 ENCOUNTER — HOSPITAL ENCOUNTER (OUTPATIENT)
Age: 70
Setting detail: SPECIMEN
Discharge: HOME | End: 2020-11-18
Payer: MEDICARE

## 2020-11-18 LAB
INR BLD: 2.1
PROTHROMBIN TIME: 21.3 SEC (ref 9–12)

## 2020-11-18 PROCEDURE — G0297 LDCT FOR LUNG CA SCREEN: HCPCS

## 2021-01-04 ENCOUNTER — HOSPITAL ENCOUNTER (OUTPATIENT)
Age: 71
Setting detail: SPECIMEN
Discharge: HOME | End: 2021-01-04
Payer: MEDICARE

## 2021-01-04 LAB
INR BLD: 2.7
PROTHROMBIN TIME: 27.1 SEC (ref 9–12)

## 2021-01-07 DIAGNOSIS — I50.32 CHRONIC DIASTOLIC CONGESTIVE HEART FAILURE (HCC): ICD-10-CM

## 2021-01-07 RX ORDER — FUROSEMIDE 20 MG/1
TABLET ORAL
Qty: 90 TABLET | Refills: 3 | Status: SHIPPED | OUTPATIENT
Start: 2021-01-07 | End: 2022-01-04

## 2021-02-03 ENCOUNTER — TELEPHONE (OUTPATIENT)
Dept: INTERNAL MEDICINE CLINIC | Age: 71
End: 2021-02-03

## 2021-02-03 ENCOUNTER — HOSPITAL ENCOUNTER (OUTPATIENT)
Age: 71
Setting detail: SPECIMEN
Discharge: HOME | End: 2021-02-03
Payer: MEDICARE

## 2021-02-03 ENCOUNTER — OFFICE VISIT (OUTPATIENT)
Dept: INTERNAL MEDICINE CLINIC | Age: 71
End: 2021-02-03
Payer: MEDICARE

## 2021-02-03 VITALS
TEMPERATURE: 97.3 F | DIASTOLIC BLOOD PRESSURE: 60 MMHG | SYSTOLIC BLOOD PRESSURE: 110 MMHG | HEIGHT: 74 IN | BODY MASS INDEX: 35.16 KG/M2 | WEIGHT: 274 LBS

## 2021-02-03 DIAGNOSIS — G47.33 OSA (OBSTRUCTIVE SLEEP APNEA): ICD-10-CM

## 2021-02-03 DIAGNOSIS — E11.9 TYPE 2 DIABETES MELLITUS WITHOUT COMPLICATION, WITHOUT LONG-TERM CURRENT USE OF INSULIN (HCC): ICD-10-CM

## 2021-02-03 DIAGNOSIS — Z79.899 ON AMIODARONE THERAPY: Primary | ICD-10-CM

## 2021-02-03 DIAGNOSIS — G47.33 OSA (OBSTRUCTIVE SLEEP APNEA): Primary | ICD-10-CM

## 2021-02-03 DIAGNOSIS — Z79.899 ON AMIODARONE THERAPY: ICD-10-CM

## 2021-02-03 DIAGNOSIS — I10 ESSENTIAL HYPERTENSION: ICD-10-CM

## 2021-02-03 DIAGNOSIS — E66.01 MORBID OBESITY, UNSPECIFIED OBESITY TYPE (HCC): ICD-10-CM

## 2021-02-03 DIAGNOSIS — Z91.81 AT HIGH RISK FOR FALLS: ICD-10-CM

## 2021-02-03 DIAGNOSIS — I50.20 SYSTOLIC CONGESTIVE HEART FAILURE, UNSPECIFIED HF CHRONICITY (HCC): ICD-10-CM

## 2021-02-03 DIAGNOSIS — J44.9 CHRONIC OBSTRUCTIVE PULMONARY DISEASE, UNSPECIFIED COPD TYPE (HCC): ICD-10-CM

## 2021-02-03 LAB
ALBUMIN SERPL-MCNC: 4.3 G/DL (ref 3.5–5.2)
ALBUMIN/GLOBULIN RATIO: 1.5 (ref 1–2.5)
ALP BLD-CCNC: 90 U/L (ref 40–129)
ALT SERPL-CCNC: 54 U/L (ref 5–41)
ANION GAP SERPL CALCULATED.3IONS-SCNC: 13 MMOL/L (ref 9–17)
AST SERPL-CCNC: 53 U/L
BILIRUB SERPL-MCNC: 0.72 MG/DL (ref 0.3–1.2)
BUN BLDV-MCNC: 21 MG/DL (ref 8–23)
BUN/CREAT BLD: ABNORMAL (ref 9–20)
CALCIUM SERPL-MCNC: 9.8 MG/DL (ref 8.6–10.4)
CHLORIDE BLD-SCNC: 103 MMOL/L (ref 98–107)
CO2: 22 MMOL/L (ref 20–31)
CREAT SERPL-MCNC: 1.28 MG/DL (ref 0.7–1.2)
GFR AFRICAN AMERICAN: >60 ML/MIN
GFR NON-AFRICAN AMERICAN: 56 ML/MIN
GFR SERPL CREATININE-BSD FRML MDRD: ABNORMAL ML/MIN/{1.73_M2}
GFR SERPL CREATININE-BSD FRML MDRD: ABNORMAL ML/MIN/{1.73_M2}
GLUCOSE BLD-MCNC: 110 MG/DL (ref 70–99)
HBA1C MFR BLD: 6.2 %
INR BLD: 2.1
POTASSIUM SERPL-SCNC: 4.6 MMOL/L (ref 3.7–5.3)
PROTHROMBIN TIME: 21.8 SEC (ref 9–12)
SODIUM BLD-SCNC: 138 MMOL/L (ref 135–144)
TOTAL PROTEIN: 7.2 G/DL (ref 6.4–8.3)
TSH SERPL DL<=0.05 MIU/L-ACNC: 1.83 MIU/L (ref 0.3–5)

## 2021-02-03 PROCEDURE — 83036 HEMOGLOBIN GLYCOSYLATED A1C: CPT | Performed by: INTERNAL MEDICINE

## 2021-02-03 PROCEDURE — 99214 OFFICE O/P EST MOD 30 MIN: CPT | Performed by: INTERNAL MEDICINE

## 2021-02-03 ASSESSMENT — ENCOUNTER SYMPTOMS
APNEA: 0
FACIAL SWELLING: 0
WHEEZING: 0
DIARRHEA: 0
CHEST TIGHTNESS: 0
COLOR CHANGE: 0
ABDOMINAL DISTENTION: 0
COUGH: 0
BACK PAIN: 0
SHORTNESS OF BREATH: 1
CONSTIPATION: 0
ABDOMINAL PAIN: 0

## 2021-02-03 ASSESSMENT — PATIENT HEALTH QUESTIONNAIRE - PHQ9
SUM OF ALL RESPONSES TO PHQ QUESTIONS 1-9: 0
1. LITTLE INTEREST OR PLEASURE IN DOING THINGS: 0

## 2021-02-03 NOTE — PROGRESS NOTES
Subjective:      Patient ID: Chavo Bundy is a 79 y.o. male. HPI Patient is here for evaluation Of multiple medical Problems , he has CHF,DM, CKD, HTN,  COPD, Mural Thrombus on Coumadin, S/p AICD , follows with Cardiologist, on Entresto  . He is still Smoking 1 PPD. He feels Helen Rodolfo , His Exercise capacity is Much better   Check Blood sugar/BP at Home regularly , Most of Blood sugar are ok   Seen Opthamologist Recently   Had CT Chest Lung screening   1. Mild-to-moderate emphysema.  No suspicious noncalcified pulmonary nodules. Mild right basilar atelectasis.  Respiratory motion. 2. Cardiomegaly.  Coronary artery disease.  Atherosclerotic calcification of   the aorta. 3. Fatty liver. Will be having PFT soon   Did not get phone call from [facility]     Review of Systems   Constitutional: Negative for activity change, appetite change, chills and diaphoresis. HENT: Negative for congestion, dental problem, ear discharge, facial swelling and hearing loss. Respiratory: Positive for shortness of breath (with Excertion ). Negative for apnea, cough, chest tightness and wheezing. Cardiovascular: Negative for chest pain and leg swelling. Gastrointestinal: Negative for abdominal distention, abdominal pain, constipation and diarrhea. Genitourinary: Negative for difficulty urinating, dysuria, enuresis, flank pain and frequency. Musculoskeletal: Negative for arthralgias, back pain, gait problem and joint swelling. Skin: Negative for color change, pallor and rash. Neurological: Negative for dizziness, seizures, facial asymmetry, light-headedness, numbness and headaches. Psychiatric/Behavioral: Negative for agitation, behavioral problems, confusion, decreased concentration and dysphoric mood. Objective:   Physical Exam  Vitals signs and nursing note reviewed. Constitutional:       General: He is not in acute distress. Appearance: He is well-developed. He is obese. He is not diaphoretic. ([facility] [address]  Compensated, still smoking    7. On amiodarone therapy  Had liver function tests done recently, going to have pulmonary function tests soon  - TSH; Future      · Return in about 3 months (around 5/3/2021). · Reviewed prior labs and health maintenance. · Discussed use, benefit, and side effects of prescribed medications. Barriers to medication compliance addressed. All patient questions answered. Pt voiced understanding. Lionel Umana MD  [facility]  2/3/2021, 11:21 AM    Please note that this chart was generated using voice recognition Dragon dictation software. Although every effort was made to ensure the accuracy of this automated transcription, some errors in transcription may have occurred. Visit Information    Have you changed or started any medications since your last visit including any over-the-counter medicines, vitamins, or herbal medicines? no   Are you having any side effects from any of your medications? -  no  Have you stopped taking any of your medications? Is so, why? -  no    Have you seen any other physician or provider since your last visit? Yes - Records Requested  Have you had any other diagnostic tests since your last visit? Yes - Records Requested  Have you been seen in the emergency room and/or had an admission to a hospital since we last saw you? No  Have you had your routine dental cleaning in the past 6 months? yes -     Have you activated your [employer] account? If not, what are your barriers?  Yes     Patient Care Team:  Lionel Umana MD as PCP - General (Internal Medicine)  [facility]ion[facility]  Ruben Cali MD as Consulting Physician (Gastroenterology)  Hakeem Little RN as Nurse Navigator    Medical History Review  Past Medical, Family, and Social History reviewed and does not contribute to the patient presenting condition    Health Maintenance   Topic Date Due    COVID-19 Vaccine (1 of 2) 08/15/1966    DTaP/Tdap/Td vaccine (1 - Tdap) 08/15/1969    Shingles Vaccine (1 of 2) 08/15/2000    Diabetic retinal exam  08/06/2019    Potassium monitoring  02/11/2021    Creatinine monitoring  02/11/2021    Diabetic foot exam  02/24/2021    TSH testing  06/08/2021    A1C test (Diabetic or Prediabetic)  08/03/2021    Diabetic microalbuminuria test  08/03/2021    Lipid screen  08/03/2021    Annual Wellness Visit (AWV)  11/03/2021    Low dose CT lung screening  11/18/2021    Colon cancer screen colonoscopy  03/15/2025    Flu vaccine  Completed    Pneumococcal 65+ years Vaccine  Completed    AAA screen  Completed    Hepatitis C screen  Completed    Hepatitis A vaccine  Aged Out    Hib vaccine  Aged Out    Meningococcal (ACWY) vaccine  Aged Out               On the basis of positive falls risk screening, assessment and plan is as follows: home safety tips provided.

## 2021-02-21 ENCOUNTER — HOSPITAL ENCOUNTER (OUTPATIENT)
Dept: SLEEP CENTER | Age: 71
Discharge: HOME | End: 2021-02-23
Payer: MEDICARE

## 2021-02-21 VITALS
HEART RATE: 57 BPM | BODY MASS INDEX: 37.94 KG/M2 | TEMPERATURE: 98.7 F | HEIGHT: 71 IN | WEIGHT: 271 LBS | RESPIRATION RATE: 10 BRPM | OXYGEN SATURATION: 91 %

## 2021-02-21 DIAGNOSIS — G47.33 OSA (OBSTRUCTIVE SLEEP APNEA): ICD-10-CM

## 2021-02-21 PROCEDURE — 95810 POLYSOM 6/> YRS 4/> PARAM: CPT

## 2021-03-03 ENCOUNTER — TELEPHONE (OUTPATIENT)
Dept: INTERNAL MEDICINE CLINIC | Age: 71
End: 2021-03-03

## 2021-03-03 DIAGNOSIS — G47.33 OSA (OBSTRUCTIVE SLEEP APNEA): Primary | ICD-10-CM

## 2021-03-03 LAB — STATUS: NORMAL

## 2021-03-17 ENCOUNTER — HOSPITAL ENCOUNTER (OUTPATIENT)
Age: 71
Setting detail: SPECIMEN
Discharge: HOME | End: 2021-03-17
Payer: MEDICARE

## 2021-03-17 LAB
INR BLD: 2.6
PROTHROMBIN TIME: 25.4 SEC (ref 9.1–12.3)

## 2021-03-19 ENCOUNTER — IMMUNIZATION (OUTPATIENT)
Dept: INTERNAL MEDICINE CLINIC | Age: 71
End: 2021-03-19
Payer: MEDICARE

## 2021-03-19 PROCEDURE — 0001A COVID-19, PFIZER VACCINE 30MCG/0.3ML DOSE: CPT | Performed by: INTERNAL MEDICINE

## 2021-03-19 PROCEDURE — 91300 COVID-19, PFIZER VACCINE 30MCG/0.3ML DOSE: CPT | Performed by: INTERNAL MEDICINE

## 2021-04-09 ENCOUNTER — IMMUNIZATION (OUTPATIENT)
Dept: INTERNAL MEDICINE CLINIC | Age: 71
End: 2021-04-09
Payer: MEDICARE

## 2021-04-09 PROCEDURE — 91300 COVID-19, PFIZER VACCINE 30MCG/0.3ML DOSE: CPT | Performed by: INTERNAL MEDICINE

## 2021-04-09 PROCEDURE — 0002A COVID-19, PFIZER VACCINE 30MCG/0.3ML DOSE: CPT | Performed by: INTERNAL MEDICINE

## 2021-04-30 ENCOUNTER — HOSPITAL ENCOUNTER (OUTPATIENT)
Age: 71
Setting detail: SPECIMEN
Discharge: HOME | End: 2021-04-30
Payer: MEDICARE

## 2021-04-30 LAB
INR BLD: 2.9
PROTHROMBIN TIME: 28.8 SEC (ref 9.1–12.3)

## 2021-05-15 DIAGNOSIS — E11.9 TYPE 2 DIABETES MELLITUS WITHOUT COMPLICATION, WITHOUT LONG-TERM CURRENT USE OF INSULIN (HCC): ICD-10-CM

## 2021-06-08 ENCOUNTER — HOSPITAL ENCOUNTER (OUTPATIENT)
Dept: SLEEP CENTER | Age: 71
Discharge: HOME | End: 2021-06-10
Payer: MEDICARE

## 2021-06-08 DIAGNOSIS — G47.33 OSA (OBSTRUCTIVE SLEEP APNEA): ICD-10-CM

## 2021-06-08 PROCEDURE — 95811 POLYSOM 6/>YRS CPAP 4/> PARM: CPT

## 2021-06-09 VITALS
HEIGHT: 71 IN | RESPIRATION RATE: 12 BRPM | BODY MASS INDEX: 38.36 KG/M2 | OXYGEN SATURATION: 94 % | HEART RATE: 53 BPM | WEIGHT: 274 LBS

## 2021-06-16 ENCOUNTER — HOSPITAL ENCOUNTER (OUTPATIENT)
Age: 71
Setting detail: SPECIMEN
Discharge: HOME | End: 2021-06-16
Payer: MEDICARE

## 2021-06-16 LAB
INR BLD: 2.7
PROTHROMBIN TIME: 26.7 SEC (ref 9.1–12.3)

## 2021-06-24 LAB — STATUS: NORMAL

## 2021-07-13 ENCOUNTER — TELEPHONE (OUTPATIENT)
Dept: INTERNAL MEDICINE CLINIC | Age: 71
End: 2021-07-13

## 2021-07-19 ENCOUNTER — OFFICE VISIT (OUTPATIENT)
Dept: INTERNAL MEDICINE CLINIC | Age: 71
End: 2021-07-19
Payer: MEDICARE

## 2021-07-19 VITALS
SYSTOLIC BLOOD PRESSURE: 120 MMHG | WEIGHT: 267.4 LBS | HEIGHT: 71 IN | DIASTOLIC BLOOD PRESSURE: 78 MMHG | BODY MASS INDEX: 37.44 KG/M2

## 2021-07-19 DIAGNOSIS — I20.0 UNSTABLE ANGINA (HCC): ICD-10-CM

## 2021-07-19 DIAGNOSIS — I10 ESSENTIAL HYPERTENSION: ICD-10-CM

## 2021-07-19 DIAGNOSIS — G47.33 OSA (OBSTRUCTIVE SLEEP APNEA): ICD-10-CM

## 2021-07-19 DIAGNOSIS — N17.9 AKI (ACUTE KIDNEY INJURY) (HCC): ICD-10-CM

## 2021-07-19 DIAGNOSIS — E11.9 TYPE 2 DIABETES MELLITUS WITHOUT COMPLICATION, WITHOUT LONG-TERM CURRENT USE OF INSULIN (HCC): Primary | ICD-10-CM

## 2021-07-19 DIAGNOSIS — I50.20 SYSTOLIC CONGESTIVE HEART FAILURE, UNSPECIFIED HF CHRONICITY (HCC): ICD-10-CM

## 2021-07-19 LAB — HBA1C MFR BLD: 5.8 %

## 2021-07-19 PROCEDURE — 99214 OFFICE O/P EST MOD 30 MIN: CPT | Performed by: INTERNAL MEDICINE

## 2021-07-19 PROCEDURE — 83036 HEMOGLOBIN GLYCOSYLATED A1C: CPT | Performed by: INTERNAL MEDICINE

## 2021-07-19 SDOH — ECONOMIC STABILITY: FOOD INSECURITY: WITHIN THE PAST 12 MONTHS, YOU WORRIED THAT YOUR FOOD WOULD RUN OUT BEFORE YOU GOT MONEY TO BUY MORE.: NEVER TRUE

## 2021-07-19 SDOH — ECONOMIC STABILITY: FOOD INSECURITY: WITHIN THE PAST 12 MONTHS, THE FOOD YOU BOUGHT JUST DIDN'T LAST AND YOU DIDN'T HAVE MONEY TO GET MORE.: NEVER TRUE

## 2021-07-19 ASSESSMENT — SOCIAL DETERMINANTS OF HEALTH (SDOH): HOW HARD IS IT FOR YOU TO PAY FOR THE VERY BASICS LIKE FOOD, HOUSING, MEDICAL CARE, AND HEATING?: SOMEWHAT HARD

## 2021-07-19 NOTE — PROGRESS NOTES
Subjective:      Patient ID: Carlos Aguilera is a 79 y.o. male. HPI Patient is here for evaluation Of multiple medical Problems , he has CHF,DM, CKD, HTN,  COPD, Mural Thrombus on Coumadin, S/p AICD , follows with Cardiologist, on Entresto  . He is still Smoking 1 PPD. Cannot Tolerate 15 of CPAP   Requesting CPAP setting to Reduce , he mentioned that he cannot tolerate 15 of CPAP  It does not let him sleep. No other complaints. Review of Systems   Constitutional: Negative for activity change, appetite change, chills and diaphoresis. HENT: Negative for congestion, dental problem, ear discharge, facial swelling and hearing loss. Respiratory: Positive for shortness of breath (with Excertion ). Negative for apnea, cough, chest tightness and wheezing. Cardiovascular: Negative for chest pain and leg swelling. Gastrointestinal: Negative for abdominal distention, abdominal pain, constipation and diarrhea. Genitourinary: Negative for difficulty urinating, dysuria, enuresis, flank pain and frequency. Musculoskeletal: Negative for arthralgias, back pain, gait problem and joint swelling. Skin: Negative for color change, pallor and rash. Neurological: Negative for dizziness, seizures, facial asymmetry, light-headedness, numbness and headaches. Psychiatric/Behavioral: Negative for agitation, behavioral problems, confusion, decreased concentration and dysphoric mood. Objective:   Physical Exam  Vitals and nursing note reviewed. Constitutional:       General: He is not in acute distress. Appearance: He is well-developed. He is obese. He is not diaphoretic. HENT:      Head: Normocephalic and atraumatic. Mouth/Throat:      Pharynx: No oropharyngeal exudate. Eyes:      General: No scleral icterus. Right eye: No discharge. Left eye: No discharge. Conjunctiva/sclera: Conjunctivae normal.      Pupils: Pupils are equal, round, and reactive to light.    Neck: Thyroid: No thyromegaly. Vascular: No JVD. Trachea: No tracheal deviation. Cardiovascular:      Rate and Rhythm: Normal rate. Heart sounds: Normal heart sounds. No murmur heard. No gallop. Pulmonary:      Effort: Pulmonary effort is normal. No respiratory distress. Breath sounds: Normal breath sounds. No stridor. No wheezing or rales. Abdominal:      General: Bowel sounds are normal. There is no distension. Palpations: Abdomen is soft. Tenderness: There is no abdominal tenderness. There is no guarding or rebound. Musculoskeletal:         General: No tenderness. Normal range of motion. Cervical back: Normal range of motion and neck supple. Right lower leg: Edema present. Left lower leg: Edema present. Skin:     General: Skin is warm and dry. Findings: No erythema or rash. Neurological:      Mental Status: He is alert and oriented to person, place, and time. Assessment / Plan:   1. Type 2 diabetes mellitus without complication, without long-term current use of insulin ([facility])  Controlled  - POCT glycosylated hemoglobin (Hb A1C)    2. Unstable angina ([facility])  stable    3. MAD[place] (acute kidney injury) ([facility])  Stable     4. JAYCE (obstructive sleep apnea)  Patient cannot tolerate CPAP 15, advised to cut down CPAP to 12 and monitor    5. Essential hypertension  Controlled    6. Systolic congestive heart failure, unspecified HF chronicity ([address]  Compensated      · Return in about 3 months (around 10/19/2021). · Reviewed prior labs and health maintenance. · Discussed use, benefit, and side effects of prescribed medications. Barriers to medication compliance addressed. All patient questions answered. Pt voiced understanding. MD JASVIR Gleason [facility]  7/25/2021, 9:38 PM    Please note that this chart was generated using voice recognition Dragon dictation software.   Although every effort was made to ensure the accuracy of this automated transcription, some errors in transcription may have occurred. Visit Information    Have you changed or started any medications since your last visit including any over-the-counter medicines, vitamins, or herbal medicines? no   Are you having any side effects from any of your medications? -  no  Have you stopped taking any of your medications? Is so, why? -  no    Have you seen any other physician or provider since your last visit? Yes - Records Requested  Have you had any other diagnostic tests since your last visit? Yes - Records Requested  Have you been seen in the emergency room and/or had an admission to a hospital since we last saw you? No  Have you had your routine dental cleaning in the past 6 months? yes -     Have you activated your [employer] account? If not, what are your barriers?  Yes     Patient Care Team:  Kendall Johnson MD as PCP - General (Internal Medicine)  Kendall Johnson MD as PCP - [facility]  Jonathan Saravia MD as Consulting Physician (Gastroenterology)  Agustín Beckman RN as Nurse Navigator    Medical History Review  Past Medical, Family, and Social History reviewed and does not contribute to the patient presenting condition    Health Maintenance   Topic Date Due    DTaP/Tdap/Td vaccine (1 - Tdap) Never done    Shingles Vaccine (1 of 2) Never done    Diabetic retinal exam  08/06/2019    Diabetic foot exam  02/24/2021    Diabetic microalbuminuria test  08/03/2021    Lipid screen  08/03/2021    Flu vaccine (1) 09/01/2021    Annual Wellness Visit (AWV)  11/03/2021    Low dose CT lung screening  11/18/2021    A1C test (Diabetic or Prediabetic)  02/03/2022    TSH testing  02/03/2022    Potassium monitoring  02/03/2022    Creatinine monitoring  02/03/2022    Colon cancer screen colonoscopy  03/15/2025    Pneumococcal 65+ years Vaccine  Completed    COVID-19 Vaccine  Completed    AAA screen  Completed    Hepatitis C screen  Completed    Hepatitis A vaccine  Aged Out    Hib vaccine  Aged Out    Meningococcal (ACWY) vaccine  Aged Out

## 2021-07-25 PROBLEM — I47.20 VENTRICULAR TACHYARRHYTHMIA (HCC): Status: RESOLVED | Noted: 2018-08-30 | Resolved: 2021-07-25

## 2021-07-25 ASSESSMENT — ENCOUNTER SYMPTOMS
APNEA: 0
COUGH: 0
COLOR CHANGE: 0
CHEST TIGHTNESS: 0
SHORTNESS OF BREATH: 1
FACIAL SWELLING: 0
ABDOMINAL DISTENTION: 0
CONSTIPATION: 0
BACK PAIN: 0
ABDOMINAL PAIN: 0
DIARRHEA: 0
WHEEZING: 0

## 2021-08-02 ENCOUNTER — HOSPITAL ENCOUNTER (OUTPATIENT)
Age: 71
Setting detail: SPECIMEN
Discharge: HOME | End: 2021-08-02
Payer: MEDICARE

## 2021-08-02 LAB
INR BLD: 2
PROTHROMBIN TIME: 20.3 SEC (ref 9.1–12.3)

## 2021-09-16 ENCOUNTER — HOSPITAL ENCOUNTER (OUTPATIENT)
Age: 71
Setting detail: SPECIMEN
Discharge: HOME | End: 2021-09-16
Payer: MEDICARE

## 2021-09-16 LAB
INR BLD: 1.9
PROTHROMBIN TIME: 19.5 SEC (ref 9.1–12.3)

## 2021-10-14 ENCOUNTER — OFFICE VISIT (OUTPATIENT)
Dept: INTERNAL MEDICINE CLINIC | Age: 71
End: 2021-10-14
Payer: MEDICARE

## 2021-10-14 VITALS
BODY MASS INDEX: 36.96 KG/M2 | WEIGHT: 264 LBS | DIASTOLIC BLOOD PRESSURE: 76 MMHG | SYSTOLIC BLOOD PRESSURE: 124 MMHG | HEIGHT: 71 IN

## 2021-10-14 DIAGNOSIS — J44.9 CHRONIC OBSTRUCTIVE PULMONARY DISEASE, UNSPECIFIED COPD TYPE (HCC): ICD-10-CM

## 2021-10-14 DIAGNOSIS — Z13.220 SCREENING FOR HYPERLIPIDEMIA: ICD-10-CM

## 2021-10-14 DIAGNOSIS — N17.9 AKI (ACUTE KIDNEY INJURY) (HCC): ICD-10-CM

## 2021-10-14 DIAGNOSIS — Z23 NEED FOR VACCINATION: ICD-10-CM

## 2021-10-14 DIAGNOSIS — E11.9 TYPE 2 DIABETES MELLITUS WITHOUT COMPLICATION, WITHOUT LONG-TERM CURRENT USE OF INSULIN (HCC): ICD-10-CM

## 2021-10-14 DIAGNOSIS — I20.0 UNSTABLE ANGINA (HCC): Primary | ICD-10-CM

## 2021-10-14 PROBLEM — I47.20 V-TACH (HCC): Status: RESOLVED | Noted: 2018-12-04 | Resolved: 2021-10-14

## 2021-10-14 PROCEDURE — 90694 VACC AIIV4 NO PRSRV 0.5ML IM: CPT | Performed by: INTERNAL MEDICINE

## 2021-10-14 PROCEDURE — G0008 ADMIN INFLUENZA VIRUS VAC: HCPCS | Performed by: INTERNAL MEDICINE

## 2021-10-14 PROCEDURE — 99214 OFFICE O/P EST MOD 30 MIN: CPT | Performed by: INTERNAL MEDICINE

## 2021-10-14 ASSESSMENT — ENCOUNTER SYMPTOMS
DIARRHEA: 0
COUGH: 0
BACK PAIN: 0
SHORTNESS OF BREATH: 1
CHEST TIGHTNESS: 0
COLOR CHANGE: 0
WHEEZING: 0
FACIAL SWELLING: 0
CONSTIPATION: 0
APNEA: 0
ABDOMINAL PAIN: 0
ABDOMINAL DISTENTION: 0

## 2021-10-14 NOTE — PROGRESS NOTES
Subjective:      Patient ID: Sebas Orosco is a 70 y.o. male. HPI Patient is here for evaluation Of multiple medical Problems , he has CHF,DM, CKD, HTN,  COPD, Mural Thrombus on Coumadin, S/p AICD , follows with Cardiologist, on Entresto  . He is still Smoking 1 PPD.   INR is Therapeutic   Tolerating 12 of CPAP . Review of Systems   Constitutional: Negative for activity change, appetite change, chills and diaphoresis. HENT: Negative for congestion, dental problem, ear discharge, facial swelling and hearing loss. Respiratory: Positive for shortness of breath (with Excertion ). Negative for apnea, cough, chest tightness and wheezing. Cardiovascular: Positive for leg swelling. Negative for chest pain. Gastrointestinal: Negative for abdominal distention, abdominal pain, constipation and diarrhea. Genitourinary: Negative for difficulty urinating, dysuria, enuresis, flank pain and frequency. Musculoskeletal: Negative for arthralgias, back pain, gait problem and joint swelling. Skin: Negative for color change, pallor and rash. Neurological: Negative for dizziness, seizures, facial asymmetry, light-headedness, numbness and headaches. Psychiatric/Behavioral: Negative for agitation, behavioral problems, confusion, decreased concentration and dysphoric mood. Objective:   Physical Exam  Vitals and nursing note reviewed. Constitutional:       General: He is not in acute distress. Appearance: He is well-developed. He is obese. He is not diaphoretic. HENT:      Head: Normocephalic and atraumatic. Mouth/Throat:      Pharynx: No oropharyngeal exudate. Eyes:      General: No scleral icterus. Right eye: No discharge. Left eye: No discharge. Conjunctiva/sclera: Conjunctivae normal.      Pupils: Pupils are equal, round, and reactive to light. Neck:      Thyroid: No thyromegaly. Vascular: No JVD. Trachea: No tracheal deviation.    Cardiovascular:      Rate and Rhythm: Normal rate. Heart sounds: Normal heart sounds. No murmur heard. No gallop. Pulmonary:      Effort: Pulmonary effort is normal. No respiratory distress. Breath sounds: Normal breath sounds. No stridor. No wheezing or rales. Abdominal:      General: Bowel sounds are normal. There is no distension. Palpations: Abdomen is soft. Tenderness: There is no abdominal tenderness. There is no guarding or rebound. Musculoskeletal:         General: No tenderness. Normal range of motion. Cervical back: Normal range of motion and neck supple. Right lower leg: Edema present. Left lower leg: Edema (1 plus edema ) present. Skin:     General: Skin is warm and dry. Findings: No erythema or rash. Neurological:      Mental Status: He is alert and oriented to person, place, and time. DM FOOT EXAM   has Good pulsation  Small Callous in undersurface of Left Foot  Loss of Touch sensation in Tip of fingers of Right foot   Assessment / Plan:   1. Unstable angina (HCC)  Stable   - Lipid Panel; Future    2. Chronic obstructive pulmonary disease, unspecified COPD type ([facility]  Stable     3. MADISON (acute kidney injury) ([facility] [address]  - Comprehensive Metabolic Panel; Future    4. Screening for hyperlipidemia  - Lipid, Fasting; Future    5. Need for vaccination  - INFLUENZA, QUADV, ADJUVANTED, 65 YRS =, IM, PF, PREFILL SYR, 0.5ML (FLUAD)    6. Type 2 diabetes mellitus without complication, without long-term current use of insulin (HCC)  Stable   -  DIABETES FOOT EXAM  - Microalbumin, Ur; Future  - Vitamin B12 & Folate; Future      No follow-ups on file. · Reviewed prior labs and health maintenance. · Discussed use, benefit, and side effects of prescribed medications. Barriers to medication compliance addressed. All patient questions answered. Pt voiced understanding.          MD JASVIR CastroMer[facility] St[facility]  10/17/2021, 8:49 PM    Please note that this chart was generated using voice recognition Dragon dictation software. Although every effort was made to ensure the accuracy of this automated transcription, some errors in transcription may have occurred. Visit Information    Have you changed or started any medications since your last visit including any over-the-counter medicines, vitamins, or herbal medicines? no   Are you having any side effects from any of your medications? -  no  Have you stopped taking any of your medications? Is so, why? -  no    Have you seen any other physician or provider since your last visit? Yes - Records Requested  Have you had any other diagnostic tests since your last visit? Yes - Records Requested  Have you been seen in the emergency room and/or had an admission to a hospital since we last saw you? No  Have you had your routine dental cleaning in the past 6 months? no    Have you activated your [employer] account? If not, what are your barriers?  Yes     Patient Care Team:  Walter Chavez MD as PCP - General (Internal Medicine)  Walter Chavez MD as PCP - [facility] Provider  Cherie Rahman MD as Consulting Physician (Gastroenterology)  Ladi Gutierrez RN as Nurse Navigator    Medical History Review  Past Medical, Family, and Social History reviewed and does not contribute to the patient presenting condition    Health Maintenance   Topic Date Due    DTaP/Tdap/Td vaccine (1 - Tdap) Never done    Shingles Vaccine (1 of 2) Never done    Diabetic retinal exam  08/06/2019    Diabetic foot exam  02/24/2021    Diabetic microalbuminuria test  08/03/2021    Lipid screen  08/03/2021    Flu vaccine (1) 09/01/2021    Annual Wellness Visit (AWV)  11/03/2021    Low dose CT lung screening  11/18/2021    TSH testing  02/03/2022    Potassium monitoring  02/03/2022    Creatinine monitoring  02/03/2022    A1C test (Diabetic or Prediabetic)  07/19/2022    Colon cancer screen colonoscopy  03/15/2025    Pneumococcal 65+ years Vaccine  Completed  COVID-19 Vaccine  Completed    AAA screen  Completed    Hepatitis C screen  Completed    Hepatitis A vaccine  Aged Out    Hib vaccine  Aged Out    Meningococcal (ACWY) vaccine  Aged Out

## 2021-11-11 ENCOUNTER — HOSPITAL ENCOUNTER (OUTPATIENT)
Age: 71
Setting detail: SPECIMEN
Discharge: HOME | End: 2021-11-11
Payer: MEDICARE

## 2021-11-11 DIAGNOSIS — Z13.220 SCREENING FOR HYPERLIPIDEMIA: ICD-10-CM

## 2021-11-11 DIAGNOSIS — N17.9 AKI (ACUTE KIDNEY INJURY) (HCC): ICD-10-CM

## 2021-11-11 DIAGNOSIS — E11.9 TYPE 2 DIABETES MELLITUS WITHOUT COMPLICATION, WITHOUT LONG-TERM CURRENT USE OF INSULIN (HCC): ICD-10-CM

## 2021-11-11 LAB
ALBUMIN SERPL-MCNC: 3.9 G/DL (ref 3.5–5.2)
ALBUMIN/GLOBULIN RATIO: 1.3 (ref 1–2.5)
ALP BLD-CCNC: 106 U/L (ref 40–129)
ALT SERPL-CCNC: 35 U/L (ref 5–41)
ANION GAP SERPL CALCULATED.3IONS-SCNC: 17 MMOL/L (ref 9–17)
AST SERPL-CCNC: 37 U/L
BILIRUB SERPL-MCNC: 0.57 MG/DL (ref 0.3–1.2)
BUN BLDV-MCNC: 20 MG/DL (ref 8–23)
BUN/CREAT BLD: ABNORMAL (ref 9–20)
CALCIUM SERPL-MCNC: 9.6 MG/DL (ref 8.6–10.4)
CHLORIDE BLD-SCNC: 105 MMOL/L (ref 98–107)
CHOLESTEROL, FASTING: 95 MG/DL
CHOLESTEROL/HDL RATIO: 1.9
CO2: 20 MMOL/L (ref 20–31)
CREAT SERPL-MCNC: 1.33 MG/DL (ref 0.7–1.2)
CREATININE URINE: 126 MG/DL (ref 39–259)
FOLATE: 18.5 NG/ML
GFR AFRICAN AMERICAN: >60 ML/MIN
GFR NON-AFRICAN AMERICAN: 53 ML/MIN
GFR SERPL CREATININE-BSD FRML MDRD: ABNORMAL ML/MIN/{1.73_M2}
GFR SERPL CREATININE-BSD FRML MDRD: ABNORMAL ML/MIN/{1.73_M2}
GLUCOSE BLD-MCNC: 124 MG/DL (ref 70–99)
HDLC SERPL-MCNC: 49 MG/DL
INR BLD: 2.3
LDL CHOLESTEROL: 28 MG/DL (ref 0–130)
MICROALBUMIN/CREAT 24H UR: 23 MG/L
MICROALBUMIN/CREAT UR-RTO: 18 MCG/MG CREAT
POTASSIUM SERPL-SCNC: 4.6 MMOL/L (ref 3.7–5.3)
PROTHROMBIN TIME: 22.9 SEC (ref 9.1–12.3)
SODIUM BLD-SCNC: 142 MMOL/L (ref 135–144)
TOTAL PROTEIN: 7 G/DL (ref 6.4–8.3)
TRIGLYCERIDE, FASTING: 88 MG/DL
VITAMIN B-12: 1094 PG/ML (ref 232–1245)
VLDLC SERPL CALC-MCNC: NORMAL MG/DL (ref 1–30)

## 2021-11-19 ENCOUNTER — TELEPHONE (OUTPATIENT)
Dept: ONCOLOGY | Age: 71
End: 2021-11-19

## 2021-11-19 DIAGNOSIS — Z87.891 PERSONAL HISTORY OF NICOTINE DEPENDENCE: Primary | ICD-10-CM

## 2021-11-19 NOTE — TELEPHONE ENCOUNTER
Our records indicate that your patient is due for their annual lung cancer screening follow up testing. For your convenience, we have pended the order for the scan for you. If you do not agree with the need for the test, please cancel the order and let us know. Sincerely,    [address] [place] [address] Screening Program    Auto printed reminder letter sent to patient.

## 2021-12-27 ENCOUNTER — APPOINTMENT (OUTPATIENT)
Dept: CT IMAGING | Age: 71
DRG: 309 | End: 2021-12-27
Payer: MEDICARE

## 2021-12-27 ENCOUNTER — HOSPITAL ENCOUNTER (INPATIENT)
Age: 71
LOS: 1 days | Discharge: ANOTHER HOSPITAL | DRG: 309 | End: 2021-12-28
Attending: EMERGENCY MEDICINE | Admitting: INTERNAL MEDICINE
Payer: MEDICARE

## 2021-12-27 ENCOUNTER — APPOINTMENT (OUTPATIENT)
Dept: GENERAL RADIOLOGY | Age: 71
DRG: 309 | End: 2021-12-27
Payer: MEDICARE

## 2021-12-27 DIAGNOSIS — R00.0 TACHYCARDIA: ICD-10-CM

## 2021-12-27 DIAGNOSIS — Z45.02 ICD (IMPLANTABLE CARDIOVERTER-DEFIBRILLATOR) DISCHARGE: Primary | ICD-10-CM

## 2021-12-27 LAB
ABSOLUTE EOS #: 0.27 K/UL (ref 0–0.44)
ABSOLUTE IMMATURE GRANULOCYTE: 0.04 K/UL (ref 0–0.3)
ABSOLUTE LYMPH #: 2.87 K/UL (ref 1.1–3.7)
ABSOLUTE MONO #: 1.24 K/UL (ref 0.1–1.2)
ANION GAP SERPL CALCULATED.3IONS-SCNC: 14 MMOL/L (ref 9–17)
BASOPHILS # BLD: 1 % (ref 0–2)
BASOPHILS ABSOLUTE: 0.07 K/UL (ref 0–0.2)
BNP INTERPRETATION: ABNORMAL
BUN BLDV-MCNC: 28 MG/DL (ref 8–23)
BUN/CREAT BLD: ABNORMAL (ref 9–20)
CALCIUM SERPL-MCNC: 9.8 MG/DL (ref 8.6–10.4)
CHLORIDE BLD-SCNC: 104 MMOL/L (ref 98–107)
CO2: 20 MMOL/L (ref 20–31)
CREAT SERPL-MCNC: 1.67 MG/DL (ref 0.7–1.2)
D-DIMER QUANTITATIVE: 0.94 MG/L FEU
DIFFERENTIAL TYPE: ABNORMAL
EOSINOPHILS RELATIVE PERCENT: 3 % (ref 1–4)
GFR AFRICAN AMERICAN: 49 ML/MIN
GFR NON-AFRICAN AMERICAN: 41 ML/MIN
GFR SERPL CREATININE-BSD FRML MDRD: ABNORMAL ML/MIN/{1.73_M2}
GFR SERPL CREATININE-BSD FRML MDRD: ABNORMAL ML/MIN/{1.73_M2}
GLUCOSE BLD-MCNC: 137 MG/DL (ref 75–110)
GLUCOSE BLD-MCNC: 147 MG/DL (ref 70–99)
GLUCOSE BLD-MCNC: 181 MG/DL (ref 75–110)
HCT VFR BLD CALC: 43.7 % (ref 40.7–50.3)
HEMOGLOBIN: 15.1 G/DL (ref 13–17)
IMMATURE GRANULOCYTES: 0 %
INR BLD: 2.2
LACTIC ACID, WHOLE BLOOD: 1.1 MMOL/L (ref 0.7–2.1)
LYMPHOCYTES # BLD: 28 % (ref 24–43)
MAGNESIUM: 1.9 MG/DL (ref 1.6–2.6)
MCH RBC QN AUTO: 34.6 PG (ref 25.2–33.5)
MCHC RBC AUTO-ENTMCNC: 34.6 G/DL (ref 28.4–34.8)
MCV RBC AUTO: 100 FL (ref 82.6–102.9)
MONOCYTES # BLD: 12 % (ref 3–12)
NRBC AUTOMATED: 0 PER 100 WBC
PARTIAL THROMBOPLASTIN TIME: 29.7 SEC (ref 20.5–30.5)
PARTIAL THROMBOPLASTIN TIME: 32.5 SEC (ref 20.5–30.5)
PDW BLD-RTO: 13.9 % (ref 11.8–14.4)
PLATELET # BLD: 171 K/UL (ref 138–453)
PLATELET ESTIMATE: ABNORMAL
PMV BLD AUTO: 10.9 FL (ref 8.1–13.5)
POTASSIUM SERPL-SCNC: 4.8 MMOL/L (ref 3.7–5.3)
POTASSIUM SERPL-SCNC: 5.3 MMOL/L (ref 3.7–5.3)
PRO-BNP: 1679 PG/ML
PROTHROMBIN TIME: 21.7 SEC (ref 9.1–12.3)
RBC # BLD: 4.37 M/UL (ref 4.21–5.77)
RBC # BLD: ABNORMAL 10*6/UL
SARS-COV-2, RAPID: NOT DETECTED
SEG NEUTROPHILS: 56 % (ref 36–65)
SEGMENTED NEUTROPHILS ABSOLUTE COUNT: 5.96 K/UL (ref 1.5–8.1)
SODIUM BLD-SCNC: 138 MMOL/L (ref 135–144)
SPECIMEN DESCRIPTION: NORMAL
TROPONIN INTERP: ABNORMAL
TROPONIN T: ABNORMAL NG/ML
TROPONIN, HIGH SENSITIVITY: 33 NG/L (ref 0–22)
TROPONIN, HIGH SENSITIVITY: 35 NG/L (ref 0–22)
TROPONIN, HIGH SENSITIVITY: 38 NG/L (ref 0–22)
TROPONIN, HIGH SENSITIVITY: 47 NG/L (ref 0–22)
TSH SERPL DL<=0.05 MIU/L-ACNC: 1.48 MIU/L (ref 0.3–5)
WBC # BLD: 10.5 K/UL (ref 3.5–11.3)
WBC # BLD: ABNORMAL 10*3/UL

## 2021-12-27 PROCEDURE — 2580000003 HC RX 258: Performed by: STUDENT IN AN ORGANIZED HEALTH CARE EDUCATION/TRAINING PROGRAM

## 2021-12-27 PROCEDURE — 84484 ASSAY OF TROPONIN QUANT: CPT

## 2021-12-27 PROCEDURE — 6360000004 HC RX CONTRAST MEDICATION: Performed by: STUDENT IN AN ORGANIZED HEALTH CARE EDUCATION/TRAINING PROGRAM

## 2021-12-27 PROCEDURE — 99285 EMERGENCY DEPT VISIT HI MDM: CPT

## 2021-12-27 PROCEDURE — 96365 THER/PROPH/DIAG IV INF INIT: CPT

## 2021-12-27 PROCEDURE — 83735 ASSAY OF MAGNESIUM: CPT

## 2021-12-27 PROCEDURE — 93005 ELECTROCARDIOGRAM TRACING: CPT | Performed by: STUDENT IN AN ORGANIZED HEALTH CARE EDUCATION/TRAINING PROGRAM

## 2021-12-27 PROCEDURE — 71260 CT THORAX DX C+: CPT

## 2021-12-27 PROCEDURE — 6370000000 HC RX 637 (ALT 250 FOR IP): Performed by: STUDENT IN AN ORGANIZED HEALTH CARE EDUCATION/TRAINING PROGRAM

## 2021-12-27 PROCEDURE — 6370000000 HC RX 637 (ALT 250 FOR IP)

## 2021-12-27 PROCEDURE — 6360000002 HC RX W HCPCS: Performed by: STUDENT IN AN ORGANIZED HEALTH CARE EDUCATION/TRAINING PROGRAM

## 2021-12-27 PROCEDURE — 2060000000 HC ICU INTERMEDIATE R&B

## 2021-12-27 PROCEDURE — 2580000003 HC RX 258: Performed by: INTERNAL MEDICINE

## 2021-12-27 PROCEDURE — 2500000003 HC RX 250 WO HCPCS: Performed by: INTERNAL MEDICINE

## 2021-12-27 PROCEDURE — 71045 X-RAY EXAM CHEST 1 VIEW: CPT

## 2021-12-27 PROCEDURE — 85025 COMPLETE CBC W/AUTO DIFF WBC: CPT

## 2021-12-27 PROCEDURE — 85379 FIBRIN DEGRADATION QUANT: CPT

## 2021-12-27 PROCEDURE — 6370000000 HC RX 637 (ALT 250 FOR IP): Performed by: INTERNAL MEDICINE

## 2021-12-27 PROCEDURE — 80048 BASIC METABOLIC PNL TOTAL CA: CPT

## 2021-12-27 PROCEDURE — 84132 ASSAY OF SERUM POTASSIUM: CPT

## 2021-12-27 PROCEDURE — 85730 THROMBOPLASTIN TIME PARTIAL: CPT

## 2021-12-27 PROCEDURE — 84443 ASSAY THYROID STIM HORMONE: CPT

## 2021-12-27 PROCEDURE — 83605 ASSAY OF LACTIC ACID: CPT

## 2021-12-27 PROCEDURE — 83880 ASSAY OF NATRIURETIC PEPTIDE: CPT

## 2021-12-27 PROCEDURE — 99223 1ST HOSP IP/OBS HIGH 75: CPT | Performed by: INTERNAL MEDICINE

## 2021-12-27 PROCEDURE — 6360000002 HC RX W HCPCS: Performed by: INTERNAL MEDICINE

## 2021-12-27 PROCEDURE — 82947 ASSAY GLUCOSE BLOOD QUANT: CPT

## 2021-12-27 PROCEDURE — 6360000002 HC RX W HCPCS

## 2021-12-27 PROCEDURE — 2580000003 HC RX 258

## 2021-12-27 PROCEDURE — 36415 COLL VENOUS BLD VENIPUNCTURE: CPT

## 2021-12-27 PROCEDURE — 85610 PROTHROMBIN TIME: CPT

## 2021-12-27 PROCEDURE — 87635 SARS-COV-2 COVID-19 AMP PRB: CPT

## 2021-12-27 RX ORDER — FUROSEMIDE 20 MG/1
20 TABLET ORAL DAILY
Status: DISCONTINUED | OUTPATIENT
Start: 2021-12-27 | End: 2021-12-28 | Stop reason: HOSPADM

## 2021-12-27 RX ORDER — HEPARIN SODIUM 1000 [USP'U]/ML
80 INJECTION, SOLUTION INTRAVENOUS; SUBCUTANEOUS PRN
Status: DISCONTINUED | OUTPATIENT
Start: 2021-12-27 | End: 2021-12-27

## 2021-12-27 RX ORDER — SODIUM CHLORIDE 0.9 % (FLUSH) 0.9 %
5-40 SYRINGE (ML) INJECTION PRN
Status: DISCONTINUED | OUTPATIENT
Start: 2021-12-27 | End: 2021-12-27

## 2021-12-27 RX ORDER — SODIUM CHLORIDE 0.9 % (FLUSH) 0.9 %
5-40 SYRINGE (ML) INJECTION EVERY 12 HOURS SCHEDULED
Status: DISCONTINUED | OUTPATIENT
Start: 2021-12-27 | End: 2021-12-28 | Stop reason: HOSPADM

## 2021-12-27 RX ORDER — ONDANSETRON 4 MG/1
4 TABLET, ORALLY DISINTEGRATING ORAL EVERY 8 HOURS PRN
Status: DISCONTINUED | OUTPATIENT
Start: 2021-12-27 | End: 2021-12-27 | Stop reason: SDUPTHER

## 2021-12-27 RX ORDER — POLYETHYLENE GLYCOL 3350 17 G/17G
17 POWDER, FOR SOLUTION ORAL DAILY PRN
Status: DISCONTINUED | OUTPATIENT
Start: 2021-12-27 | End: 2021-12-28 | Stop reason: HOSPADM

## 2021-12-27 RX ORDER — HEPARIN SODIUM 1000 [USP'U]/ML
80 INJECTION, SOLUTION INTRAVENOUS; SUBCUTANEOUS ONCE
Status: DISCONTINUED | OUTPATIENT
Start: 2021-12-27 | End: 2021-12-27

## 2021-12-27 RX ORDER — ACETAMINOPHEN 650 MG/1
650 SUPPOSITORY RECTAL EVERY 6 HOURS PRN
Status: DISCONTINUED | OUTPATIENT
Start: 2021-12-27 | End: 2021-12-28 | Stop reason: HOSPADM

## 2021-12-27 RX ORDER — ALPRAZOLAM 0.25 MG/1
0.25 TABLET ORAL EVERY 8 HOURS
Status: DISCONTINUED | OUTPATIENT
Start: 2021-12-27 | End: 2021-12-28 | Stop reason: HOSPADM

## 2021-12-27 RX ORDER — SODIUM CHLORIDE 0.9 % (FLUSH) 0.9 %
5-40 SYRINGE (ML) INJECTION PRN
Status: DISCONTINUED | OUTPATIENT
Start: 2021-12-27 | End: 2021-12-28 | Stop reason: HOSPADM

## 2021-12-27 RX ORDER — ONDANSETRON 2 MG/ML
4 INJECTION INTRAMUSCULAR; INTRAVENOUS EVERY 6 HOURS PRN
Status: DISCONTINUED | OUTPATIENT
Start: 2021-12-27 | End: 2021-12-27 | Stop reason: SDUPTHER

## 2021-12-27 RX ORDER — METOPROLOL SUCCINATE 50 MG/1
50 TABLET, EXTENDED RELEASE ORAL DAILY
Status: DISCONTINUED | OUTPATIENT
Start: 2021-12-27 | End: 2021-12-28 | Stop reason: HOSPADM

## 2021-12-27 RX ORDER — ONDANSETRON 4 MG/1
4 TABLET, ORALLY DISINTEGRATING ORAL EVERY 8 HOURS PRN
Status: DISCONTINUED | OUTPATIENT
Start: 2021-12-27 | End: 2021-12-28 | Stop reason: HOSPADM

## 2021-12-27 RX ORDER — AMIODARONE HYDROCHLORIDE 200 MG/1
200 TABLET ORAL 2 TIMES DAILY
Status: DISCONTINUED | OUTPATIENT
Start: 2021-12-27 | End: 2021-12-28 | Stop reason: HOSPADM

## 2021-12-27 RX ORDER — DEXTROSE MONOHYDRATE 25 G/50ML
12.5 INJECTION, SOLUTION INTRAVENOUS PRN
Status: DISCONTINUED | OUTPATIENT
Start: 2021-12-27 | End: 2021-12-28 | Stop reason: HOSPADM

## 2021-12-27 RX ORDER — NICOTINE POLACRILEX 4 MG
15 LOZENGE BUCCAL PRN
Status: DISCONTINUED | OUTPATIENT
Start: 2021-12-27 | End: 2021-12-28 | Stop reason: HOSPADM

## 2021-12-27 RX ORDER — SODIUM CHLORIDE 0.9 % (FLUSH) 0.9 %
5-40 SYRINGE (ML) INJECTION EVERY 12 HOURS SCHEDULED
Status: DISCONTINUED | OUTPATIENT
Start: 2021-12-27 | End: 2021-12-27 | Stop reason: SDUPTHER

## 2021-12-27 RX ORDER — ACETAMINOPHEN 325 MG/1
650 TABLET ORAL EVERY 6 HOURS PRN
Status: DISCONTINUED | OUTPATIENT
Start: 2021-12-27 | End: 2021-12-27 | Stop reason: SDUPTHER

## 2021-12-27 RX ORDER — SODIUM CHLORIDE 9 MG/ML
25 INJECTION, SOLUTION INTRAVENOUS PRN
Status: DISCONTINUED | OUTPATIENT
Start: 2021-12-27 | End: 2021-12-27

## 2021-12-27 RX ORDER — ASPIRIN 81 MG/1
81 TABLET, CHEWABLE ORAL DAILY
Status: DISCONTINUED | OUTPATIENT
Start: 2021-12-28 | End: 2021-12-28 | Stop reason: HOSPADM

## 2021-12-27 RX ORDER — ATORVASTATIN CALCIUM 40 MG/1
40 TABLET, FILM COATED ORAL NIGHTLY
Status: DISCONTINUED | OUTPATIENT
Start: 2021-12-27 | End: 2021-12-28 | Stop reason: HOSPADM

## 2021-12-27 RX ORDER — SPIRONOLACTONE 25 MG/1
25 TABLET ORAL DAILY
Status: DISCONTINUED | OUTPATIENT
Start: 2021-12-27 | End: 2021-12-28 | Stop reason: HOSPADM

## 2021-12-27 RX ORDER — ACETAMINOPHEN 650 MG/1
650 SUPPOSITORY RECTAL EVERY 6 HOURS PRN
Status: DISCONTINUED | OUTPATIENT
Start: 2021-12-27 | End: 2021-12-27 | Stop reason: SDUPTHER

## 2021-12-27 RX ORDER — HEPARIN SODIUM 1000 [USP'U]/ML
40 INJECTION, SOLUTION INTRAVENOUS; SUBCUTANEOUS PRN
Status: DISCONTINUED | OUTPATIENT
Start: 2021-12-27 | End: 2021-12-27

## 2021-12-27 RX ORDER — LIDOCAINE HYDROCHLORIDE ANHYDROUS AND DEXTROSE MONOHYDRATE .4; 5 G/100ML; G/100ML
2 INJECTION, SOLUTION INTRAVENOUS CONTINUOUS
Status: DISCONTINUED | OUTPATIENT
Start: 2021-12-27 | End: 2021-12-28 | Stop reason: HOSPADM

## 2021-12-27 RX ORDER — HEPARIN SODIUM 10000 [USP'U]/100ML
5-30 INJECTION, SOLUTION INTRAVENOUS CONTINUOUS
Status: DISCONTINUED | OUTPATIENT
Start: 2021-12-27 | End: 2021-12-27

## 2021-12-27 RX ORDER — MAGNESIUM SULFATE 1 G/100ML
1000 INJECTION INTRAVENOUS ONCE
Status: COMPLETED | OUTPATIENT
Start: 2021-12-27 | End: 2021-12-27

## 2021-12-27 RX ORDER — POLYETHYLENE GLYCOL 3350 17 G/17G
17 POWDER, FOR SOLUTION ORAL DAILY PRN
Status: DISCONTINUED | OUTPATIENT
Start: 2021-12-27 | End: 2021-12-27 | Stop reason: SDUPTHER

## 2021-12-27 RX ORDER — SODIUM CHLORIDE 9 MG/ML
25 INJECTION, SOLUTION INTRAVENOUS PRN
Status: DISCONTINUED | OUTPATIENT
Start: 2021-12-27 | End: 2021-12-28 | Stop reason: HOSPADM

## 2021-12-27 RX ORDER — METOPROLOL TARTRATE 5 MG/5ML
5 INJECTION INTRAVENOUS ONCE
Status: COMPLETED | OUTPATIENT
Start: 2021-12-27 | End: 2021-12-27

## 2021-12-27 RX ORDER — ASPIRIN 81 MG/1
324 TABLET, CHEWABLE ORAL ONCE
Status: COMPLETED | OUTPATIENT
Start: 2021-12-27 | End: 2021-12-27

## 2021-12-27 RX ORDER — ACETAMINOPHEN 325 MG/1
650 TABLET ORAL EVERY 6 HOURS PRN
Status: DISCONTINUED | OUTPATIENT
Start: 2021-12-27 | End: 2021-12-28 | Stop reason: HOSPADM

## 2021-12-27 RX ORDER — DEXTROSE MONOHYDRATE 50 MG/ML
100 INJECTION, SOLUTION INTRAVENOUS PRN
Status: DISCONTINUED | OUTPATIENT
Start: 2021-12-27 | End: 2021-12-28 | Stop reason: HOSPADM

## 2021-12-27 RX ORDER — ONDANSETRON 2 MG/ML
4 INJECTION INTRAMUSCULAR; INTRAVENOUS EVERY 6 HOURS PRN
Status: DISCONTINUED | OUTPATIENT
Start: 2021-12-27 | End: 2021-12-28 | Stop reason: HOSPADM

## 2021-12-27 RX ADMIN — LIDOCAINE HYDROCHLORIDE 2 MG/MIN: 4 INJECTION, SOLUTION INTRAVENOUS at 13:41

## 2021-12-27 RX ADMIN — AMIODARONE HYDROCHLORIDE 1 MG/MIN: 50 INJECTION, SOLUTION INTRAVENOUS at 04:16

## 2021-12-27 RX ADMIN — SODIUM CHLORIDE, PRESERVATIVE FREE 10 ML: 5 INJECTION INTRAVENOUS at 09:59

## 2021-12-27 RX ADMIN — AMIODARONE HYDROCHLORIDE 150 MG: 50 INJECTION, SOLUTION INTRAVENOUS at 14:11

## 2021-12-27 RX ADMIN — AMIODARONE HYDROCHLORIDE 0.5 MG/MIN: 50 INJECTION, SOLUTION INTRAVENOUS at 10:55

## 2021-12-27 RX ADMIN — FUROSEMIDE 20 MG: 20 TABLET ORAL at 11:19

## 2021-12-27 RX ADMIN — AMIODARONE HYDROCHLORIDE 1 MG/MIN: 50 INJECTION, SOLUTION INTRAVENOUS at 21:48

## 2021-12-27 RX ADMIN — LIDOCAINE HYDROCHLORIDE 100 MG: 20 INJECTION, SOLUTION INTRAVENOUS at 13:38

## 2021-12-27 RX ADMIN — ASPIRIN 324 MG: 81 TABLET, CHEWABLE ORAL at 03:48

## 2021-12-27 RX ADMIN — ALPRAZOLAM 0.25 MG: 0.25 TABLET ORAL at 15:40

## 2021-12-27 RX ADMIN — SODIUM CHLORIDE, PRESERVATIVE FREE 10 ML: 5 INJECTION INTRAVENOUS at 09:28

## 2021-12-27 RX ADMIN — SPIRONOLACTONE 25 MG: 25 TABLET ORAL at 11:19

## 2021-12-27 RX ADMIN — SODIUM CHLORIDE, PRESERVATIVE FREE 10 ML: 5 INJECTION INTRAVENOUS at 20:52

## 2021-12-27 RX ADMIN — MAGNESIUM SULFATE HEPTAHYDRATE 1000 MG: 1 INJECTION, SOLUTION INTRAVENOUS at 16:11

## 2021-12-27 RX ADMIN — DESMOPRESSIN ACETATE 40 MG: 0.2 TABLET ORAL at 20:52

## 2021-12-27 RX ADMIN — IOPAMIDOL 85 ML: 755 INJECTION, SOLUTION INTRAVENOUS at 06:14

## 2021-12-27 RX ADMIN — AMIODARONE HYDROCHLORIDE 150 MG: 50 INJECTION, SOLUTION INTRAVENOUS at 04:03

## 2021-12-27 RX ADMIN — METOPROLOL SUCCINATE 50 MG: 50 TABLET, FILM COATED, EXTENDED RELEASE ORAL at 11:19

## 2021-12-27 RX ADMIN — SACUBITRIL AND VALSARTAN 1 TABLET: 24; 26 TABLET, FILM COATED ORAL at 22:05

## 2021-12-27 RX ADMIN — SACUBITRIL AND VALSARTAN 1 TABLET: 24; 26 TABLET, FILM COATED ORAL at 10:56

## 2021-12-27 RX ADMIN — METOPROLOL TARTRATE 5 MG: 5 INJECTION INTRAVENOUS at 16:11

## 2021-12-27 ASSESSMENT — ENCOUNTER SYMPTOMS
PHOTOPHOBIA: 0
DIARRHEA: 0
NAUSEA: 0
VOMITING: 0
CONSTIPATION: 0
EYES NEGATIVE: 1
WHEEZING: 0
RESPIRATORY NEGATIVE: 1
TROUBLE SWALLOWING: 0
ABDOMINAL PAIN: 0
BLOOD IN STOOL: 0
SHORTNESS OF BREATH: 0
BACK PAIN: 0
GASTROINTESTINAL NEGATIVE: 1
VOICE CHANGE: 0
RHINORRHEA: 0
COUGH: 0

## 2021-12-27 ASSESSMENT — HEART SCORE: ECG: 2

## 2021-12-27 NOTE — ED NOTES
Pt moved to ED 14 via stretcher by EMS. Pt is a/o x4. Pt states that he was sleeping when his defibrillator fired x3. Pt states that he does not have CP or SOB. Pt states it has never fired since being implanted. Pt placed on cardiac monitor. EKG and labs obtained. Call light provided. Will continue to monitor.      Dionisio Vega RN  12/27/21 3569

## 2021-12-27 NOTE — PROGRESS NOTES
Pt arrived to unit at 1310. Pt shocked by defibrillator shortly after arrival and shortly after that,  Pt then shocked again at [phone] and 1350. Pt given Lido bolus 100mg at 1345 and gtt started at 2 following. Pt denies any discomfort other than the discomfort from the shocks. Monitoring, Dr. Xochitl Andress aware and coming to bedside per cardiology resident.

## 2021-12-27 NOTE — ED NOTES
Bed: 14  Expected date:   Expected time:   Means of arrival:   Comments:  Magdalena Almaguer RN  12/27/21 9980

## 2021-12-27 NOTE — Clinical Note
Patient Class: Inpatient [101]   REQUIRED: Diagnosis: ICD (implantable cardioverter-defibrillator) discharge [594548]   Estimated Length of Stay: Estimated stay of less than 2 midnights   Telemetry/Cardiac Monitoring Required?: Yes

## 2021-12-27 NOTE — CONSULTS
(shortness of breath), Tendonitis, Achilles, left, Unstable angina ([address] and Ventricular tachyarrhythmia ([address] Past Surgical History:   has a past surgical history that includes Cardiac defibrillator placement; Cardiac catheterization; Cardiac defibrillator placement (Left, 9/2014); and Colonoscopy (3/2015). Home Medications:    Prior to Admission medications    Medication Sig Start Date End Date Taking? Authorizing Provider   metFORMIN (GLUCOPHAGE) 500 MG tablet Take 1 tablet by mouth 2 times daily (with meals) 5/17/21   Bhavani Siddiqui MD   furosemide (LASIX) 20 MG tablet TAKE 1 TABLET DAILY 1/7/21   Bhavani Siddiqui MD   blood glucose test strips (ASCENSIA AUTODISC VI;ONE TOUCH ULTRA TEST VI) strip 1 each by In Vitro route 3 times daily Use with associated glucose meter. 2/13/20   Bhavani Siddiqui MD   warfarin (COUMADIN) 2.5 MG tablet  12/12/19   Historical Provider, MD   ENTRESTO 24-26 MG per tablet 1 tablet 2 times daily  8/22/19   Historical Provider, MD   TOPROL XL 50 MG extended release tablet Take 50 mg by mouth daily  8/20/19   Historical Provider, MD   spironolactone (ALDACTONE) 25 MG tablet Take 25 mg by mouth daily    Historical Provider, MD   atorvastatin (LIPITOR) 40 MG tablet TAKE 1 TABLET DAILY 7/22/19   BRENDAN Dominique - CNP   amiodarone (CORDARONE) 200 MG tablet Take 2 tablets PO BID x3 days then take 1 tablet PO BID thereafter 12/10/18   Vilma Viramontes MD   Multiple Vitamin (MULTIVITAMIN) tablet Take 1 tablet by mouth daily 12/11/18   Ishmael Hall MD   aspirin 81 MG tablet Take 81 mg by mouth daily. Historical Provider, MD       Allergies:  Patient has no known allergies. Social History:   reports that he has been smoking cigarettes. He started smoking about 63 years ago. He has a 58.00 pack-year smoking history. He has never used smokeless tobacco. He reports current alcohol use. He reports that he does not use drugs.      Family History:   Positive for early CAD    REVIEW OF SYSTEMS:    · Constitutional: there has been no unanticipated weight loss. There's been No change in energy level, No change in activity level. · Eyes: No visual changes or diplopia. No scleral icterus. · ENT: No Headaches, hearing loss or vertigo. No mouth sores or sore throat. · Cardiovascular: No problem  · Respiratory: No previous reported problems  · Gastrointestinal: No abdominal pain, appetite loss, blood in stools. No change in bowel or bladder habits. · Genitourinary: No dysuria, trouble voiding, or hematuria. · Musculoskeletal:  No gait disturbance, No weakness or joint complaints. · Integumentary: No rash or pruritis. · Neurological: No headache, diplopia, change in muscle strength, numbness or tingling. No change in gait, balance, coordination, mood, affect, memory, mentation, behavior. · Psychiatric: No anxiety, or depression. · Endocrine: No temperature intolerance. No excessive thirst, fluid intake, or urination. No tremor. · Hematologic/Lymphatic: No abnormal bruising or bleeding, blood clots or swollen lymph nodes. · Allergic/Immunologic: No nasal congestion or hives. PHYSICAL EXAM:    Physical Examination:    BP 91/76   Pulse 118   Temp 98.1 °F (36.7 °C) (Oral)   Resp 18   Ht 6' 1\" (1.854 m)   Wt 254 lb 13.6 oz (115.6 kg)   SpO2 91%   BMI 33.62 kg/m²    Constitutional and General Appearance: alert, cooperative, no distress and appears stated age  HEENT: PERRL, no cervical lymphadenopathy. No masses palpable. Normal oral mucosa  Respiratory:  · Normal excursion and expansion without use of accessory muscles  · Resp Auscultation: Good respiratory effort. No for increased work of breathing.  On auscultation: clear to auscultation bilaterally  Cardiovascular:  · The apical impulse is not displaced  · Heart tones are crisp and normal. regular S1 and S2. Murmurs: None  · Jugular venous pulsation Normal  · The carotid upstroke is normal in amplitude and contour without delay or bruit  · Peripheral pulses are symmetrical and full   Abdomen:  · No masses or tenderness  · Bowel sounds present  Extremities:  ·  No Cyanosis or Clubbing  ·  Lower extremity edema: None  ·  Skin: Warm and dry; ICD site atraumatic with no erythema or swelling  Neurological:  · Alert and oriented. · Moves all extremities well  · No abnormalities of mood, affect, memory, mentation, or behavior are noted    DATA:    Diagnostics:      EKG: VT, 118 bpm      CARDIAC CATHETERIZATION ( 9/9/14)  LMCA: distal 20%     LAD: has ostial/proximal 100% occlusion . It gets left to left and right to  left collaterals  The D1 is seen via collaterals.     LCx: The distal LCX/LPL is occluded and seen via collaterals. The proximal  and mid portion is normal.  The OM1 is normal.     RCA: is normal.     Ramus: is normal.      Coronary Tree      Dominance: Right      2D ECHO ( 6/3/19)  Summary  Contrast (Definity) was utilized on this technically difficult study. Small Apical thrombus remains in left ventricle. Appears smaller than  visualized in previous study of 3/2019. Severely reduced LV systolic function EF 43% with aneurysmal apex. Labs:     CBC:   Recent Labs     12/27/21  0344   WBC 10.5   HGB 15.1   HCT 43.7        BMP:   Recent Labs     12/27/21  0344 12/27/21  1344     --    K 5.3 4.8   CO2 20  --    BUN 28*  --    CREATININE 1.67*  --    LABGLOM 41*  --    GLUCOSE 147*  --      BNP: No results for input(s): BNP in the last 72 hours. PT/INR:   Recent Labs     12/27/21  0344   PROTIME 21.7*   INR 2.2     APTT:  Recent Labs     12/27/21  0935   APTT 32.5*     CARDIAC ENZYMES:No results for input(s): CKTOTAL, CKMB, CKMBINDEX, TROPONINI in the last 72 hours. FASTING LIPID PANEL:  Lab Results   Component Value Date    HDL 49 11/11/2021    TRIG 76 08/03/2020     LIVER PROFILE:No results for input(s): AST, ALT, LABALBU in the last 72 hours. IMPRESSION:    1.  Incessant slow ventricular tachycardia, CL 500-520 msec, hemodynamically stable with no CHF  2. Ischemic cardiomyopathy, LVEF 20%; h/o anteroseptal infarction with cardiac catheterization in 2014 showing chronic total occlusion of the proximal LAD and distal LCX, with right-to-left and left-to-left collateral circulation  3. ICD in-situ ( Medtronic Evera XT VR) implanted 2014; functioning normally on evaluation  4. H/o LV apical aneurysm and thrombus, on chronic warfarin ( on hold)  5. Essential HTN  6. Type II DM    Patient Active Problem List   Diagnosis    [facility]    Alcohol abuse    Smoking    CHF (congestive heart failure) (HCC)    COPD (chronic obstructive pulmonary disease) ([facility]    Morbid obesity, unspecified obesity type ([address]    Shortness of breath    Ischemic cardiomyopathy    SOB (shortness of breath)    Anxiety    Hoarseness    Colon polyps    Back pain    Essential hypertension    Type 2 diabetes mellitus without complication, without long-term current use of insulin ([address]    V tach ([address]    MADISON (acute kidney injury) ([address]    ICD (implantable cardioverter-defibrillator) discharge       RECOMMENDATIONS:  1. Continue IV amiodarone 1 mg/min  2. Continue IV lidocaine 2 mg/min  3. Will continue Toprol 50 mg daily and give additional 5 mg IV metoprolol now. 4. Continue ICD detection in three zones, with lowest zone rate cut off increased to 120 msec with initial burst and then ramp ATP. 5. Xanax 0.25 mg q 8 hrs. 6. Will contact [facility] for consideration of VT ablation. Discussed with patient, son and Nursing staff.     Electronically signed by Jose Maria Dolan MD on 12/27/2021 at 3:57 PM.  [facility] cardiology Consultant

## 2021-12-27 NOTE — ED NOTES
Pt resting comfortably, call bell within reach. Pt has no complaints. Side rails up x2.  VSS Clovis Siemens, RN  12/27/21 3499

## 2021-12-27 NOTE — ED PROVIDER NOTES
[address] [facility]  Emergency Department  Faculty Sign-Out Addendum     Care of Camilo Richardson was assumed from previous attending and is being seen for Chest Pain (defib fired x3 denies CP, just from defib firing)  . The patient's initial evaluation and plan have been discussed with the prior provider who initially evaluated the patient. Attestation    I was available and discussed any additional care issues that arose and coordinated the management plans with the resident(s) caring for the patient during my duty period. Any areas of disagreement with residents documentation of care or procedures are noted on the chart. I was personally present for the key portions of any/all procedures during my duty period. I have documented in the chart those procedures where I was not present during the key portions.     EMERGENCY DEPARTMENT COURSE / MEDICAL DECISION MAKING:       MEDICATIONS GIVEN:  Orders Placed This Encounter   Medications    aspirin chewable tablet 324 mg    FOLLOWED BY Linked Order Group     amiodarone (CORDARONE) 150 mg in dextrose 5 % 100 mL bolus     amiodarone (CORDARONE) 450 mg in dextrose 5 % 250 mL infusion     amiodarone (CORDARONE) 450 mg in dextrose 5 % 250 mL infusion    iopamidol (ISOVUE-370) 76 % injection 85 mL       LABS / RADIOLOGY:     Labs Reviewed   TROPONIN - Abnormal; Notable for the following components:       Result Value    Troponin, High Sensitivity 33 (*)     All other components within normal limits   TROPONIN - Abnormal; Notable for the following components:    Troponin, High Sensitivity 35 (*)     All other components within normal limits   BRAIN NATRIURETIC PEPTIDE - Abnormal; Notable for the following components:    Pro-BNP 1,679 (*)     All other components within normal limits   CBC WITH AUTO DIFFERENTIAL - Abnormal; Notable for the following components:    MCH 34.6 (*)     Absolute Mono # 1.24 (*)     All other components within normal limits   BASIC METABOLIC PANEL W/ REFLEX TO MG FOR LOW K - Abnormal; Notable for the following components:    Glucose 147 (*)     BUN 28 (*)     CREATININE 1.67 (*)     GFR Non- 41 (*)     GFR  49 (*)     All other components within normal limits   PROTIME-INR - Abnormal; Notable for the following components:    Protime 21.7 (*)     All other components within normal limits   COVID-19, RAPID   D-DIMER, QUANTITATIVE       XR CHEST PORTABLE    Result Date: 12/27/2021  EXAMINATION: ONE XRAY VIEW OF THE CHEST 12/27/2021 3:46 am COMPARISON: 06/08/2020 HISTORY: ORDERING SYSTEM PROVIDED HISTORY: tachy, concern MI TECHNOLOGIST PROVIDED HISTORY: tachy, concern MI Reason for Exam: Upright port, MI FINDINGS: Moderate cardiomegaly new since the comparison study. Pulmonary vasculature appears normal.  The lungs are clear with low lung volumes. No pneumothorax or pleural effusion. Surrounding osseous and soft tissue structures are noted for an ICD. Moderate cardiomegaly new since the comparison study of 06/08/2020. CT CHEST PULMONARY EMBOLISM W CONTRAST    Result Date: 12/27/2021  EXAMINATION: CTA OF THE CHEST 12/27/2021 6:05 am TECHNIQUE: CTA of the chest was performed after the administration of intravenous contrast.  Multiplanar reformatted images are provided for review. MIP images are provided for review. Dose modulation, iterative reconstruction, and/or weight based adjustment of the mA/kV was utilized to reduce the radiation dose to as low as reasonably achievable.  COMPARISON: 11/18/2020 HISTORY: ORDERING SYSTEM PROVIDED HISTORY: hypotension, ICD firing TECHNOLOGIST PROVIDED HISTORY: hypotension, ICD firing Decision Support Exception - unselect if not a suspected or confirmed emergency medical condition->Emergency Medical Condition (MA) FINDINGS: Pulmonary Arteries: Pulmonary arteries are adequately opacified for evaluation but evaluation is somewhat limited due to respiratory motion artifact. No evidence of intraluminal filling defect to suggest pulmonary embolism. Main pulmonary artery is normal in caliber. Mediastinum: No evidence of mediastinal lymphadenopathy. The heart and pericardium demonstrate no acute abnormality. There is no acute abnormality of the thoracic aorta. Lungs/pleura: The lungs are without acute process. No focal consolidation or pulmonary edema. There appears to be mild emphysema. Hypoventilatory changes are noted. No evidence of pleural effusion or pneumothorax. Upper Abdomen: Limited images of the upper abdomen are unremarkable. Soft Tissues/Bones: No acute bone or soft tissue abnormality. No evidence of pulmonary embolism or acute pulmonary abnormality. Evaluation somewhat limited due to respiratory motion artifact. Mild COPD. RECOMMENDATIONS: Unavailable       RECENT VITALS:     Temp: 98.5 °F (36.9 °C),  Pulse: 114, Resp: 18, BP: 104/77, SpO2: 94 %    Dale Butts is a 70 y.o. male who presents with complaint of chest pain. Defibrillator fired multiple times today for V. tach. Interventional cardiology has reviewed EKGs, wishes to start amiodarone. Admission. OUTSTANDING TASKS / RECOMMENDATIONS:    1.  Disposition made by previous attending and nothing to do      Brock Rosales MD, Amanda Avalos  Attending Emergency Physician  [address]  ED       Rashid Crum MD  12/27/21 2657

## 2021-12-27 NOTE — ED NOTES
RN noted pt going into VTAC and BP hypotensive.  Pts ICD fired , pt now in sinus tachycardia 115- /80- RN increased amiodarone to 33cc/hr   Darylene Ends, RN  RN perfect served cardiology  12/27/21 [address] [place] [address], RN  12/27/21 [address] [place] [address], RN  12/27/21 1228

## 2021-12-27 NOTE — ED PROVIDER NOTES
[facility] ED  Emergency Department  Emergency Medicine Resident Sign-out     Care of Laureen Orr was assumed from Dr. Young Gamino and is being seen for Chest Pain (defib fired x3 denies CP, just from defib firing)  . The patient's initial evaluation and plan have been discussed with the prior provider who initially evaluated the patient.      EMERGENCY DEPARTMENT COURSE / MEDICAL DECISION MAKING:       MEDICATIONS GIVEN:  Orders Placed This Encounter   Medications    aspirin chewable tablet 324 mg    FOLLOWED BY Linked Order Group     amiodarone (CORDARONE) 150 mg in dextrose 5 % 100 mL bolus     amiodarone (CORDARONE) 450 mg in dextrose 5 % 250 mL infusion     amiodarone (CORDARONE) 450 mg in dextrose 5 % 250 mL infusion    iopamidol (ISOVUE-370) 76 % injection 85 mL       LABS / RADIOLOGY:     Labs Reviewed   TROPONIN - Abnormal; Notable for the following components:       Result Value    Troponin, High Sensitivity 33 (*)     All other components within normal limits   TROPONIN - Abnormal; Notable for the following components:    Troponin, High Sensitivity 35 (*)     All other components within normal limits   BRAIN NATRIURETIC PEPTIDE - Abnormal; Notable for the following components:    Pro-BNP 1,679 (*)     All other components within normal limits   CBC WITH AUTO DIFFERENTIAL - Abnormal; Notable for the following components:    MCH 34.6 (*)     Absolute Mono # 1.24 (*)     All other components within normal limits   BASIC METABOLIC PANEL W/ REFLEX TO MG FOR LOW K - Abnormal; Notable for the following components:    Glucose 147 (*)     BUN 28 (*)     CREATININE 1.67 (*)     GFR Non- 41 (*)     GFR  49 (*)     All other components within normal limits   PROTIME-INR - Abnormal; Notable for the following components:    Protime 21.7 (*)     All other components within normal limits   COVID-19, RAPID   D-DIMER, QUANTITATIVE       XR CHEST PORTABLE    Result Date: 12/27/2021  EXAMINATION: ONE XRAY VIEW OF THE CHEST 12/27/2021 3:46 am COMPARISON: 06/08/2020 HISTORY: ORDERING SYSTEM PROVIDED HISTORY: tachy, concern MI TECHNOLOGIST PROVIDED HISTORY: tachy, concern MI Reason for Exam: Upright port, MI FINDINGS: Moderate cardiomegaly new since the comparison study. Pulmonary vasculature appears normal.  The lungs are clear with low lung volumes. No pneumothorax or pleural effusion. Surrounding osseous and soft tissue structures are noted for an ICD. Moderate cardiomegaly new since the comparison study of 06/08/2020. CT CHEST PULMONARY EMBOLISM W CONTRAST    Result Date: 12/27/2021  EXAMINATION: CTA OF THE CHEST 12/27/2021 6:05 am TECHNIQUE: CTA of the chest was performed after the administration of intravenous contrast.  Multiplanar reformatted images are provided for review. MIP images are provided for review. Dose modulation, iterative reconstruction, and/or weight based adjustment of the mA/kV was utilized to reduce the radiation dose to as low as reasonably achievable. COMPARISON: 11/18/2020 HISTORY: ORDERING SYSTEM PROVIDED HISTORY: hypotension, ICD firing TECHNOLOGIST PROVIDED HISTORY: hypotension, ICD firing Decision Support Exception - unselect if not a suspected or confirmed emergency medical condition->Emergency Medical Condition (MA) FINDINGS: Pulmonary Arteries: Pulmonary arteries are adequately opacified for evaluation but evaluation is somewhat limited due to respiratory motion artifact. No evidence of intraluminal filling defect to suggest pulmonary embolism. Main pulmonary artery is normal in caliber. Mediastinum: No evidence of mediastinal lymphadenopathy. The heart and pericardium demonstrate no acute abnormality. There is no acute abnormality of the thoracic aorta. Lungs/pleura: The lungs are without acute process. No focal consolidation or pulmonary edema. There appears to be mild emphysema. Hypoventilatory changes are noted.   No evidence of pleural effusion or pneumothorax. Upper Abdomen: Limited images of the upper abdomen are unremarkable. Soft Tissues/Bones: No acute bone or soft tissue abnormality. No evidence of pulmonary embolism or acute pulmonary abnormality. Evaluation somewhat limited due to respiratory motion artifact. Mild COPD. RECOMMENDATIONS: Unavailable       RECENT VITALS:     Temp: 98.5 °F (36.9 °C),  Pulse: 114, Resp: 18, BP: 104/77, SpO2: 94 %    This patient is a 70 y.o. Male with 3 ICD shocks at waking him from sleep. Patient without chest pain. Found to be in V. tach. Cardiology consulted as STEMI alert due to new LBBB and depression in V2 on initial EKG. Cardiology started patient on amiodarone bolus and drip. Patient continues to be hypotensive however he is on amiodarone drip. Given high risk and elevated D-dimer CT PE negative. OUTSTANDING TASKS / RECOMMENDATIONS:    1. Admission to internal medicine     FINAL IMPRESSION:     1. ICD (implantable cardioverter-defibrillator) discharge    2. Tachycardia        DISPOSITION:         DISPOSITION:  []  Discharge   []  Transfer -    [x]  Admission - Internal medicine   []  Against Medical Advice   []  Eloped   FOLLOW-UP: No follow-up provider specified.    DISCHARGE MEDICATIONS: New Prescriptions    No medications on file           Hector Gilmore MD  Emergency Medicine Resident  [facility]        Hector Gilmore MD  Resident  12/27/21 6396

## 2021-12-27 NOTE — ED PROVIDER NOTES
[facility] ED  Emergency Department Encounter  Emergency Medicine Resident     Pt Name: Ion Cline  MRN: [id number]  [place] 1950  Date of evaluation: 12/27/21  PCP:  Lokesh Putnam MD    This patient was evaluated in the Emergency Department for symptoms described in the history of present illness. The patient was evaluated in the context of the global COVID-19 pandemic, which necessitated consideration that the patient might be at risk for infection with the SARS-CoV-2 virus that causes COVID-19. Institutional protocols and algorithms that pertain to the evaluation of patients at risk for COVID-19 are in a state of rapid change based on information released by regulatory bodies including the CDC and federal and state organizations. These policies and algorithms were followed during the patient's care in the ED. CHIEF COMPLAINT       Chief Complaint   Patient presents with    Chest Pain     defib fired x3 denies CP, just from defib firing     HISTORY OFPRESENT ILLNESS  (Location/Symptom, Timing/Onset, Context/Setting, Quality, Duration, Modifying Factors,Severity.)      Ion Cline is a 70 y.o. male who presents after having woken up to 3 shocks of his ICD. Patient states that he has not had any chest pain. Denies any recent illness or sick symptoms. He does have history of tachyarrhythmias, for which he was \"in and out\" of the CCU, although he thinks it has been years since his last admission. He states since the ICD was placed, he has not had it fire/shock. Patient denies any fevers, headaches, vision changes, chest pain, shortness of breath, cough, congestion, runny nose, abdominal pain, nausea, vomiting, or diarrhea. 12/3/2018- admitted for [facility] which necessitated synchronized cardioversion, 2 episodes occurred on inpatient unit while patient getting up to use restroom, first self resolved, second needed cardioversion. Discharged on amiodarone and mexiletine. PAST MEDICAL / SURGICAL / SOCIAL / FAMILY HISTORY      has a past medical history of Alcohol abuse, Anxiety, CHF (congestive heart failure) ([facility] Colon polyps, COPD [facility]ch[facility] disease[facility] Diabetes mellitus ([facility] Dupuytren contracture, Hypertension, ICD (implantable cardioverter-defibrillator) battery depletion, Ischemic cardiomyopathy, Obesity, SOB (shortness of breath), Tendonitis, Achilles, left, Unstable angina ([facility] and Ventricular tachyarrhythmia ([facility] has a past surgical history that includes Cardiac defibrillator placement; Cardiac catheterization; Cardiac defibrillator placement (Left, 2014); and Colonoscopy (3/2015). Social History     Socioeconomic History    Marital status:      Spouse name: Not on file    Number of children: Not on file    Years of education: Not on file    Highest education level: Not on file   Occupational History    Not on file   Tobacco Use    Smoking status: Current Every Day Smoker     Packs/day: 1.00     Years: 58.00     Pack years: 58.00     Types: Cigarettes     Start date: 1959     Last attempt to quit: 2014     Years since quittin.3    Smokeless tobacco: Never Used    Tobacco comment: There have been smokeless periods during lifetime   Substance and Sexual Activity    Alcohol use: Yes     Alcohol/week: 0.0 standard drinks     Comment: NO ALCOHOL SINCE 2018    Drug use: No    Sexual activity: Not on file   Other Topics Concern    Not on file   Social History Narrative    Not on file     Social Determinants of Health     Financial Resource Strain: Medium Risk    Difficulty of Paying Living Expenses: Somewhat hard   Food Insecurity: No Food Insecurity    Worried About Running Out of Food in the Last Year: Never true    Jae of Food in the Last Year: Never true   Transportation Needs:     Lack of Transportation (Medical): Not on file    Lack of Transportation (Non-Medical):  Not on file   Physical Activity:     Days of Exercise per Week: Not on file    Minutes of Exercise per Session: Not on file   Stress:     Feeling of Stress : Not on file   Social Connections:     Frequency of Communication with Friends and Family: Not on file    Frequency of Social Gatherings with Friends and Family: Not on file    Attends Jew Services: Not on file    Active Member of [address] [place] [address] [employer] or Organizations: Not on file    Attends Club or Organization Meetings: Not on file    Marital Status: Not on file   Intimate Partner Violence:     Fear of Current or Ex-Partner: Not on file    Emotionally Abused: Not on file    Physically Abused: Not on file    Sexually Abused: Not on file   Housing Stability:     Unable to Pay for Housing in the Last Year: Not on file    Number of [place] in the Last Year: Not on file    Unstable Housing in the Last Year: Not on file     Family History   Problem Relation Age of Onset    Heart Disease Father     High Blood Pressure Father     Diabetes Father     Heart Disease Maternal Aunt     Heart Disease Maternal Uncle     Heart Disease Paternal Aunt     Heart Disease Paternal Uncle      Allergies:  Patient has no known allergies. Home Medications:  Prior to Admission medications    Medication Sig Start Date End Date Taking? Authorizing Provider   metFORMIN (GLUCOPHAGE) 500 MG tablet Take 1 tablet by mouth 2 times daily (with meals) 5/17/21   Matty Baker MD   furosemide (LASIX) 20 MG tablet TAKE 1 TABLET DAILY 1/7/21   Matty Baker MD   blood glucose test strips (ASCENSIA AUTODISC VI;ONE TOUCH ULTRA TEST VI) strip 1 each by In Vitro route 3 times daily Use with associated glucose meter.  2/13/20   Matty Baker MD   warfarin (COUMADIN) 2.5 MG tablet  12/12/19   Historical Provider, MD   ENTRESTO 24-26 MG per tablet 1 tablet 2 times daily  8/22/19   Historical Provider, MD   TOPROL XL 50 MG extended release tablet Take 50 mg by mouth daily  8/20/19   Historical Provider, MD spironolactone (ALDACTONE) 25 MG tablet Take 25 mg by mouth daily    Historical Provider, MD   atorvastatin (LIPITOR) 40 MG tablet TAKE 1 TABLET DAILY 7/22/19   BRENDAN Asencio - CNP   amiodarone (CORDARONE) 200 MG tablet Take 2 tablets PO BID x3 days then take 1 tablet PO BID thereafter 12/10/18   Helena Kruse MD   Multiple Vitamin (MULTIVITAMIN) tablet Take 1 tablet by mouth daily 12/11/18   Ishmael Hall MD   aspirin 81 MG tablet Take 81 mg by mouth daily. Historical Provider, MD     REVIEW OF SYSTEMS    (2-9 systems for level 4, 10 or more for level 5)      Review of Systems   Constitutional: Negative for activity change, appetite change and diaphoresis. HENT: Negative for congestion and rhinorrhea. Eyes: Negative for visual disturbance. Respiratory: Negative for cough and shortness of breath. Cardiovascular: Positive for leg swelling. Negative for chest pain. Gastrointestinal: Negative for abdominal pain, diarrhea, nausea and vomiting. Genitourinary: Negative for dysuria. Musculoskeletal: Negative for myalgias. Skin: Negative for wound. Neurological: Negative for headaches. PHYSICAL EXAM   (up to 7 for level 4, 8 or more for level 5)     INITIAL VITALS:    height is 6' 1\" (1.854 m) and weight is 254 lb (115.2 kg). His oral temperature is 98.5 °F (36.9 °C). His blood pressure is 104/77 and his pulse is 114. His respiration is 18 and oxygen saturation is 94%. Physical Exam  Constitutional:       General: He is not in acute distress. Appearance: Normal appearance. He is obese. He is not ill-appearing. HENT:      Head: Normocephalic and atraumatic. Mouth/Throat:      Mouth: Mucous membranes are moist.   Eyes:      Extraocular Movements: Extraocular movements intact. Pupils: Pupils are equal, round, and reactive to light. Cardiovascular:      Rate and Rhythm: Tachycardia present. Pulses: Normal pulses.       Heart sounds: No murmur heard.      Pulmonary:      Effort: Pulmonary effort is normal. No respiratory distress. Breath sounds: Normal breath sounds. Chest:      Chest wall: No tenderness. Abdominal:      General: There is no distension. Palpations: Abdomen is soft. Tenderness: There is no abdominal tenderness. Musculoskeletal:         General: Normal range of motion. Cervical back: Normal range of motion. No tenderness. Right lower leg: Edema present. Left lower leg: Edema present. Lymphadenopathy:      Cervical: No cervical adenopathy. Skin:     General: Skin is warm. Capillary Refill: Capillary refill takes less than 2 seconds. Findings: No rash. Neurological:      General: No focal deficit present. Mental Status: He is alert and oriented to person, place, and time. Cranial Nerves: No cranial nerve deficit. DIFFERENTIAL  DIAGNOSIS     PLAN (LABS / IMAGING / EKG):  Orders Placed This Encounter   Procedures    COVID-19, Rapid    XR CHEST PORTABLE    CT CHEST PULMONARY EMBOLISM W CONTRAST    Troponin    Brain Natriuretic Peptide    CBC Auto Differential    Basic Metabolic Panel w/ Reflex to MG    Protime-INR    D-Dimer, Quantitative    Inpatient consult to Hospitalist    Inpatient consult to Internal Medicine    EKG 12 Lead    EKG 12 Lead     MEDICATIONS ORDERED:  Orders Placed This Encounter   Medications    aspirin chewable tablet 324 mg    FOLLOWED BY Linked Order Group     amiodarone (CORDARONE) 150 mg in dextrose 5 % 100 mL bolus     amiodarone (CORDARONE) 450 mg in dextrose 5 % 250 mL infusion     amiodarone (CORDARONE) 450 mg in dextrose 5 % 250 mL infusion    iopamidol (ISOVUE-370) 76 % injection 85 mL     DDX: VTAC, STEMI, NSTEMI, dysrhythmia, PE     Initial MDM/Plan: 70 y.o. male who presents with ICD firing 3 times, waking him from sleep. Denies chest pain, diaphoresis or shortness of breath.  Initial EKG concerning for STEMI due to >1mm depression in V2 with new LBBB. Paged out at STEMI alert. Cardiology recommended amiodarone bolus and drip, repeat EKG in 30 minutes, trops, and ICD interrogation. DIAGNOSTIC RESULTS / EMERGENCY DEPARTMENT COURSE / MDM     LABS:  Labs Reviewed   TROPONIN - Abnormal; Notable for the following components:       Result Value    Troponin, High Sensitivity 33 (*)     All other components within normal limits   TROPONIN - Abnormal; Notable for the following components:    Troponin, High Sensitivity 35 (*)     All other components within normal limits   BRAIN NATRIURETIC PEPTIDE - Abnormal; Notable for the following components:    Pro-BNP 1,679 (*)     All other components within normal limits   CBC WITH AUTO DIFFERENTIAL - Abnormal; Notable for the following components:    MCH 34.6 (*)     Absolute Mono # 1.24 (*)     All other components within normal limits   BASIC METABOLIC PANEL W/ REFLEX TO MG FOR LOW K - Abnormal; Notable for the following components:    Glucose 147 (*)     BUN 28 (*)     CREATININE 1.67 (*)     GFR Non- 41 (*)     GFR  49 (*)     All other components within normal limits   PROTIME-INR - Abnormal; Notable for the following components:    Protime 21.7 (*)     All other components within normal limits   COVID-19, RAPID   D-DIMER, QUANTITATIVE     RADIOLOGY:  XR CHEST PORTABLE    Result Date: 12/27/2021  EXAMINATION: ONE XRAY VIEW OF THE CHEST 12/27/2021 3:46 am COMPARISON: 06/08/2020 HISTORY: ORDERING SYSTEM PROVIDED HISTORY: tachy, concern MI TECHNOLOGIST PROVIDED HISTORY: tachy, concern MI Reason for Exam: Upright port, MI FINDINGS: Moderate cardiomegaly new since the comparison study. Pulmonary vasculature appears normal.  The lungs are clear with low lung volumes. No pneumothorax or pleural effusion. Surrounding osseous and soft tissue structures are noted for an ICD. Moderate cardiomegaly new since the comparison study of 06/08/2020.      CT CHEST PULMONARY EMBOLISM W CONTRAST    Result Date: 12/27/2021  EXAMINATION: CTA OF THE CHEST 12/27/2021 6:05 am TECHNIQUE: CTA of the chest was performed after the administration of intravenous contrast.  Multiplanar reformatted images are provided for review. MIP images are provided for review. Dose modulation, iterative reconstruction, and/or weight based adjustment of the mA/kV was utilized to reduce the radiation dose to as low as reasonably achievable. COMPARISON: 11/18/2020 HISTORY: ORDERING SYSTEM PROVIDED HISTORY: hypotension, ICD firing TECHNOLOGIST PROVIDED HISTORY: hypotension, ICD firing Decision Support Exception - unselect if not a suspected or confirmed emergency medical condition->Emergency Medical Condition (MA) FINDINGS: Pulmonary Arteries: Pulmonary arteries are adequately opacified for evaluation but evaluation is somewhat limited due to respiratory motion artifact. No evidence of intraluminal filling defect to suggest pulmonary embolism. Main pulmonary artery is normal in caliber. Mediastinum: No evidence of mediastinal lymphadenopathy. The heart and pericardium demonstrate no acute abnormality. There is no acute abnormality of the thoracic aorta. Lungs/pleura: The lungs are without acute process. No focal consolidation or pulmonary edema. There appears to be mild emphysema. Hypoventilatory changes are noted. No evidence of pleural effusion or pneumothorax. Upper Abdomen: Limited images of the upper abdomen are unremarkable. Soft Tissues/Bones: No acute bone or soft tissue abnormality. No evidence of pulmonary embolism or acute pulmonary abnormality. Evaluation somewhat limited due to respiratory motion artifact. Mild COPD. RECOMMENDATIONS: Unavailable     EKG  Initial EKG:   Tachycardia with apparent Left bundle branch block and ST depression noted in V2. Repeat EKG:   Tachycardia with LBBB, continued depression in V2.      All EKG's are interpreted by the Emergency Department Physician who either signs or Co-signs this chart in the absence of a cardiologist.    EMERGENCY DEPARTMENT COURSE:  ED Course as of 12/27/21 0748   Mon Dec 27, 2021   9926 Patient arrived to the ED [CP]   [id number] Paged STEMI alert [CP]   9199 Spoke with Dr. Candace Ponce from Cardiology, who did not think that this was an acute STEMI. He recommended starting amiodarone bolus and drip, obtaining troponins and repeating EKG in 30 minutes. Given ICD firing, likely effects from from ICD. Will get report from ICD. [CP]   0400 INR: 2.2  INR therapeutic [CP]   0400 D-Dimer, Quant: 0.94  D dimer elevated, PERC Score 3, YEARS criteria not met, will obtain CT PE given unclear etiology for tachycardia and hypotension [CP]   0407 Heart Score of 6 prior to Troponin resulting [CP]   9824 Spoke with "[employer] rep- Patient had an episode of VTAC at rate of 122bpm treated with anti-tachycardic pacing (ATP) x4 and 3 shocks. His pacer said this occurred at 4:20am, but it appears that his pacer is set two hours ahead. He also had 16 episodes of SVT on 12/25/2021. He had one episode of FVT on 11/3/2021 treated with ATP .    5:07 currently in monitored VT episode, not being treated because it is below threshold. 118bpm. Current pace 115. [CP]   0426 Troponin, High Sensitivity(!): 33  Elevated troponin, likely secondary to ICD firing. Obtaining second EKG at this time [CP]   0427 CT CHEST PULMONARY EMBOLISM W CONTRAST  CT PE negative [CP]      ED Course User Index  [CP] Wendy Solis MD     PROCEDURES:  None    CONSULTS:  IP CONSULT TO HOSPITALIST  IP CONSULT TO INTERNAL MEDICINE    CRITICAL CARE:  Please see attending note    FINAL IMPRESSION      1. ICD (implantable cardioverter-defibrillator) discharge    2.  Tachycardia        DISPOSITION / PLAN     DISPOSITION      Admit pending conversation with Internal Medicine    Patient signed out to Dr. Sarai Barrera who will resume care of the patient    PATIENTREFERRED TO:  No follow-up provider specified.     DISCHARGE MEDICATIONS:  New Prescriptions    No medications on file       Phil Vergara MD  Emergency Medicine Resident    (Please note that portions of this note were completed with a voice recognition program.  Efforts were made to edit the dictations but occasionally words are mis-transcribed.)       Lesvia Lake MD  Resident  12/27/21 8624

## 2021-12-27 NOTE — FLOWSHEET NOTE
[address] [facility] [address]     Emergency/Trauma Note    PATIENT NAME: Gael Allan    Shift date: 12/27/2021  Shift day: Sunday   Shift # 3    Room # 14/14   Name: Leela Daily            Age: 70 y.o. Gender: male          Buddhist: Zoroastrianism   Place of Holiness:     Trauma/Incident type: Stemi Alert  Admit Date & Time: 12/27/2021  3:32 AM  TRAUMA NAME:     ADVANCE DIRECTIVES IN CHART? No    NAME OF DECISION MAKER:     RELATIONSHIP OF DECISION MAKER TO PATIENT:     PATIENT/EVENT DESCRIPTION:  Leela Daily is a 70 y.o. male who arrived EMS as a Stemi Alert. Pt to be admitted to 14/14. SPIRITUAL ASSESSMENT/INTERVENTION:  Daron Do was ministry of presence.  made attempts to visit patient. Patient was sleeping. PATIENT BELONGINGS:  No belongings noted    ANY BELONGINGS OF SIGNIFICANT VALUE NOTED:  None Noted    REGISTRATION STAFF NOTIFIED? No      WHAT IS YOUR SPIRITUAL CARE PLAN FOR THIS PATIENT?:   Chaplains may be paged 24/7 via [address] [place] [address].   Electronically signed by Jose Villanueva on 12/27/2021 at [facility]  [phone]

## 2021-12-27 NOTE — CARE COORDINATION
Case Management Initial Discharge Plan  [facility],             Met with:patient to discuss discharge plans. Information verified: address, contacts, phone number, , insurance Yes  Insurance Provider: [facility]    Emergency Contact/Next of Kin name & number: Dao Nino (son) [phone]  *call son, patient's wife is in rehab  Who are involved in patient's support system? family    PCP: Lenora Salmeron MD  Date of last visit: 1 month ago      Discharge Planning    Living Arrangements:        Home has 2 stories   stairs to climb to get into front door, stairs to climb to reach second floor  Location of bedroom/bathroom in home basement    Patient able to perform ADL's:Independent    Current Services (outpatient & in home) none  DME equipment: concentrator, cpap  DME provider:     Is patient receiving oral anticoagulation therapy? Yes, coumadin    If indicated:   Physician managing anticoagulation treatment: Dr. Sadiq Colindres  Where does patient obtain lab work for ATC treatment? [facility]      Potential Assistance Needed:       Patient agreeable to home care: No  [place] of choice provided:  no    Prior SNF/Rehab Placement and Facility: no  Agreeable to SNF/Rehab: No  [place] of choice provided: no     Evaluation: no    Expected Discharge date:       Patient expects to be discharged to: If home: is the family and/or caregiver wiling & able to provide support at home? Who will be providing this support? Follow Up Appointment: Best Day/ Time:      Transportation provider: family  Transportation arrangements needed for discharge: No    Readmission Risk              Risk of Unplanned Readmission:  13             Does patient have a readmission risk score greater than 14?: No  If yes, follow-up appointment must be made within 7 days of discharge.      Goals of Care:       Educated patient on transitional options, provided freedom of choice and are agreeable with plan      Discharge Plan: pursuing transfer to [facility]          Electronically signed by Latoya Jean RN on 12/27/21 at 4:26 PM EST

## 2021-12-27 NOTE — H&P
[address]     Department of Internal Medicine - Staff Internal Medicine Teaching Service          ADMISSION NOTE/HISTORY AND PHYSICAL EXAMINATION   Date: 12/27/2021  Patient Name: Cipriano Webster  Date of admission: 12/27/2021  3:32 AM  YOB: 1950  PCP: Rosie Alexander MD  History Obtained From:  patient, electronic medical record    CHIEF COMPLAINT     Chief complaint: AICD firing    HISTORY OF PRESENTING ILLNESS     The patient is a pleasant 70 y.o. male presents with a chief complaint of his AICD firing. 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 Patient got AICD placed in 2014. Today morning around 3 AM while patient was sleeping, he felt his AICD fired for the first time, he felt like he got hit by lightning. Patient checks his vitals every day. Heart rate usually runs between 50s to 60 s. During the daytime of AICD firing, patient denied any symptoms of chest pain or palpitations. Denied any syncopal episodes. In 2018 patient was admitted with ASHLYN Carney, required cardioversions for rate control. Previous history of firing of AICD multiple times, follows with Dr. Catherine Alvarez. Patient denies any recent infection, denies any recent fevers or chills, denies any recent vomitings or diarrhea. Denies any change in diet. Denied starting any new medication. Pt does not use any oxygen at home    Last cardiac cath was done in 2018 which showed chronic LAD occlusion with collaterals. Last echo in 2019 showed EF of 20% , small apical thrombus in the left ventricle    Patient examined room 20 mg daily, Lipitor 40 mg daily, Aldactone 25 mg daily, metoprolol succinate 50 mg daily, Entresto, Lasix 40 mg daily, aspirin 81 mg daily, Metformin 500 mg daily, warfarin 2.5 mg daily.     ED Course -   On arrival pt was mildly hypotensive and tachycardic  Afebrile  Was placed on 2 L NC    Pacer interrogation, showed multiple VT episodes on 27 th dec, 3 shocks    labs showed -   BUN 28, creatinine 1.67  Blood glucose levels elevated  Pro bnp 1679, less than baseline  Troponin 35 > 38> 47  Wbc 10.5  Hb 15    Vitals:    12/27/21 1715   BP:    Pulse: 116   Resp:    Temp:    SpO2: 92%       On my evaluation,  Vitals:    12/27/21 1715   BP:    Pulse: 116   Resp:    Temp:    SpO2: 92%         Review of Systems:  Review of Systems   Constitutional: Negative. Negative for activity change, appetite change, chills, fatigue and fever. HENT: Negative. Negative for ear discharge, ear pain, trouble swallowing and voice change. Eyes: Negative. Negative for photophobia and visual disturbance. Respiratory: Negative. Negative for cough, shortness of breath and wheezing. Cardiovascular: Negative. Negative for chest pain, palpitations and leg swelling. Gastrointestinal: Negative. Negative for abdominal pain, blood in stool, constipation, diarrhea, nausea and vomiting. Endocrine: Negative for polyuria. Genitourinary: Negative. Negative for dysuria. Musculoskeletal: Negative for arthralgias and back pain. Skin: Negative. Negative for pallor and rash. Neurological: Negative for dizziness, seizures, syncope, weakness, light-headedness and headaches. Psychiatric/Behavioral: Negative. Negative for confusion.          PAST MEDICAL HISTORY     Past Medical History:   Diagnosis Date    Alcohol abuse 09/07/2014    Anxiety     CHF (congestive heart failure) (HCC)     Colon polyps     [facility]ch[facility]     Diabetes mellitus [facility]     Dupuytren contracture 2004    Hypertension     ICD (implantable cardioverter-defibrillator) battery depletion 09/15/2014    Ischemic cardiomyopathy     Obesity     SOB (shortness of breath)     Tendonitis, Achilles, left     Unstable angina ([facility] [address] 09/07/2014    Ventricular tachyarrhythmia ([facility] [address] 08/30/2018       PAST SURGICAL HISTORY     Past Surgical History:   Procedure Laterality Date    CARDIAC CATHETERIZATION      CARDIAC DEFIBRILLATOR PLACEMENT      CARDIAC DEFIBRILLATOR PLACEMENT Left 9/2014    COLONOSCOPY  3/2015    polyps, bx-serrated adenoma       ALLERGIES     Patient has no known allergies. MEDICATIONS PRIOR TO ADMISSION     Prior to Admission medications    Medication Sig Start Date End Date Taking? Authorizing Provider   metFORMIN (GLUCOPHAGE) 500 MG tablet Take 1 tablet by mouth 2 times daily (with meals) 5/17/21   Ladonna Jimenez MD   furosemide (LASIX) 20 MG tablet TAKE 1 TABLET DAILY 1/7/21   Ladonna Jimenez MD   blood glucose test strips (ASCENSIA AUTODISC VI;ONE TOUCH ULTRA TEST VI) strip 1 each by In Vitro route 3 times daily Use with associated glucose meter. 2/13/20   Ladonna Jimenez MD   warfarin (COUMADIN) 2.5 MG tablet  12/12/19   Historical Provider, MD   ENTRESTO 24-26 MG per tablet 1 tablet 2 times daily  8/22/19   Historical Provider, MD   TOPROL XL 50 MG extended release tablet Take 50 mg by mouth daily  8/20/19   Historical Provider, MD   spironolactone (ALDACTONE) 25 MG tablet Take 25 mg by mouth daily    Historical Provider, MD   atorvastatin (LIPITOR) 40 MG tablet TAKE 1 TABLET DAILY 7/22/19   BRENDAN Romero - CNP   amiodarone (CORDARONE) 200 MG tablet Take 2 tablets PO BID x3 days then take 1 tablet PO BID thereafter 12/10/18   Shila De La Vega MD   Multiple Vitamin (MULTIVITAMIN) tablet Take 1 tablet by mouth daily 12/11/18   Ishmael Hall MD   aspirin 81 MG tablet Take 81 mg by mouth daily.     Historical Provider, MD       SOCIAL HISTORY     Tobacco: smokes one pack everyday   Alcohol: denies  Illicits: denies    FAMILY HISTORY     Family History   Problem Relation Age of Onset    Heart Disease Father     High Blood Pressure Father     Diabetes Father     Heart Disease Maternal Aunt     Heart Disease Maternal Uncle     Heart Disease Paternal Aunt     Heart Disease Paternal Uncle        PHYSICAL EXAM     Vitals: BP 96/74   Pulse 116   Temp 98.4 °F (36.9 °C) (Oral)   Resp 18   Ht 6' 1\" (1.854 m)   Wt 254 lb 13.6 oz (115.6 kg)   SpO2 92%   BMI 33.62 kg/m²   Tmax: Temp (24hrs), Av.4 °F (36.9 °C), Min:98.1 °F (36.7 °C), Max:98.5 °F (36.9 °C)    Last Body weight:   Wt Readings from Last 3 Encounters:   21 254 lb 13.6 oz (115.6 kg)   10/14/21 264 lb (119.7 kg)   21 267 lb 6.4 oz (121.3 kg)     Body Mass Index : Body mass index is 33.62 kg/m². PHYSICAL EXAMINATION:  Physical Exam  Constitutional:       Appearance: Normal appearance. He is obese. Eyes:      Extraocular Movements: Extraocular movements intact. Cardiovascular:      Rate and Rhythm: Tachycardia present. Rhythm irregular. Pulses: Normal pulses. Heart sounds: Normal heart sounds. No murmur heard. Pulmonary:      Effort: No respiratory distress. Breath sounds: Rales present. Abdominal:      General: There is no distension. Palpations: Abdomen is soft. Tenderness: There is no abdominal tenderness. Musculoskeletal:         General: Swelling present. Cervical back: Normal range of motion. No rigidity. Right lower leg: Edema present. Left lower leg: Edema present. Skin:     Coloration: Skin is not jaundiced. Findings: No bruising or rash. Neurological:      General: No focal deficit present. Mental Status: He is alert and oriented to person, place, and time.    Psychiatric:         Mood and Affect: Mood normal.         Behavior: Behavior normal.           INVESTIGATIONS     Laboratory Testing:     Recent Results (from the past 24 hour(s))   Troponin    Collection Time: 21  3:44 AM   Result Value Ref Range    Troponin, High Sensitivity 33 (H) 0 - 22 ng/L    Troponin T NOT REPORTED <0.03 ng/mL    Troponin Interp NOT REPORTED    Brain Natriuretic Peptide    Collection Time: 21  3:44 AM   Result Value Ref Range    Pro-BNP 1,679 (H) <300 pg/mL    BNP Interpretation NOT REPORTED    CBC Auto Differential    Collection Time: 12/27/21  3:44 AM   Result Value Ref Range    WBC 10.5 3.5 - 11.3 k/uL    RBC 4.37 4.21 - 5.77 m/uL    Hemoglobin 15.1 13.0 - 17.0 g/dL    Hematocrit 43.7 40.7 - 50.3 %    .0 82.6 - 102.9 fL    MCH 34.6 (H) 25.2 - 33.5 pg    MCHC 34.6 28.4 - 34.8 g/dL    RDW 13.9 11.8 - 14.4 %    Platelets 744 893 - 279 k/uL    MPV 10.9 8.1 - 13.5 fL    NRBC Automated 0.0 0.0 per 100 WBC    Differential Type NOT REPORTED     Seg Neutrophils 56 36 - 65 %    Lymphocytes 28 24 - 43 %    Monocytes 12 3 - 12 %    Eosinophils % 3 1 - 4 %    Basophils 1 0 - 2 %    Immature Granulocytes 0 0 %    Segs Absolute 5.96 1.50 - 8.10 k/uL    Absolute Lymph # 2.87 1.10 - 3.70 k/uL    Absolute Mono # 1.24 (H) 0.10 - 1.20 k/uL    Absolute Eos # 0.27 0.00 - 0.44 k/uL    Basophils Absolute 0.07 0.00 - 0.20 k/uL    Absolute Immature Granulocyte 0.04 0.00 - 0.30 k/uL    WBC Morphology NOT REPORTED     RBC Morphology NOT REPORTED     Platelet Estimate NOT REPORTED    Basic Metabolic Panel w/ Reflex to MG    Collection Time: 12/27/21  3:44 AM   Result Value Ref Range    Glucose 147 (H) 70 - 99 mg/dL    BUN 28 (H) 8 - 23 mg/dL    CREATININE 1.67 (H) 0.70 - 1.20 mg/dL    Bun/Cre Ratio NOT REPORTED 9 - 20    Calcium 9.8 8.6 - 10.4 mg/dL    Sodium 138 135 - 144 mmol/L    Potassium 5.3 3.7 - 5.3 mmol/L    Chloride 104 98 - 107 mmol/L    CO2 20 20 - 31 mmol/L    Anion Gap 14 9 - 17 mmol/L    GFR Non-African American 41 (L) >60 mL/min    GFR  49 (L) >60 mL/min    GFR Comment          GFR Staging NOT REPORTED    Protime-INR    Collection Time: 12/27/21  3:44 AM   Result Value Ref Range    Protime 21.7 (H) 9.1 - 12.3 sec    INR 2.2    D-Dimer, Quantitative    Collection Time: 12/27/21  3:44 AM   Result Value Ref Range    D-Dimer, Quant 0.94 mg/L FEU   COVID-19, Rapid    Collection Time: 12/27/21  3:52 AM    Specimen: Nasopharyngeal Swab   Result Value Ref Range    Specimen Description . NASOPHARYNGEAL SWAB     SARS-CoV-2, Rapid Not Detected Not Detected   EKG 12 Lead    Collection Time: 12/27/21  4:34 AM   Result Value Ref Range    Ventricular Rate 114 BPM    Atrial Rate 114 BPM    P-R Interval 184 ms    QRS Duration 204 ms    Q-T Interval 376 ms    QTc Calculation (Bazett) 518 ms    R Axis -48 degrees    T Axis 89 degrees   Troponin    Collection Time: 12/27/21  4:48 AM   Result Value Ref Range    Troponin, High Sensitivity 35 (H) 0 - 22 ng/L    Troponin T NOT REPORTED <0.03 ng/mL    Troponin Interp NOT REPORTED    APTT    Collection Time: 12/27/21  9:35 AM   Result Value Ref Range    PTT 32.5 (H) 20.5 - 30.5 sec   Troponin    Collection Time: 12/27/21  9:35 AM   Result Value Ref Range    Troponin, High Sensitivity 38 (H) 0 - 22 ng/L    Troponin T NOT REPORTED <0.03 ng/mL    Troponin Interp NOT REPORTED    POTASSIUM    Collection Time: 12/27/21  1:44 PM   Result Value Ref Range    Potassium 4.8 3.7 - 5.3 mmol/L   MAGNESIUM    Collection Time: 12/27/21  1:44 PM   Result Value Ref Range    Magnesium 1.9 1.6 - 2.6 mg/dL   POC Glucose Fingerstick    Collection Time: 12/27/21  5:04 PM   Result Value Ref Range    POC Glucose 181 (H) 75 - 110 mg/dL   APTT    Collection Time: 12/27/21  5:17 PM   Result Value Ref Range    PTT 29.7 20.5 - 30.5 sec   LACTIC ACID, WHOLE BLOOD    Collection Time: 12/27/21  5:17 PM   Result Value Ref Range    Lactic Acid, Whole Blood 1.1 0.7 - 2.1 mmol/L       Imaging:   XR CHEST PORTABLE    Result Date: 12/27/2021  Moderate cardiomegaly new since the comparison study of 06/08/2020. CT CHEST PULMONARY EMBOLISM W CONTRAST    Result Date: 12/27/2021  No evidence of pulmonary embolism or acute pulmonary abnormality. Evaluation somewhat limited due to respiratory motion artifact. Mild COPD.  RECOMMENDATIONS: Unavailable       ASSESSMENT & PLAN     ASSESSMENT:     Primary Problem  <principal problem not specified>    Active Hospital Problems    Diagnosis Date Noted    ICD (implantable cardioverter-defibrillator) discharge [Z45.02] 12/27/2021       PLAN:     IMPRESSION  This is a 70 y.o. male who presented with above mentioned complaints and was admitted to inpatient service for further management as follows: Active Problems:    ICD (implantable cardioverter-defibrillator) discharge  Resolved Problems:    * No resolved hospital problems. *      1. Ventricular tachycardia -   - ICD in place  - cardiology onboard  - continue amiodarone drip and lidocaine drip as per cardiology  - transfer to [address] [place] [address] for VT ablation    2. Ischemic cardiomyopathy -   - last echo shows LV EF of 20 %  - continue aspirin  - continue Lipitor and lasix  - continue metoprolol, aldactone and entresto    3. LV thrombus -   - on warfarin at home  - will start heparin drip, if cardiology planning for any procedure    4. MADISON on CKD -   - baseline 1.2 - 1.4  - likely due to decreased effective intravascular volume  - will monitor    5. HTN -   - continue metoprolol and aldactone     6. DM -   - continue low dose sliding scale  - hypoglycemia protocol in place  - home metformin held due to MADISON    7.  Elevated troponin -   - likely due to tachycardia     Diet: NPO    PT/OT/SW - consulted  Discharge Planning - consulted       Neeta Muñiz  PGY-1  Internal Medicine  [address] [facility]   5[facility] 12/27/2021

## 2021-12-27 NOTE — CONSULTS
Attestation signed by      Attending Physician Statement:    I have discussed the care of  [facility] , including pertinent history and exam findings, with the Cardiology fellow/resident. I have seen and examined the patient and the key elements of all parts of the encounter have been performed by me. I agree with the assessment, plan and orders as documented by the fellow/resident, after I modified exam findings and plan of treatments, and the final version is my approved version of the assessment. Additional Comments:     Patient with known hx of multivessel CAD, only patent RCA with collaterals to LAD/LCx  territory, severe ischemic CMP with ICD in situ, hx of multiple VT in the past requiring device reprogramming to lower HR threshold and antiarrhythmic, currently on po amiodarone 200 mg qd at home, presented to ED with ICD firing x 3 while he was asleep. He denies any chest pain or angina. No increase in baseline dyspnea. No chf decompensation on exam. Initial ECG shows wide complex tachycardia, Atrial flutter with LBBB vs slow VT. HS troponins minimally elevated not consistent with ACS. ICD interrogation reviewed and shows around 28 episodes of VT last night, requiring 4 ATP and three defibrillation (2 unsuccessful)   No Vfib or Atrial arrhythmia   Started on IV amio after bolus last night   Check Magnesium   Resume toprol XL  EP consult/Dr Torrez for further recommendations        [facility]   Consultation Note               Today's Date: 12/27/2021  Patient Name: [facility]  Date of admission: 12/27/2021  3:32 AM  Patient's age: 70 y.o., 1950  Admission Dx: ICD (implantable cardioverter-defibrillator) discharge [Z45.02]    Reason for Consult:  ICD shock    Requesting Physician: No admitting provider for patient encounter.     CHIEF COMPLAINT:    Chief Complaint   Patient presents with    Chest Pain     defib fired x3 denies CP, just from defib firing History Obtained From:  patient    HISTORY OF PRESENT ILLNESS:      The patient is a 70 y.o. with hx of ischemic cardiomyopathy with EF 25% s/p ICD, previous Vtach requiring cardioversion, LV thrombus on coumadin presented to ER after his ICD shocked him thrice. Past Medical History:   has a past medical history of Alcohol abuse, Anxiety, CHF (congestive heart failure) ([address] Colon polyps, COPD (chronic obstructive pulmonary disease) ([address] Diabetes mellitus ([address] Dupuytren contracture, Hypertension, ICD (implantable cardioverter-defibrillator) battery depletion, Ischemic cardiomyopathy, Obesity, SOB (shortness of breath), Tendonitis, Achilles, left, Unstable angina ([address] and Ventricular tachyarrhythmia ([address] Past Surgical History:   has a past surgical history that includes Cardiac defibrillator placement; Cardiac catheterization; Cardiac defibrillator placement (Left, 9/2014); and Colonoscopy (3/2015). Home Medications:    Prior to Admission medications    Medication Sig Start Date End Date Taking? Authorizing Provider   metFORMIN (GLUCOPHAGE) 500 MG tablet Take 1 tablet by mouth 2 times daily (with meals) 5/17/21   Ania Cisneros MD   furosemide (LASIX) 20 MG tablet TAKE 1 TABLET DAILY 1/7/21   Ania Cisneros MD   blood glucose test strips (ASCENSIA AUTODISC VI;ONE TOUCH ULTRA TEST VI) strip 1 each by In Vitro route 3 times daily Use with associated glucose meter.  2/13/20   Ania Cisneros MD   warfarin (COUMADIN) 2.5 MG tablet  12/12/19   Historical Provider, MD   ENTRESTO 24-26 MG per tablet 1 tablet 2 times daily  8/22/19   Historical Provider, MD   TOPROL XL 50 MG extended release tablet Take 50 mg by mouth daily  8/20/19   Historical Provider, MD   spironolactone (ALDACTONE) 25 MG tablet Take 25 mg by mouth daily    Historical Provider, MD   atorvastatin (LIPITOR) 40 MG tablet TAKE 1 TABLET DAILY 7/22/19   BRENDAN Razo - CNP   amiodarone (CORDARONE) 200 MG tablet Take 2 tablets PO BID x3 days then take 1 tablet PO BID thereafter 12/10/18   Jeffery Bolanos MD   Multiple Vitamin (MULTIVITAMIN) tablet Take 1 tablet by mouth daily 18   Ishmael Hall MD   aspirin 81 MG tablet Take 81 mg by mouth daily.     Historical Provider, MD      Current Facility-Administered Medications: [COMPLETED] amiodarone (CORDARONE) 150 mg in dextrose 5 % 100 mL bolus, 150 mg, IntraVENous, Once **FOLLOWED BY** [] amiodarone (CORDARONE) 450 mg in dextrose 5 % 250 mL infusion, 1 mg/min, IntraVENous, Continuous **FOLLOWED BY** amiodarone (CORDARONE) 450 mg in dextrose 5 % 250 mL infusion, 0.5 mg/min, IntraVENous, Continuous  sodium chloride flush 0.9 % injection 5-40 mL, 5-40 mL, IntraVENous, 2 times per day  sodium chloride flush 0.9 % injection 5-40 mL, 5-40 mL, IntraVENous, PRN  0.9 % sodium chloride infusion, 25 mL, IntraVENous, PRN  ondansetron (ZOFRAN-ODT) disintegrating tablet 4 mg, 4 mg, Oral, Q8H PRN **OR** ondansetron (ZOFRAN) injection 4 mg, 4 mg, IntraVENous, Q6H PRN  polyethylene glycol (GLYCOLAX) packet 17 g, 17 g, Oral, Daily PRN  acetaminophen (TYLENOL) tablet 650 mg, 650 mg, Oral, Q6H PRN **OR** acetaminophen (TYLENOL) suppository 650 mg, 650 mg, Rectal, Q6H PRN  [Held by provider] amiodarone (CORDARONE) tablet 200 mg, 200 mg, Oral, BID  [START ON 2021] aspirin chewable tablet 81 mg, 81 mg, Oral, Daily  atorvastatin (LIPITOR) tablet 40 mg, 40 mg, Oral, Nightly  sacubitril-valsartan (ENTRESTO) 24-26 MG per tablet 1 tablet, 1 tablet, Oral, BID  furosemide (LASIX) tablet 20 mg, 20 mg, Oral, Daily  spironolactone (ALDACTONE) tablet 25 mg, 25 mg, Oral, Daily  metoprolol succinate (TOPROL XL) extended release tablet 50 mg, 50 mg, Oral, Daily  insulin lispro (HUMALOG) injection vial 0-6 Units, 0-6 Units, SubCUTAneous, TID WC  insulin lispro (HUMALOG) injection vial 0-3 Units, 0-3 Units, SubCUTAneous, Nightly  glucose (GLUTOSE) 40 % oral gel 15 g, 15 g, Oral, PRN  dextrose 50 % IV solution, 12.5 g, IntraVENous, PRN  glucagon (rDNA) injection 1 mg, 1 mg, IntraMUSCular, PRN  dextrose 5 % solution, 100 mL/hr, IntraVENous, PRN      Allergies:  Patient has no known allergies. Social History:   reports that he has been smoking cigarettes. He started smoking about 63 years ago. He has a 58.00 pack-year smoking history. He has never used smokeless tobacco. He reports current alcohol use. He reports that he does not use drugs. Family History: family history includes Diabetes in his father; Heart Disease in his father, maternal aunt, maternal uncle, paternal aunt, and paternal uncle; High Blood Pressure in his father. No h/o sudden cardiac death. REVIEW OF SYSTEMS:    Constitutional: there has been no unanticipated weight loss. Eyes: No visual changes or diplopia. ENT: No Headaches  Cardiovascular: see above  Respiratory: No previous pulmonary problems, No cough  Gastrointestinal: No abdominal pain. No change in bowel or bladder habits. Genitourinary: No dysuria, trouble voiding, or hematuria. Musculoskeletal:  No gait disturbance, No weakness or joint complaints. Integumentary: No rash or pruritis. Neurological: No headache, diplopia      PHYSICAL EXAM:      /75   Pulse 110   Temp 98.5 °F (36.9 °C) (Oral)   Resp 18   Ht 6' 1\" (1.854 m)   Wt 254 lb (115.2 kg)   SpO2 95%   BMI 33.51 kg/m²    Constitutional and General Appearance: Alert, no distress  Respiratory:  No increased work of breathing. Clear to auscultation bilaterally. No wheeze or crackles. Cardiovascular:  Normal S1 and S2.   Jugular venous pulsation Normal  Abdomen:   Soft  No tenderness  Extremities:  No lower extremity edema  Neurologic:  Alert and oriented.   Moves all extremities well      DATA:    Diagnostics:    Labs:     CBC:   Recent Labs     12/27/21  0344   WBC 10.5   HGB 15.1   HCT 43.7        BMP:   Recent Labs     12/27/21  0344      K 5.3   CO2 20   BUN 28*   CREATININE 1.67*   LABGLOM 41*   GLUCOSE 147*     BNP: No results for input(s): BNP in the last 72 hours. PT/INR:   Recent Labs     12/27/21  0344   PROTIME 21.7*   INR 2.2     APTT:  Recent Labs     12/27/21  0935   APTT 32.5*     CARDIAC ENZYMES:  Recent Labs     12/27/21  0344 12/27/21  0448 12/27/21  0935   TROPHS 33* 35* 38*     No results for input(s): CKTOTAL, CKMB, CKMBINDEX, TROPONINI in the last 72 hours. Invalid input(s):  [address]  Recent Labs     12/27/21  0344 12/27/21  0448 12/27/21  0935   TROPONINT NOT REPORTED NOT REPORTED NOT REPORTED     FASTING LIPID PANEL:  Lab Results   Component Value Date    HDL 49 11/11/2021    TRIG 76 08/03/2020     LIVER PROFILE:No results for input(s): AST, ALT, LABALBU in the last 72 hours. EKG: Wide complex tachycardia         IMPRESSION:    Slow Vtach vs. Atrial flutter with LBBB aberrancy  Ischemic cardiomyopathy s/p ICD  ICD shock x3  Hx of LV thrombus on coumadin        RECOMMENDATIONS:  Continue on IV amiodarone for now  Dr. Mandy Coy consulted for recommendations  Repeat echo for LV thrombus        Thank you for allowing us to participate in [facility]. Will follow with you.       Electronically signed on 12/27/21 at 10:30 AM by:    Bari Beth MD, MD   Fellow, [id number] [facility]

## 2021-12-27 NOTE — ED PROVIDER NOTES
[facility]   Emergency Department  Faculty Attestation       I performed a history and physical examination of the patient and discussed management with the resident. I reviewed the residents note and agree with the documented findings including all diagnostic interpretations and plan of care. Any areas of disagreement are noted on the chart. I was personally present for the key portions of any procedures. I have documented in the chart those procedures where I was not present during the key portions. I have reviewed the emergency nurses triage note. I agree with the chief complaint, past medical history, past surgical history, allergies, medications, social and family history as documented unless otherwise noted below. Documentation of the HPI, Physical Exam and Medical Decision Making performed by scribsanjuana is based on my personal performance of the HPI, PE and MDM. For Physician Assistant/ Nurse Practitioner cases/documentation I have personally evaluated this patient and have completed at least one if not all key elements of the E/M (history, physical exam, and MDM). Additional findings are as noted. Pertinent Comments     Primary Care Physician: Sultana Camarillo MD      ED Triage Vitals [12/27/21 0334]   BP Temp Temp Source Pulse Resp SpO2 Height Weight   (!) 119/95 98.5 °F (36.9 °C) Oral 113 16 94 % 6' 1\" (1.854 m) 254 lb (115.2 kg)        History/Physical: This is a 70 y.o. male who presents to the Emergency Department with complaint of defibrillator firing. Woke him up from her sleep. States he was feeling fine prior to that, but did fire 3 times. States that he has had in place since 2014 and he has not had it fire previously. Not been having any chest pain or shortness of breath. EMS did not provide any medications in route. On exam, patient is alert answering questions in no significant distress. Normocephalic atraumatic. Heart sounds are tachycardic but regular.   Lungs clear to auscultation bilaterally. Abdomen soft nontender tender. Bilateral lower extremities with trace pitting edema, but symmetric and no erythema or warmth. Alert and oriented x4 with no focal deficits. MDM/Plan:   Tachycardic. Patient has a new wide complex rhythm that appears to be some form of conduction delay, there is nonspecific ST and T wave changes including Concordant depressions in V2. In the setting of possible new left bundle branch, decision was made to call STEMI at that time. Cardiology reviewed EKGs and gently that this is a STEMI, however recommending amiodarone as a think that this is a intermittent possible V. tach situation  Will get cardiac work-up  Interrogate pacemaker  D-dimer was slightly elevated decision was made to get CT rule out PE  Admit      EKG Interpretation    Interpreted by emergency department physician    Clinical Impression: Nonspecific EKG with PVCs. There is deviation, and what appears to be left bundle branch block versus nonspecific versus ventricular delay. There is a widened QRS. There is concordant depressions in V2. And questionable discordant elevations, due to depressions, did call STEMI alert at that time    Repeat EKG unchanged    Gamaliel Coelho MD    Critical Care: There was significant risk of life threatening deterioration of patient's condition requiring my direct management. Critical care time 30 minutes, excluding any documented procedures.       Gamaliel Coelho MD  Attending Emergency Physician        Gamaliel Coelho MD  12/27/21 7284

## 2021-12-28 VITALS
BODY MASS INDEX: 33.78 KG/M2 | OXYGEN SATURATION: 92 % | RESPIRATION RATE: 20 BRPM | DIASTOLIC BLOOD PRESSURE: 64 MMHG | SYSTOLIC BLOOD PRESSURE: 83 MMHG | WEIGHT: 254.85 LBS | TEMPERATURE: 98.6 F | HEIGHT: 73 IN | HEART RATE: 116 BPM

## 2021-12-28 LAB
EKG ATRIAL RATE: 114 BPM
EKG ATRIAL RATE: 227 BPM
EKG P AXIS: -150 DEGREES
EKG P-R INTERVAL: 184 MS
EKG Q-T INTERVAL: 340 MS
EKG Q-T INTERVAL: 376 MS
EKG QRS DURATION: 108 MS
EKG QRS DURATION: 204 MS
EKG QTC CALCULATION (BAZETT): 466 MS
EKG QTC CALCULATION (BAZETT): 518 MS
EKG R AXIS: -48 DEGREES
EKG R AXIS: -78 DEGREES
EKG T AXIS: -175 DEGREES
EKG T AXIS: 89 DEGREES
EKG VENTRICULAR RATE: 113 BPM
EKG VENTRICULAR RATE: 114 BPM
GLUCOSE BLD-MCNC: 147 MG/DL (ref 75–110)

## 2021-12-28 PROCEDURE — 6370000000 HC RX 637 (ALT 250 FOR IP): Performed by: INTERNAL MEDICINE

## 2021-12-28 RX ADMIN — ALPRAZOLAM 0.25 MG: 0.25 TABLET ORAL at 00:00

## 2021-12-28 NOTE — PROGRESS NOTES
Contacted by Nighat Lopez at [address] patient had received a bed. Writer informed that if patient is able to be transported via ground then he can be taken tonight. 2130: attending team notified and asked about transport mode preference writer informed to contact cardiology. Dr. Christiano Mills notified and per Dr. Christiano Mills patient approved for ground transport. 2145: [facility] notified and writer received patient bed placement. 2200: [facility], ED  consulted and asked if she could be of assistance with arranging mobile ICU transport via ground for patient. Son TJ updated on patient updates. 2215[facility] informing RN that mobile ICU had been arranged for within the next hour to transport patient to [address] [place] [address] Road. 2220: mobile icu called and writer informed they will received bedside report once they arrive. Will continue to monitor patient and update team on any changes.

## 2021-12-28 NOTE — PROGRESS NOTES
Mobile ICU arrived with transport team. Kaitlynn Duque with team given report. Patient signed consent . Medications hooked up to their IV pump. Vitals stables, questions answered. Report will be called to [facility] [employer] clinic nurse line and son TJ updated.

## 2021-12-28 NOTE — ED NOTES
Consulted by RN for transportation to [facility]. Advised patient will require ICU ground mobile transportation due to: AICD malfunctioning, oxygen at 5L, two drips running (amiodarone and lidocaine), and cardiac monitoring. Patient to be admitted to [facility] main J31-1. Contacted [facility] ground ICU [phone] who will have a mobile unit available within an hour. Notified RN.      Dusty LAFLEUR, ACSSHUBHAM, Sandro Hewitt, GINNY  12/27/21 5203

## 2021-12-28 NOTE — PLAN OF CARE
Problem: Skin Integrity:  Goal: Will show no infection signs and symptoms  Description: Will show no infection signs and symptoms  12/27/2021 2141 by Abhay Fink RN  Outcome: Ongoing  12/27/2021 1932 by Kimber Caballero RN  Outcome: Ongoing  Goal: Absence of new skin breakdown  Description: Absence of new skin breakdown  12/27/2021 2141 by Abhay Fink RN  Outcome: Ongoing  12/27/2021 1932 by Kimber Caballero RN  Outcome: Ongoing     Problem: Falls - Risk of:  Goal: Will remain free from falls  Description: Will remain free from falls  12/27/2021 2141 by Abhay Fink RN  Outcome: Ongoing  12/27/2021 1932 by Kimber Caballero RN  Outcome: Ongoing  Goal: Absence of physical injury  Description: Absence of physical injury  12/27/2021 2141 by Abhay Fink RN  Outcome: Ongoing  12/27/2021 1932 by Kimber Caballero RN  Outcome: Ongoing     Problem: Safety:  Goal: Free from accidental physical injury  Description: Free from accidental physical injury  Outcome: Ongoing  Goal: Free from intentional harm  Description: Free from intentional harm  Outcome: Ongoing     Problem: Skin Integrity:  Goal: Skin integrity will stabilize  Description: Skin integrity will stabilize  Outcome: Ongoing

## 2022-01-05 NOTE — DISCHARGE SUMMARY
[address]     Department of Internal Medicine - Staff Internal Medicine Teaching Service    INPATIENT DISCHARGE SUMMARY      Patient Identification:  Loli Maradiaga is a 70 y.o. male. :  1950  MRN: [id number]     Acct: [de-identified]   PCP: Chloe Pierre MD  Admit Date:  2021  Discharge date and time: 2021  Attending Provider: Dr Chris Yoder Problem Lists:  Active Problems:    Ventricular tachycardia [facility])    ICD (implantable cardioverter-defibrillator) discharge  Resolved Problems:    * No resolved hospital problems. *      HOSPITAL STAY     Brief Inpatient course:   Loli Maradiaga is a 70 y.o. male who was admitted for the management of <principal problem not specified>, presented to the emergency department with chief complaint of his AICD firing.     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 Patient got AICD placed in .     On the day of admission in the morning around 3 AM while patient was sleeping, he felt his AICD fired for the first time, he felt like he got hit by lightning. Patient checks his vitals every day. Heart rate usually runs between 50s to 60 s. During the daytime of AICD firing, patient denied any symptoms of chest pain or palpitations. Denied any syncopal episodes. Pacer interrogation, showed multiple VT episodes on 27 th dec, 3 shocks         Significant therapeutic interventions -     1. Ventricular tachycardia -   - ICD in place  - cardiology was onboard  - started on amiodarone drip and lidocaine drip as per cardiology  - transferred to [address] [place] [address] for VT ablation     2. Ischemic cardiomyopathy -   - last echo shows LV EF of 20 %  - continued on aspirin Lipitor and lasix  - continued on metoprolol, aldactone and entresto     3.  LV thrombus -   - on warfarin at home     4. MADISON on CKD -   - baseline 1.2 - 1.4  - likely due to decreased effective intravascular volume       Procedures/ Significant Interventions:      No results found    Consults:     Consults:     Final Specialist Recommendations/Findings:   IP CONSULT TO HOSPITALIST  IP CONSULT TO INTERNAL MEDICINE  IP CONSULT TO CASE MANAGEMENT  IP CONSULT TO SOCIAL WORK Follow up out patient     Any Hospital Acquired Infections: none    Discharge Functional Status:  stable    DISCHARGE PLAN     Disposition: transferred to [address] [place] [address]    Patient Instructions:   Discharge Medication List as of 12/28/2021 12:20 AM      CONTINUE these medications which have NOT CHANGED    Details   metFORMIN (GLUCOPHAGE) 500 MG tablet Take 1 tablet by mouth 2 times daily (with meals), Disp-60 tablet, R-11Normal      furosemide (LASIX) 20 MG tablet TAKE 1 TABLET DAILY, Disp-90 tablet, R-3Normal      blood glucose test strips (ASCENSIA AUTODISC VI;ONE TOUCH ULTRA TEST VI) strip 3 TIMES DAILY Starting Thu 2/13/2020, Disp-100 strip, R-11, NormalUse with associated glucose meter. warfarin (COUMADIN) 2.5 MG tablet Historical Med      ENTRESTO 24-26 MG per tablet 1 tablet 2 times daily , DAWHistorical Med      TOPROL XL 50 MG extended release tablet Take 50 mg by mouth daily , DAWHistorical Med      spironolactone (ALDACTONE) 25 MG tablet Take 25 mg by mouth dailyHistorical Med      atorvastatin (LIPITOR) 40 MG tablet TAKE 1 TABLET DAILY, Disp-90 tablet, R-3Normal      amiodarone (CORDARONE) 200 MG tablet Take 2 tablets PO BID x3 days then take 1 tablet PO BID thereafter, Disp-90 tablet, R-6Normal      Multiple Vitamin (MULTIVITAMIN) tablet Take 1 tablet by mouth daily, Disp-30 tablet, R-3Normal      aspirin 81 MG tablet Take 81 mg by mouth daily. Activity: activity as tolerated    Diet: cardiac diet    Follow-up:    No follow-up provider specified.       Note that over 30 minutes was spent in preparing discharge papers, discussing discharge with patient, medication review, etc.      Neeta Muñiz  PGY-1  Internal Medicine  [facility]   8:36 AM 1/5/2022

## 2022-01-19 ENCOUNTER — TELEPHONE (OUTPATIENT)
Dept: INTERNAL MEDICINE CLINIC | Age: 72
End: 2022-01-19

## 2022-01-19 NOTE — TELEPHONE ENCOUNTER
1)  Heart rate was 56 today   /61       If this is an acceptable heart rate, can you please fax an order         of what is not an acceptable heart rate to fax [phone]    2)  Bilateral LE swelling. Patient is non compliant with            compression stockings. . Increase lasix or please advise?

## 2022-01-21 ENCOUNTER — HOSPITAL ENCOUNTER (EMERGENCY)
Age: 72
Discharge: HOME | End: 2022-01-21
Attending: EMERGENCY MEDICINE
Payer: MEDICARE

## 2022-01-21 ENCOUNTER — APPOINTMENT (OUTPATIENT)
Dept: GENERAL RADIOLOGY | Age: 72
End: 2022-01-21
Payer: MEDICARE

## 2022-01-21 VITALS
HEIGHT: 74 IN | BODY MASS INDEX: 31.7 KG/M2 | TEMPERATURE: 98 F | DIASTOLIC BLOOD PRESSURE: 66 MMHG | SYSTOLIC BLOOD PRESSURE: 109 MMHG | RESPIRATION RATE: 18 BRPM | WEIGHT: 247 LBS | OXYGEN SATURATION: 95 % | HEART RATE: 52 BPM

## 2022-01-21 DIAGNOSIS — R53.1 GENERAL WEAKNESS: ICD-10-CM

## 2022-01-21 DIAGNOSIS — R42 DIZZINESS: Primary | ICD-10-CM

## 2022-01-21 LAB
ABSOLUTE EOS #: 0.08 K/UL (ref 0–0.44)
ABSOLUTE IMMATURE GRANULOCYTE: <0.03 K/UL (ref 0–0.3)
ABSOLUTE LYMPH #: 1.14 K/UL (ref 1.1–3.7)
ABSOLUTE MONO #: 0.75 K/UL (ref 0.1–1.2)
ALBUMIN SERPL-MCNC: 3.8 G/DL (ref 3.5–5.2)
ALBUMIN/GLOBULIN RATIO: 1.5 (ref 1–2.5)
ALP BLD-CCNC: 96 U/L (ref 40–129)
ALT SERPL-CCNC: 46 U/L (ref 5–41)
ANION GAP SERPL CALCULATED.3IONS-SCNC: 12 MMOL/L (ref 9–17)
AST SERPL-CCNC: 33 U/L
BASOPHILS # BLD: 0 % (ref 0–2)
BASOPHILS ABSOLUTE: 0.03 K/UL (ref 0–0.2)
BILIRUB SERPL-MCNC: 0.88 MG/DL (ref 0.3–1.2)
BNP INTERPRETATION: ABNORMAL
BUN BLDV-MCNC: 17 MG/DL (ref 8–23)
BUN/CREAT BLD: ABNORMAL (ref 9–20)
CALCIUM SERPL-MCNC: 8.8 MG/DL (ref 8.6–10.4)
CHLORIDE BLD-SCNC: 106 MMOL/L (ref 98–107)
CO2: 22 MMOL/L (ref 20–31)
CREAT SERPL-MCNC: 1.38 MG/DL (ref 0.7–1.2)
DIFFERENTIAL TYPE: ABNORMAL
EOSINOPHILS RELATIVE PERCENT: 1 % (ref 1–4)
GFR AFRICAN AMERICAN: >60 ML/MIN
GFR NON-AFRICAN AMERICAN: 51 ML/MIN
GFR SERPL CREATININE-BSD FRML MDRD: ABNORMAL ML/MIN/{1.73_M2}
GFR SERPL CREATININE-BSD FRML MDRD: ABNORMAL ML/MIN/{1.73_M2}
GLUCOSE BLD-MCNC: 104 MG/DL (ref 70–99)
HCT VFR BLD CALC: 34.3 % (ref 40.7–50.3)
HCT VFR BLD CALC: 34.3 % (ref 40.7–50.3)
HEMOGLOBIN: 11.3 G/DL (ref 13–17)
HEMOGLOBIN: 11.5 G/DL (ref 13–17)
IMMATURE GRANULOCYTES: 0 %
LYMPHOCYTES # BLD: 16 % (ref 24–43)
MAGNESIUM: 1.6 MG/DL (ref 1.6–2.6)
MCH RBC QN AUTO: 34.8 PG (ref 25.2–33.5)
MCHC RBC AUTO-ENTMCNC: 33.5 G/DL (ref 28.4–34.8)
MCV RBC AUTO: 103.9 FL (ref 82.6–102.9)
MONOCYTES # BLD: 10 % (ref 3–12)
NRBC AUTOMATED: 0 PER 100 WBC
PDW BLD-RTO: 14.5 % (ref 11.8–14.4)
PLATELET # BLD: 144 K/UL (ref 138–453)
PLATELET ESTIMATE: ABNORMAL
PMV BLD AUTO: 10.7 FL (ref 8.1–13.5)
POTASSIUM SERPL-SCNC: 3.9 MMOL/L (ref 3.7–5.3)
PRO-BNP: 2301 PG/ML
RBC # BLD: 3.3 M/UL (ref 4.21–5.77)
RBC # BLD: ABNORMAL 10*6/UL
SEG NEUTROPHILS: 73 % (ref 36–65)
SEGMENTED NEUTROPHILS ABSOLUTE COUNT: 5.32 K/UL (ref 1.5–8.1)
SODIUM BLD-SCNC: 140 MMOL/L (ref 135–144)
TOTAL PROTEIN: 6.4 G/DL (ref 6.4–8.3)
TROPONIN INTERP: ABNORMAL
TROPONIN INTERP: ABNORMAL
TROPONIN T: ABNORMAL NG/ML
TROPONIN T: ABNORMAL NG/ML
TROPONIN, HIGH SENSITIVITY: 33 NG/L (ref 0–22)
TROPONIN, HIGH SENSITIVITY: 34 NG/L (ref 0–22)
TSH SERPL DL<=0.05 MIU/L-ACNC: 2.48 MIU/L (ref 0.3–5)
WBC # BLD: 7.3 K/UL (ref 3.5–11.3)
WBC # BLD: ABNORMAL 10*3/UL

## 2022-01-21 PROCEDURE — 84484 ASSAY OF TROPONIN QUANT: CPT

## 2022-01-21 PROCEDURE — 85018 HEMOGLOBIN: CPT

## 2022-01-21 PROCEDURE — 85014 HEMATOCRIT: CPT

## 2022-01-21 PROCEDURE — 93005 ELECTROCARDIOGRAM TRACING: CPT | Performed by: STUDENT IN AN ORGANIZED HEALTH CARE EDUCATION/TRAINING PROGRAM

## 2022-01-21 PROCEDURE — 84443 ASSAY THYROID STIM HORMONE: CPT

## 2022-01-21 PROCEDURE — 71045 X-RAY EXAM CHEST 1 VIEW: CPT

## 2022-01-21 PROCEDURE — 83735 ASSAY OF MAGNESIUM: CPT

## 2022-01-21 PROCEDURE — 83880 ASSAY OF NATRIURETIC PEPTIDE: CPT

## 2022-01-21 PROCEDURE — 80053 COMPREHEN METABOLIC PANEL: CPT

## 2022-01-21 PROCEDURE — 99285 EMERGENCY DEPT VISIT HI MDM: CPT

## 2022-01-21 PROCEDURE — 85025 COMPLETE CBC W/AUTO DIFF WBC: CPT

## 2022-01-21 RX ORDER — OMEPRAZOLE 20 MG/1
20 CAPSULE, DELAYED RELEASE ORAL
Qty: 30 CAPSULE | Refills: 0 | Status: SHIPPED | OUTPATIENT
Start: 2022-01-21 | End: 2022-01-31 | Stop reason: ALTCHOICE

## 2022-01-21 ASSESSMENT — ENCOUNTER SYMPTOMS
BACK PAIN: 0
ABDOMINAL PAIN: 0
STRIDOR: 0
SHORTNESS OF BREATH: 0
VOMITING: 0
NAUSEA: 0

## 2022-01-21 NOTE — ED TRIAGE NOTES
Pt arrived to ED 23 via EMS. Pt has had dizziness time 1 week,. Denies any vision changes  Pt had catheterization 10 days ago. Pt states that dizziness got worse today. Pt denies any pain. Pt is resting on stretcher with call light within reach. Breathing is non labored and no acute distress is noted.    Will continue to monitor

## 2022-01-21 NOTE — ED PROVIDER NOTES
[facility]  ED  Emergency Department Encounter  Emergency Medicine Resident     Pt Name: Leela Daily  MRN: [id number]  [place] 1950  Date of evaluation: 1/21/22  PCP:  Pooja Contreras MD    [address] [place] [address]       Chief Complaint   Patient presents with    Dizziness       HISTORY [place]  (Location/Symptom, Timing/Onset, Context/Setting, Quality, Duration, Modifying Factors,Severity.)      Leela Daily is a 70 y. o.yo male who has an extensive cardiac history including CHF, ischemic cardiomyopathy status post ICD placement. Patient presents with complaints of feeling weak and dizzy. Patient reports that symptoms started about 2 days ago. Pertinent history includes recent hospitalization for multiple firing of his AICD. Patient was here about 2 weeks ago for ICD firing, she was subsequently transferred to [facility] for an abrasion. Patient reports that after he was discharged, he was switched from warfarin to Eliquis, his Lasix dose was increased from 20 mg daily to 40 mg daily and also discontinued his spironolactone. Patient reports that all of a sudden he started feeling dizzy especially when he stands up. He denies any falls, chest pain, shortness of breath, nausea or vomiting, abdominal pain, cough, generalized body aches. PAST MEDICAL / SURGICAL / SOCIAL / FAMILY HISTORY      has a past medical history of Alcohol abuse, Anxiety, CHF (congestive heart failure) ([address] Colon polyps, COPD (chronic obstructive pulmonary disease) ([address] Diabetes mellitus ([address] Dupuytren contracture, Hypertension, ICD (implantable cardioverter-defibrillator) battery depletion, Ischemic cardiomyopathy, Obesity, SOB (shortness of breath), Tendonitis, Achilles, left, Unstable angina ([address] and Ventricular tachyarrhythmia ([address]      has a past surgical history that includes Cardiac defibrillator placement; Cardiac catheterization; Cardiac defibrillator placement (Left, 9/2014); and Colonoscopy (3/2015). Social History     Socioeconomic History    Marital status:      Spouse name: Not on file    Number of children: Not on file    Years of education: Not on file    Highest education level: Not on file   Occupational History    Not on file   Tobacco Use    Smoking status: Current Every Day Smoker     Packs/day: 1.00     Years: 58.00     Pack years: 58.00     Types: Cigarettes     Start date: 1959     Last attempt to quit: 2014     Years since quittin.3    Smokeless tobacco: Never Used    Tobacco comment: There have been smokeless periods during lifetime   Substance and Sexual Activity    Alcohol use: Yes     Alcohol/week: 0.0 standard drinks     Comment: NO ALCOHOL SINCE 2018    Drug use: No    Sexual activity: Not on file   Other Topics Concern    Not on file   Social History Narrative    Not on file     Social Determinants of Health     Financial Resource Strain: Medium Risk    Difficulty of Paying Living Expenses: Somewhat hard   Food Insecurity: No Food Insecurity    Worried About Running Out of Food in the Last Year: Never true    Jae of Food in the Last Year: Never true   Transportation Needs:     Lack of Transportation (Medical): Not on file    Lack of Transportation (Non-Medical):  Not on file   Physical Activity:     Days of Exercise per Week: Not on file    Minutes of Exercise per Session: Not on file   Stress:     Feeling of Stress : Not on file   Social Connections:     Frequency of Communication with Friends and Family: Not on file    Frequency of Social Gatherings with Friends and Family: Not on file    Attends Muslim Services: Not on file    Active Member of Clubs or Organizations: Not on file    Attends Club or Organization Meetings: Not on file    Marital Status: Not on file   Intimate Partner Violence:     Fear of Current or Ex-Partner: Not on file    Emotionally Abused: Not on file    Physically Abused: Not on file    Sexually Abused: Not on file   Housing Stability:     Unable to Pay for Housing in the Last Year: Not on file    Number of Places Lived in the Last Year: Not on file    Unstable Housing in the Last Year: Not on file       Family History   Problem Relation Age of Onset    Heart Disease Father     High Blood Pressure Father     Diabetes Father     Heart Disease Maternal Aunt     Heart Disease Maternal Uncle     Heart Disease Paternal Aunt     Heart Disease Paternal Uncle         Allergies:  Patient has no known allergies. Home Medications:  Prior to Admission medications    Medication Sig Start Date End Date Taking? Authorizing Provider   omeprazole (PRILOSEC) 20 MG delayed release capsule Take 1 capsule by mouth every morning (before breakfast) 1/21/22  Yes Veronika Sheikh MD   furosemide (LASIX) 20 MG tablet TAKE 1 TABLET DAILY 1/4/22   BRENDAN Razo CNP   metFORMIN (GLUCOPHAGE) 500 MG tablet Take 1 tablet by mouth 2 times daily (with meals) 5/17/21   Ania Cisneros MD   blood glucose test strips (ASCENSIA AUTODISC VI;ONE TOUCH ULTRA TEST VI) strip 1 each by In Vitro route 3 times daily Use with associated glucose meter. 2/13/20   Ania Cisneros MD   warfarin (COUMADIN) 2.5 MG tablet  12/12/19   Historical Provider, MD   ENTRESTO 24-26 MG per tablet 1 tablet 2 times daily  8/22/19   Historical Provider, MD   TOPROL XL 50 MG extended release tablet Take 50 mg by mouth daily  8/20/19   Historical Provider, MD   spironolactone (ALDACTONE) 25 MG tablet Take 25 mg by mouth daily    Historical Provider, MD   atorvastatin (LIPITOR) 40 MG tablet TAKE 1 TABLET DAILY 7/22/19   BRENDAN Razo CNP   amiodarone (CORDARONE) 200 MG tablet Take 2 tablets PO BID x3 days then take 1 tablet PO BID thereafter 12/10/18   Charley Woodruff MD   Multiple Vitamin (MULTIVITAMIN) tablet Take 1 tablet by mouth daily 12/11/18   Ishmael Hall MD   aspirin 81 MG tablet Take 81 mg by mouth daily. Historical Provider, MD       REVIEW OFSYSTEMS    (2-9 systems for level 4, 10 or more for level 5)      Review of Systems   Constitutional: Negative for fatigue and fever. Respiratory: Negative for shortness of breath and stridor. Gastrointestinal: Negative for abdominal pain, nausea and vomiting. Musculoskeletal: Negative for back pain and gait problem. Skin: Negative for rash and wound. Neurological: Positive for dizziness and weakness. Psychiatric/Behavioral: Negative for behavioral problems and confusion. PHYSICAL EXAM   (up to 7 for level 4, 8 or more forlevel 5)      INITIAL VITALS:   ED Triage Vitals [01/21/22 1629]   BP Temp Temp Source Pulse Resp SpO2 Height Weight   135/78 98.4 °F (36.9 °C) Oral 56 15 98 % 6' 1.5\" (1.867 m) 247 lb (112 kg)       Physical Exam  Constitutional:       Appearance: He is obese. HENT:      Head: Normocephalic and atraumatic. Eyes:      Pupils: Pupils are equal, round, and reactive to light. Cardiovascular:      Rate and Rhythm: Bradycardia present. Pulmonary:      Effort: Pulmonary effort is normal. No respiratory distress. Abdominal:      General: There is no distension. Palpations: Abdomen is soft. Tenderness: There is no abdominal tenderness. Musculoskeletal:      Cervical back: No rigidity or tenderness. Right lower leg: Edema present. Left lower leg: Edema present. Neurological:      General: No focal deficit present. Mental Status: He is alert.    Psychiatric:         Mood and Affect: Mood normal.         Behavior: Behavior normal.         DIFFERENTIAL  DIAGNOSIS     PLAN (LABS / IMAGING / EKG):  Orders Placed This Encounter   Procedures    XR CHEST PORTABLE    CBC Auto Differential    Troponin    Brain Natriuretic Peptide    MAGNESIUM    COMPREHENSIVE METABOLIC PANEL    TSH with Reflex    Troponin    HEMOGLOBIN AND HEMATOCRIT, BLOOD    Orthostatic blood pressure and pulse    Pacer Interrogate    EKG 12 Lead       MEDICATIONS ORDERED:  Orders Placed This Encounter   Medications    omeprazole (PRILOSEC) 20 MG delayed release capsule     Sig: Take 1 capsule by mouth every morning (before breakfast)     Dispense:  30 capsule     Refill:  0       Initial MDM/Plan: 70 y.o. male who presents with complaints of generalized weakness and dizziness. Patient is conversive, not altered, alert,. He is afebrile, normotensive, heart rate of 56 however baseline for patient. He does have bilateral lower extremity edema but no JVD. No abdominal tenderness, lungs clear to auscultate, no crackles heard.   Given that this patient had increase in his Lasix, impression is hypokalemia which could be causing his weakness versus ACS versus thyroid dysfunction  Plan for labs of CMP, magnesium, troponin, CBC, BMP we will also add on a thyroid function study  Will obtain orthostatics to ensure that he does not have any orthostatic hypotension  Plan to interrogate his ICD      DIAGNOSTIC RESULTS / EMERGENCYDEPARTMENT COURSE / MDM     LABS:  Labs Reviewed   CBC WITH AUTO DIFFERENTIAL - Abnormal; Notable for the following components:       Result Value    RBC 3.30 (*)     Hemoglobin 11.5 (*)     Hematocrit 34.3 (*)     .9 (*)     MCH 34.8 (*)     RDW 14.5 (*)     Seg Neutrophils 73 (*)     Lymphocytes 16 (*)     All other components within normal limits   TROPONIN - Abnormal; Notable for the following components:    Troponin, High Sensitivity 34 (*)     All other components within normal limits   BRAIN NATRIURETIC PEPTIDE - Abnormal; Notable for the following components:    Pro-BNP 2,301 (*)     All other components within normal limits   COMPREHENSIVE METABOLIC PANEL - Abnormal; Notable for the following components:    Glucose 104 (*)     CREATININE 1.38 (*)     ALT 46 (*)     GFR Non- 51 (*)     All other components within normal limits   TROPONIN - Abnormal; Notable for the following components:    Troponin, High Sensitivity 33 (*)     All other components within normal limits   HEMOGLOBIN AND HEMATOCRIT, BLOOD - Abnormal; Notable for the following components:    Hemoglobin 11.3 (*)     Hematocrit 34.3 (*)     All other components within normal limits   MAGNESIUM   TSH WITH REFLEX         RADIOLOGY:  XR CHEST PORTABLE    Result Date: 1/21/2022  EXAMINATION: ONE XRAY VIEW OF THE CHEST 1/21/2022 4:50 pm COMPARISON: Chest radiograph performed December 27, 2021. HISTORY: ORDERING SYSTEM PROVIDED HISTORY: weakness TECHNOLOGIST PROVIDED HISTORY: weakness FINDINGS: Left chest AICD in grossly stable positioning. No focal consolidation, pleural effusion, or pneumothorax. Stable cardiomediastinal silhouette. No acute osseous abnormality. 1. Cardiomegaly. 2. No acute cardiopulmonary findings. EKG      All EKG's are interpreted by the Emergency Department Physicianwho either signs or Co-signs this chart in the absence of a cardiologist.    EMERGENCY DEPARTMENT COURSE:  ED Course as of 01/21/22 2159 Fri Jan 21, 2022 1933 Patient was stable cardiac work-up, troponin is in the 33 however its been stable. He did have a drop in his hemoglobin of 11 today, 3 weeks ago it was 15. Patient reports that he has not been having any melanic stool. Plan will be to get a repeat hemoglobin, will get a digital rectal exam to assess for Hemoccult.  [AN]   2003 Digital rectal exam negative for Hemoccult blood. Awaiting for H&H. Patient ICD was interrogated, there was no any firing. Or V. tach [AN]   2157 Patient repeat H&H was 11.3, stable from prior at 11.5. He was able to ambulate without any feeling of weakness or about to pass out. Patient did report that he would like to be discharged because he wants to go see his wife that is being discharged from the hospital tomorrow. He did state that if he has worsening symptoms, that he will come back to the emergency room to be evaluated.   I did explain to patient that we wanted to keep him however he insisted that this was the only time that he would be opposed to being kept in the hospital simply because his wife is being discharged and he has not seen her in 1 month. Patient given strict return precautions. [AN]      ED Course User Index  [AN] Ernestina Carrion MD          PROCEDURES:  None    CONSULTS:  None    CRITICAL CARE:      FINAL IMPRESSION      1. Dizziness    2.  General weakness          DISPOSITION / PLAN     DISPOSITION Decision To Discharge 01/21/2022 08:26:00 PM      PATIENT REFERRED TO:  [facility]  [address] [facility] [address]  [phone]    If symptoms worsen    Stephen Penny, [address] [place] [address]  [phone]      As needed      DISCHARGE MEDICATIONS:  Discharge Medication List as of 1/21/2022  8:37 PM      START taking these medications    Details   omeprazole (PRILOSEC) 20 MG delayed release capsule Take 1 capsule by mouth every morning (before breakfast), Disp-30 capsule, R-0Print             Ernestina Carrion MD  Emergency Medicine Resident    (Please note that portions of this note were completed with a voice recognition program.Efforts were made to edit the dictations but occasionally words are mis-transcribed.)       Ernestina Carrion MD  Resident  01/21/22 7863

## 2022-01-21 NOTE — ED NOTES
Bed: 23  Expected date:   Expected time:   Means of arrival:   Comments:  ENDY [address] Ponce RN  01/21/22 3258

## 2022-01-21 NOTE — ED PROVIDER NOTES
[facility]  Emergency Department  Faculty Attestation     I performed a history and physical examination of the patient and discussed management with the resident. I reviewed the residents note and agree with the documented findings and plan of care. Any areas of disagreement are noted on the chart. I was personally present for the key portions of any procedures. I have documented in the chart those procedures where I was not present during the key portions. I have reviewed the emergency nurses triage note. I agree with the chief complaint, past medical history, past surgical history, allergies, medications, social and family history as documented unless otherwise noted below. For Physician Assistant/ Nurse Practitioner cases/documentation I have personally evaluated this patient and have completed at least one if not all key elements of the E/M (history, physical exam, and MDM). Additional findings are as noted. Primary Care Physician:  Khoi Fernández MD    Screenings:  [unfilled]    CHIEF COMPLAINT       Chief Complaint   Patient presents with    Dizziness       RECENT VITALS:   Temp: 98.4 °F (36.9 °C),  Pulse: 56, Resp: 11, BP: 135/78    LABS:  Labs Reviewed   CBC WITH AUTO DIFFERENTIAL - Abnormal; Notable for the following components:       Result Value    RBC 3.30 (*)     Hemoglobin 11.5 (*)     Hematocrit 34.3 (*)     .9 (*)     MCH 34.8 (*)     RDW 14.5 (*)     Seg Neutrophils 73 (*)     Lymphocytes 16 (*)     All other components within normal limits   TROPONIN   BRAIN NATRIURETIC PEPTIDE   TROPONIN   MAGNESIUM   COMPREHENSIVE METABOLIC PANEL       Radiology  XR CHEST PORTABLE   Preliminary Result   1. Cardiomegaly. 2. No acute cardiopulmonary findings. CRITICAL CARE: There was a high probability of clinically significant/life threatening deterioration in this patient's condition which required my urgent intervention.   Total critical care time was none minutes. This excludes any time for separately reportable procedures. EKG:   EKG Interpretation    Interpreted by me    Rhythm: normal sinus   Rate: Bradycardia  Axis: normal  Ectopy: none  Conduction: First-degree AVB  ST Segments: Subtle elevation V2, depression V6 and lead aVF  T Waves: Inversion 1, L, diffuse flattening of T waves  Q Waves: none    Bradycardia, first-degree AVB, nonspecific ST and T wave changes    Attending Physician Additional  Notes    Patient feels different since this morning with transient paresthesias in both forearms and dizziness/fatigue, presyncope through the day when he is upright. He also lost his appetite. No difficulty breathing sweats nausea or chest heaviness. He has a history of cardiomyopathy, EF of 20%, ICD pacer with no recent shocks. He was admitted and transferred to [facility] clinic after 14 shocks that had ablation. Since then he has had no shocks. They adjusted his pacer where he no longer has as many paced beats. They adjusted his Lasix recently because of leg edema and he is on Lasix twice daily. He is no longer on spironolactone. He is transition from Coumadin to Eliquis. He is told that he has a thrombus in his heart. His most recent echo does not show this however he was told that it smoothed out and still present per his cardiologist.  Weight is baseline. Heart rate this morning was 49 although he has other measurements of 50 and 52. His weights have been steady for the past 2 weeks on his home monitoring sheet. On exam he is nontoxic afebrile vital signs are normal.  Heart rate is 50 is 56 without many paced beats. No JVD. No dyspnea. No increased work of breathing. No rales. There is bilateral pitting edema to the knees 2+ in nature symmetrical without cords or calf tenderness. Impression is fatigue, bradycardia, peripheral edema, consider MADISON or potassium abnormality.   Plan is laboratory studies, interrogate pacer/ICD, reassess. Anticipate discussion with his PCP. Georgina Rodriguez.  Corby Morrell MD, [address] [facility]  Attending Emergency  Physician               Kirby Guillory MD  01/21/22 9900       Kirby Guillory MD  01/21/22 2337

## 2022-01-22 LAB
EKG ATRIAL RATE: 55 BPM
EKG P AXIS: 54 DEGREES
EKG P-R INTERVAL: 214 MS
EKG Q-T INTERVAL: 476 MS
EKG QRS DURATION: 66 MS
EKG QTC CALCULATION (BAZETT): 455 MS
EKG R AXIS: -13 DEGREES
EKG T AXIS: -11 DEGREES
EKG VENTRICULAR RATE: 55 BPM

## 2022-01-22 NOTE — ED NOTES
Resident informed and aware patients HR 48-49 bpm, patient denies dizziness.       Manish Lynch RN  01/21/22 2023

## 2022-01-22 NOTE — ED NOTES
Received report from previous shift RN. Assumed care of patient at this time.       Sarai Yañez RN  01/21/22 4243

## 2022-01-24 ENCOUNTER — CARE COORDINATION (OUTPATIENT)
Dept: CARE COORDINATION | Age: 72
End: 2022-01-24

## 2022-01-24 NOTE — CARE COORDINATION
Ambulatory Care Coordination  ED Follow up Call    Reason for ED visit:  Dizziness & Weakness   Status:     improved    Did you call your PCP prior to going to the ED? No      Did you receive a discharge instructions from the Emergency Room? Yes  Review of Instructions:     Understands what to report/when to return?:  Yes   Understands discharge instructions?:  Yes   Following discharge instructions?:  Yes   If not why? Are there any new complaints of pain? No  New Pain Meds? N/A    Constipation prophylaxis needed? N/A    If you have a wound is the dressing clean, dry, and intact? N/A  Understands wound care regimen? N/A    Are there any other complaints/concerns that you wish to tell your provider? FU appts/Provider:    Future Appointments   Date Time Provider Cris Burden   1/31/2022  1:00 PM Cory Dawson MD [address]           New Medications?:   No      Medication Reconciliation by phone - N/A  Understands Medications? N/A  Taking Medications? Yes  Can you swallow your pills? Yes    Any further needs in the home i.e. Equipment?   No    Link to services in community?:  No   Which services:

## 2022-01-24 NOTE — ACP (ADVANCE CARE PLANNING)
Advance Care Planning   Healthcare Decision Maker:    Primary Decision Maker: Virginia Jarrett - Child - [phone]    Secondary Decision Maker: Ernestina Allan - Spouse - [phone]    Click here to complete Healthcare Decision Makers including selection of the Healthcare Decision Maker Relationship (ie \"Primary\"). Today we At patient's request, documented decision maker who is not patient's next of Kin. See comments.

## 2022-01-31 ENCOUNTER — OFFICE VISIT (OUTPATIENT)
Dept: INTERNAL MEDICINE CLINIC | Age: 72
End: 2022-01-31
Payer: MEDICARE

## 2022-01-31 VITALS
WEIGHT: 252.2 LBS | HEIGHT: 74 IN | SYSTOLIC BLOOD PRESSURE: 100 MMHG | BODY MASS INDEX: 32.37 KG/M2 | DIASTOLIC BLOOD PRESSURE: 60 MMHG | OXYGEN SATURATION: 97 % | HEART RATE: 56 BPM

## 2022-01-31 DIAGNOSIS — E66.01 MORBID OBESITY, UNSPECIFIED OBESITY TYPE (HCC): ICD-10-CM

## 2022-01-31 DIAGNOSIS — I47.20 VENTRICULAR TACHYCARDIA: ICD-10-CM

## 2022-01-31 DIAGNOSIS — J44.9 CHRONIC OBSTRUCTIVE PULMONARY DISEASE, UNSPECIFIED COPD TYPE (HCC): Primary | ICD-10-CM

## 2022-01-31 PROCEDURE — 99214 OFFICE O/P EST MOD 30 MIN: CPT | Performed by: INTERNAL MEDICINE

## 2022-01-31 PROCEDURE — 1111F DSCHRG MED/CURRENT MED MERGE: CPT | Performed by: INTERNAL MEDICINE

## 2022-01-31 PROCEDURE — 3288F FALL RISK ASSESSMENT DOCD: CPT | Performed by: INTERNAL MEDICINE

## 2022-01-31 ASSESSMENT — ENCOUNTER SYMPTOMS
ABDOMINAL DISTENTION: 0
WHEEZING: 0
APNEA: 0
CONSTIPATION: 0
FACIAL SWELLING: 0
SHORTNESS OF BREATH: 1
CHEST TIGHTNESS: 0
ABDOMINAL PAIN: 0
DIARRHEA: 0
COUGH: 0
BACK PAIN: 0
COLOR CHANGE: 0

## 2022-01-31 NOTE — PROGRESS NOTES
Post-Discharge Transitional Care Management Services or Hospital Follow Up      Ion Cline   YOB: 1950    Date of Office Visit:  1/31/2022  Date of Hospital Admission: 1/21/22  Date of Hospital Discharge: 1/21/22  Readmission Risk Score(high >=14%. Medium >=10%):Readmission Risk Score: 11.6 ( )      Care management risk score Rising risk (score 2-5) and Complex Care (Scores >=6): 11     Non face to face  following discharge, date last encounter closed (first attempt may have been earlier): *No documented post hospital discharge outreach found in the last 14 days *No documented post hospital discharge outreach found in the last 14 days    Call initiated 2 business days of discharge: *No response recorded in the last 14 days     Patient Active Problem List   Diagnosis    [facility]ina [facility]    Alcohol abuse    Smoking    CHF (congestive heart failure) ([address]    COPD (chronic obstructive pulmonary disease) ([facility]    Morbid obesity, unspecified obesity type ([facility]    Shortness of breath    Ischemic cardiomyopathy    SOB (shortness of breath)    Anxiety    Hoarseness    Colon polyps    Back pain    Essential hypertension    Type 2 diabetes mellitus without complication, without long-term current use of insulin ([address]    Ventricular tachycardia ([address]    MADISON (acute kidney injury) ([facility]    ICD (implantable cardioverter-defibrillator) discharge       No Known Allergies    Medications listed as ordered at the time of discharge from hospital     Medication List          Accurate as of January 31, 2022  1:15 PM. If you have any questions, ask your nurse or doctor.             CONTINUE taking these medications    amiodarone 200 MG tablet  Commonly known as: CORDARONE  Take 2 tablets PO BID x3 days then take 1 tablet PO BID thereafter     apixaban 5 MG Tabs tablet  Commonly known as: ELIQUIS     aspirin 81 MG tablet     atorvastatin 40 MG tablet  Commonly known as: LIPITOR  TAKE 1 TABLET DAILY     blood glucose test strips strip  Commonly known as: ASCENSIA AUTODISC VI;ONE TOUCH ULTRA TEST VI  1 each by In Vitro route 3 times daily Use with associated glucose meter. Entresto 24-26 MG per tablet  Generic drug: sacubitril-valsartan     furosemide 20 MG tablet  Commonly known as: LASIX  TAKE 1 TABLET DAILY     metFORMIN 500 MG tablet  Commonly known as: Glucophage  Take 1 tablet by mouth 2 times daily (with meals)     multivitamin tablet  Take 1 tablet by mouth daily     omeprazole 20 MG delayed release capsule  Commonly known as: PRILOSEC  Take 1 capsule by mouth every morning (before breakfast)     spironolactone 25 MG tablet  Commonly known as: ALDACTONE     Toprol XL 50 MG extended release tablet  Generic drug: metoprolol succinate     warfarin 2.5 MG tablet  Commonly known as: COUMADIN              Medications marked \"taking\" at this time  Outpatient Medications Marked as Taking for the 1/31/22 encounter (Office Visit) with John Coon MD   Medication Sig Dispense Refill    apixaban (ELIQUIS) 5 MG TABS tablet Take 5 mg by mouth 2 times daily      omeprazole (PRILOSEC) 20 MG delayed release capsule Take 1 capsule by mouth every morning (before breakfast) 30 capsule 0    furosemide (LASIX) 20 MG tablet TAKE 1 TABLET DAILY 90 tablet 0    metFORMIN (GLUCOPHAGE) 500 MG tablet Take 1 tablet by mouth 2 times daily (with meals) 60 tablet 11    blood glucose test strips (ASCENSIA AUTODISC VI;ONE TOUCH ULTRA TEST VI) strip 1 each by In Vitro route 3 times daily Use with associated glucose meter.  100 strip 11    ENTRESTO 24-26 MG per tablet 1 tablet 2 times daily       TOPROL XL 50 MG extended release tablet Take 50 mg by mouth daily       atorvastatin (LIPITOR) 40 MG tablet TAKE 1 TABLET DAILY 90 tablet 3    amiodarone (CORDARONE) 200 MG tablet Take 2 tablets PO BID x3 days then take 1 tablet PO BID thereafter 90 tablet 6    Multiple Vitamin (MULTIVITAMIN) tablet Take 1 tablet by mouth daily 30 tablet 3    aspirin 81 MG tablet Take 81 mg by mouth daily. Medications patient taking as of now reconciled against medications ordered at time of hospital discharge: Yes    Chief Complaint   Patient presents with    Follow-Up from [facility] 12/28-1/7 VT    ED Follow-up     STV 1/21 dizziness    Dizziness    Leg Swelling       HPI     Hopsital Course from [address] [place] [address] Rd., [address] was admitted to CICU under the direction of Dr. Lakshmi Burns. He continued to experience ventricular tachycardia despite Amiodarone and Lidocaine drips. He was changed to Procainamide and converted spontaneously to Sr, however the slow monomorphic VT reoccurred. Coumadin was held and remained on a Heparin drip. Electrophysiology was consulted and Dr. Rosemary Foster saw the patient. A electrophysiology study and ventricular tachycardia ablation was advised. A echocardiogram revealed EF 22%. Cardiothoracic surgery (Dr. Roz Casey) was consulted regarding Impella insertion prior to undergoing the VT ablation. 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 He was unable to undergo the Impella insertion due to left ventricular thrombus. Heart failure medications were uptritrated. He continued to have intermittent ventricular tachycardia and underwent an Epicardial and endocardial ventricular tachycardia ablation with Dr. Sonia Rivera. He had a small right pericardial effusion which was followed up on via serial limited echocardiograms. He was extubated a few days later and transferred to telemetry. Plan was to have patient follow up to discuss advanced heart failure therapies probably an LVAD with heart failure.   Couamdin Changed to eliquis   Creatinine was 1.32   Last HB is 10.3   Noticed increased Swelling in legs and SOB   Weight is 249, although weight on 12/18 was 257   Has Home health aid   Does not have symptoms with GERD   Vivas snot want to increase Lasix , since it had Dropped his BP required ED visit

## 2022-01-31 NOTE — PROGRESS NOTES
Subjective:      Patient ID: Bhargavi Jaquez is a 70 y.o. male. HPI    Review of Systems    Objective:   Physical Exam    Assessment / Plan:           Visit Information    Have you changed or started any medications since your last visit including any over-the-counter medicines, vitamins, or herbal medicines? no   Are you having any side effects from any of your medications? -  no  Have you stopped taking any of your medications? Is so, why? -  no    Have you seen any other physician or provider since your last visit? Yes - Records Requested  Have you had any other diagnostic tests since your last visit? Yes - Records Requested  Have you been seen in the emergency room and/or had an admission to a hospital since we last saw you? Yes - Records Requested  Have you had your routine dental cleaning in the past 6 months? no    Have you activated your [employer] account? If not, what are your barriers?  Yes     Patient Care Team:  Ted Almaraz MD as PCP - General (Internal Medicine)  Ted Almaraz MD as PCP - [facility]  General Alla MD as Consulting Physician (Gastroenterology)  Trenton Frey RN as Nurse Navigator    Medical History Review  Past Medical, Family, and Social History reviewed and does not contribute to the patient presenting condition    Health Maintenance   Topic Date Due    DTaP/Tdap/Td vaccine (1 - Tdap) Never done    Shingles Vaccine (1 of 2) Never done    Diabetic retinal exam  08/06/2019    Annual Wellness Visit (AWV)  11/03/2021    Depression Screen  02/03/2022    Diabetic foot exam  10/14/2022    Diabetic microalbuminuria test  11/11/2022    Lipid screen  11/11/2022    A1C test (Diabetic or Prediabetic)  12/28/2022    Low dose CT lung screening  12/28/2022    TSH testing  01/21/2023    Potassium monitoring  01/21/2023    Creatinine monitoring  01/21/2023    Colon cancer screen colonoscopy  03/15/2025    Flu vaccine  Completed    Pneumococcal 65+ years Vaccine  Completed    COVID-19 Vaccine  Completed    AAA screen  Completed    Hepatitis C screen  Completed    Hepatitis A vaccine  Aged Out    Hib vaccine  Aged Out    Meningococcal (ACWY) vaccine  Aged Out

## 2022-03-21 ENCOUNTER — HOSPITAL ENCOUNTER (OUTPATIENT)
Age: 72
Setting detail: SPECIMEN
Discharge: HOME | End: 2022-03-21

## 2022-03-21 LAB
ANION GAP SERPL CALCULATED.3IONS-SCNC: 14 MMOL/L (ref 9–17)
BUN BLDV-MCNC: 15 MG/DL (ref 8–23)
CHLORIDE BLD-SCNC: 101 MMOL/L (ref 98–107)
CO2: 24 MMOL/L (ref 20–31)
CREAT SERPL-MCNC: 1.1 MG/DL (ref 0.7–1.2)
GFR AFRICAN AMERICAN: >60 ML/MIN
GFR NON-AFRICAN AMERICAN: >60 ML/MIN
GFR SERPL CREATININE-BSD FRML MDRD: NORMAL ML/MIN/{1.73_M2}
MAGNESIUM: 1.9 MG/DL (ref 1.6–2.6)
POTASSIUM SERPL-SCNC: 4.9 MMOL/L (ref 3.7–5.3)
SODIUM BLD-SCNC: 139 MMOL/L (ref 135–144)

## 2022-04-01 ENCOUNTER — OFFICE VISIT (OUTPATIENT)
Dept: INTERNAL MEDICINE CLINIC | Age: 72
End: 2022-04-01
Payer: MEDICARE

## 2022-04-01 VITALS
WEIGHT: 258.4 LBS | OXYGEN SATURATION: 98 % | DIASTOLIC BLOOD PRESSURE: 64 MMHG | SYSTOLIC BLOOD PRESSURE: 110 MMHG | HEIGHT: 74 IN | BODY MASS INDEX: 33.16 KG/M2 | HEART RATE: 53 BPM

## 2022-04-01 DIAGNOSIS — E11.9 TYPE 2 DIABETES MELLITUS WITHOUT COMPLICATION, WITHOUT LONG-TERM CURRENT USE OF INSULIN (HCC): ICD-10-CM

## 2022-04-01 DIAGNOSIS — J44.9 CHRONIC OBSTRUCTIVE PULMONARY DISEASE, UNSPECIFIED COPD TYPE (HCC): ICD-10-CM

## 2022-04-01 DIAGNOSIS — I50.9 CONGESTIVE HEART FAILURE, UNSPECIFIED HF CHRONICITY, UNSPECIFIED HEART FAILURE TYPE (HCC): Primary | ICD-10-CM

## 2022-04-01 LAB — HBA1C MFR BLD: 5.7 %

## 2022-04-01 PROCEDURE — 3044F HG A1C LEVEL LT 7.0%: CPT | Performed by: INTERNAL MEDICINE

## 2022-04-01 PROCEDURE — 99214 OFFICE O/P EST MOD 30 MIN: CPT | Performed by: INTERNAL MEDICINE

## 2022-04-01 PROCEDURE — 83036 HEMOGLOBIN GLYCOSYLATED A1C: CPT | Performed by: INTERNAL MEDICINE

## 2022-04-01 ASSESSMENT — PATIENT HEALTH QUESTIONNAIRE - PHQ9
3. TROUBLE FALLING OR STAYING ASLEEP: 0
SUM OF ALL RESPONSES TO PHQ QUESTIONS 1-9: 4
9. THOUGHTS THAT YOU WOULD BE BETTER OFF DEAD, OR OF HURTING YOURSELF: 0
2. FEELING DOWN, DEPRESSED OR HOPELESS: 1
SUM OF ALL RESPONSES TO PHQ QUESTIONS 1-9: 4
1. LITTLE INTEREST OR PLEASURE IN DOING THINGS: 2
SUM OF ALL RESPONSES TO PHQ QUESTIONS 1-9: 4
7. TROUBLE CONCENTRATING ON THINGS, SUCH AS READING THE NEWSPAPER OR WATCHING TELEVISION: 0
SUM OF ALL RESPONSES TO PHQ9 QUESTIONS 1 & 2: 3
4. FEELING TIRED OR HAVING LITTLE ENERGY: 1
5. POOR APPETITE OR OVEREATING: 0
SUM OF ALL RESPONSES TO PHQ QUESTIONS 1-9: 4
6. FEELING BAD ABOUT YOURSELF - OR THAT YOU ARE A FAILURE OR HAVE LET YOURSELF OR YOUR FAMILY DOWN: 0
8. MOVING OR SPEAKING SO SLOWLY THAT OTHER PEOPLE COULD HAVE NOTICED. OR THE OPPOSITE, BEING SO FIGETY OR RESTLESS THAT YOU HAVE BEEN MOVING AROUND A LOT MORE THAN USUAL: 0
10. IF YOU CHECKED OFF ANY PROBLEMS, HOW DIFFICULT HAVE THESE PROBLEMS MADE IT FOR YOU TO DO YOUR WORK, TAKE CARE OF THINGS AT HOME, OR GET ALONG WITH OTHER PEOPLE: 0

## 2022-04-01 NOTE — PROGRESS NOTES
Subjective:      Patient ID: Kaity Matthew is a 70 y.o. male. HPI Patient is here for evaluation Of multiple medical Problems , he has CHF,DM, CKD, HTN,  COPD, Mural Thrombus on Coumadin, S/p AICD , follows with Cardiologist, on Entresto  . He is still Smoking 1 PPD.   S/p cardiac ablation with VT at [facility]   Seen cardiologist recently , had AICD interrogated  INR is Therapeutic   Tolerating 12 of CPAP . Had lab work recently, which was ok   Was not taking Aldactone as Advised by his cardiologist , noticed increased weight and pedal edema     Review of Systems   Constitutional: Negative for activity change, appetite change, chills and diaphoresis. HENT: Negative for congestion, dental problem, ear discharge, facial swelling and hearing loss. Respiratory: Positive for shortness of breath. Negative for apnea, cough, chest tightness and wheezing. Orthopnea present    Cardiovascular: Positive for palpitations (occaiosnal ) and leg swelling. Negative for chest pain. Gastrointestinal: Negative for abdominal distention, abdominal pain, constipation and diarrhea. Genitourinary: Negative for difficulty urinating, dysuria, enuresis, flank pain and frequency. Musculoskeletal: Negative for arthralgias, back pain, gait problem and joint swelling. Skin: Negative for color change, pallor and rash. Neurological: Negative for dizziness, seizures, facial asymmetry, light-headedness, numbness and headaches. Psychiatric/Behavioral: Negative for agitation, behavioral problems, confusion, decreased concentration and dysphoric mood. Objective:   Physical Exam  Vitals and nursing note reviewed. Constitutional:       General: He is not in acute distress. Appearance: He is well-developed. He is obese. He is not diaphoretic. HENT:      Head: Normocephalic and atraumatic. Mouth/Throat:      Pharynx: No oropharyngeal exudate. Eyes:      General: No scleral icterus.         Right eye: No discharge. Left eye: No discharge. Conjunctiva/sclera: Conjunctivae normal.      Pupils: Pupils are equal, round, and reactive to light. Neck:      Thyroid: No thyromegaly. Vascular: No JVD. Trachea: No tracheal deviation. Comments: Thick neck present  Cardiovascular:      Rate and Rhythm: Normal rate. Heart sounds: Normal heart sounds. No murmur heard. No gallop. Pulmonary:      Effort: Pulmonary effort is normal. No respiratory distress. Breath sounds: Normal breath sounds. No stridor. No wheezing or rales. Abdominal:      General: Bowel sounds are normal. There is no distension. Palpations: Abdomen is soft. Tenderness: There is no abdominal tenderness. There is no guarding or rebound. Musculoskeletal:         General: No tenderness. Normal range of motion. Cervical back: Normal range of motion. Right lower leg: Edema present. Left lower leg: Edema present. Skin:     General: Skin is warm and dry. Findings: No erythema or rash. Neurological:      Mental Status: He is alert and oriented to person, place, and time. Assessment / Plan:   1. Congestive heart failure, unspecified HF chronicity, unspecified heart failure type ([address]  Last creatinine is 1.10  Advised to restart on Aldactone    2. Chronic obstructive pulmonary disease, unspecified COPD type ([address]  Stable   Controlled  Advised to quit smoking    3. Type 2 diabetes mellitus without complication, without long-term current use of insulin (HCC)  Controlled   - POCT glycosylated hemoglobin (Hb A1C)      · Return in about 3 months (around 7/1/2022). · Reviewed prior labs and health maintenance. · Discussed use, benefit, and side effects of prescribed medications. Barriers to medication compliance addressed. All patient questions answered. Pt voiced understanding.          MD JASVIR JonesMer[facility]  4/10/2022, 4:20 PM    Please note that this chart was generated using voice recognition Dragon dictation software. Although every effort was made to ensure the accuracy of this automated transcription, some errors in transcription may have occurred. Visit Information    Have you changed or started any medications since your last visit including any over-the-counter medicines, vitamins, or herbal medicines? no   Are you having any side effects from any of your medications? -  no  Have you stopped taking any of your medications? Is so, why? -  no    Have you seen any other physician or provider since your last visit? Yes - Records Obtained  Have you had any other diagnostic tests since your last visit? No  Have you been seen in the emergency room and/or had an admission to a hospital since we last saw you? No  Have you had your routine dental cleaning in the past 6 months? no    Have you activated your [employer] account? If not, what are your barriers?  Yes     Patient Care Team:  Lex Cowden, MD as PCP - General (Internal Medicine)  Lex Cowden, MD as PCP - [facility]  Jordan Brooke MD as Consulting Physician (Gastroenterology)  Franck Hernandez RN as Nurse Navigator    Medical History Review  Past Medical, Family, and Social History reviewed and does contribute to the patient presenting condition    Health Maintenance   Topic Date Due    DTaP/Tdap/Td vaccine (1 - Tdap) Never done    Shingles Vaccine (1 of 2) Never done    Diabetic retinal exam  08/06/2019    Annual Wellness Visit (AWV)  11/03/2021    Depression Screen  02/03/2022    Diabetic foot exam  10/14/2022    Diabetic microalbuminuria test  11/11/2022    Lipid screen  11/11/2022    A1C test (Diabetic or Prediabetic)  12/28/2022    Low dose CT lung screening  12/28/2022    TSH testing  01/21/2023    Potassium monitoring  03/21/2023    Creatinine monitoring  03/21/2023    Colorectal Cancer Screen  03/15/2025    Flu vaccine  Completed    Pneumococcal 65+ years Vaccine Completed    COVID-19 Vaccine  Completed    AAA screen  Completed    Hepatitis C screen  Completed    Hepatitis A vaccine  Aged Out    Hib vaccine  Aged Out    Meningococcal (ACWY) vaccine  Aged Out

## 2022-04-04 DIAGNOSIS — I50.32 CHRONIC DIASTOLIC CONGESTIVE HEART FAILURE (HCC): ICD-10-CM

## 2022-04-04 RX ORDER — FUROSEMIDE 20 MG/1
TABLET ORAL
Qty: 90 TABLET | Refills: 3 | OUTPATIENT
Start: 2022-04-04

## 2022-04-10 ASSESSMENT — ENCOUNTER SYMPTOMS
FACIAL SWELLING: 0
COUGH: 0
CONSTIPATION: 0
WHEEZING: 0
BACK PAIN: 0
ABDOMINAL DISTENTION: 0
APNEA: 0
ABDOMINAL PAIN: 0
CHEST TIGHTNESS: 0
DIARRHEA: 0
SHORTNESS OF BREATH: 1
COLOR CHANGE: 0

## 2022-04-12 DIAGNOSIS — E11.9 TYPE 2 DIABETES MELLITUS WITHOUT COMPLICATION, WITHOUT LONG-TERM CURRENT USE OF INSULIN (HCC): ICD-10-CM

## 2022-05-31 ENCOUNTER — HOSPITAL ENCOUNTER (OUTPATIENT)
Age: 72
Setting detail: SPECIMEN
Discharge: HOME | End: 2022-05-31

## 2022-05-31 LAB
ANION GAP SERPL CALCULATED.3IONS-SCNC: 16 MMOL/L (ref 9–17)
BUN BLDV-MCNC: 23 MG/DL (ref 8–23)
CHLORIDE BLD-SCNC: 99 MMOL/L (ref 98–107)
CO2: 20 MMOL/L (ref 20–31)
CREAT SERPL-MCNC: 1.41 MG/DL (ref 0.7–1.2)
GFR AFRICAN AMERICAN: >60 ML/MIN
GFR NON-AFRICAN AMERICAN: 50 ML/MIN
GFR SERPL CREATININE-BSD FRML MDRD: ABNORMAL ML/MIN/{1.73_M2}
MAGNESIUM: 1.6 MG/DL (ref 1.6–2.6)
POTASSIUM SERPL-SCNC: 5 MMOL/L (ref 3.7–5.3)
SODIUM BLD-SCNC: 135 MMOL/L (ref 135–144)

## 2022-07-08 ENCOUNTER — OFFICE VISIT (OUTPATIENT)
Dept: INTERNAL MEDICINE CLINIC | Age: 72
End: 2022-07-08
Payer: MEDICARE

## 2022-07-08 VITALS
HEIGHT: 73 IN | OXYGEN SATURATION: 97 % | HEART RATE: 55 BPM | SYSTOLIC BLOOD PRESSURE: 112 MMHG | WEIGHT: 237 LBS | BODY MASS INDEX: 31.41 KG/M2 | DIASTOLIC BLOOD PRESSURE: 60 MMHG

## 2022-07-08 DIAGNOSIS — E66.01 MORBID OBESITY, UNSPECIFIED OBESITY TYPE (HCC): ICD-10-CM

## 2022-07-08 DIAGNOSIS — Z00.00 MEDICARE ANNUAL WELLNESS VISIT, SUBSEQUENT: ICD-10-CM

## 2022-07-08 DIAGNOSIS — N17.9 AKI (ACUTE KIDNEY INJURY) (HCC): ICD-10-CM

## 2022-07-08 DIAGNOSIS — E11.9 TYPE 2 DIABETES MELLITUS WITHOUT COMPLICATION, WITHOUT LONG-TERM CURRENT USE OF INSULIN (HCC): Primary | ICD-10-CM

## 2022-07-08 PROCEDURE — G0439 PPPS, SUBSEQ VISIT: HCPCS | Performed by: INTERNAL MEDICINE

## 2022-07-08 PROCEDURE — 3044F HG A1C LEVEL LT 7.0%: CPT | Performed by: INTERNAL MEDICINE

## 2022-07-08 PROCEDURE — 1123F ACP DISCUSS/DSCN MKR DOCD: CPT | Performed by: INTERNAL MEDICINE

## 2022-07-08 RX ORDER — MIDODRINE HYDROCHLORIDE 2.5 MG/1
TABLET ORAL
COMMUNITY
Start: 2022-05-19

## 2022-07-08 ASSESSMENT — PATIENT HEALTH QUESTIONNAIRE - PHQ9
SUM OF ALL RESPONSES TO PHQ QUESTIONS 1-9: 0
SUM OF ALL RESPONSES TO PHQ9 QUESTIONS 1 & 2: 0
SUM OF ALL RESPONSES TO PHQ QUESTIONS 1-9: 0
1. LITTLE INTEREST OR PLEASURE IN DOING THINGS: 0
SUM OF ALL RESPONSES TO PHQ QUESTIONS 1-9: 0
2. FEELING DOWN, DEPRESSED OR HOPELESS: 0
SUM OF ALL RESPONSES TO PHQ QUESTIONS 1-9: 0

## 2022-07-08 ASSESSMENT — LIFESTYLE VARIABLES
HOW MANY STANDARD DRINKS CONTAINING ALCOHOL DO YOU HAVE ON A TYPICAL DAY: 1 OR 2
HOW OFTEN DO YOU HAVE A DRINK CONTAINING ALCOHOL: MONTHLY OR LESS

## 2022-07-08 NOTE — PROGRESS NOTES
Subjective:      Patient ID: Tony Barroso is a 70 y.o. male. HPI    Review of Systems    Objective:   Physical Exam    Assessment / Plan:           Visit Information    Have you changed or started any medications since your last visit including any over-the-counter medicines, vitamins, or herbal medicines? no   Are you having any side effects from any of your medications? -  no  Have you stopped taking any of your medications? Is so, why? -  no    Have you seen any other physician or provider since your last visit? No  Have you had any other diagnostic tests since your last visit? No  Have you been seen in the emergency room and/or had an admission to a hospital since we last saw you? No  Have you had your routine dental cleaning in the past 6 months? no    Have you activated your [employer] account? If not, what are your barriers?  Yes     Patient Care Team:  Jethro Carrizales MD as PCP - General (Internal Medicine)  Jeth[facility] Car[facility]ales MD [facility]  Sarai Thompson MD as Consulting Physician (Gastroenterology)    Medical History Review  Past Medical, Family, and Social History reviewed and does contribute to the patient presenting condition    Health Maintenance   Topic Date Due    DTaP/Tdap/Td vaccine (1 - Tdap) Never done    Shingles vaccine (1 of 2) Never done    Diabetic retinal exam  08/06/2019    Annual Wellness Visit (AWV)  11/03/2021    Low dose CT lung screening  11/18/2021    Flu vaccine (1) 09/01/2022    Diabetic foot exam  10/14/2022    Diabetic microalbuminuria test  11/11/2022    Lipids  11/11/2022    A1C test (Diabetic or Prediabetic)  04/01/2023    Depression Screen  04/01/2023    Colorectal Cancer Screen  03/15/2025    Pneumococcal 65+ years Vaccine  Completed    COVID-19 Vaccine  Completed    AAA screen  Completed    Hepatitis C screen  Completed    Hepatitis A vaccine  Aged Out    Hib vaccine  Aged Out    Meningococcal (ACWY) vaccine  Aged Out

## 2022-07-08 NOTE — PROGRESS NOTES
social and emotional support that you need?: Yes  Do you have a Living Will?: Yes    Advance Directives     Power of [address] Will ACP-Advance Directive ACP-Power of     Not on File Not on File Not on File Not on File      General Health Risk Interventions:  · ADvise to have LIving will     Health Habits/Nutrition:     Physical Activity: Inactive    Days of Exercise per Week: 0 days    Minutes of Exercise per Session: 0 min     Have you lost any weight without trying in the past 3 months?: No  Body mass index: (!) 31.26  Have you seen the dentist within the past year?: (!) No    Health Habits/Nutrition Interventions:  · ADvise to do regular Excercise     Hearing/Vision:  Do you or your family notice any trouble with your hearing that hasn't been managed with hearing aids?: (!) Yes  Do you have difficulty driving, watching TV, or doing any of your daily activities because of your eyesight?: (!) Yes  Have you had an eye exam within the past year?: Yes  No exam data present    Hearing/Vision Interventions:  · Advise to have Hearing test , see opthalmologist once  a year, does not wants to see ENT at this movement             Objective   Vitals:    07/08/22 1124   BP: 112/60   Pulse: 55   SpO2: 97%   Weight: 237 lb (107.5 kg)   Height: 6' 1\" (1.854 m)      Body mass index is 31.27 kg/m².         General Appearance: alert and oriented to person, place and time, well developed and well- nourished, in no acute distress  Skin: warm and dry, no rash or erythema  Head: normocephalic and atraumatic  Neck: supple and non-tender without mass, no thyromegaly or thyroid nodules, no cervical lymphadenopathy  Pulmonary/Chest: clear to auscultation bilaterally- no wheezes, rales or rhonchi, normal air movement, no respiratory distress  Cardiovascular: normal rate, regular rhythm, normal S1 and S2, no murmurs, rubs, clicks, or gallops, distal pulses intact, no carotid bruits  Abdomen: soft, non-tender, non-distended, normal bowel sounds, no masses or organomegaly  Extremities: no cyanosis, clubbing , pitting edema present  Musculoskeletal: normal range of motion, no joint swelling, deformity or tenderness  Neurologic: reflexes normal and symmetric, no cranial nerve deficit, gait, coordination and speech normal       No Known Allergies  Prior to Visit Medications    Medication Sig Taking? Authorizing Provider   midodrine (PROAMATINE) 2.5 MG tablet  Yes Historical Provider, MD   blood glucose test strips (ASCENSIA AUTODISC VI;ONE TOUCH ULTRA TEST VI) strip 1 each by In Vitro route 3 times daily Use with associated glucose meter. Yes Ivory Palmer MD   metFORMIN (GLUCOPHAGE) 500 MG tablet Take 1 tablet by mouth 2 times daily (with meals) Yes Ivory Palmer MD   apixaban (ELIQUIS) 5 MG TABS tablet Take 5 mg by mouth 2 times daily Yes Historical Provider, MD   furosemide (LASIX) 20 MG tablet TAKE 1 TABLET DAILY Yes BRENDAN Arauz CNP   ENTRESTO 24-26 MG per tablet 1 tablet once 1/ 2 tab in the PM Yes Historical Provider, MD   TOPROL XL 50 MG extended release tablet Take 50 mg by mouth daily  Yes Historical Provider, MD   spironolactone (ALDACTONE) 25 MG tablet Take 25 mg by mouth daily Yes Historical Provider, MD   atorvastatin (LIPITOR) 40 MG tablet TAKE 1 TABLET DAILY Yes BRENDAN Arauz CNP   amiodarone (CORDARONE) 200 MG tablet Take 2 tablets PO BID x3 days then take 1 tablet PO BID thereafter Yes Loren Durham MD   Multiple Vitamin (MULTIVITAMIN) tablet Take 1 tablet by mouth daily Yes Ishmael Hall MD   aspirin 81 MG tablet Take 81 mg by mouth daily.  Yes Historical Provider, MD Olivas (Including outside providers/suppliers regularly involved in providing care):   Patient Care Team:  Ivory Palmer MD as PCP - General (Internal Medicine)  Ivory Palmer MD as P[facility]  Danitza Constantino MD as Consulting Physician (Gastroenterology)     Reviewed and updated this visit:  Tobacco Allergies  Meds  Med Hx  Surg Hx  Soc Hx  Fam Hx

## 2022-07-08 NOTE — PATIENT INSTRUCTIONS
Personalized Preventive Plan for Brinda Zamora - 7/8/2022  Medicare offers a range of preventive health benefits. Some of the tests and screenings are paid in full while other may be subject to a deductible, co-insurance, and/or copay. Some of these benefits include a comprehensive review of your medical history including lifestyle, illnesses that may run in your family, and various assessments and screenings as appropriate. After reviewing your medical record and screening and assessments performed today your provider may have ordered immunizations, labs, imaging, and/or referrals for you. A list of these orders (if applicable) as well as your Preventive Care list are included within your After Visit Summary for your review. Other Preventive Recommendations:    · A preventive eye exam performed by an eye specialist is recommended every 1-2 years to screen for glaucoma; cataracts, macular degeneration, and other eye disorders. · A preventive dental visit is recommended every 6 months. · Try to get at least 150 minutes of exercise per week or 10,000 steps per day on a pedometer . · Order or download the FREE \"Exercise & Physical Activity: Your Everyday Guide\" from [employer]. Call 5-657.666.4614 or search [employer] online. · You need 7409-9242 mg of calcium and 7981-3517 IU of vitamin D per day. It is possible to meet your calcium requirement with diet alone, but a vitamin D supplement is usually necessary to meet this goal.  · When exposed to the sun, use a sunscreen that protects against both UVA and UVB radiation with an SPF of 30 or greater. Reapply every 2 to 3 hours or after sweating, drying off with a towel, or swimming. · Always wear a seat belt when traveling in a car. Always wear a helmet when riding a bicycle or motorcycle.

## 2022-09-12 ENCOUNTER — TELEPHONE (OUTPATIENT)
Dept: ONCOLOGY | Age: 72
End: 2022-09-12

## 2022-09-12 NOTE — LETTER
9/12/2022        Blake Baumgarten Plock  [address] [address]    Dear Blake Baumgarten Plock: Your healthcare provider has ordered a low dose CT scan of the chest for lung cancer screening. Enclosed you will find information about CT lung screening and smoking cessation resources. If you are unable to keep you appointment for you CT lung screening, please call our scheduling department at [phone]    Keep in mind that CT lung screening does not take the place of smoking cessation. Please do not hesitate to contact me if you have any questions or concerns.     [address] [facility],      [facility] Screening [facility]  [phone]

## 2022-09-15 ENCOUNTER — HOSPITAL ENCOUNTER (OUTPATIENT)
Dept: CT IMAGING | Age: 72
Discharge: HOME | End: 2022-09-17
Payer: MEDICARE

## 2022-09-15 ENCOUNTER — HOSPITAL ENCOUNTER (OUTPATIENT)
Dept: PULMONOLOGY | Age: 72
Discharge: HOME | End: 2022-09-15
Payer: MEDICARE

## 2022-09-15 DIAGNOSIS — Z79.631 LONG TERM METHOTREXATE USER: ICD-10-CM

## 2022-09-15 DIAGNOSIS — R00.0 TACHYCARDIA: ICD-10-CM

## 2022-09-15 DIAGNOSIS — Z87.891 PERSONAL HISTORY OF NICOTINE DEPENDENCE: ICD-10-CM

## 2022-09-15 PROCEDURE — 71271 CT THORAX LUNG CANCER SCR C-: CPT

## 2022-09-15 PROCEDURE — 94664 DEMO&/EVAL PT USE INHALER: CPT

## 2022-09-15 PROCEDURE — 94729 DIFFUSING CAPACITY: CPT

## 2022-09-15 PROCEDURE — 94060 EVALUATION OF WHEEZING: CPT

## 2022-09-15 PROCEDURE — 94640 AIRWAY INHALATION TREATMENT: CPT

## 2022-09-15 PROCEDURE — 94726 PLETHYSMOGRAPHY LUNG VOLUMES: CPT

## 2022-09-15 NOTE — PROCEDURES
[address] [facility] [address] [address]                               PULMONARY FUNCTION    PATIENT NAME: Jose Holman                   :        1950  MED REC NO:   [id number]                             ROOM:  ACCOUNT NO:   [de-identified]                           ADMIT DATE: 09/15/2022  PROVIDER:     Chema Sanchez MD    DATE OF PROCEDURE:  09/15/2022    Spirometry shows FVC is 4.66, 100% predicted. FEV1 is 2.93, 84%  predicted. FEV1/FVC ratio is consistent with mild obstructive  ventilatory impairment, although FEV1 is normal.  Post bronchodilator,  there is no significant improvement in FEV1 or FVC to suggest  bronchospastic but clinical response is possible. Lung volume shows total lung capacity is 6.64, 86% predicted, which is  within normal limits. Diffusion capacity is 17.53, 62% predicted,  consistent with moderate reduction in diffusion capacity which could be  secondary to emphysema, intraparenchymal lung disease, pulmonary  vascular disease or anemia. IMPRESSION:  This pulmonary function test is consistent with mild  obstructive ventilatory impairment. No significant response to  bronchodilator but clinical response is possible. Lung volume is  normal.  Diffusion capacity is moderately reduced, which could be  secondary to emphysema, pulmonary vascular disease or intraparenchymal  lung disease. Clinical correlation is recommended.         Grace Johnson MD    D: 09/15/2022 9:24:21       T: 09/15/2022 9:27:40     [email or web address]  Job#: [id number]     Doc#: [id number]    CC:

## 2022-09-28 ENCOUNTER — HOSPITAL ENCOUNTER (OUTPATIENT)
Age: 72
Setting detail: SPECIMEN
Discharge: HOME | End: 2022-09-28

## 2022-09-28 DIAGNOSIS — I20.0 UNSTABLE ANGINA (HCC): ICD-10-CM

## 2022-09-28 LAB
CHOLESTEROL/HDL RATIO: 2.6
CHOLESTEROL: 116 MG/DL
HDLC SERPL-MCNC: 45 MG/DL
LDL CHOLESTEROL: 52 MG/DL (ref 0–130)
TRIGL SERPL-MCNC: 93 MG/DL

## 2022-10-11 ENCOUNTER — OFFICE VISIT (OUTPATIENT)
Dept: INTERNAL MEDICINE CLINIC | Age: 72
End: 2022-10-11
Payer: MEDICARE

## 2022-10-11 VITALS
HEART RATE: 56 BPM | WEIGHT: 235 LBS | BODY MASS INDEX: 31.14 KG/M2 | OXYGEN SATURATION: 98 % | HEIGHT: 73 IN | DIASTOLIC BLOOD PRESSURE: 68 MMHG | SYSTOLIC BLOOD PRESSURE: 104 MMHG

## 2022-10-11 DIAGNOSIS — I50.22 CHRONIC SYSTOLIC (CONGESTIVE) HEART FAILURE (HCC): ICD-10-CM

## 2022-10-11 DIAGNOSIS — I10 ESSENTIAL HYPERTENSION: ICD-10-CM

## 2022-10-11 DIAGNOSIS — Z23 NEED FOR IMMUNIZATION AGAINST INFLUENZA: ICD-10-CM

## 2022-10-11 DIAGNOSIS — E11.9 TYPE 2 DIABETES MELLITUS WITHOUT COMPLICATION, WITHOUT LONG-TERM CURRENT USE OF INSULIN (HCC): Primary | ICD-10-CM

## 2022-10-11 DIAGNOSIS — Z45.02 ICD (IMPLANTABLE CARDIOVERTER-DEFIBRILLATOR) DISCHARGE: ICD-10-CM

## 2022-10-11 DIAGNOSIS — I25.5 ISCHEMIC CARDIOMYOPATHY: ICD-10-CM

## 2022-10-11 LAB — HBA1C MFR BLD: 5.8 %

## 2022-10-11 PROCEDURE — 83036 HEMOGLOBIN GLYCOSYLATED A1C: CPT | Performed by: INTERNAL MEDICINE

## 2022-10-11 PROCEDURE — 3074F SYST BP LT 130 MM HG: CPT | Performed by: INTERNAL MEDICINE

## 2022-10-11 PROCEDURE — 3078F DIAST BP <80 MM HG: CPT | Performed by: INTERNAL MEDICINE

## 2022-10-11 PROCEDURE — 3044F HG A1C LEVEL LT 7.0%: CPT | Performed by: INTERNAL MEDICINE

## 2022-10-11 PROCEDURE — 99214 OFFICE O/P EST MOD 30 MIN: CPT | Performed by: INTERNAL MEDICINE

## 2022-10-11 PROCEDURE — 1123F ACP DISCUSS/DSCN MKR DOCD: CPT | Performed by: INTERNAL MEDICINE

## 2022-10-11 SDOH — ECONOMIC STABILITY: FOOD INSECURITY: WITHIN THE PAST 12 MONTHS, THE FOOD YOU BOUGHT JUST DIDN'T LAST AND YOU DIDN'T HAVE MONEY TO GET MORE.: NEVER TRUE

## 2022-10-11 SDOH — ECONOMIC STABILITY: FOOD INSECURITY: WITHIN THE PAST 12 MONTHS, YOU WORRIED THAT YOUR FOOD WOULD RUN OUT BEFORE YOU GOT MONEY TO BUY MORE.: NEVER TRUE

## 2022-10-11 ASSESSMENT — SOCIAL DETERMINANTS OF HEALTH (SDOH): HOW HARD IS IT FOR YOU TO PAY FOR THE VERY BASICS LIKE FOOD, HOUSING, MEDICAL CARE, AND HEATING?: NOT HARD AT ALL

## 2022-10-11 NOTE — PROGRESS NOTES
Visit Information    Have you changed or started any medications since your last visit including any over-the-counter medicines, vitamins, or herbal medicines? no   Are you having any side effects from any of your medications? -  no  Have you stopped taking any of your medications? Is so, why? -  no    Have you seen any other physician or provider since your last visit? Yes - Records Obtained  Have you had any other diagnostic tests since your last visit? Yes - Records Obtained  Have you been seen in the emergency room and/or had an admission to a hospital since we last saw you? No  Have you had your routine dental cleaning in the past 6 months? no    Have you activated your iNeed account? If not, what are your barriers?  Yes     Patient Care Team:  Maty Guerrero MD as PCP - General (Internal Medicine)  Maty Guerrero MD as PCP - [facility]  Jennifer Ramirez MD as Consulting Physician (Gastroenterology)    Medical History Review  Past Medical, Family, and Social History reviewed and does contribute to the patient presenting condition    Health Maintenance   Topic Date Due    DTaP/Tdap/Td vaccine (1 - Tdap) Never done    Shingles vaccine (1 of 2) Never done    Diabetic retinal exam  08/06/2019    Flu vaccine (1) 08/01/2022    Diabetic foot exam  10/14/2022    Diabetic microalbuminuria test  11/11/2022    A1C test (Diabetic or Prediabetic)  04/01/2023    Depression Screen  07/08/2023    Annual Wellness Visit (AWV)  07/09/2023    Low dose CT lung screening  09/15/2023    Lipids  09/28/2023    Colorectal Cancer Screen  03/15/2025    Pneumococcal 65+ years Vaccine  Completed    COVID-19 Vaccine  Completed    AAA screen  Completed    Hepatitis C screen  Completed    Hepatitis A vaccine  Aged Out    Hib vaccine  Aged Out    Meningococcal (ACWY) vaccine  Aged Out

## 2022-10-12 PROBLEM — I50.22 CHRONIC SYSTOLIC (CONGESTIVE) HEART FAILURE (HCC): Status: ACTIVE | Noted: 2022-10-12

## 2022-10-12 NOTE — PROGRESS NOTES
OFFICE VISIT  PROGRESS NOTE         Date of patient's visit: 10/12/22  Patient's Name:  Becca Russell  YOB: 1950       Patient Care Team:  Madison Dooley MD as PCP - General (Internal Medicine)  Madison Dooley MD as PCP - [facility] Empaneled Provider  Jose Burton MD as Consulting Physician (Gastroenterology)        SUBJECTIVE     HISTORY           History of present illness     Pertinent details  added to ,       Chief Complaint   Patient presents with    Diabetes     DM type 2, A1c today           Encounter Diagnoses   Name Primary? Type 2 diabetes mellitus without complication, without long-term current use of insulin (HCC) Yes    Need for immunization against influenza     Essential hypertension     Ischemic cardiomyopathy     ICD (implantable cardioverter-defibrillator) discharge     Chronic systolic (congestive) heart failure         Symptom / problem          --history obtained from patient. -   Known ischemic cardiomyopathy with chf      Known to have  Hypertension ,  Denies  HEADACHES , PALPITATIONS     No   complaints of dizziness. No     shortness of breath . No    chest pain . Patient known CAD . Denies chest pain . Denies SOBOE . Denies palpitations . Compliant with regimen . Known  CHRONIC  HEART FAILURE . NYHA CLASS   -- 2 -- ,  NO CHANGE IN SOB AND LEG EDEMA SINCE LAST VISIT . NO ORTHOPNEA. COMPLIANT WITH DRUG REGIMEN . MEDICATIONS:      Current Outpatient Medications   Medication Sig Dispense Refill    midodrine (PROAMATINE) 2.5 MG tablet       blood glucose test strips (ASCENSIA AUTODISC VI;ONE TOUCH ULTRA TEST VI) strip 1 each by In Vitro route 3 times daily Use with associated glucose meter.  100 strip 11    metFORMIN (GLUCOPHAGE) 500 MG tablet Take 1 tablet by mouth 2 times daily (with meals) 60 tablet 11    furosemide (LASIX) 20 MG tablet TAKE 1 TABLET DAILY 90 tablet 0    ENTRESTO 24-26 MG per tablet 1 tablet once 1/ 2 tab in the PM      TOPROL XL 50 MG extended release tablet Take 50 mg by mouth daily       spironolactone (ALDACTONE) 25 MG tablet Take 25 mg by mouth daily      atorvastatin (LIPITOR) 40 MG tablet TAKE 1 TABLET DAILY 90 tablet 3    amiodarone (CORDARONE) 200 MG tablet Take 2 tablets PO BID x3 days then take 1 tablet PO BID thereafter 90 tablet 6    Multiple Vitamin (MULTIVITAMIN) tablet Take 1 tablet by mouth daily 30 tablet 3    aspirin 81 MG tablet Take 81 mg by mouth daily. No current facility-administered medications for this visit. ALLERGIES:    No Known Allergies    SOCIAL HISTORY   Reviewed and no change from previous record. Alvaro Bauer  reports that he has been smoking cigarettes. He started smoking about 63 years ago. He has a 58.00 pack-year smoking history. He has never used smokeless tobacco.    FAMILY HISTORY:    Reviewed and No change from previous visit    REVIEW OF SYSTEMS:    Review of Systems        OBJECTIVE      Physical exam           Vitals:    10/11/22 1421   BP: 104/68   Pulse: 56   SpO2: 98%   Weight: 235 lb (106.6 kg)   Height: 6' 1\" (1.854 m)         Physical Exam   Vitals:  /68   Pulse 56   Ht 6' 1\" (1.854 m)   Wt 235 lb (106.6 kg)   SpO2 98%   BMI 31.00 kg/m²                 Body mass index is 31 kg/m².      Vitals:    10/11/22 1421   BP: 104/68   Pulse: 56   SpO2: 98%   Weight: 235 lb (106.6 kg)   Height: 6' 1\" (1.854 m)       General -alert, well appearing, and in no distress  Skin - normal coloration and turgor, no rashes,no suspicious skin lesions noted  Eyes - pupils equal and reactive, extraocular eye movementsintact  Ears - bilateral TM's and external ear canals normal  Nose - normal and patent, no erythema, discharge or polyps  Mouth - mucous membranes are moist, pharynx normal without lesions  Neck - supple, no significant adenopathy  Lymphatics - no palpable lymphadenopathy, no hepatosplenomegaly    Chest - clear to auscultation, no wheezes, rales or rhonchi, symmetric air entry    Heart - normal rate, regular rhythm, no murmurs, rubs, clicks or gallops    Extremities - peripheral pulses normal, no pedal edema       Abdomen - soft, nontender, nondistended, no masses or organomegaly    Back - full range of motion, notenderness, palpable spasm or pain on motion    Neurological - alert, oriented, normal speech, no focal sensory or motor deficit  Musculoskeletal - no joint tenderness, deformity or swelling                DIAGNOSTICS / INSERTS / IMAGES    [x] Reviewed       Labs      URINE ANALYSIS: No results found for: LABURIN     CBC:  Lab Results   Component Value Date/Time    WBC 7.3 01/21/2022 04:48 PM    HGB 11.3 01/21/2022 07:59 PM     01/21/2022 04:48 PM        BMP:    Lab Results   Component Value Date/Time     05/31/2022 10:50 AM    K 5.0 05/31/2022 10:50 AM    CL 99 05/31/2022 10:50 AM    CO2 20 05/31/2022 10:50 AM    BUN 23 05/31/2022 10:50 AM    CREATININE 1.41 05/31/2022 10:50 AM    GLUCOSE 104 01/21/2022 04:48 PM        No results found for: LDLC  Lab Results   Component Value Date/Time    LABA1C 5.8 10/11/2022 03:13 PM     Lab Results   Component Value Date/Time    POCGLU 137 12/27/2021 08:34 PM    POCGLU 181 12/27/2021 05:04 PM    POCGLU 147 12/27/2021 01:51 PM    POCGLU 245 12/10/2018 08:54 AM    POCGLU 223 12/09/2018 08:48 PM      .     LIVER PROFILE:  Lab Results   Component Value Date/Time    ALT 46 01/21/2022 04:48 PM    AST 33 01/21/2022 04:48 PM    PROT 6.4 01/21/2022 04:48 PM    BILITOT 0.88 01/21/2022 04:48 PM    BILIDIR 0.31 12/04/2018 05:42 AM    LABALBU 3.8 01/21/2022 04:48 PM          Results for orders placed or performed in visit on 10/11/22   POCT glycosylated hemoglobin (Hb A1C)   Result Value Ref Range    Hemoglobin A1C 5.8 % VITALS INCLUDING BMI REVIEWED WITH PATIENT  Labs reviewed as noted above   Discussed with patient        ASSESSMENT / Mp Vanesa was seen today for diabetes. Diagnoses and all orders for this visit:    Type 2 diabetes mellitus without complication, without long-term current use of insulin (HCC)  -     POCT glycosylated hemoglobin (Hb A1C)   Hbaic 5.8   Good control  Need for immunization against influenza  -     Influenza, AFLURIA, (age 1 y+), IM, Preservative Free, 0.5 mL    Essential hypertension  Good control  Ischemic cardiomyopathy    ICD (implantable cardioverter-defibrillator) discharge    Chronic systolic (congestive) heart failure  Compensated , rx reviewed . Continue              SALIENT  ACTIONS TODAYS VISIT       Today's office visit SALIENT ACTIONS    ----            Discussed use, benefit, and side effects of prescribed medications. [x] yes    All patient questions answered. Patient voiced understanding. Patient given educational materials - see patient instructions  [] yes         There are no discontinued medications. Orders Placed This Encounter   Procedures    Influenza, AFLURIA, (age 1 y+), IM, Preservative Free, 0.5 mL    POCT glycosylated hemoglobin (Hb A1C)        There are no Patient Instructions on file for this visit. Medication List            Accurate as of October 11, 2022 11:59 PM. If you have any questions, ask your nurse or doctor. CONTINUE taking these medications      amiodarone 200 MG tablet  Commonly known as: CORDARONE  Take 2 tablets PO BID x3 days then take 1 tablet PO BID thereafter     aspirin 81 MG tablet     atorvastatin 40 MG tablet  Commonly known as: LIPITOR  TAKE 1 TABLET DAILY     blood glucose test strips strip  Commonly known as: ASCENSIA AUTODISC VI;ONE TOUCH ULTRA TEST VI  1 each by In Vitro route 3 times daily Use with associated glucose meter.      Entresto 24-26 MG per tablet  Generic drug: sacubitril-valsartan furosemide 20 MG tablet  Commonly known as: LASIX  TAKE 1 TABLET DAILY     metFORMIN 500 MG tablet  Commonly known as: Glucophage  Take 1 tablet by mouth 2 times daily (with meals)     midodrine 2.5 MG tablet  Commonly known as: PROAMATINE     multivitamin tablet  Take 1 tablet by mouth daily     spironolactone 25 MG tablet  Commonly known as: ALDACTONE     Toprol XL 50 MG extended release tablet  Generic drug: metoprolol succinate                  FOLLOW UP  PLANS     Return in about 2 months (around 12/11/2022) for follow up for chronic disease management. MD JASVIR Gamez [address]    Phone [phone]   Fax: [phone]  Answering Service: [phone]

## 2022-11-15 PROCEDURE — G0008 ADMIN INFLUENZA VIRUS VAC: HCPCS | Performed by: INTERNAL MEDICINE

## 2022-11-15 PROCEDURE — 90694 VACC AIIV4 NO PRSRV 0.5ML IM: CPT | Performed by: INTERNAL MEDICINE

## 2022-12-14 ENCOUNTER — OFFICE VISIT (OUTPATIENT)
Dept: INTERNAL MEDICINE CLINIC | Age: 72
End: 2022-12-14

## 2022-12-14 VITALS
WEIGHT: 240 LBS | BODY MASS INDEX: 31.81 KG/M2 | HEIGHT: 73 IN | DIASTOLIC BLOOD PRESSURE: 68 MMHG | SYSTOLIC BLOOD PRESSURE: 118 MMHG | HEART RATE: 57 BPM | OXYGEN SATURATION: 98 %

## 2022-12-14 DIAGNOSIS — E11.9 TYPE 2 DIABETES MELLITUS WITHOUT COMPLICATION, WITHOUT LONG-TERM CURRENT USE OF INSULIN (HCC): ICD-10-CM

## 2022-12-14 DIAGNOSIS — J44.9 CHRONIC OBSTRUCTIVE PULMONARY DISEASE, UNSPECIFIED COPD TYPE (HCC): ICD-10-CM

## 2022-12-14 DIAGNOSIS — I10 ESSENTIAL HYPERTENSION: Primary | ICD-10-CM

## 2022-12-14 DIAGNOSIS — E66.01 MORBID OBESITY, UNSPECIFIED OBESITY TYPE (HCC): ICD-10-CM

## 2022-12-14 DIAGNOSIS — N17.9 AKI (ACUTE KIDNEY INJURY) (HCC): ICD-10-CM

## 2022-12-14 RX ORDER — APIXABAN 5 MG/1
TABLET, FILM COATED ORAL
COMMUNITY
Start: 2022-12-12

## 2022-12-14 NOTE — PROGRESS NOTES
[address]  [place] [address]  Dept: [phone]  Dept Fax: [phone]    Office Progress/Follow Up Note  Date of patient's visit: 12/14/2022  Patient's Name:  Chasity Maradiaga YOB: 1950            Patient Care Team:  Oswaldo Stout MD as PCP - General (Internal Medicine)  Oswaldo Stout MD as PCP - [facility]  Aleyda Patterson MD as Consulting Physician (Gastroenterology)    REASON FOR VISIT: Routine outpatient follow up/Same day visit/Post hospital/ED visit    HISTORY OF PRESENT ILLNESS:      Chief Complaint   Patient presents with    Diabetes     A1C- 10/11/22= 5.8        History was obtained from the patient. Chasity Maradiaga is a 67 y.o. is here for evaluation Of multiple medical Problems , he has CHF,DM, CKD, HTN,  COPD, Mural Thrombus on Coumadin, S/p AICD , follows with Cardiologist, on Entresto  . He is still Smoking 1 PPD  Tolerating 12 of CPAP   Patient, checks his blood pressure, blood sugar regularly  No complaints of chest pain, shortness of breath, abdominal pain,      Health Maintenance Due   Topic Date Due    DTaP/Tdap/Td vaccine (1 - Tdap) Never done    Shingles vaccine (1 of 2) Never done    Diabetic retinal exam  11/25/2021    Diabetic foot exam  10/14/2022    Diabetic microalbuminuria test  11/11/2022       No Known Allergies      MEDICATIONS:     Current Outpatient Medications   Medication Sig Dispense Refill    ELIQUIS 5 MG TABS tablet       midodrine (PROAMATINE) 2.5 MG tablet       blood glucose test strips (ASCENSIA AUTODISC VI;ONE TOUCH ULTRA TEST VI) strip 1 each by In Vitro route 3 times daily Use with associated glucose meter.  100 strip 11    metFORMIN (GLUCOPHAGE) 500 MG tablet Take 1 tablet by mouth 2 times daily (with meals) 60 tablet 11    furosemide (LASIX) 20 MG tablet TAKE 1 TABLET DAILY 90 tablet 0    ENTRESTO 24-26 MG per tablet 1 tablet once 1/ 2 tab in the PM      TOPROL XL 50 MG extended release tablet Take 50 mg by mouth daily       spironolactone (ALDACTONE) 25 MG tablet Take 25 mg by mouth daily      atorvastatin (LIPITOR) 40 MG tablet TAKE 1 TABLET DAILY 90 tablet 3    amiodarone (CORDARONE) 200 MG tablet Take 2 tablets PO BID x3 days then take 1 tablet PO BID thereafter 90 tablet 6    Multiple Vitamin (MULTIVITAMIN) tablet Take 1 tablet by mouth daily 30 tablet 3    aspirin 81 MG tablet Take 81 mg by mouth daily. No current facility-administered medications for this visit. SOCIAL HISTORY    Reviewed and no change from previous record. Reina Mcqueen  reports that he has been smoking cigarettes. He started smoking about 63 years ago. He has a 58.00 pack-year smoking history. He has never used smokeless tobacco.    FAMILY HISTORY:    Reviewed and No change from previous visit  family history includes Diabetes in his father; Heart Disease in his father, maternal aunt, maternal uncle, paternal aunt, and paternal uncle; High Blood Pressure in his father. REVIEW OF SYSTEMS:      Review of Systems   Constitutional:  Negative for activity change, appetite change, chills and diaphoresis. HENT:  Negative for congestion, dental problem, ear discharge, facial swelling and hearing loss. Respiratory:  Positive for shortness of breath (occasional). Negative for apnea, cough, chest tightness and wheezing. Orthopnea present    Cardiovascular:  Positive for palpitations (occaiosnal ) and leg swelling. Negative for chest pain. Gastrointestinal:  Negative for abdominal distention, abdominal pain, constipation and diarrhea. Genitourinary:  Negative for difficulty urinating, dysuria, enuresis, flank pain and frequency. Musculoskeletal:  Negative for arthralgias, back pain, gait problem and joint swelling. Skin:  Negative for color change, pallor and rash. Neurological:  Negative for dizziness, seizures, facial asymmetry, light-headedness, numbness and headaches. Psychiatric/Behavioral:  Negative for agitation, behavioral problems, confusion, decreased concentration and dysphoric mood. PHYSICAL EXAM:      Vitals:    12/14/22 1402   BP: 118/68   Pulse: 57   SpO2: 98%   Weight: 240 lb (108.9 kg)   Height: 6' 1\" (1.854 m)     BP Readings from Last 3 Encounters:   12/14/22 118/68   10/11/22 104/68   07/08/22 112/60        Physical Exam  Vitals and nursing note reviewed. Constitutional:       General: He is not in acute distress. Appearance: He is well-developed. He is obese. He is not diaphoretic. HENT:      Head: Normocephalic and atraumatic. Mouth/Throat:      Pharynx: No oropharyngeal exudate. Eyes:      General: No scleral icterus. Right eye: No discharge. Left eye: No discharge. Conjunctiva/sclera: Conjunctivae normal.      Pupils: Pupils are equal, round, and reactive to light. Neck:      Thyroid: No thyromegaly. Vascular: No JVD. Trachea: No tracheal deviation. Comments: Thick neck present  Cardiovascular:      Rate and Rhythm: Normal rate. Heart sounds: Normal heart sounds. No murmur heard. No gallop. Pulmonary:      Effort: Pulmonary effort is normal. No respiratory distress. Breath sounds: Normal breath sounds. No stridor. No wheezing or rales. Abdominal:      General: Bowel sounds are normal. There is no distension. Palpations: Abdomen is soft. Tenderness: There is no abdominal tenderness. There is no guarding or rebound. Musculoskeletal:         General: No tenderness. Normal range of motion. Cervical back: Normal range of motion. Right lower leg: Edema present. Left lower leg: Edema present. Skin:     General: Skin is warm and dry. Findings: No erythema or rash. Neurological:      Mental Status: He is alert and oriented to person, place, and time.         LABORATORY FINDINGS:    [email or web address]    BMP:    Lab Results   Component Value Date/Time     05/31/2022 10:50 AM    K 5.0 05/31/2022 10:50 AM    CL 99 05/31/2022 10:50 AM    CO2 20 05/31/2022 10:50 AM    BUN 23 05/31/2022 10:50 AM    CREATININE 1.41 05/31/2022 10:50 AM    GLUCOSE 104 01/21/2022 04:48 PM       HEMOGLOBIN A1C:   Lab Results   Component Value Date/Time    LABA1C 5.8 10/11/2022 03:13 PM       FASTING LIPID PANEL:  Lab Results   Component Value Date    CHOL 116 09/28/2022    HDL 45 09/28/2022    TRIG 93 09/28/2022       ASSESSMENT AND PLAN:      1. Essential hypertension  Controlled     2. Chronic obstructive pulmonary disease, unspecified COPD type ([facility] [address]  Compensated   Still Continue to smoke    3. Type 2 diabetes mellitus without complication, without long-term current use of insulin (HCC)  Compensated     4. MADISON (acute kidney injury) ([facility])  Stable     5. Morbid obesity, unspecified obesity type ([facility] [address]  Advise to loose weight     HFREF S/p AICD - Compensated       FOLLOW UP AND INSTRUCTIONS:   Return in about 3 months (around 3/14/2023). Tara Palmer received counseling on the following healthy behaviors: nutrition, exercise, and medication adherence    Discussed use, benefit, and side effects of prescribed medications. Barriers to medication compliance addressed. All patient questions answered. Pt voiced understanding. Patient given educational materials - see patient instructions    MD JAS[facility]IR [facility]  12/14/2022, 2:39 PM    Please note that this chart was generated using voice recognition Dragon dictation software. Although every effort was made to ensure the accuracy of this automatedtranscription, some errors in transcription may have occurred.

## 2022-12-18 ASSESSMENT — ENCOUNTER SYMPTOMS
WHEEZING: 0
ABDOMINAL PAIN: 0
SHORTNESS OF BREATH: 1
ABDOMINAL DISTENTION: 0
CHEST TIGHTNESS: 0
APNEA: 0
DIARRHEA: 0
BACK PAIN: 0
CONSTIPATION: 0
COLOR CHANGE: 0
COUGH: 0
FACIAL SWELLING: 0

## 2022-12-20 ENCOUNTER — HOSPITAL ENCOUNTER (OUTPATIENT)
Age: 72
Setting detail: SPECIMEN
Discharge: HOME | End: 2022-12-20

## 2022-12-20 ENCOUNTER — HOSPITAL ENCOUNTER (OUTPATIENT)
Facility: CLINIC | Age: 72
Discharge: HOME | End: 2022-12-22
Payer: MEDICARE

## 2022-12-20 ENCOUNTER — HOSPITAL ENCOUNTER (OUTPATIENT)
Dept: GENERAL RADIOLOGY | Facility: CLINIC | Age: 72
Discharge: HOME | End: 2022-12-22
Payer: MEDICARE

## 2022-12-20 DIAGNOSIS — N17.9 AKI (ACUTE KIDNEY INJURY) (HCC): ICD-10-CM

## 2022-12-20 DIAGNOSIS — E11.9 TYPE 2 DIABETES MELLITUS WITHOUT COMPLICATION, WITHOUT LONG-TERM CURRENT USE OF INSULIN (HCC): ICD-10-CM

## 2022-12-20 DIAGNOSIS — Z79.899 POLYPHARMACY: ICD-10-CM

## 2022-12-20 LAB
ANION GAP SERPL CALCULATED.3IONS-SCNC: 15 MMOL/L (ref 9–17)
BUN BLDV-MCNC: 21 MG/DL (ref 8–23)
CALCIUM SERPL-MCNC: 9.7 MG/DL (ref 8.6–10.4)
CHLORIDE BLD-SCNC: 100 MMOL/L (ref 98–107)
CO2: 23 MMOL/L (ref 20–31)
CREAT SERPL-MCNC: 1.3 MG/DL (ref 0.7–1.2)
CREATININE URINE: 28.3 MG/DL (ref 39–259)
GFR SERPL CREATININE-BSD FRML MDRD: 58 ML/MIN/1.73M2
GLUCOSE BLD-MCNC: 87 MG/DL (ref 70–99)
MICROALBUMIN/CREAT 24H UR: <12 MG/L
MICROALBUMIN/CREAT UR-RTO: ABNORMAL MCG/MG CREAT
POTASSIUM SERPL-SCNC: 5.7 MMOL/L (ref 3.7–5.3)
SODIUM BLD-SCNC: 138 MMOL/L (ref 135–144)

## 2022-12-20 PROCEDURE — 71046 X-RAY EXAM CHEST 2 VIEWS: CPT

## 2022-12-22 ENCOUNTER — TELEPHONE (OUTPATIENT)
Dept: INTERNAL MEDICINE CLINIC | Age: 72
End: 2022-12-22

## 2022-12-22 DIAGNOSIS — E87.5 HYPERKALEMIA: Primary | ICD-10-CM

## 2022-12-22 NOTE — TELEPHONE ENCOUNTER
Patient has hyperkalemia on recent lab, please advise patient to hold Aldactone, Entresto for now until repeat BMP  Orders placed for BMP

## 2022-12-27 ENCOUNTER — HOSPITAL ENCOUNTER (OUTPATIENT)
Age: 72
Setting detail: SPECIMEN
Discharge: HOME | End: 2022-12-27

## 2022-12-27 DIAGNOSIS — E87.5 HYPERKALEMIA: ICD-10-CM

## 2022-12-27 LAB
ANION GAP SERPL CALCULATED.3IONS-SCNC: 14 MMOL/L (ref 9–17)
BUN BLDV-MCNC: 22 MG/DL (ref 8–23)
CALCIUM SERPL-MCNC: 9.6 MG/DL (ref 8.6–10.4)
CHLORIDE BLD-SCNC: 102 MMOL/L (ref 98–107)
CO2: 23 MMOL/L (ref 20–31)
CREAT SERPL-MCNC: 1.42 MG/DL (ref 0.7–1.2)
GFR SERPL CREATININE-BSD FRML MDRD: 53 ML/MIN/1.73M2
GLUCOSE BLD-MCNC: 107 MG/DL (ref 70–99)
POTASSIUM SERPL-SCNC: 5.1 MMOL/L (ref 3.7–5.3)
SODIUM BLD-SCNC: 139 MMOL/L (ref 135–144)

## 2022-12-29 ENCOUNTER — TELEPHONE (OUTPATIENT)
Dept: INTERNAL MEDICINE CLINIC | Age: 72
End: 2022-12-29

## 2022-12-29 DIAGNOSIS — E87.5 HYPERKALEMIA: Primary | ICD-10-CM

## 2023-01-18 ENCOUNTER — HOSPITAL ENCOUNTER (OUTPATIENT)
Age: 73
Setting detail: SPECIMEN
Discharge: HOME | End: 2023-01-18

## 2023-01-18 DIAGNOSIS — E87.5 HYPERKALEMIA: ICD-10-CM

## 2023-01-18 LAB
ANION GAP SERPL CALCULATED.3IONS-SCNC: 15 MMOL/L (ref 9–17)
BUN BLDV-MCNC: 19 MG/DL (ref 8–23)
CALCIUM SERPL-MCNC: 10.1 MG/DL (ref 8.6–10.4)
CHLORIDE BLD-SCNC: 105 MMOL/L (ref 98–107)
CO2: 23 MMOL/L (ref 20–31)
CREAT SERPL-MCNC: 1.27 MG/DL (ref 0.7–1.2)
GFR SERPL CREATININE-BSD FRML MDRD: >60 ML/MIN/1.73M2
GLUCOSE BLD-MCNC: 103 MG/DL (ref 70–99)
POTASSIUM SERPL-SCNC: 4.7 MMOL/L (ref 3.7–5.3)
SODIUM BLD-SCNC: 143 MMOL/L (ref 135–144)

## 2023-01-19 ENCOUNTER — TELEPHONE (OUTPATIENT)
Dept: INTERNAL MEDICINE CLINIC | Age: 73
End: 2023-01-19

## 2023-02-12 DIAGNOSIS — E11.9 TYPE 2 DIABETES MELLITUS WITHOUT COMPLICATION, WITHOUT LONG-TERM CURRENT USE OF INSULIN (HCC): ICD-10-CM

## 2023-03-07 ENCOUNTER — APPOINTMENT (OUTPATIENT)
Dept: CT IMAGING | Age: 73
End: 2023-03-07
Payer: MEDICARE

## 2023-03-07 ENCOUNTER — HOSPITAL ENCOUNTER (INPATIENT)
Age: 73
LOS: 4 days | Discharge: HOME | End: 2023-03-11
Attending: EMERGENCY MEDICINE | Admitting: SURGERY
Payer: MEDICARE

## 2023-03-07 DIAGNOSIS — N18.31 STAGE 3A CHRONIC KIDNEY DISEASE (HCC): ICD-10-CM

## 2023-03-07 DIAGNOSIS — K35.20 ACUTE APPENDICITIS WITH GENERALIZED PERITONITIS WITHOUT GANGRENE, UNSPECIFIED WHETHER ABSCESS PRESENT, UNSPECIFIED WHETHER PERFORATION PRESENT: Primary | ICD-10-CM

## 2023-03-07 DIAGNOSIS — K37 APPENDICITIS, UNSPECIFIED APPENDICITIS TYPE: ICD-10-CM

## 2023-03-07 DIAGNOSIS — Z86.79 HISTORY OF VENTRICULAR TACHYCARDIA: ICD-10-CM

## 2023-03-07 DIAGNOSIS — J44.1 COPD WITH ACUTE EXACERBATION (HCC): ICD-10-CM

## 2023-03-07 PROBLEM — Z95.810 ICD (IMPLANTABLE CARDIOVERTER-DEFIBRILLATOR) IN PLACE: Status: ACTIVE | Noted: 2023-03-07

## 2023-03-07 PROBLEM — N18.30 CKD (CHRONIC KIDNEY DISEASE) STAGE 3, GFR 30-59 ML/MIN (HCC): Status: ACTIVE | Noted: 2023-03-07

## 2023-03-07 PROBLEM — R77.8 ELEVATED TROPONIN: Status: ACTIVE | Noted: 2023-03-07

## 2023-03-07 PROBLEM — R79.89 ELEVATED TROPONIN: Status: ACTIVE | Noted: 2023-03-07

## 2023-03-07 PROBLEM — K35.80 ACUTE APPENDICITIS: Status: ACTIVE | Noted: 2023-03-07

## 2023-03-07 PROBLEM — I23.6 LEFT VENTRICULAR APICAL THROMBUS FOLLOWING MI (HCC): Status: ACTIVE | Noted: 2023-03-07

## 2023-03-07 PROBLEM — Z79.01 ANTICOAGULATED: Status: ACTIVE | Noted: 2023-03-07

## 2023-03-07 PROBLEM — G47.33 OSA (OBSTRUCTIVE SLEEP APNEA): Status: ACTIVE | Noted: 2023-03-07

## 2023-03-07 LAB
ABSOLUTE EOS #: <0.03 K/UL (ref 0–0.44)
ABSOLUTE IMMATURE GRANULOCYTE: 0.06 K/UL (ref 0–0.3)
ABSOLUTE LYMPH #: 1.27 K/UL (ref 1.1–3.7)
ABSOLUTE MONO #: 1.19 K/UL (ref 0.1–1.2)
ACETAMINOPHEN LEVEL: <5 UG/ML (ref 10–30)
ALBUMIN SERPL-MCNC: 4.2 G/DL (ref 3.5–5.2)
ALBUMIN/GLOBULIN RATIO: 1.4 (ref 1–2.5)
ALP SERPL-CCNC: 85 U/L (ref 40–129)
ALT SERPL-CCNC: 33 U/L (ref 5–41)
ANION GAP SERPL CALCULATED.3IONS-SCNC: 12 MMOL/L (ref 9–17)
AST SERPL-CCNC: 29 U/L
BASOPHILS # BLD: 0 % (ref 0–2)
BASOPHILS ABSOLUTE: 0.04 K/UL (ref 0–0.2)
BILIRUB DIRECT SERPL-MCNC: 0.4 MG/DL
BILIRUB INDIRECT SERPL-MCNC: 0.8 MG/DL (ref 0–1)
BILIRUB SERPL-MCNC: 1.2 MG/DL (ref 0.3–1.2)
BUN SERPL-MCNC: 20 MG/DL (ref 8–23)
CALCIUM SERPL-MCNC: 9.6 MG/DL (ref 8.6–10.4)
CHLORIDE SERPL-SCNC: 101 MMOL/L (ref 98–107)
CO2 SERPL-SCNC: 23 MMOL/L (ref 20–31)
CREAT SERPL-MCNC: 1.31 MG/DL (ref 0.7–1.2)
EOSINOPHILS RELATIVE PERCENT: 0 % (ref 1–4)
ETHANOL PERCENT: <0.01 %
ETHANOL: <10 MG/DL
GFR SERPL CREATININE-BSD FRML MDRD: 58 ML/MIN/1.73M2
GLUCOSE SERPL-MCNC: 142 MG/DL (ref 70–99)
HCT VFR BLD AUTO: 45.1 % (ref 40.7–50.3)
HCT VFR BLD AUTO: 45.7 % (ref 40.7–50.3)
HGB BLD-MCNC: 15.3 G/DL (ref 13–17)
HGB BLD-MCNC: 15.4 G/DL (ref 13–17)
IMMATURE GRANULOCYTES: 0 %
LACTIC ACID, SEPSIS WHOLE BLOOD: 1.8 MMOL/L (ref 0.5–1.9)
LYMPHOCYTES # BLD: 9 % (ref 24–43)
MAGNESIUM SERPL-MCNC: 1.7 MG/DL (ref 1.6–2.6)
MCH RBC QN AUTO: 34.2 PG (ref 25.2–33.5)
MCH RBC QN AUTO: 34.4 PG (ref 25.2–33.5)
MCHC RBC AUTO-ENTMCNC: 33.5 G/DL (ref 28.4–34.8)
MCHC RBC AUTO-ENTMCNC: 34.1 G/DL (ref 28.4–34.8)
MCV RBC AUTO: 100.7 FL (ref 82.6–102.9)
MCV RBC AUTO: 102.2 FL (ref 82.6–102.9)
MONOCYTES # BLD: 8 % (ref 3–12)
NRBC AUTOMATED: 0 PER 100 WBC
NRBC AUTOMATED: 0 PER 100 WBC
PARTIAL THROMBOPLASTIN TIME: 27.1 SEC (ref 20.5–30.5)
PDW BLD-RTO: 13.2 % (ref 11.8–14.4)
PDW BLD-RTO: 13.4 % (ref 11.8–14.4)
PLATELET # BLD AUTO: 118 K/UL (ref 138–453)
PLATELET # BLD AUTO: 145 K/UL (ref 138–453)
PMV BLD AUTO: 10.5 FL (ref 8.1–13.5)
PMV BLD AUTO: 11 FL (ref 8.1–13.5)
POTASSIUM SERPL-SCNC: 4.5 MMOL/L (ref 3.7–5.3)
PROT SERPL-MCNC: 7.2 G/DL (ref 6.4–8.3)
RBC # BLD: 4.47 M/UL (ref 4.21–5.77)
RBC # BLD: 4.48 M/UL (ref 4.21–5.77)
SALICYLATE LEVEL: <1 MG/DL (ref 3–10)
SEG NEUTROPHILS: 83 % (ref 36–65)
SEGMENTED NEUTROPHILS ABSOLUTE COUNT: 12.21 K/UL (ref 1.5–8.1)
SODIUM SERPL-SCNC: 136 MMOL/L (ref 135–144)
TOXIC TRICYCLIC SC,BLOOD: NEGATIVE
TROPONIN I SERPL DL<=0.01 NG/ML-MCNC: 26 NG/L (ref 0–22)
TROPONIN I SERPL DL<=0.01 NG/ML-MCNC: 32 NG/L (ref 0–22)
WBC # BLD AUTO: 14.7 K/UL (ref 3.5–11.3)
WBC # BLD AUTO: 14.8 K/UL (ref 3.5–11.3)

## 2023-03-07 PROCEDURE — 2580000003 HC RX 258: Performed by: STUDENT IN AN ORGANIZED HEALTH CARE EDUCATION/TRAINING PROGRAM

## 2023-03-07 PROCEDURE — 96374 THER/PROPH/DIAG INJ IV PUSH: CPT

## 2023-03-07 PROCEDURE — 99285 EMERGENCY DEPT VISIT HI MDM: CPT

## 2023-03-07 PROCEDURE — 85730 THROMBOPLASTIN TIME PARTIAL: CPT

## 2023-03-07 PROCEDURE — 96375 TX/PRO/DX INJ NEW DRUG ADDON: CPT

## 2023-03-07 PROCEDURE — 99223 1ST HOSP IP/OBS HIGH 75: CPT | Performed by: INTERNAL MEDICINE

## 2023-03-07 PROCEDURE — 84484 ASSAY OF TROPONIN QUANT: CPT

## 2023-03-07 PROCEDURE — 74177 CT ABD & PELVIS W/CONTRAST: CPT

## 2023-03-07 PROCEDURE — 83605 ASSAY OF LACTIC ACID: CPT

## 2023-03-07 PROCEDURE — G0480 DRUG TEST DEF 1-7 CLASSES: HCPCS

## 2023-03-07 PROCEDURE — 82947 ASSAY GLUCOSE BLOOD QUANT: CPT

## 2023-03-07 PROCEDURE — 80048 BASIC METABOLIC PNL TOTAL CA: CPT

## 2023-03-07 PROCEDURE — 2060000000 HC ICU INTERMEDIATE R&B

## 2023-03-07 PROCEDURE — 83735 ASSAY OF MAGNESIUM: CPT

## 2023-03-07 PROCEDURE — 2580000003 HC RX 258

## 2023-03-07 PROCEDURE — 85025 COMPLETE CBC W/AUTO DIFF WBC: CPT

## 2023-03-07 PROCEDURE — 6360000002 HC RX W HCPCS: Performed by: STUDENT IN AN ORGANIZED HEALTH CARE EDUCATION/TRAINING PROGRAM

## 2023-03-07 PROCEDURE — 85027 COMPLETE CBC AUTOMATED: CPT

## 2023-03-07 PROCEDURE — 6360000004 HC RX CONTRAST MEDICATION: Performed by: STUDENT IN AN ORGANIZED HEALTH CARE EDUCATION/TRAINING PROGRAM

## 2023-03-07 PROCEDURE — 80307 DRUG TEST PRSMV CHEM ANLYZR: CPT

## 2023-03-07 PROCEDURE — 80143 DRUG ASSAY ACETAMINOPHEN: CPT

## 2023-03-07 PROCEDURE — 6360000002 HC RX W HCPCS

## 2023-03-07 PROCEDURE — 80179 DRUG ASSAY SALICYLATE: CPT

## 2023-03-07 PROCEDURE — 93005 ELECTROCARDIOGRAM TRACING: CPT | Performed by: STUDENT IN AN ORGANIZED HEALTH CARE EDUCATION/TRAINING PROGRAM

## 2023-03-07 PROCEDURE — 80076 HEPATIC FUNCTION PANEL: CPT

## 2023-03-07 RX ORDER — METOPROLOL SUCCINATE 50 MG/1
50 TABLET, EXTENDED RELEASE ORAL DAILY
Status: DISCONTINUED | OUTPATIENT
Start: 2023-03-08 | End: 2023-03-08

## 2023-03-07 RX ORDER — MORPHINE SULFATE 4 MG/ML
4 INJECTION, SOLUTION INTRAMUSCULAR; INTRAVENOUS ONCE
Status: COMPLETED | OUTPATIENT
Start: 2023-03-07 | End: 2023-03-07

## 2023-03-07 RX ORDER — GUAIFENESIN 600 MG/1
600 TABLET, EXTENDED RELEASE ORAL 2 TIMES DAILY
Status: DISCONTINUED | OUTPATIENT
Start: 2023-03-08 | End: 2023-03-10

## 2023-03-07 RX ORDER — HEPARIN SODIUM 1000 [USP'U]/ML
80 INJECTION, SOLUTION INTRAVENOUS; SUBCUTANEOUS ONCE
Status: COMPLETED | OUTPATIENT
Start: 2023-03-07 | End: 2023-03-07

## 2023-03-07 RX ORDER — ONDANSETRON 2 MG/ML
4 INJECTION INTRAMUSCULAR; INTRAVENOUS ONCE
Status: COMPLETED | OUTPATIENT
Start: 2023-03-07 | End: 2023-03-07

## 2023-03-07 RX ORDER — SODIUM CHLORIDE, SODIUM LACTATE, POTASSIUM CHLORIDE, AND CALCIUM CHLORIDE .6; .31; .03; .02 G/100ML; G/100ML; G/100ML; G/100ML
1000 INJECTION, SOLUTION INTRAVENOUS ONCE
Status: COMPLETED | OUTPATIENT
Start: 2023-03-07 | End: 2023-03-07

## 2023-03-07 RX ORDER — HEPARIN SODIUM 1000 [USP'U]/ML
40 INJECTION, SOLUTION INTRAVENOUS; SUBCUTANEOUS PRN
Status: DISCONTINUED | OUTPATIENT
Start: 2023-03-07 | End: 2023-03-08

## 2023-03-07 RX ORDER — FUROSEMIDE 10 MG/ML
20 INJECTION INTRAMUSCULAR; INTRAVENOUS ONCE
Status: COMPLETED | OUTPATIENT
Start: 2023-03-08 | End: 2023-03-08

## 2023-03-07 RX ORDER — AMIODARONE HYDROCHLORIDE 200 MG/1
200 TABLET ORAL 2 TIMES DAILY
Status: DISCONTINUED | OUTPATIENT
Start: 2023-03-08 | End: 2023-03-08

## 2023-03-07 RX ORDER — MIDODRINE HYDROCHLORIDE 2.5 MG/1
2.5 TABLET ORAL
Status: DISCONTINUED | OUTPATIENT
Start: 2023-03-08 | End: 2023-03-09

## 2023-03-07 RX ORDER — ATORVASTATIN CALCIUM 40 MG/1
40 TABLET, FILM COATED ORAL DAILY
Status: DISCONTINUED | OUTPATIENT
Start: 2023-03-08 | End: 2023-03-11 | Stop reason: HOSPADM

## 2023-03-07 RX ORDER — IPRATROPIUM BROMIDE AND ALBUTEROL SULFATE 2.5; .5 MG/3ML; MG/3ML
1 SOLUTION RESPIRATORY (INHALATION) EVERY 4 HOURS
Status: DISCONTINUED | OUTPATIENT
Start: 2023-03-08 | End: 2023-03-11 | Stop reason: HOSPADM

## 2023-03-07 RX ORDER — HEPARIN SODIUM 1000 [USP'U]/ML
80 INJECTION, SOLUTION INTRAVENOUS; SUBCUTANEOUS PRN
Status: DISCONTINUED | OUTPATIENT
Start: 2023-03-07 | End: 2023-03-08

## 2023-03-07 RX ORDER — FENTANYL CITRATE 50 UG/ML
100 INJECTION, SOLUTION INTRAMUSCULAR; INTRAVENOUS ONCE
Status: COMPLETED | OUTPATIENT
Start: 2023-03-07 | End: 2023-03-07

## 2023-03-07 RX ORDER — SODIUM CHLORIDE, SODIUM LACTATE, POTASSIUM CHLORIDE, CALCIUM CHLORIDE 600; 310; 30; 20 MG/100ML; MG/100ML; MG/100ML; MG/100ML
INJECTION, SOLUTION INTRAVENOUS CONTINUOUS
Status: DISCONTINUED | OUTPATIENT
Start: 2023-03-07 | End: 2023-03-09

## 2023-03-07 RX ORDER — HEPARIN SODIUM AND DEXTROSE 10000; 5 [USP'U]/100ML; G/100ML
5-30 INJECTION INTRAVENOUS CONTINUOUS
Status: DISCONTINUED | OUTPATIENT
Start: 2023-03-07 | End: 2023-03-08

## 2023-03-07 RX ORDER — ASPIRIN 81 MG/1
81 TABLET, CHEWABLE ORAL DAILY
Status: DISCONTINUED | OUTPATIENT
Start: 2023-03-08 | End: 2023-03-11 | Stop reason: HOSPADM

## 2023-03-07 RX ADMIN — IOPAMIDOL 75 ML: 755 INJECTION, SOLUTION INTRAVENOUS at 18:47

## 2023-03-07 RX ADMIN — HEPARIN SODIUM 18 UNITS/KG/HR: 10000 INJECTION, SOLUTION INTRAVENOUS at 21:09

## 2023-03-07 RX ADMIN — SODIUM CHLORIDE, POTASSIUM CHLORIDE, SODIUM LACTATE AND CALCIUM CHLORIDE: 600; 310; 30; 20 INJECTION, SOLUTION INTRAVENOUS at 20:59

## 2023-03-07 RX ADMIN — MORPHINE SULFATE 4 MG: 4 INJECTION INTRAVENOUS at 17:12

## 2023-03-07 RX ADMIN — FENTANYL CITRATE 100 MCG: 50 INJECTION, SOLUTION INTRAMUSCULAR; INTRAVENOUS at 19:00

## 2023-03-07 RX ADMIN — HEPARIN SODIUM 8960 UNITS: 1000 INJECTION INTRAVENOUS; SUBCUTANEOUS at 21:07

## 2023-03-07 RX ADMIN — SODIUM CHLORIDE, POTASSIUM CHLORIDE, SODIUM LACTATE AND CALCIUM CHLORIDE 1000 ML: 600; 310; 30; 20 INJECTION, SOLUTION INTRAVENOUS at 17:13

## 2023-03-07 RX ADMIN — PIPERACILLIN AND TAZOBACTAM 3375 MG: 3; .375 INJECTION, POWDER, FOR SOLUTION INTRAVENOUS at 21:00

## 2023-03-07 RX ADMIN — ONDANSETRON 4 MG: 2 INJECTION INTRAMUSCULAR; INTRAVENOUS at 17:12

## 2023-03-07 ASSESSMENT — ENCOUNTER SYMPTOMS
BLOOD IN STOOL: 0
NAUSEA: 1
VOMITING: 0
BACK PAIN: 0
COUGH: 0
CONSTIPATION: 1
SHORTNESS OF BREATH: 0
ABDOMINAL PAIN: 1
DIARRHEA: 0

## 2023-03-07 ASSESSMENT — PAIN SCALES - GENERAL
PAINLEVEL_OUTOF10: 9
PAINLEVEL_OUTOF10: 10

## 2023-03-07 ASSESSMENT — PAIN - FUNCTIONAL ASSESSMENT: PAIN_FUNCTIONAL_ASSESSMENT: 0-10

## 2023-03-07 NOTE — ED NOTES
Pt to room 27 with c/o abd pain. Pt reports that yesterday he started having abd pain and states that the pain has increased over the last day. Pt states that he has not had a bowel movement and has not been passing any gas. Pt reports that he has some intermittent SOB. Pt placed on continuous cardiac monitor, bp and pulse ox. EKG completed, IV established, blood work drawn. Pt alert and oriented x4, talking in complete sentences, respirations even and unlabored. Pt acting age appropriate.  White board updated, will continue to plan of care        Terry Mosqueda RN  03/07/23 [id number]

## 2023-03-07 NOTE — ED PROVIDER NOTES
[address] [facility]  ED  Emergency Department Encounter  Emergency Medicine Resident     Pt Name:Librado Bullock  MRN: [id number]  [place] 1950  Date of evaluation: 3/7/23  PCP:  Fani Rainey MD  Note Started: 4:39 PM EST      CHIEF COMPLAINT       Chief Complaint   Patient presents with    Abdominal Pain    Abdominal Cramping     Right side        HISTORY OF PRESENT ILLNESS  (Location/Symptom, Timing/Onset, Context/Setting, Quality, Duration, Modifying Factors, Severity.)      Shalonda Gant is a 67 y.o. male who presents with right lower quadrant abdominal pain. Has not had a bowel movement in about 24 hours. Pain has been present for about 24 to 48 hours. Thought that it was due to needing to defecate although that that did not resolve his pain. He is feeling a little bit nauseous although has not vomited. Has been taking over-the-counter medications at home. No abdominal trauma. No bloody emesis. No bloody bowel movements. No previous surgeries on his abdomen. Wife Is at bedside helping provide history as well the patient is an independent historian    PAST MEDICAL / SURGICAL / SOCIAL / FAMILY HISTORY      has a past medical history of Alcohol abuse, Anxiety, CHF (congestive heart failure) ([address] Colon polyps, COPD (chronic obstructive pulmonary disease) ([address] Diabetes mellitus ([address] Dupuytren contracture, Hypertension, ICD (implantable cardioverter-defibrillator) battery depletion, Ischemic cardiomyopathy, Obesity, SOB (shortness of breath), Tendonitis, Achilles, left, Unstable angina ([address] and Ventricular tachyarrhythmia. has a past surgical history that includes Cardiac defibrillator placement; Cardiac catheterization; Cardiac defibrillator placement (Left, 9/2014); and Colonoscopy (3/2015).       Social History     Socioeconomic History    Marital status:      Spouse name: Not on file    Number of children: Not on file    Years of education: Not on file Highest education level: Not on file   Occupational History    Not on file   Tobacco Use    Smoking status: Every Day     Packs/day: 1.00     Years: 58.00     Pack years: 58.00     Types: Cigarettes     Start date: 1959     Last attempt to quit: 2014     Years since quittin.4    Smokeless tobacco: Never    Tobacco comments:     There have been smokeless periods during lifetime   Substance and Sexual Activity    Alcohol use: Yes     Alcohol/week: 0.0 standard drinks     Comment: NO ALCOHOL SINCE 2018    Drug use: No    Sexual activity: Not on file   Other Topics Concern    Not on file   Social History Narrative    Not on file     Social Determinants of Health     Financial Resource Strain: Low Risk     Difficulty of Paying Living Expenses: Not hard at all   Food Insecurity: No Food Insecurity    Worried About Running Out of Food in the Last Year: Never true    Ran Out of Food in the Last Year: Never true   Transportation Needs: Not on file   Physical Activity: Inactive    Days of Exercise per Week: 0 days    Minutes of Exercise per Session: 0 min   Stress: Not on file   Social Connections: Not on file   Intimate Partner Violence: Not on file   Housing Stability: Not on file       Family History   Problem Relation Age of Onset    Heart Disease Father     High Blood Pressure Father     Diabetes Father     Heart Disease Maternal Aunt     Heart Disease Maternal Uncle     Heart Disease Paternal Aunt     Heart Disease Paternal Uncle        Allergies:  Patient has no known allergies.    Home Medications:  Prior to Admission medications    Medication Sig Start Date End Date Taking? Authorizing Provider   metFORMIN (GLUCOPHAGE) 500 MG tablet TAKE 1 TABLET BY MOUTH TWICE A  DAY WITH MEALS 23   Catarino Landry MD   ELIQUIS 5 MG TABS tablet  22   Historical Provider, MD   midodrine (PROAMATINE) 2.5 MG tablet  22   Historical Provider, MD   blood glucose test strips (ASCENSIA AUTODISC VI;ONE TOUCH  ULTRA TEST VI) strip 1 each by In Vitro route 3 times daily Use with associated glucose meter. 7/8/22   Luis Felipe Rios MD   furosemide (LASIX) 20 MG tablet TAKE 1 TABLET DAILY 1/4/22   BRENDAN Parmar CNP   ENTRESTO 24-26 MG per tablet 1 tablet once 1/ 2 tab in the PM 8/22/19   Historical Provider, MD   TOPROL XL 50 MG extended release tablet Take 50 mg by mouth daily  8/20/19   Historical Provider, MD   spironolactone (ALDACTONE) 25 MG tablet Take 25 mg by mouth daily    Historical Provider, MD   atorvastatin (LIPITOR) 40 MG tablet TAKE 1 TABLET DAILY 7/22/19   BRENDAN Parmar CNP   amiodarone (CORDARONE) 200 MG tablet Take 2 tablets PO BID x3 days then take 1 tablet PO BID thereafter 12/10/18   Tunde Reyes MD   Multiple Vitamin (MULTIVITAMIN) tablet Take 1 tablet by mouth daily 12/11/18   Ishmael Hall MD   aspirin 81 MG tablet Take 81 mg by mouth daily. Historical Provider, MD         REVIEW OF SYSTEMS       Review of Systems   Constitutional:  Positive for appetite change. Negative for fever. Respiratory:  Negative for cough and shortness of breath. Cardiovascular:  Negative for chest pain. Gastrointestinal:  Positive for abdominal pain, constipation and nausea. Negative for blood in stool, diarrhea and vomiting. Genitourinary:  Negative for difficulty urinating, dysuria, flank pain and frequency. Musculoskeletal:  Negative for back pain. Skin:  Negative for rash and wound. Neurological:  Negative for dizziness and syncope. Psychiatric/Behavioral:  Negative for confusion.       PHYSICAL EXAM      INITIAL VITALS:   /73   Pulse 67   Temp 97.9 °F (36.6 °C) (Oral)   Resp 16   Ht 6' 1\" (1.854 m)   Wt 247 lb (112 kg)   SpO2 94%   BMI 32.59 kg/m²         Physical exam: Patient is a nontoxic, non-lethargic, pleasant, conversational, speaking in full sentences, no respiratory distress, bilateral breath sounds are intact, moving all 4 extremities spontaneously, heart sounds are normal, radial pulses are palpable and 2+ bilaterally, abdomen is significantly tender in all 4 quadrants for me, Rovsing positive,mcburney +, no surgical scars noted over the abdomen. DDX/DIAGNOSTIC RESULTS / EMERGENCY DEPARTMENT COURSE / MDM     Medical Decision Making  Nontoxic, non-lethargic 22-year-old male here with right lower quadrant abdominal pain. Differential to include appendicitis versus diverticulosis versus diarrhea reticulitis versus small bowel obstruction. Vital signs are within normal limits upon arrival.  Will obtain abdominal pain work-up to include CBC, CMP, due to patient's significant heart history will obtain EKG as well as troponin, will plan for pain control with morphine and plan for CT abdomen pelvis for concerns for appendicitis. Pending imaging will decide on disposition suspect admission. Amount and/or Complexity of Data Reviewed  Labs: ordered. Radiology: ordered. ECG/medicine tests: ordered. Risk  Prescription drug management. Decision regarding hospitalization. EKG  Rate normal, sinus rhythm with first-degree AV block noted as well as PVCs noted FL interval 222, QRS 88, QT/QTc within normal limits. No ST segment elevation no ST segment depression, T wave inversion in V3 similar when compared to previous EKG. All EKG's are interpreted by the Emergency Department Physician who either signs or Co-signs this chart in the absence of a cardiologist.    EMERGENCY DEPARTMENT COURSE:      ED Course as of 03/07/23 2136   Tue Mar 07, 2023   1923 Called by [facility] [place]. radiology. Patient does have acute appendicitis. Will discuss with general surgery team.  She does not see any free air. Does have a small amount of free fluid in the pelvis she states that could be an early abscess although she is unsure. Will discuss with general surgery team.  We will update patient that he will be admitted.  [MA]   2059 Patient is going to room 423 awaiting cardiology to return page. Once I discussed with cardiology will call echo lab for stat echo. [MA]   2126 Cardiology paged back. They stated that echo will be performed in the morning. Cardiology updated. Patient to be admitted to them. We will update [facility] team. [MA]   2135 Cussed with general surgery team.  They plan for admission. They would like at a medicine admission. [facility] paged. They are excepting patient at this time. Cardiology paged for echo they will obtain echo in the morning for restratification for possible surgical intervention for acute appendicitis. Zosyn provided for IV antibiotic management for acute appendicitis at this time. Those were per recommendations by general surgery. They will follow patient. Patient admitted in vitally stable condition after remaining vitally stable throughout emergency department stay. [MA]      ED Course User Index  [MA] Diony Landaverde DO       PROCEDURES:  none    CONSULTS:  IP CONSULT TO GENERAL SURGERY  IP CONSULT TO HOSPITALIST  IP CONSULT TO CARDIOLOGY  IP CONSULT TO CARDIOLOGY  IP CONSULT TO GENERAL SURGERY  IP CONSULT TO CARDIOLOGY    CRITICAL CARE:  There was significant risk of life threatening deterioration of patient's condition requiring my direct management. Critical care time 0 minutes, excluding any documented procedures. FINAL IMPRESSION      1. Acute appendicitis with generalized peritonitis without gangrene, unspecified whether abscess present, unspecified whether perforation present          [facility] Ad[facility] 03/07/2023 08:43:51 PM      PATIENT REFERRED TO:  No follow-up provider specified.     DISCHARGE MEDICATIONS:  New Prescriptions    No medications on file       Diony Landaverde DO  Emergency Medicine Resident    (Please note that portions of thisnote were completed with a voice recognition program.  Efforts were made to edit the dictations but occasionally words are mis-transcribed.)        Michael Suh Giuliana Haq DO  Resident  03/07/23 6825

## 2023-03-07 NOTE — ED PROVIDER NOTES
[facility]  Emergency Department  Faculty Attestation     I performed a history and physical examination of the patient and discussed management with the resident. I reviewed the residents note and agree with the documented findings and plan of care. Any areas of disagreement are noted on the chart. I was personally present for the key portions of any procedures. I have documented in the chart those procedures where I was not present during the key portions. I have reviewed the emergency nurses triage note. I agree with the chief complaint, past medical history, past surgical history, allergies, medications, social and family history as documented unless otherwise noted below. For Physician Assistant/ Nurse Practitioner cases/documentation I have personally evaluated this patient and have completed at least one if not all key elements of the E/M (history, physical exam, and MDM). Additional findings are as noted. Primary Care Physician:  Belen Yeh MD    Screenings:  [unfilled]    CHIEF COMPLAINT       Chief Complaint   Patient presents with    Abdominal Pain    Abdominal Cramping     Right side        RECENT VITALS:   Temp: 97.9 °F (36.6 °C),  Heart Rate: 70, Resp: 15, BP: 128/69    LABS:  Labs Reviewed   TOX SCR, BLD, ED   CBC WITH AUTO DIFFERENTIAL   BASIC METABOLIC PANEL   HEPATIC FUNCTION PANEL   TROPONIN       Radiology  No orders to display       CRITICAL CARE: There was a high probability of clinically significant/life threatening deterioration in this patient's condition which required my urgent intervention. Total critical care time was 10 minutes. This excludes any time for separately reportable procedures.      EKG:  EKG Interpretation    Interpreted by me    Rhythm: normal sinus   Rate: normal  Axis: normal  Ectopy: Frequent unifocal PVCs  Conduction: First-degree AVB, incomplete bundle branch block pattern  ST Segments: no acute change  T Waves: Inversion high lateral  Q Waves: Septal, poor R wave progression anteriorly    Clinical Impression: First-degree AVB, ectopy, probable old anteroseptal MI, nonspecific T inversion    Attending Physician Additional  Notes    Patient was straining hard to move his bowels, now has increasing right lower quad abdominal pain. No nausea or vomiting but mild anorexia. No blood in his urine or blood in his stools. No urine symptoms such as dysuria frequency hematuria. No fever chills or sweats. No prior abdominal surgery. He does have extensive past medical history including cardiomyopathy, CHF, COPD, diabetes, ICD pacer, type 2 diabetes. On exam he is uncomfortable, afebrile, vital signs normal.  Abdomen is protuberant but soft. He has diffuse right lower quadrant abdominal tenderness, greatest just lateral and inferior to McBurney's point. He also has mild right upper quadrant tenderness but negative Galvez sign. Negative Rovsing sign. Negative heeltap. Positive obturator and psoas sign. No CVA tenderness. Impression is acute abdominal pain, consider appendicitis, abdominal wall strain/hematoma since he is on DOAC, right-sided diverticulitis, renal colic or other cause. Plan is IV access, analgesics, antiemetics as needed, laboratory studies, urinalysis, CT abdomen, reassess. Anticipate admission. Danae Yi.  Kenneth Chan MD, [address] [facility]  Attending Emergency  Physician                Artemio Palacio MD  03/07/23 [id number]

## 2023-03-08 ENCOUNTER — ANESTHESIA (OUTPATIENT)
Dept: OPERATING ROOM | Age: 73
End: 2023-03-08
Payer: MEDICARE

## 2023-03-08 ENCOUNTER — APPOINTMENT (OUTPATIENT)
Dept: GENERAL RADIOLOGY | Age: 73
End: 2023-03-08
Payer: MEDICARE

## 2023-03-08 ENCOUNTER — ANESTHESIA EVENT (OUTPATIENT)
Dept: OPERATING ROOM | Age: 73
End: 2023-03-08
Payer: MEDICARE

## 2023-03-08 LAB
ABSOLUTE EOS #: <0.03 K/UL (ref 0–0.44)
ABSOLUTE IMMATURE GRANULOCYTE: 0.08 K/UL (ref 0–0.3)
ABSOLUTE LYMPH #: 0.83 K/UL (ref 1.1–3.7)
ABSOLUTE MONO #: 0.65 K/UL (ref 0.1–1.2)
ANION GAP SERPL CALCULATED.3IONS-SCNC: 13 MMOL/L (ref 9–17)
ANION GAP SERPL CALCULATED.3IONS-SCNC: 16 MMOL/L (ref 9–17)
BASOPHILS # BLD: 0 % (ref 0–2)
BASOPHILS ABSOLUTE: <0.03 K/UL (ref 0–0.2)
BNP SERPL-MCNC: 9351 PG/ML
BUN SERPL-MCNC: 18 MG/DL (ref 8–23)
BUN SERPL-MCNC: 20 MG/DL (ref 8–23)
CALCIUM SERPL-MCNC: 8.9 MG/DL (ref 8.6–10.4)
CALCIUM SERPL-MCNC: 8.9 MG/DL (ref 8.6–10.4)
CHLORIDE SERPL-SCNC: 100 MMOL/L (ref 98–107)
CHLORIDE SERPL-SCNC: 96 MMOL/L (ref 98–107)
CO2 SERPL-SCNC: 21 MMOL/L (ref 20–31)
CO2 SERPL-SCNC: 23 MMOL/L (ref 20–31)
CREAT SERPL-MCNC: 1.19 MG/DL (ref 0.7–1.2)
CREAT SERPL-MCNC: 1.23 MG/DL (ref 0.7–1.2)
EKG ATRIAL RATE: 64 BPM
EKG P AXIS: 41 DEGREES
EKG P-R INTERVAL: 220 MS
EKG Q-T INTERVAL: 452 MS
EKG QRS DURATION: 88 MS
EKG QTC CALCULATION (BAZETT): 466 MS
EKG R AXIS: -4 DEGREES
EKG T AXIS: 62 DEGREES
EKG VENTRICULAR RATE: 64 BPM
EOSINOPHILS RELATIVE PERCENT: 0 % (ref 1–4)
FIO2: 60
GFR SERPL CREATININE-BSD FRML MDRD: >60 ML/MIN/1.73M2
GFR SERPL CREATININE-BSD FRML MDRD: >60 ML/MIN/1.73M2
GLUCOSE BLD-MCNC: 150 MG/DL (ref 75–110)
GLUCOSE BLD-MCNC: 152 MG/DL (ref 75–110)
GLUCOSE BLD-MCNC: 165 MG/DL (ref 75–110)
GLUCOSE BLD-MCNC: 167 MG/DL (ref 75–110)
GLUCOSE BLD-MCNC: 240 MG/DL (ref 75–110)
GLUCOSE BLD-MCNC: 267 MG/DL (ref 75–110)
GLUCOSE SERPL-MCNC: 169 MG/DL (ref 70–99)
GLUCOSE SERPL-MCNC: 234 MG/DL (ref 70–99)
HCT VFR BLD AUTO: 41.4 % (ref 40.7–50.3)
HCT VFR BLD AUTO: 42.7 % (ref 40.7–50.3)
HGB BLD-MCNC: 14.1 G/DL (ref 13–17)
HGB BLD-MCNC: 14.4 G/DL (ref 13–17)
IMMATURE GRANULOCYTES: 1 %
INR PPP: 1.2
LACTIC ACID, SEPSIS WHOLE BLOOD: 1.3 MMOL/L (ref 0.5–1.9)
LACTIC ACID, SEPSIS WHOLE BLOOD: 1.5 MMOL/L (ref 0.5–1.9)
LACTIC ACID, SEPSIS WHOLE BLOOD: 1.7 MMOL/L (ref 0.5–1.9)
LACTIC ACID, SEPSIS WHOLE BLOOD: 1.8 MMOL/L (ref 0.5–1.9)
LV EF: 38 %
LVEF MODALITY: NORMAL
LYMPHOCYTES # BLD: 5 % (ref 24–43)
MAGNESIUM SERPL-MCNC: 2.4 MG/DL (ref 1.6–2.6)
MCH RBC QN AUTO: 34.1 PG (ref 25.2–33.5)
MCH RBC QN AUTO: 34.3 PG (ref 25.2–33.5)
MCHC RBC AUTO-ENTMCNC: 33.7 G/DL (ref 28.4–34.8)
MCHC RBC AUTO-ENTMCNC: 34.1 G/DL (ref 28.4–34.8)
MCV RBC AUTO: 100.2 FL (ref 82.6–102.9)
MCV RBC AUTO: 101.7 FL (ref 82.6–102.9)
MONOCYTES # BLD: 4 % (ref 3–12)
NEGATIVE BASE EXCESS, ART: 2 (ref 0–2)
NRBC AUTOMATED: 0 PER 100 WBC
NRBC AUTOMATED: 0 PER 100 WBC
O2 DEVICE/FLOW/%: ABNORMAL
PARTIAL THROMBOPLASTIN TIME: >120 SEC (ref 20.5–30.5)
PDW BLD-RTO: 13.3 % (ref 11.8–14.4)
PDW BLD-RTO: 13.6 % (ref 11.8–14.4)
PHOSPHATE SERPL-MCNC: 4.7 MG/DL (ref 2.5–4.5)
PLATELET # BLD AUTO: ABNORMAL K/UL (ref 138–453)
PLATELET # BLD AUTO: ABNORMAL K/UL (ref 138–453)
PLATELET, FLUORESCENCE: 122 K/UL (ref 138–453)
PLATELET, FLUORESCENCE: 137 K/UL (ref 138–453)
PLATELET, IMMATURE FRACTION: 5.1 % (ref 1.1–10.3)
PLATELET, IMMATURE FRACTION: 5.1 % (ref 1.1–10.3)
POC HCO3: 23.3 MMOL/L (ref 21–28)
POC LACTIC ACID: 1.38 MMOL/L (ref 0.56–1.39)
POC O2 SATURATION: 95 % (ref 94–98)
POC PCO2: 42.4 MM HG (ref 35–48)
POC PH: 7.35 (ref 7.35–7.45)
POC PO2: 81.3 MM HG (ref 83–108)
POTASSIUM SERPL-SCNC: 4.2 MMOL/L (ref 3.7–5.3)
POTASSIUM SERPL-SCNC: 5.3 MMOL/L (ref 3.7–5.3)
PROTHROMBIN TIME: 13.1 SEC (ref 9.1–12.3)
RBC # BLD: 4.13 M/UL (ref 4.21–5.77)
RBC # BLD: 4.2 M/UL (ref 4.21–5.77)
SEG NEUTROPHILS: 91 % (ref 36–65)
SEGMENTED NEUTROPHILS ABSOLUTE COUNT: 15.16 K/UL (ref 1.5–8.1)
SODIUM SERPL-SCNC: 133 MMOL/L (ref 135–144)
SODIUM SERPL-SCNC: 136 MMOL/L (ref 135–144)
TROPONIN I SERPL DL<=0.01 NG/ML-MCNC: 119 NG/L (ref 0–22)
TROPONIN I SERPL DL<=0.01 NG/ML-MCNC: 71 NG/L (ref 0–22)
WBC # BLD AUTO: 16.7 K/UL (ref 3.5–11.3)
WBC # BLD AUTO: 18.7 K/UL (ref 3.5–11.3)

## 2023-03-08 PROCEDURE — 84100 ASSAY OF PHOSPHORUS: CPT

## 2023-03-08 PROCEDURE — 2580000003 HC RX 258: Performed by: STUDENT IN AN ORGANIZED HEALTH CARE EDUCATION/TRAINING PROGRAM

## 2023-03-08 PROCEDURE — 2580000003 HC RX 258: Performed by: NURSE PRACTITIONER

## 2023-03-08 PROCEDURE — 83605 ASSAY OF LACTIC ACID: CPT

## 2023-03-08 PROCEDURE — 6360000002 HC RX W HCPCS: Performed by: NURSE PRACTITIONER

## 2023-03-08 PROCEDURE — 93010 ELECTROCARDIOGRAM REPORT: CPT | Performed by: INTERNAL MEDICINE

## 2023-03-08 PROCEDURE — 6370000000 HC RX 637 (ALT 250 FOR IP): Performed by: STUDENT IN AN ORGANIZED HEALTH CARE EDUCATION/TRAINING PROGRAM

## 2023-03-08 PROCEDURE — 82803 BLOOD GASES ANY COMBINATION: CPT

## 2023-03-08 PROCEDURE — 94002 VENT MGMT INPAT INIT DAY: CPT

## 2023-03-08 PROCEDURE — 2500000003 HC RX 250 WO HCPCS

## 2023-03-08 PROCEDURE — 2580000003 HC RX 258

## 2023-03-08 PROCEDURE — 6370000000 HC RX 637 (ALT 250 FOR IP): Performed by: NURSE PRACTITIONER

## 2023-03-08 PROCEDURE — 6360000002 HC RX W HCPCS

## 2023-03-08 PROCEDURE — 85055 RETICULATED PLATELET ASSAY: CPT

## 2023-03-08 PROCEDURE — 2500000003 HC RX 250 WO HCPCS: Performed by: NURSE PRACTITIONER

## 2023-03-08 PROCEDURE — 99232 SBSQ HOSP IP/OBS MODERATE 35: CPT | Performed by: STUDENT IN AN ORGANIZED HEALTH CARE EDUCATION/TRAINING PROGRAM

## 2023-03-08 PROCEDURE — 74018 RADEX ABDOMEN 1 VIEW: CPT

## 2023-03-08 PROCEDURE — 88304 TISSUE EXAM BY PATHOLOGIST: CPT

## 2023-03-08 PROCEDURE — 6370000000 HC RX 637 (ALT 250 FOR IP): Performed by: INTERNAL MEDICINE

## 2023-03-08 PROCEDURE — 2500000003 HC RX 250 WO HCPCS: Performed by: SURGERY

## 2023-03-08 PROCEDURE — 02HV33Z INSERTION OF INFUSION DEVICE INTO SUPERIOR VENA CAVA, PERCUTANEOUS APPROACH: ICD-10-PCS | Performed by: SURGERY

## 2023-03-08 PROCEDURE — 3700000000 HC ANESTHESIA ATTENDED CARE: Performed by: SURGERY

## 2023-03-08 PROCEDURE — 3600000014 HC SURGERY LEVEL 4 ADDTL 15MIN: Performed by: SURGERY

## 2023-03-08 PROCEDURE — 85025 COMPLETE CBC W/AUTO DIFF WBC: CPT

## 2023-03-08 PROCEDURE — 3700000001 HC ADD 15 MINUTES (ANESTHESIA): Performed by: SURGERY

## 2023-03-08 PROCEDURE — 87086 URINE CULTURE/COLONY COUNT: CPT

## 2023-03-08 PROCEDURE — 0DTJ4ZZ RESECTION OF APPENDIX, PERCUTANEOUS ENDOSCOPIC APPROACH: ICD-10-PCS | Performed by: STUDENT IN AN ORGANIZED HEALTH CARE EDUCATION/TRAINING PROGRAM

## 2023-03-08 PROCEDURE — 85027 COMPLETE CBC AUTOMATED: CPT

## 2023-03-08 PROCEDURE — 93306 TTE W/DOPPLER COMPLETE: CPT

## 2023-03-08 PROCEDURE — 2709999900 HC NON-CHARGEABLE SUPPLY: Performed by: SURGERY

## 2023-03-08 PROCEDURE — 5A1935Z RESPIRATORY VENTILATION, LESS THAN 24 CONSECUTIVE HOURS: ICD-10-PCS | Performed by: SURGERY

## 2023-03-08 PROCEDURE — 2500000003 HC RX 250 WO HCPCS: Performed by: STUDENT IN AN ORGANIZED HEALTH CARE EDUCATION/TRAINING PROGRAM

## 2023-03-08 PROCEDURE — 71045 X-RAY EXAM CHEST 1 VIEW: CPT

## 2023-03-08 PROCEDURE — 94640 AIRWAY INHALATION TREATMENT: CPT

## 2023-03-08 PROCEDURE — 83880 ASSAY OF NATRIURETIC PEPTIDE: CPT

## 2023-03-08 PROCEDURE — 2000000000 HC ICU R&B

## 2023-03-08 PROCEDURE — 85610 PROTHROMBIN TIME: CPT

## 2023-03-08 PROCEDURE — 6360000002 HC RX W HCPCS: Performed by: INTERNAL MEDICINE

## 2023-03-08 PROCEDURE — 80048 BASIC METABOLIC PNL TOTAL CA: CPT

## 2023-03-08 PROCEDURE — 84484 ASSAY OF TROPONIN QUANT: CPT

## 2023-03-08 PROCEDURE — 6360000002 HC RX W HCPCS: Performed by: STUDENT IN AN ORGANIZED HEALTH CARE EDUCATION/TRAINING PROGRAM

## 2023-03-08 PROCEDURE — 36415 COLL VENOUS BLD VENIPUNCTURE: CPT

## 2023-03-08 PROCEDURE — 37799 UNLISTED PX VASCULAR SURGERY: CPT

## 2023-03-08 PROCEDURE — 94761 N-INVAS EAR/PLS OXIMETRY MLT: CPT

## 2023-03-08 PROCEDURE — 2700000000 HC OXYGEN THERAPY PER DAY

## 2023-03-08 PROCEDURE — 85730 THROMBOPLASTIN TIME PARTIAL: CPT

## 2023-03-08 PROCEDURE — 2720000010 HC SURG SUPPLY STERILE: Performed by: SURGERY

## 2023-03-08 PROCEDURE — 83735 ASSAY OF MAGNESIUM: CPT

## 2023-03-08 PROCEDURE — 2580000003 HC RX 258: Performed by: SURGERY

## 2023-03-08 PROCEDURE — 3600000004 HC SURGERY LEVEL 4 BASE: Performed by: SURGERY

## 2023-03-08 RX ORDER — ACETAMINOPHEN 650 MG/1
650 SUPPOSITORY RECTAL EVERY 6 HOURS PRN
Status: DISCONTINUED | OUTPATIENT
Start: 2023-03-08 | End: 2023-03-08

## 2023-03-08 RX ORDER — MORPHINE SULFATE 4 MG/ML
4 INJECTION, SOLUTION INTRAMUSCULAR; INTRAVENOUS EVERY 4 HOURS PRN
Status: DISCONTINUED | OUTPATIENT
Start: 2023-03-08 | End: 2023-03-08

## 2023-03-08 RX ORDER — NOREPINEPHRINE BIT/0.9 % NACL 16MG/250ML
1-100 INFUSION BOTTLE (ML) INTRAVENOUS CONTINUOUS
Status: DISCONTINUED | OUTPATIENT
Start: 2023-03-08 | End: 2023-03-08

## 2023-03-08 RX ORDER — BUPIVACAINE HYDROCHLORIDE AND EPINEPHRINE 2.5; 5 MG/ML; UG/ML
INJECTION, SOLUTION INFILTRATION; PERINEURAL PRN
Status: DISCONTINUED | OUTPATIENT
Start: 2023-03-08 | End: 2023-03-08 | Stop reason: HOSPADM

## 2023-03-08 RX ORDER — ONDANSETRON 2 MG/ML
INJECTION INTRAMUSCULAR; INTRAVENOUS PRN
Status: DISCONTINUED | OUTPATIENT
Start: 2023-03-08 | End: 2023-03-08 | Stop reason: SDUPTHER

## 2023-03-08 RX ORDER — ONDANSETRON 4 MG/1
4 TABLET, ORALLY DISINTEGRATING ORAL EVERY 8 HOURS PRN
Status: DISCONTINUED | OUTPATIENT
Start: 2023-03-08 | End: 2023-03-11 | Stop reason: HOSPADM

## 2023-03-08 RX ORDER — SODIUM CHLORIDE 9 MG/ML
INJECTION, SOLUTION INTRAVENOUS PRN
Status: DISCONTINUED | OUTPATIENT
Start: 2023-03-08 | End: 2023-03-08

## 2023-03-08 RX ORDER — DIPHENHYDRAMINE HYDROCHLORIDE 50 MG/ML
12.5 INJECTION INTRAMUSCULAR; INTRAVENOUS
Status: DISCONTINUED | OUTPATIENT
Start: 2023-03-08 | End: 2023-03-08

## 2023-03-08 RX ORDER — EPHEDRINE SULFATE/0.9% NACL/PF 50 MG/5 ML
SYRINGE (ML) INTRAVENOUS PRN
Status: DISCONTINUED | OUTPATIENT
Start: 2023-03-08 | End: 2023-03-08 | Stop reason: SDUPTHER

## 2023-03-08 RX ORDER — INSULIN LISPRO 100 [IU]/ML
0-16 INJECTION, SOLUTION INTRAVENOUS; SUBCUTANEOUS EVERY 4 HOURS
Status: DISCONTINUED | OUTPATIENT
Start: 2023-03-08 | End: 2023-03-09

## 2023-03-08 RX ORDER — CALCIUM GLUCONATE 94 MG/ML
1000 INJECTION, SOLUTION INTRAVENOUS ONCE
Status: COMPLETED | OUTPATIENT
Start: 2023-03-08 | End: 2023-03-08

## 2023-03-08 RX ORDER — FENTANYL CITRATE 50 UG/ML
INJECTION, SOLUTION INTRAMUSCULAR; INTRAVENOUS PRN
Status: DISCONTINUED | OUTPATIENT
Start: 2023-03-08 | End: 2023-03-08 | Stop reason: SDUPTHER

## 2023-03-08 RX ORDER — POTASSIUM CHLORIDE 7.45 MG/ML
10 INJECTION INTRAVENOUS PRN
Status: DISCONTINUED | OUTPATIENT
Start: 2023-03-08 | End: 2023-03-08

## 2023-03-08 RX ORDER — MORPHINE SULFATE 2 MG/ML
2 INJECTION, SOLUTION INTRAMUSCULAR; INTRAVENOUS EVERY 5 MIN PRN
Status: DISCONTINUED | OUTPATIENT
Start: 2023-03-08 | End: 2023-03-08

## 2023-03-08 RX ORDER — SODIUM CHLORIDE, SODIUM LACTATE, POTASSIUM CHLORIDE, CALCIUM CHLORIDE 600; 310; 30; 20 MG/100ML; MG/100ML; MG/100ML; MG/100ML
INJECTION, SOLUTION INTRAVENOUS CONTINUOUS PRN
Status: DISCONTINUED | OUTPATIENT
Start: 2023-03-08 | End: 2023-03-08 | Stop reason: SDUPTHER

## 2023-03-08 RX ORDER — INSULIN LISPRO 100 [IU]/ML
0-16 INJECTION, SOLUTION INTRAVENOUS; SUBCUTANEOUS
Status: DISCONTINUED | OUTPATIENT
Start: 2023-03-08 | End: 2023-03-08

## 2023-03-08 RX ORDER — DEXMEDETOMIDINE HYDROCHLORIDE 4 UG/ML
.1-1.5 INJECTION, SOLUTION INTRAVENOUS CONTINUOUS
Status: DISCONTINUED | OUTPATIENT
Start: 2023-03-08 | End: 2023-03-08

## 2023-03-08 RX ORDER — FUROSEMIDE 20 MG/1
20 TABLET ORAL DAILY
Status: DISCONTINUED | OUTPATIENT
Start: 2023-03-08 | End: 2023-03-10

## 2023-03-08 RX ORDER — SODIUM CHLORIDE 0.9 % (FLUSH) 0.9 %
5-40 SYRINGE (ML) INJECTION EVERY 12 HOURS SCHEDULED
Status: DISCONTINUED | OUTPATIENT
Start: 2023-03-08 | End: 2023-03-11 | Stop reason: HOSPADM

## 2023-03-08 RX ORDER — SODIUM CHLORIDE, SODIUM LACTATE, POTASSIUM CHLORIDE, AND CALCIUM CHLORIDE .6; .31; .03; .02 G/100ML; G/100ML; G/100ML; G/100ML
500 INJECTION, SOLUTION INTRAVENOUS ONCE
Status: COMPLETED | OUTPATIENT
Start: 2023-03-08 | End: 2023-03-09

## 2023-03-08 RX ORDER — POTASSIUM CHLORIDE 20 MEQ/1
40 TABLET, EXTENDED RELEASE ORAL PRN
Status: DISCONTINUED | OUTPATIENT
Start: 2023-03-08 | End: 2023-03-08

## 2023-03-08 RX ORDER — AMIODARONE HYDROCHLORIDE 50 MG/ML
INJECTION, SOLUTION INTRAVENOUS PRN
Status: DISCONTINUED | OUTPATIENT
Start: 2023-03-08 | End: 2023-03-08 | Stop reason: SDUPTHER

## 2023-03-08 RX ORDER — PROPOFOL 10 MG/ML
INJECTION, EMULSION INTRAVENOUS
Status: COMPLETED
Start: 2023-03-08 | End: 2023-03-08

## 2023-03-08 RX ORDER — PROPOFOL 10 MG/ML
INJECTION, EMULSION INTRAVENOUS PRN
Status: DISCONTINUED | OUTPATIENT
Start: 2023-03-08 | End: 2023-03-08 | Stop reason: SDUPTHER

## 2023-03-08 RX ORDER — INSULIN LISPRO 100 [IU]/ML
0-4 INJECTION, SOLUTION INTRAVENOUS; SUBCUTANEOUS EVERY 4 HOURS
Status: DISCONTINUED | OUTPATIENT
Start: 2023-03-08 | End: 2023-03-08

## 2023-03-08 RX ORDER — MAGNESIUM HYDROXIDE 1200 MG/15ML
LIQUID ORAL CONTINUOUS PRN
Status: DISCONTINUED | OUTPATIENT
Start: 2023-03-08 | End: 2023-03-08 | Stop reason: HOSPADM

## 2023-03-08 RX ORDER — SODIUM CHLORIDE 0.9 % (FLUSH) 0.9 %
5-40 SYRINGE (ML) INJECTION PRN
Status: DISCONTINUED | OUTPATIENT
Start: 2023-03-08 | End: 2023-03-08

## 2023-03-08 RX ORDER — MAGNESIUM SULFATE HEPTAHYDRATE 40 MG/ML
INJECTION, SOLUTION INTRAVENOUS PRN
Status: DISCONTINUED | OUTPATIENT
Start: 2023-03-08 | End: 2023-03-08 | Stop reason: SDUPTHER

## 2023-03-08 RX ORDER — DEXTROSE MONOHYDRATE 100 MG/ML
INJECTION, SOLUTION INTRAVENOUS CONTINUOUS PRN
Status: DISCONTINUED | OUTPATIENT
Start: 2023-03-08 | End: 2023-03-11 | Stop reason: HOSPADM

## 2023-03-08 RX ORDER — MAGNESIUM SULFATE 1 G/100ML
1000 INJECTION INTRAVENOUS PRN
Status: DISCONTINUED | OUTPATIENT
Start: 2023-03-08 | End: 2023-03-08

## 2023-03-08 RX ORDER — INSULIN LISPRO 100 [IU]/ML
0-4 INJECTION, SOLUTION INTRAVENOUS; SUBCUTANEOUS NIGHTLY
Status: DISCONTINUED | OUTPATIENT
Start: 2023-03-08 | End: 2023-03-08

## 2023-03-08 RX ORDER — NOREPINEPHRINE BIT/0.9 % NACL 16MG/250ML
INFUSION BOTTLE (ML) INTRAVENOUS CONTINUOUS PRN
Status: DISCONTINUED | OUTPATIENT
Start: 2023-03-08 | End: 2023-03-08 | Stop reason: SDUPTHER

## 2023-03-08 RX ORDER — NOREPINEPHRINE BIT/0.9 % NACL 16MG/250ML
1-100 INFUSION BOTTLE (ML) INTRAVENOUS CONTINUOUS
Status: DISCONTINUED | OUTPATIENT
Start: 2023-03-08 | End: 2023-03-09

## 2023-03-08 RX ORDER — PROPOFOL 10 MG/ML
10 INJECTION, EMULSION INTRAVENOUS
Status: DISCONTINUED | OUTPATIENT
Start: 2023-03-08 | End: 2023-03-09

## 2023-03-08 RX ORDER — PHENYLEPHRINE HCL IN 0.9% NACL 1 MG/10 ML
SYRINGE (ML) INTRAVENOUS PRN
Status: DISCONTINUED | OUTPATIENT
Start: 2023-03-08 | End: 2023-03-08 | Stop reason: SDUPTHER

## 2023-03-08 RX ORDER — SODIUM CHLORIDE 0.9 % (FLUSH) 0.9 %
10 SYRINGE (ML) INJECTION PRN
Status: DISCONTINUED | OUTPATIENT
Start: 2023-03-08 | End: 2023-03-11 | Stop reason: HOSPADM

## 2023-03-08 RX ORDER — NEOSTIGMINE METHYLSULFATE 5 MG/5 ML
SYRINGE (ML) INTRAVENOUS PRN
Status: DISCONTINUED | OUTPATIENT
Start: 2023-03-08 | End: 2023-03-08 | Stop reason: SDUPTHER

## 2023-03-08 RX ORDER — ONDANSETRON 2 MG/ML
4 INJECTION INTRAMUSCULAR; INTRAVENOUS EVERY 6 HOURS PRN
Status: DISCONTINUED | OUTPATIENT
Start: 2023-03-08 | End: 2023-03-11 | Stop reason: HOSPADM

## 2023-03-08 RX ORDER — FAMOTIDINE 20 MG/1
20 TABLET, FILM COATED ORAL 2 TIMES DAILY
Status: DISCONTINUED | OUTPATIENT
Start: 2023-03-08 | End: 2023-03-09

## 2023-03-08 RX ORDER — ACETAMINOPHEN 500 MG
1000 TABLET ORAL EVERY 8 HOURS SCHEDULED
Status: DISCONTINUED | OUTPATIENT
Start: 2023-03-08 | End: 2023-03-11 | Stop reason: HOSPADM

## 2023-03-08 RX ORDER — GLYCOPYRROLATE 0.2 MG/ML
INJECTION INTRAMUSCULAR; INTRAVENOUS PRN
Status: DISCONTINUED | OUTPATIENT
Start: 2023-03-08 | End: 2023-03-08 | Stop reason: SDUPTHER

## 2023-03-08 RX ORDER — LIDOCAINE HYDROCHLORIDE 10 MG/ML
INJECTION, SOLUTION EPIDURAL; INFILTRATION; INTRACAUDAL; PERINEURAL PRN
Status: DISCONTINUED | OUTPATIENT
Start: 2023-03-08 | End: 2023-03-08 | Stop reason: SDUPTHER

## 2023-03-08 RX ORDER — DEXTROSE MONOHYDRATE 100 MG/ML
INJECTION, SOLUTION INTRAVENOUS CONTINUOUS PRN
Status: DISCONTINUED | OUTPATIENT
Start: 2023-03-08 | End: 2023-03-09

## 2023-03-08 RX ORDER — MIDAZOLAM HYDROCHLORIDE 1 MG/ML
INJECTION INTRAMUSCULAR; INTRAVENOUS PRN
Status: DISCONTINUED | OUTPATIENT
Start: 2023-03-08 | End: 2023-03-08 | Stop reason: SDUPTHER

## 2023-03-08 RX ORDER — SODIUM CHLORIDE 0.9 % (FLUSH) 0.9 %
5-40 SYRINGE (ML) INJECTION EVERY 12 HOURS SCHEDULED
Status: DISCONTINUED | OUTPATIENT
Start: 2023-03-08 | End: 2023-03-08

## 2023-03-08 RX ORDER — POLYETHYLENE GLYCOL 3350 17 G/17G
17 POWDER, FOR SOLUTION ORAL DAILY PRN
Status: DISCONTINUED | OUTPATIENT
Start: 2023-03-08 | End: 2023-03-10

## 2023-03-08 RX ORDER — ROCURONIUM BROMIDE 10 MG/ML
INJECTION, SOLUTION INTRAVENOUS PRN
Status: DISCONTINUED | OUTPATIENT
Start: 2023-03-08 | End: 2023-03-08 | Stop reason: SDUPTHER

## 2023-03-08 RX ORDER — ONDANSETRON 2 MG/ML
4 INJECTION INTRAMUSCULAR; INTRAVENOUS
Status: DISCONTINUED | OUTPATIENT
Start: 2023-03-08 | End: 2023-03-08

## 2023-03-08 RX ORDER — FENTANYL CITRATE 50 UG/ML
25 INJECTION, SOLUTION INTRAMUSCULAR; INTRAVENOUS EVERY 5 MIN PRN
Status: DISCONTINUED | OUTPATIENT
Start: 2023-03-08 | End: 2023-03-08

## 2023-03-08 RX ORDER — DEXAMETHASONE SODIUM PHOSPHATE 10 MG/ML
INJECTION INTRAMUSCULAR; INTRAVENOUS PRN
Status: DISCONTINUED | OUTPATIENT
Start: 2023-03-08 | End: 2023-03-08 | Stop reason: SDUPTHER

## 2023-03-08 RX ORDER — ACETAMINOPHEN 325 MG/1
650 TABLET ORAL EVERY 6 HOURS PRN
Status: DISCONTINUED | OUTPATIENT
Start: 2023-03-08 | End: 2023-03-08

## 2023-03-08 RX ADMIN — GUAIFENESIN 600 MG: 600 TABLET, EXTENDED RELEASE ORAL at 08:42

## 2023-03-08 RX ADMIN — AMIODARONE HYDROCHLORIDE 150 MG: 50 INJECTION, SOLUTION INTRAVENOUS at 14:08

## 2023-03-08 RX ADMIN — IPRATROPIUM BROMIDE AND ALBUTEROL SULFATE 1 AMPULE: 2.5; .5 SOLUTION RESPIRATORY (INHALATION) at 20:40

## 2023-03-08 RX ADMIN — MIDODRINE HYDROCHLORIDE 2.5 MG: 2.5 TABLET ORAL at 08:40

## 2023-03-08 RX ADMIN — MIDAZOLAM 2 MG: 1 INJECTION INTRAMUSCULAR; INTRAVENOUS at 14:33

## 2023-03-08 RX ADMIN — CALCIUM GLUCONATE 1000 MG: 98 INJECTION, SOLUTION INTRAVENOUS at 20:22

## 2023-03-08 RX ADMIN — SACUBITRIL AND VALSARTAN 1 TABLET: 24; 26 TABLET, FILM COATED ORAL at 08:41

## 2023-03-08 RX ADMIN — FENTANYL CITRATE 25 MCG: 50 INJECTION, SOLUTION INTRAMUSCULAR; INTRAVENOUS at 12:53

## 2023-03-08 RX ADMIN — PROPOFOL 20 MCG/KG/MIN: 10 INJECTION, EMULSION INTRAVENOUS at 15:22

## 2023-03-08 RX ADMIN — DESMOPRESSIN ACETATE 40 MG: 0.2 TABLET ORAL at 08:42

## 2023-03-08 RX ADMIN — ACETAMINOPHEN 650 MG: 325 TABLET ORAL at 04:12

## 2023-03-08 RX ADMIN — DEXAMETHASONE SODIUM PHOSPHATE 10 MG: 10 INJECTION INTRAMUSCULAR; INTRAVENOUS at 12:41

## 2023-03-08 RX ADMIN — Medication 10 MG: at 13:03

## 2023-03-08 RX ADMIN — Medication 4.5 MG: at 13:41

## 2023-03-08 RX ADMIN — ROCURONIUM BROMIDE 40 MG: 10 INJECTION, SOLUTION INTRAVENOUS at 12:36

## 2023-03-08 RX ADMIN — SODIUM CHLORIDE, PRESERVATIVE FREE 10 ML: 5 INJECTION INTRAVENOUS at 21:13

## 2023-03-08 RX ADMIN — Medication 2 MCG/MIN: at 19:45

## 2023-03-08 RX ADMIN — ROCURONIUM BROMIDE 80 MG: 10 INJECTION, SOLUTION INTRAVENOUS at 14:11

## 2023-03-08 RX ADMIN — AMIODARONE HYDROCHLORIDE 200 MG: 200 TABLET ORAL at 00:36

## 2023-03-08 RX ADMIN — MORPHINE SULFATE 4 MG: 4 INJECTION INTRAVENOUS at 08:38

## 2023-03-08 RX ADMIN — Medication 2 MCG/MIN: at 14:42

## 2023-03-08 RX ADMIN — AMIODARONE HYDROCHLORIDE 200 MG: 200 TABLET ORAL at 08:40

## 2023-03-08 RX ADMIN — GLYCOPYRROLATE 0.8 MG: 0.2 INJECTION INTRAMUSCULAR; INTRAVENOUS at 13:39

## 2023-03-08 RX ADMIN — IPRATROPIUM BROMIDE AND ALBUTEROL SULFATE 1 AMPULE: 2.5; .5 SOLUTION RESPIRATORY (INHALATION) at 01:00

## 2023-03-08 RX ADMIN — AMIODARONE HYDROCHLORIDE 1 MG/MIN: 50 INJECTION, SOLUTION INTRAVENOUS at 14:30

## 2023-03-08 RX ADMIN — PROPOFOL 200 MG: 10 INJECTION, EMULSION INTRAVENOUS at 12:36

## 2023-03-08 RX ADMIN — FENTANYL CITRATE 50 MCG: 50 INJECTION, SOLUTION INTRAMUSCULAR; INTRAVENOUS at 12:36

## 2023-03-08 RX ADMIN — FAMOTIDINE 20 MG: 20 TABLET, FILM COATED ORAL at 21:09

## 2023-03-08 RX ADMIN — IPRATROPIUM BROMIDE AND ALBUTEROL SULFATE 1 AMPULE: 2.5; .5 SOLUTION RESPIRATORY (INHALATION) at 08:36

## 2023-03-08 RX ADMIN — ACETAMINOPHEN 1000 MG: 500 TABLET ORAL at 21:09

## 2023-03-08 RX ADMIN — FUROSEMIDE 20 MG: 20 TABLET ORAL at 21:09

## 2023-03-08 RX ADMIN — SODIUM CHLORIDE, POTASSIUM CHLORIDE, SODIUM LACTATE AND CALCIUM CHLORIDE 500 ML: 600; 310; 30; 20 INJECTION, SOLUTION INTRAVENOUS at 23:03

## 2023-03-08 RX ADMIN — SODIUM CHLORIDE, PRESERVATIVE FREE 10 ML: 5 INJECTION INTRAVENOUS at 08:39

## 2023-03-08 RX ADMIN — Medication 25 MCG/HR: at 17:20

## 2023-03-08 RX ADMIN — ROCURONIUM BROMIDE 10 MG: 10 INJECTION, SOLUTION INTRAVENOUS at 12:53

## 2023-03-08 RX ADMIN — SODIUM CHLORIDE, POTASSIUM CHLORIDE, SODIUM LACTATE AND CALCIUM CHLORIDE: 600; 310; 30; 20 INJECTION, SOLUTION INTRAVENOUS at 04:30

## 2023-03-08 RX ADMIN — Medication 100 MCG: at 12:59

## 2023-03-08 RX ADMIN — LIDOCAINE HYDROCHLORIDE 100 MG: 10 INJECTION, SOLUTION EPIDURAL; INFILTRATION; INTRACAUDAL; PERINEURAL at 14:01

## 2023-03-08 RX ADMIN — FUROSEMIDE 20 MG: 10 INJECTION, SOLUTION INTRAMUSCULAR; INTRAVENOUS at 00:35

## 2023-03-08 RX ADMIN — METOPROLOL TARTRATE 25 MG: 25 TABLET ORAL at 21:09

## 2023-03-08 RX ADMIN — MORPHINE SULFATE 4 MG: 4 INJECTION INTRAVENOUS at 04:28

## 2023-03-08 RX ADMIN — LIDOCAINE HYDROCHLORIDE 50 MG: 10 INJECTION, SOLUTION EPIDURAL; INFILTRATION; INTRACAUDAL; PERINEURAL at 12:36

## 2023-03-08 RX ADMIN — PROPOFOL 15 MCG/KG/MIN: 10 INJECTION, EMULSION INTRAVENOUS at 21:36

## 2023-03-08 RX ADMIN — ROCURONIUM BROMIDE 20 MG: 10 INJECTION, SOLUTION INTRAVENOUS at 12:57

## 2023-03-08 RX ADMIN — PIPERACILLIN AND TAZOBACTAM 3375 MG: 3; .375 INJECTION, POWDER, LYOPHILIZED, FOR SOLUTION INTRAVENOUS at 21:05

## 2023-03-08 RX ADMIN — Medication 15 MCG/MIN: at 23:05

## 2023-03-08 RX ADMIN — Medication 200 MCG: at 13:01

## 2023-03-08 RX ADMIN — MIDODRINE HYDROCHLORIDE 2.5 MG: 2.5 TABLET ORAL at 17:26

## 2023-03-08 RX ADMIN — SODIUM CHLORIDE, SODIUM LACTATE, POTASSIUM CHLORIDE, CALCIUM CHLORIDE: 600; 310; 30; 20 INJECTION, SOLUTION INTRAVENOUS at 12:37

## 2023-03-08 RX ADMIN — AMIODARONE HYDROCHLORIDE 0.5 MG/MIN: 50 INJECTION, SOLUTION INTRAVENOUS at 20:59

## 2023-03-08 RX ADMIN — VASOPRESSIN 0.04 UNITS/MIN: 20 INJECTION INTRAVENOUS at 23:12

## 2023-03-08 RX ADMIN — PIPERACILLIN AND TAZOBACTAM 3375 MG: 3; .375 INJECTION, POWDER, FOR SOLUTION INTRAVENOUS at 12:55

## 2023-03-08 RX ADMIN — INSULIN HUMAN 10 UNITS: 100 INJECTION, SOLUTION PARENTERAL at 20:33

## 2023-03-08 RX ADMIN — SODIUM CHLORIDE, POTASSIUM CHLORIDE, SODIUM LACTATE AND CALCIUM CHLORIDE: 600; 310; 30; 20 INJECTION, SOLUTION INTRAVENOUS at 12:20

## 2023-03-08 RX ADMIN — PIPERACILLIN AND TAZOBACTAM 3375 MG: 3; .375 INJECTION, POWDER, FOR SOLUTION INTRAVENOUS at 04:19

## 2023-03-08 RX ADMIN — INSULIN LISPRO 8 UNITS: 100 INJECTION, SOLUTION INTRAVENOUS; SUBCUTANEOUS at 21:24

## 2023-03-08 RX ADMIN — ONDANSETRON 4 MG: 2 INJECTION INTRAMUSCULAR; INTRAVENOUS at 13:38

## 2023-03-08 RX ADMIN — SACUBITRIL AND VALSARTAN 1 TABLET: 24; 26 TABLET, FILM COATED ORAL at 00:36

## 2023-03-08 RX ADMIN — SACUBITRIL AND VALSARTAN 1 TABLET: 24; 26 TABLET, FILM COATED ORAL at 21:16

## 2023-03-08 RX ADMIN — GUAIFENESIN 600 MG: 600 TABLET, EXTENDED RELEASE ORAL at 00:36

## 2023-03-08 RX ADMIN — DEXTROSE MONOHYDRATE 250 ML: 100 INJECTION, SOLUTION INTRAVENOUS at 20:27

## 2023-03-08 RX ADMIN — MAGNESIUM SULFATE HEPTAHYDRATE 2000 MG: 40 INJECTION, SOLUTION INTRAVENOUS at 14:00

## 2023-03-08 RX ADMIN — METOPROLOL SUCCINATE 50 MG: 50 TABLET, FILM COATED, EXTENDED RELEASE ORAL at 08:40

## 2023-03-08 ASSESSMENT — PAIN SCALES - GENERAL
PAINLEVEL_OUTOF10: 7
PAINLEVEL_OUTOF10: 6
PAINLEVEL_OUTOF10: 10
PAINLEVEL_OUTOF10: 7

## 2023-03-08 ASSESSMENT — PAIN DESCRIPTION - DESCRIPTORS
DESCRIPTORS: ACHING
DESCRIPTORS: CRAMPING;ACHING

## 2023-03-08 ASSESSMENT — PAIN DESCRIPTION - LOCATION
LOCATION: ABDOMEN

## 2023-03-08 ASSESSMENT — PAIN DESCRIPTION - ORIENTATION
ORIENTATION: RIGHT
ORIENTATION: RIGHT

## 2023-03-08 ASSESSMENT — PULMONARY FUNCTION TESTS
PIF_VALUE: 19
PIF_VALUE: 22
PIF_VALUE: 21
PIF_VALUE: 18

## 2023-03-08 ASSESSMENT — ENCOUNTER SYMPTOMS: SHORTNESS OF BREATH: 1

## 2023-03-08 ASSESSMENT — LIFESTYLE VARIABLES: SMOKING_STATUS: 1

## 2023-03-08 ASSESSMENT — PAIN DESCRIPTION - FREQUENCY: FREQUENCY: CONTINUOUS

## 2023-03-08 ASSESSMENT — PAIN DESCRIPTION - PAIN TYPE: TYPE: ACUTE PAIN

## 2023-03-08 NOTE — DISCHARGE INSTRUCTIONS
Discharge Instructions for General Surgery    No lifting above 10lbs for next 2 weeks. No soaking in bathtubs or submerging yourself in water for 4 weeks  Resume activity as tolerated, No operating heavy equipment while using narcotics  Wash incision gently with soap and water, pat dry. If steri-strips or surgical glue in place wash gently and leave in place until the glue or strips fall off. (Do not pull/tug)    F/u in trauma/acute care surgery clinic in 1-2 weeks  Call your doctor for the following:   Chills   Temperature greater than 101   Pain that is not tolerable despite taking pain medicine as ordered   There is increased swelling, redness or warmth at surgical site   There is increased drainage or bleeding from surgical site    Recommended diet: regular diet  Depending on your injuries, your doctor may want you to follow a specific diet. Some pain medicine can cause constipation . To avoid this problem:   Drink plenty of fluids. Eat foods high in fiber , such as:   Whole grain cereals and breads   Fruits and vegetables   Legumes (eg, beans, lentils)   If you are still having problems, talk to your doctor about using laxatives or stool softeners. Please continue to use your Incentive Spirometer as directed. You can practice 10 deep breaths/hour while awake. Using the [address] will promote the health of your lungs by taking slow, deep breaths in. It is also important in preventing pneumonia or a pneumothorax from developing. General questions or concerns may be called to the nurse line at [phone] and please leave a message. Obtain BMP in 3 days and follow up with cardiology in 14 days.

## 2023-03-08 NOTE — ANESTHESIA PRE PROCEDURE
Department of Anesthesiology  Preprocedure Note       Name:  Sandy Horton   Age:  67 y.o.  :  1950                                          MRN:  [id number]         Date:  3/8/2023      Surgeon: Debbie Garcia):  Wei Tyler MD    Procedure: Procedure(s):  APPENDECTOMY LAPAROSCOPIC POSSIBLE OPEN    Department of Anesthesiology  Pre-Anesthesia Evaluation/Consultation         Name:  Sandy Horton                                         Age:  67 y.o.   MRN:  [id number]             Medications  Current Facility-Administered Medications   Medication Dose Route Frequency Provider Last Rate Last Admin    [MAR Hold] piperacillin-tazobactam (ZOSYN) 3,375 mg in sodium chloride 0.9 % 50 mL IVPB (mini-bag)  3,375 mg IntraVENous Q8H Phill Moreno DO   Stopped at 23 0741    [MAR Hold] glucose chewable tablet 16 g  4 tablet Oral PRN BRENDAN Granados CNP        [facility] Hold] dextrose bolus 10% 125 mL  125 mL IntraVENous PRN BRENDAN Granados CNP        Or    [facility] Hold] dextrose bolus 10% 250 mL  250 mL IntraVENous PRN BRENDAN Granados CNP        [MAR Hold] glucagon (rDNA) injection 1 mg  1 mg SubCUTAneous PRN BRENDAN Granados CNP        [facility] Hold] dextrose 10 % infusion   IntraVENous Continuous PRN BRENDAN Granados CNP        [facility] Hold] sodium chloride flush 0.9 % injection 5-40 mL  5-40 mL IntraVENous 2 times per day BRENDAN Granados CNP   10 mL at 23 0839    [MAR Hold] sodium chloride flush 0.9 % injection 10 mL  10 mL IntraVENous PRN BRENDAN Granados CNP        [facility] Hold] 0.9 % sodium chloride infusion   IntraVENous PRN BRENDAN Granados CNP        [facility] Hold] potassium chloride (KLOR-CON M) extended release tablet 40 mEq  40 mEq Oral PRN BRENDAN Granados CNP        Or    [facility] Hold] potassium bicarb-citric acid (EFFER-K) effervescent tablet 40 mEq  40 mEq Oral PRN BRENDAN Granados CNP Or    [MAR Hold] potassium chloride 10 mEq/100 mL IVPB (Peripheral Line)  10 mEq IntraVENous PRN Mabelene Gowers, APRN - CNP        [facility] Hold] magnesium sulfate 1000 mg in dextrose 5% 100 mL IVPB  1,000 mg IntraVENous PRN Mabelene Gowers, APRN - CNP        [MAR Hold] ondansetron (ZOFRAN-ODT) disintegrating tablet 4 mg  4 mg Oral Q8H PRN Mabelene Gowers, APRN - CNP        Or    [MAR Hold] [facility] injection 4 mg  4 mg IntraVENous Q6H PRN Mabelene Gowers, APRN - CNP        [MAR Hold] polyethylene glycol (GLYCOLAX) packet 17 g  17 g Oral Daily PRN Mabelene Gowers, APRN - CNP        [MAR Hold] acetaminophen (TYLENOL) tablet 650 mg  650 mg Oral Q6H PRN Mabelene Gowers, APRN - CNP   650 mg at 03/08/23 0412    Or    [MAR Hold] acetaminophen (TYLENOL) suppository 650 mg  650 mg Rectal Q6H PRN Mabelene Gowers, APRN - CNP        [MAR Hold] insulin lispro (HUMALOG) injection vial 0-4 Units  0-4 Units SubCUTAneous Q4H Mabelene Gowers, APRN - CNP        [facility] Hold] morphine injection 4 mg  4 mg IntraVENous Q4H PRN BRENDAN Lopez CNP   4 mg at 03/08/23 0838    [MAR Hold] heparin (porcine) injection 8,960 Units  80 Units/kg IntraVENous PRN Mabelene Gowers, APRN - CNP        [facility] Hold] heparin (porcine) injection 4,480 Units  40 Units/kg IntraVENous PRN Mabelene Gowers, APRN - CNP        [Held by provider] heparin 25,000 units in dextrose 5 % 250 mL infusion (rate based)  5-30 Units/kg/hr IntraVENous Continuous Mabelene Gowers, APRN - CNP 15.7 mL/hr at 03/08/23 0451 14 Units/kg/hr at 03/08/23 0451    [MAR Hold] lactated ringers IV soln infusion   IntraVENous Continuous Ka Hin Moreno,  mL/hr at 03/08/23 0430 New Bag at 03/08/23 0430    [MAR Hold] ipratropium-albuterol (DUONEB) nebulizer solution 1 ampule  1 ampule Inhalation Q4H Syed Mascorro MD   1 ampule at 03/08/23 0836    [MAR Hold] amiodarone (CORDARONE) tablet 200 mg  200 mg Oral BID Reynaldo Dickinson MD   200 mg at 03/08/23 0840    [MAR Hold] aspirin chewable tablet 81 mg  81 mg Oral Daily Reynaldo Dickinson MD        [facility] Hold] atorvastatin (LIPITOR) tablet 40 mg  40 mg Oral Daily Reynaldo Dickinson MD   40 mg at 03/08/23 0842    [MAR Hold] sacubitril-valsartan (ENTRESTO) 24-26 MG per tablet 1 tablet  1 tablet Oral BID Reynaldo Dickinson MD   1 tablet at 03/08/23 0841    [MAR Hold] midodrine (PROAMATINE) tablet 2.5 mg  2.5 mg Oral TID WC Reynaldo Dickinson MD   2.5 mg at 03/08/23 0840    [MAR Hold] metoprolol succinate (TOPROL XL) extended release tablet 50 mg  50 mg Oral Daily Reynaldo Dickinson MD   50 mg at 03/08/23 0840    [MAR Hold] guaiFENesin ([address]) extended release tablet 600 mg  600 mg Oral BID Reynaldo Dickinson MD   600 mg at 03/08/23 7752       No Known Allergies  Patient Active Problem List   Diagnosis    Alcohol abuse    Smoking    [facility]ation [facility]    Morbid obesity, unspecified obesity type ([facility]    Shortness of breath    Ischemic cardiomyopathy    SOB (shortness of breath)    Anxiety    Hoarseness    Colon polyps    Back pain    Essential hypertension    Type 2 diabetes mellitus without complication, without long-term current use of insulin ([facility]    Ventricular tachycardia    MADISON (acute kidney injury) ([facility]    ICD (implantable cardioverter-defibrillator) discharge    Chronic systolic (congestive) heart failure    Acute appendicitis    ICD (implantable cardioverter-defibrillator) in place    History of ventricular tachycardia    Left ventricular apical thrombus following MI ([facility]    Anticoagulated    JAYCE (obstructive sleep apnea)    CKD (chronic kidney disease) stage 3, GFR 30-59 ml/min ([facility]    Elevated troponin     Past Medical History:   Diagnosis Date    Alcohol abuse 09/07/2014    Anxiety     CHF (congestive heart failure) ([facility])     Colon polyps     COPD [facility]ch[facility] obstructive pulmonary disease) [facility]     Diabetes mellitus ([facility]     Dupuytren contracture     Hypertension     ICD (implantable cardioverter-defibrillator) battery depletion 09/15/2014    Ischemic cardiomyopathy     Obesity     SOB (shortness of breath)     Tendonitis, Achilles, left     Unstable angina (HCC) 2014    Ventricular tachyarrhythmia 2018     Past Surgical History:   Procedure Laterality Date    CARDIAC CATHETERIZATION      CARDIAC DEFIBRILLATOR PLACEMENT      CARDIAC DEFIBRILLATOR PLACEMENT Left 2014    COLONOSCOPY  3/2015    polyps, bx-serrated adenoma     Social History     Tobacco Use    Smoking status: Every Day     Packs/day: 1.00     Years: 58.00     Pack years: 58.00     Types: Cigarettes     Start date: 1959     Last attempt to quit: 2014     Years since quittin.4    Smokeless tobacco: Never    Tobacco comments: There have been smokeless periods during lifetime   Substance Use Topics    Alcohol use:  Yes     Alcohol/week: 0.0 standard drinks     Comment: NO ALCOHOL SINCE 2018    Drug use: No         Vital Signs (Current)   Vitals:    23 0908   BP:    Pulse:    Resp: 16   Temp:    SpO2:      Vital Signs Statistics (for past 48 hrs)     Temp  Av.5 °F (36.9 °C)  Min: 97.8 °F (36.6 °C)   Min taken time: 23 2300  Max: 100.3 °F (37.9 °C)   Max taken time: 23 041  Pulse  Av.8  Min: 64   Min taken time: 23 1800  Max: 75   Max taken time: 23  Resp  Av.4  Min: 12   Min taken time: 23  Max: 22   Max taken time: 23 041  BP  Min: 111/61   Min taken time: 23 0840  Max: 146/68   Max taken time: 23 1945  MAP (mmHg)  Av.6  Min: [de-identified]   Min taken time: 23  Max: 121   Max taken time: 23  SpO2  Av.6 %  Min: 90 %   Min taken time: 23 07  Max: 97 %   Max taken time: 23 010  BP Readings from Last 3 Encounters:   23 111/61   22 118/68   10/11/22 104/68 BMI  Body mass index is 32.59 kg/m². CBC   Lab Results   Component Value Date/Time    WBC 16.7 03/08/2023 04:57 AM    RBC 4.13 03/08/2023 04:57 AM    HGB 14.1 03/08/2023 04:57 AM    HCT 41.4 03/08/2023 04:57 AM    .2 03/08/2023 04:57 AM    RDW 13.3 03/08/2023 04:57 AM    PLT See Reflexed IPF Result 03/08/2023 04:57 AM       CMP    Lab Results   Component Value Date/Time     03/08/2023 04:57 AM    K 4.2 03/08/2023 04:57 AM     03/08/2023 04:57 AM    CO2 23 03/08/2023 04:57 AM    BUN 18 03/08/2023 04:57 AM    CREATININE 1.23 03/08/2023 04:57 AM    GFRAA >60 05/31/2022 10:50 AM    LABGLOM >60 03/08/2023 04:57 AM    GLUCOSE 169 03/08/2023 04:57 AM    PROT 7.2 03/07/2023 04:54 PM    CALCIUM 8.9 03/08/2023 04:57 AM    BILITOT 1.2 03/07/2023 04:54 PM    ALKPHOS 85 03/07/2023 04:54 PM    AST 29 03/07/2023 04:54 PM    ALT 33 03/07/2023 04:54 PM       BMP    Lab Results   Component Value Date/Time     03/08/2023 04:57 AM    K 4.2 03/08/2023 04:57 AM     03/08/2023 04:57 AM    CO2 23 03/08/2023 04:57 AM    BUN 18 03/08/2023 04:57 AM    CREATININE 1.23 03/08/2023 04:57 AM    CALCIUM 8.9 03/08/2023 04:57 AM    GFRAA >60 05/31/2022 10:50 AM    LABGLOM >60 03/08/2023 04:57 AM    GLUCOSE 169 03/08/2023 04:57 AM       POC Testing  Recent Labs     03/08/23  0729   POCGLU 152*       Coags    Lab Results   Component Value Date/Time    PROTIME 13.1 03/08/2023 04:57 AM    INR 1.2 03/08/2023 04:57 AM    APTT >120.0 03/08/2023 03:04 AM       HCG (If Applicable) No results found for: PREGTESTUR, PREGSERUM, HCG, HCGQUANT     ABGs No results found for: PHART, PO2ART, XPK6NTX, BKV4PEC, BEART, T9VMIUKE     Type & Screen (If Applicable)  No results found for: LABABO, [address]    Radiology (If Applicable)    Cardiac Testing (If Applicable) mod risk    EKG (If Applicable) EF 06%          Medications prior to admission:   Prior to Admission medications    Medication Sig Start Date End Date Taking?  Authorizing Provider   metFORMIN (GLUCOPHAGE) 500 MG tablet TAKE 1 TABLET BY MOUTH TWICE A  DAY WITH MEALS 2/14/23   Amanda Lomeli MD   ELIQUIS 5 MG TABS tablet  12/12/22   Historical Provider, MD   midodrine (PROAMATINE) 2.5 MG tablet  5/19/22   Historical Provider, MD   blood glucose test strips (ASCENSIA AUTODISC VI;ONE TOUCH ULTRA TEST VI) strip 1 each by In Vitro route 3 times daily Use with associated glucose meter. 7/8/22   Amanda Lomeli MD   furosemide (LASIX) 20 MG tablet TAKE 1 TABLET DAILY 1/4/22   BRENDAN Espinoza CNP   ENTRESTO 24-26 MG per tablet 1 tablet once 1/ 2 tab in the PM 8/22/19   Historical Provider, MD   TOPROL XL 50 MG extended release tablet Take 50 mg by mouth daily  8/20/19   Historical Provider, MD   spironolactone (ALDACTONE) 25 MG tablet Take 25 mg by mouth daily    Historical Provider, MD   atorvastatin (LIPITOR) 40 MG tablet TAKE 1 TABLET DAILY 7/22/19   BRENDAN Espinoza CNP   amiodarone (CORDARONE) 200 MG tablet Take 2 tablets PO BID x3 days then take 1 tablet PO BID thereafter 12/10/18   Bettina Mitchell MD   Multiple Vitamin (MULTIVITAMIN) tablet Take 1 tablet by mouth daily 12/11/18   Ishmael Hall MD   aspirin 81 MG tablet Take 81 mg by mouth daily.     Historical Provider, MD       Current medications:    Current Facility-Administered Medications   Medication Dose Route Frequency Provider Last Rate Last Admin    [MAR Hold] piperacillin-tazobactam (ZOSYN) 3,375 mg in sodium chloride 0.9 % 50 mL IVPB (mini-bag)  3,375 mg IntraVENous Q8H Phill Moreno DO   Stopped at 03/08/23 0741    [MAR Hold] glucose chewable tablet 16 g  4 tablet Oral PRN BRENDAN Rodriguez CNP        [facility] Hold] dextrose bolus 10% 125 mL  125 mL IntraVENous PRN BRENDAN Rodriguez CNP        Or    [facility] Hold] dextrose bolus 10% 250 mL  250 mL IntraVENous PRN BRENDAN Rodriguez CNP        [MAR Hold] glucagon (rDNA) injection 1 mg  1 mg SubCUTAneous PRN Neymar David APRN - CNP       • [MAR Hold] dextrose 10 % infusion   IntraVENous Continuous PRN BRENDAN Verduzco CNP       • [MAR Hold] sodium chloride flush 0.9 % injection 5-40 mL  5-40 mL IntraVENous 2 times per day BRENDAN Verduzco CNP   10 mL at 03/08/23 0839   • [MAR Hold] sodium chloride flush 0.9 % injection 10 mL  10 mL IntraVENous PRN BRENDAN Verduzco CNP       • [MAR Hold] 0.9 % sodium chloride infusion   IntraVENous PRN Ernestina Villaseñor CalfBRENDAN lim CNP       • [MAR Hold] potassium chloride (KLOR-CON M) extended release tablet 40 mEq  40 mEq Oral PRN Ernestina Villaseñor [facility], APRN - CNP        Or   • [MAR Hold] potassium bicarb-citric acid (EFFER-K) effervescent tablet 40 mEq  40 mEq Oral PRN Ernestina Villaseñor Calfraphael, APRN - CNP        Or   • [MAR Hold] potassium chloride 10 mEq/100 mL IVPB (Peripheral Line)  10 mEq IntraVENous PRN BRENDAN Verduzco CNP       • [MAR Hold] magnesium sulfate 1000 mg in dextrose 5% 100 mL IVPB  1,000 mg IntraVENous PRN Ernestina Villaseñor Calfraphael, APRN - CNP       • [MAR Hold] ondansetron (ZOFRAN-ODT) disintegrating tablet 4 mg  4 mg Oral Q8H PRN RBENDAN Verduzco CNP        Or   • [MAR Hold] ondansetron (ZOFRAN) injection 4 mg  4 mg IntraVENous Q6H PRN BRENDAN Verduzco CNP       • [MAR Hold] polyethylene glycol (GLYCOLAX) packet 17 g  17 g Oral Daily PRN BRENDAN Verduzco CNP       • [MAR Hold] acetaminophen (TYLENOL) tablet 650 mg  650 mg Oral Q6H PRN BRENDAN Verduzco CNP   650 mg at 03/08/23 0412    Or   • [MAR Hold] acetaminophen (TYLENOL) suppository 650 mg  650 mg Rectal Q6H PRN BRENDAN Verduzco CNP       • [MAR Hold] insulin lispro (HUMALOG) injection vial 0-4 Units  0-4 Units SubCUTAneous Q4H BRENDAN Verduzco CNP       • [MAR Hold] morphine injection 4 mg  4 mg IntraVENous Q4H PRN BRENDAN Chun CNP   4 mg at 03/08/23 0838   • [MAR Hold] heparin (porcine) injection 8,960 Units  80 Units/kg  IntraVENous PRN [employer], APRN - JANES        [facility] Hold] heparin (porcine) injection 4,480 Units  40 Units/kg IntraVENous PRN [place] [employer], APRN - CNP        [Held by provider] heparin 25,000 units in dextrose 5 % 250 mL infusion (rate based)  5-30 Units/kg/hr IntraVENous Continuous [place] [employer], APRN - CNP 15.7 mL/hr at 03/08/23 0451 14 Units/kg/hr at 03/08/23 0451    [MAR Hold] lactated ringers IV soln infusion   IntraVENous Continuous Ka Hiariel Moreno  mL/hr at 03/08/23 0430 New Bag at 03/08/23 0430    [MAR Hold] ipratropium-albuterol (DUONEB) nebulizer solution 1 ampule  1 ampule Inhalation Q4H Ilan Hazel MD   1 ampule at 03/08/23 0836    [MAR Hold] amiodarone (CORDARONE) tablet 200 mg  200 mg Oral BID Ilan Hazel MD   200 mg at 03/08/23 0840    [MAR Hold] aspirin chewable tablet 81 mg  81 mg Oral Daily Ilan Hazel MD        [facility] Hold] atorvastatin (LIPITOR) tablet 40 mg  40 mg Oral Daily Ilan Hazel MD   40 mg at 03/08/23 0842    [MAR Hold] sacubitril-valsartan (ENTRESTO) 24-26 MG per tablet 1 tablet  1 tablet Oral BID Ilan Hazel MD   1 tablet at 03/08/23 0841    [MAR Hold] midodrine (PROAMATINE) tablet 2.5 mg  2.5 mg Oral TID WC Ilan Hazel MD   2.5 mg at 03/08/23 0840    [MAR Hold] metoprolol succinate (TOPROL XL) extended release tablet 50 mg  50 mg Oral Daily Ilan Hazel MD   50 mg at 03/08/23 0840    [MAR Hold] guaiFENesin ([address]) extended release tablet 600 mg  600 mg Oral BID Ilan Hazel MD   600 mg at 03/08/23 9516       Allergies:  No Known Allergies    Problem List:    Patient Active Problem List   Diagnosis Code    Alcohol abuse F10.10    Smoking F17.200    COPD with acute exacerbation ([address] J44.1    Morbid obesity, unspecified obesity type ([facility] E66.01    Shortness of breath R06.02    Ischemic cardiomyopathy I25.5    SOB (shortness of breath) R06.02    Anxiety F41.9    Hoarseness R49.0    Colon polyps K63.5    Back pain M54.9    Essential hypertension I10    Type 2 diabetes mellitus without complication, without long-term current use of insulin ([facility] E11.9    Ventricular tachycardia I47.20    MADISON (acute kidney injury) ([facility] N17.9    ICD (implantable cardioverter-defibrillator) discharge Z45.02    Chronic systolic (congestive) heart failure I50.22    Acute appendicitis K35.80    ICD (implantable cardioverter-defibrillator) in place Z95.810    History of ventricular tachycardia Z86.79    Left ventricular apical thrombus following MI ([facility]) I23.6    Anticoagulated Z79.01    JAYCE (obstructive sleep apnea) G47.33    CKD (chronic kidney disease) stage 3, GFR 30-59 ml/min ([facility]) N18.30    Elevated troponin R77.8       Past Medical History:        Diagnosis Date    Alcohol abuse 2014    Anxiety     CHF [facility]congestive heart failure) ([facility]     Colon polyps     COPD (chronic obstructive pulmonary disease) ([facility]     Diabetes mellitus ([facility]     Dupuytren contracture     Hypertension     ICD (implantable cardioverter-defibrillator) battery depletion 09/15/2014    Ischemic cardiomyopathy     Obesity     SOB (shortness of breath)     Tendonitis, Achilles, left     Unstable angina ([facility]) 2014    Ventricular tachyarrhythmia 2018       Past Surgical History:        Procedure Laterality Date    CARDIAC CATHETERIZATION      CARDIAC DEFIBRILLATOR PLACEMENT      CARDIAC DEFIBRILLATOR PLACEMENT Left 2014    COLONOSCOPY  3/2015    polyps, bx-serrated adenoma       Social History:    Social History     Tobacco Use    Smoking status: Every Day     Packs/day: 1.00     Years: 58.00     Pack years: 58.00     Types: Cigarettes     Start date: 1959     Last attempt to quit: 2014     Years since quittin.4    Smokeless tobacco: Never    Tobacco comments:      There have been smokeless periods during lifetime   Substance Use Topics    Alcohol use: Yes     Alcohol/week: 0.0 standard drinks     Comment: NO ALCOHOL SINCE 08/2018                                Ready to quit: Not Answered  Counseling given: Not Answered  Tobacco comments: There have been smokeless periods during lifetime      Vital Signs (Current):   Vitals:    03/08/23 0726 03/08/23 0837 03/08/23 0840 03/08/23 0908   BP: 116/68  111/61    Pulse: 65 65 65    Resp: 15 16  16   Temp: 98.5 °F (36.9 °C)      TempSrc: Oral      SpO2: 90% 96%     Weight:       Height:                                                  BP Readings from Last 3 Encounters:   03/08/23 111/61   12/14/22 118/68   10/11/22 104/68       NPO Status:                                                                                 BMI:   Wt Readings from Last 3 Encounters:   03/07/23 247 lb (112 kg)   12/14/22 240 lb (108.9 kg)   10/11/22 235 lb (106.6 kg)     Body mass index is 32.59 kg/m².     CBC:   Lab Results   Component Value Date/Time    WBC 16.7 03/08/2023 04:57 AM    RBC 4.13 03/08/2023 04:57 AM    HGB 14.1 03/08/2023 04:57 AM    HCT 41.4 03/08/2023 04:57 AM    .2 03/08/2023 04:57 AM    RDW 13.3 03/08/2023 04:57 AM    PLT See Reflexed IPF Result 03/08/2023 04:57 AM       CMP:   Lab Results   Component Value Date/Time     03/08/2023 04:57 AM    K 4.2 03/08/2023 04:57 AM     03/08/2023 04:57 AM    CO2 23 03/08/2023 04:57 AM    BUN 18 03/08/2023 04:57 AM    CREATININE 1.23 03/08/2023 04:57 AM    GFRAA >60 05/31/2022 10:50 AM    LABGLOM >60 03/08/2023 04:57 AM    GLUCOSE 169 03/08/2023 04:57 AM    PROT 7.2 03/07/2023 04:54 PM    CALCIUM 8.9 03/08/2023 04:57 AM    BILITOT 1.2 03/07/2023 04:54 PM    ALKPHOS 85 03/07/2023 04:54 PM    AST 29 03/07/2023 04:54 PM    ALT 33 03/07/2023 04:54 PM       POC Tests:   Recent Labs     03/08/23  0729   POCGLU 152*       Coags:   Lab Results   Component Value Date/Time    PROTIME 13.1 03/08/2023 04:57 AM    INR 1.2 03/08/2023 04:57 AM    APTT >120.0 03/08/2023 03:04 AM HCG (If Applicable): No results found for: PREGTESTUR, PREGSERUM, HCG, HCGQUANT     ABGs: No results found for: PHART, PO2ART, PNH3LZZ, NBQ2CEC, BEART, I9RIAWLT     Type & Screen (If Applicable):  No results found for: LABABO, LABRH    Drug/Infectious Status (If Applicable):  Lab Results   Component Value Date/Time    HEPCAB NONREACTIVE 12/07/2016 11:43 AM       COVID-19 Screening (If Applicable):   Lab Results   Component Value Date/Time    COVID19 Not Detected 12/27/2021 03:52 AM           Anesthesia Evaluation   no history of anesthetic complications:   Airway: Mallampati: III          Dental:          Pulmonary:   (+) COPD:  shortness of breath:  sleep apnea:  current smoker    (-) recent URI                          ROS comment: 62 pk yrs   Cardiovascular:  Exercise tolerance: poor (<4 METS),   (+) hypertension:, pacemaker: AICD, CAD:, dysrhythmias: ventricular tachycardia, CHF:, MARY:,     (-)  angina             ROS comment: Trop sl elevated  Mild MR,TR14 fires on defib     Neuro/Psych:      (-) seizures and CVA           GI/Hepatic/Renal:   (+) renal disease: CRI,      (-) GERD       Endo/Other:    (+) DiabetesType II DM, well controlled, , .                 Abdominal:             Vascular: Other Findings:           Anesthesia Plan      general     ASA 4       Induction: intravenous.                             Eduar Umaña MD   3/8/2023

## 2023-03-08 NOTE — PROGRESS NOTES
Resume eliquis tomorrow. Do not resume heparin today.   No activity restrictions from surgery except no lifting >10 # x 4 weeks- ok to ambulate etc  @ post op check will determine if ok for clear liquid diet

## 2023-03-08 NOTE — PROGRESS NOTES
PROGRESS NOTE          PATIENT NAME: Dari Shah [facility]  MEDICAL RECORD NO. [id number]  DATE: 3/8/2023    HD: # 1      Patient Active Problem List   Diagnosis    Alcohol abuse    Smoking    COPD with acute exacerbation ([facility]    Morbid obesity, unspecified obesity type ([facility]    Shortness of breath    Ischemic cardiomyopathy    SOB (shortness of breath)    Anxiety    Hoarseness    Colon polyps    Back pain    Essential hypertension    Type 2 diabetes mellitus without complication, without long-term current use of insulin ([facility])    Ventricular tachycardia    MADISON (acute kidney injury) ([facility] [address]    ICD (implantable cardioverter-defibrillator) discharge    Chronic systolic (congestive) heart failure    Acute appendicitis    ICD (implantable cardioverter-defibrillator) in place    History of ventricular tachycardia    Left ventricular apical thrombus following MI ([facility])    Anticoagulated    JAYCE (obstructive sleep apnea)    CKD (chronic kidney disease) stage 3, GFR 30-59 ml/min ([facility])    Elevated troponin       DIAGNOSIS AND PLAN    Acute appendicitis   -NPO with MIVF   -Continue Zosyn   -WBC increased to 16.7 this AM   -Operative planning pending cardiology recommendations    Extensive cardiac history including multivessel CAD, left ventricular thrombus, EF of 25% on most recent echo, ICD/pacemaker with recent interrogation due to syncopal episode   -Hemodynamically stable  -Cardiology consulted. Will follow-up further recommendations.   -Hold home Eliquis. Currently on heparin drip.   -Echo ordered for this morning, follow-up final read      Chief Complaint: \"Abdominal pain\"    SUBJECTIVE    Patient seen and examined at bedside. Still complaining of pain in his right lower quadrant. However, he now feels the crampy abdominal pain in his left lower quadrant. Denies any nausea or vomiting. Low-grade fever of 100.3F around 4 AM.  Last bowel movement was 2 days ago. Denies passing flatus.   Voiding on his own.    OBJECTIVE  VITALS:   Vitals:    03/08/23 0726   BP: 116/68   Pulse: 65   Resp: 15   Temp: 98.5 °F (36.9 °C)   SpO2: 90%     Physical Exam  Constitutional:       Appearance: He is not toxic-appearing. HENT:      Head: Normocephalic and atraumatic. Right Ear: External ear normal.      Left Ear: External ear normal.      Nose: Nose normal.      Mouth/Throat:      Mouth: Mucous membranes are moist.   Eyes:      Extraocular Movements: Extraocular movements intact. Pupils: Pupils are equal, round, and reactive to light. Cardiovascular:      Rate and Rhythm: Normal rate. Pulses: Normal pulses. Pulmonary:      Effort: Pulmonary effort is normal. No respiratory distress. Abdominal:      General: There is no distension. Palpations: Abdomen is soft. Tenderness: There is abdominal tenderness. There is guarding and rebound. Comments: Tenderness to the right lower quadrant with rebound and guarding. Musculoskeletal:         General: Normal range of motion. Cervical back: Normal range of motion. Skin:     Findings: No bruising, erythema, lesion or rash. Neurological:      General: No focal deficit present. Mental Status: He is alert and oriented to person, place, and time. LAB:  CBC:   Recent Labs     03/07/23  1654 03/07/23 2026 03/08/23  0457   WBC 14.8* 14.7* 16.7*   HGB 15.4 15.3 14.1   HCT 45.1 45.7 41.4   .7 102.2 100.2    118* See Reflexed IPF Result     BMP:   Recent Labs     03/07/23  1654 03/08/23 0457    136   K 4.5 4.2    100   CO2 23 23   BUN 20 18   CREATININE 1.31* 1.23*   GLUCOSE 142* 169*       RADIOLOGY:  XR CHEST (SINGLE VIEW FRONTAL)   Final Result   Trace right pleural effusion suspected to represent an asymmetric pattern of   mild edema or atelectasis. CT ABDOMEN PELVIS W IV CONTRAST Additional Contrast? None   Preliminary Result   1. Acute appendicitis.   Small amount of free fluid in the right lower quadrant/pericolic gutter with a more focal area of loculation measuring 2.4   x 1.7 cm which could reflect an early abscess or phlegmon. No free air. 2. Gallbladder sludge versus cholelithiasis. 3. There is a subtle filling defect within the proximal SMA however felt to   be related to artifact as this is not reproduced on the sagittal or coronal   reconstructions (for example image 67). Nonocclusive thrombus is felt to be   less likely. Results regarding acute appendicitis were called by Dr. Bereket Ascencio. Radha Christie MD   to Dr. Cristhian Sue on 3/7/2023 at 19:28.              Luiz Villar MD  3/8/2023, 7:33 AM

## 2023-03-08 NOTE — PLAN OF CARE
Informed by surgery that patient is intubated and going to ICU postop for V. tach. Trauma taking primary. [facility] will sign off. Please call back with any questions.

## 2023-03-08 NOTE — OP NOTE
Operative Note      Patient: Tony Barroso  YOB: 1950  MRN: [id number]    Date of Procedure: 3/8/2023    Pre-Op Diagnosis: Appendicitis, unspecified appendicitis type [K37]    Post-Op Diagnosis:  Perforated appendicitis       Procedure(s):  APPENDECTOMY LAPAROSCOPIC    Surgeon(s):  Nhung Grimes MD    Assistant:   Resident: Yun Degroot MD    Anesthesia: General    Estimated Blood Loss (mL): 10cc    Fluids: 800cc    UOP: 62PP    Complications: None    Specimens:   ID Type Source Tests Collected by Time Destination   1 : URINE FOR CULTURE FROM INDWELLING CATHETER Urine Urine, indwelling catheter CULTURE, URINE Nhung Grimes MD 3/8/2023 1304    A : APPENDIX Tissue Appendix SURGICAL PATHOLOGY Nhung Grimse MD 3/8/2023 1305        Implants:  * No implants in log *      Drains:   Urinary Catheter 03/08/23 2 Way (Active)       Findings: Perforated appendicitis, purulent peritonitis PATOS    Detailed Description of Procedure: The patient is a 77-year-old male with extensive cardiac history who presented to the emergency department with complaints of 24 hours of abdominal pain. History, physical exam, diagnostic imaging and laboratory findings were consistent with a diagnosis of acute appendicitis. The patient was resuscitated and started on broad-spectrum antibiotics at the time of admission. Cardiology was consulted and repeat echocardiography was performed. Cardiology evaluated the patient and deemed him to be moderate risk for operative intervention. Laparoscopic appendectomy was recommended. The procedure, purpose, risk, benefits and alternatives were discussed with the patient in detail, specifically the risks as they are related to his extensive cardiac history including but not limited to perioperative MI and death. Informed consent was obtained. The patient was brought to the operating room and placed on the operating table in supine position.   A timeout was performed to confirm patient, procedure and any allergies prior to initiation of the case. General anesthesia was then induced. The patient received perioperative antibiotics in accordance with the SCIP protocol. The patient was then prepped and draped in the usual sterile fashion. A 12 mm supraumbilical incision was made in the skin down to the level of subcutaneous tissue. Subcutaneous tissue was further dissected in the anterior abdominal wall fascia was identified, grasped and scored. The peritoneum was bluntly entered. A Vicryl U stitch was placed in the fascia and the [facility] port was placed into the abdomen. The abdomen was then insufflated to 15 mmHg. The laparoscope was inserted into the abdomen and the abdomen was inspected. No injuries from entrance were noted. The right lower quadrant was inspected and there was noted to be purulent peritonitis present at time of surgery. There was noted to be significant amount of fibrinous exudate in the right lower quadrant. 2 additional 5 mm ports were placed at the suprapubic and left lower quadrant positions under direct visualization. The patient was then rotated to his left and placed in a Trendelenburg. The small bowel was carefully swept in the medial direction and the appendix was identified and noted to be ruptured. Very careful dissection was carried out to isolate the appendix and mesoappendix. Once the appendix was adequately dissected, a vascular staple load was passed across the base of the appendix. The appendix was then placed into an Endo Catch bag and removed through the 12 mm supraumbilical port site. The staple line was carefully inspected and hemostasis was noted. The abdomen was then copiously irrigated using suction . The suprapubic and left lower quadrant ports were removed under direct visualization. The [facility] port was then removed from the supraumbilical position.   The fascia was closed using the previously placed Vicryl U stitch. Local anesthesia was infiltrated into the skin and subcutaneous tissue at each port site. The port sites were then closed using Monocryl sutures in a buried fashion. The skin glue was placed over all incisions. This concluded the procedure. All sponge, sharp, instrument counts were found to be correct prior to conclusion of the case. The patient was then awoken from anesthesia and brought to the recovery unit. Dr. Rosalie Verduzco was present for the entirety of the case.     Electronically signed by Roger Bruno MD on 3/8/2023 at 5:43 PM

## 2023-03-08 NOTE — ED NOTES
Report given to [facility]; all questions addressed.       Bobby Yung RN  03/07/23 8544

## 2023-03-08 NOTE — CONSULTS
General Surgery  Consult    PATIENT NAME: Ginger Allan  AGE: 67 y.o. MEDICAL RECORD NO. [id number]  DATE: 3/7/2023  SURGEON: Dr. Antonio Felipe: Bonnie De La Cruz MD    Patient evaluated at the request of  Dr. Cristhian Sue   Reason for evaluation: Acute appendicitis    Patient information was obtained from patient. History/Exam limitations: none. Patient presented to the Emergency Department by private vehicle. IMPRESSION:     Patient Active Problem List   Diagnosis    Alcohol abuse    Smoking    COPD (chronic obstructive pulmonary disease) (HCC)    Morbid obesity, unspecified obesity type ([address]    Shortness of breath    Ischemic cardiomyopathy    SOB (shortness of breath)    Anxiety    Hoarseness    Colon polyps    Back pain    Essential hypertension    Type 2 diabetes mellitus without complication, without long-term current use of insulin (HCC)    Ventricular tachycardia    MADISON (acute kidney injury) ([address]    ICD (implantable cardioverter-defibrillator) discharge    Chronic systolic (congestive) heart failure    Acute appendicitis   51-year-old male with acute appendicitis      PLAN:   Patient seen and examined at bedside. Labs and imaging reviewed. Patient with extensive cardiac history. Previous cardiology notes reviewed and reveal history of multivessel coronary artery disease, a left ventricular thrombus, and EF of 25% on most recent echo. Patient also with ICD/pacemaker with recent interrogation due to syncopal episodes. Given his comorbidities, and high surgical risk, we do recommend cardiology evaluate the patient for assistance in optimization and restratification. We will order an echo as well. Hold home Eliquis. Okay for heparin drip.   `NPO  IV antibiotics   General surgery will continue to follow, and continue operative planning pending cardiology recommendations  Recommend medicine admission      HISTORY:   History of Chief Complaint:    Mariely Ewing is a 67 y.o. male who presents with right-sided abdominal pain. Patient states that his pain began yesterday after straining to have a bowel movement. He states that his pain has remained persistent, and slightly worsened which led him to seek treatment in the emergency department. His pain is located on the right side, worse within the right lower quadrant. It is nonradiating. Patient reports some associated nausea, no vomiting. His last oral intake was at 8 AM this morning. Patient reports continuing to have regular bowel movements and pass flatus. He underwent CT imaging in the emergency department that demonstrated findings consistent with acute appendicitis with a small amount of free fluid in the right lower quadrant and an area of possible loculation measuring 2.4 x 1.7 cm which could reflect an early abscess or phlegmon. Patient denies having had any similar symptoms in the past.  Of note, patient has an extensive cardiac history including multivessel coronary artery disease, ICD/pacemaker, and a left ventricular mural thrombus. He is on Eliquis daily, which she last took this morning. Past Medical History   has a past medical history of Alcohol abuse, Anxiety, CHF (congestive heart failure) ([address] Colon polyps, COPD (chronic obstructive pulmonary disease) ([address] Diabetes mellitus ([address] Dupuytren contracture, Hypertension, ICD (implantable cardioverter-defibrillator) battery depletion, Ischemic cardiomyopathy, Obesity, SOB (shortness of breath), Tendonitis, Achilles, left, Unstable angina ([address] and Ventricular tachyarrhythmia. Past Surgical History   has a past surgical history that includes Cardiac defibrillator placement; Cardiac catheterization; Cardiac defibrillator placement (Left, 9/2014); and Colonoscopy (3/2015). Medications  Prior to Admission medications    Medication Sig Start Date End Date Taking?  Authorizing Provider   metFORMIN (GLUCOPHAGE) 500 MG tablet TAKE 1 TABLET BY MOUTH TWICE A  DAY WITH MEALS 2/14/23   Phylicia Bonilla MD   ELIQUIS 5 MG TABS tablet  12/12/22   Historical Provider, MD   midodrine (PROAMATINE) 2.5 MG tablet  5/19/22   Historical Provider, MD   blood glucose test strips (ASCENSIA AUTODISC VI;ONE TOUCH ULTRA TEST VI) strip 1 each by In Vitro route 3 times daily Use with associated glucose meter. 7/8/22   Phylicia Bonilla MD   furosemide (LASIX) 20 MG tablet TAKE 1 TABLET DAILY 1/4/22   BRENDAN Christian CNP   ENTRESTO 24-26 MG per tablet 1 tablet once 1/ 2 tab in the PM 8/22/19   Historical Provider, MD   TOPROL XL 50 MG extended release tablet Take 50 mg by mouth daily  8/20/19   Historical Provider, MD   spironolactone (ALDACTONE) 25 MG tablet Take 25 mg by mouth daily    Historical Provider, MD   atorvastatin (LIPITOR) 40 MG tablet TAKE 1 TABLET DAILY 7/22/19   BRENDAN Christian CNP   amiodarone (CORDARONE) 200 MG tablet Take 2 tablets PO BID x3 days then take 1 tablet PO BID thereafter 12/10/18   Melissa Byrnes MD   Multiple Vitamin (MULTIVITAMIN) tablet Take 1 tablet by mouth daily 12/11/18   Ishmael Hall MD   aspirin 81 MG tablet Take 81 mg by mouth daily. Historical Provider, MD    Scheduled Meds:  Continuous Infusions:   heparin (PORCINE) Infusion 18 Units/kg/hr (03/07/23 2109)    lactated ringers IV soln 100 mL/hr at 03/07/23 2059     PRN Meds:.heparin (porcine), heparin (porcine)  Allergies  has No Known Allergies. Family History  family history includes Diabetes in his father; Heart Disease in his father, maternal aunt, maternal uncle, paternal aunt, and paternal uncle; High Blood Pressure in his father. Social History   reports that he has been smoking cigarettes. He started smoking about 64 years ago. He has a 58.00 pack-year smoking history. He has never used smokeless tobacco.   reports current alcohol use. reports no history of drug use.   Review of Systems  General Denies any fever or chills  HEENT  Denies any diplopia, tinnitus   Resp Denies any shortness of breath, cough or wheezing  Cardiac Denies any chest pain, palpitations  GI positive for abdominal pain, nausea   Denies any frequency, urgency, hesitancy or incontinence  Heme Denies bruising or bleeding easily  Endocrine Denies any history of diabetes or thyroid disease  Neuro Denies any focal motor or sensory deficits    PHYSICAL:   VITALS:  height is 6' 1\" (1.854 m) and weight is 247 lb (112 kg). His oral temperature is 97.9 °F (36.6 °C). His blood pressure is 135/73 and his pulse is 67. His respiration is 16 and oxygen saturation is 94%. CONSTITUTIONAL: Awake, alert, no apparent distress  HEENT: Head is normocephalic, atraumatic. NECK: Soft, trachea midline and straight  LUNGS: No acute respiratory distress, or accessory muscle use. Slight expiratory wheezing heard on examination  CARDIOVASCULAR: Regular rate  ABDOMEN: Soft, nondistended, tender to palpation in the right lower quadrant with some guarding, no rebound  NEUROLOGIC: CN II-XII are grossly intact. There are no focalizing motor or sensory deficits  EXTREMITIES: no cyanosis, clubbing or edema    LABS:     Recent Labs     03/07/23  1654 03/07/23 2026 03/07/23  2102   WBC 14.8* 14.7*  --    HGB 15.4 15.3  --    HCT 45.1 45.7  --     118*  --      --   --    K 4.5  --   --      --   --    CO2 23  --   --    BUN 20  --   --    CREATININE 1.31*  --   --    MG  --   --  1.7   CALCIUM 9.6  --   --    AST 29  --   --    ALT 33  --   --    BILITOT 1.2  --   --    BILIDIR 0.4*  --   --      Recent Labs     03/07/23  1654   ALKPHOS 85   ALT 33   AST 29   BILITOT 1.2   BILIDIR 0.4*   LABALBU 4.2         RADIOLOGY:   CT ABDOMEN PELVIS W IV CONTRAST Additional Contrast? None    Result Date: 3/7/2023  EXAMINATION: CT OF THE ABDOMEN AND PELVIS WITH CONTRAST, 3/7/2023 6:40 pm TECHNIQUE: CT of the abdomen and pelvis was performed with the administration of intravenous contrast. Multiplanar reformatted images are provided for review. Automated exposure control, iterative reconstruction, and/or weight based adjustment of the mA/kV was utilized to reduce the radiation dose to as low as reasonably achievable. COMPARISON: None HISTORY: ORDERING SYSTEM PROVIDED HISTORY:  Abdominal pain, peritoneal, concern for SBO vs other intra-abdominal cause. TECHNOLOGIST PROVIDED HISTORY: Abdominal pain, peritoneal, concern for SBO vs other intra-abdominal cause. Decision Support Exception - unselect if not a suspected or confirmed emergency medical condition->Emergency Medical Condition (MA) Reason for Exam:  Abdominal pain, peritoneal, concern for SBO vs other intra-abdominal cause. FINDINGS: Lower Chest: Right basilar atelectasis. Organs: The liver, spleen, pancreas and adrenal glands are without focal abnormality. Subtle high-density material within the gallbladder. Excreted contrast material within the renal collecting system limiting evaluation for underlying stones. No hydronephrosis or perinephric stranding. Left renal cysts the largest measuring up to 5.2 cm. The kidneys otherwise enhance symmetrically. No biliary duct dilation. GI/Bowel: The appendix is mildly dilated with wall thickening and enhancement measuring up to 11 mm in maximum diameter. Inflammatory stranding is noted adjacent to the appendix with a trace amount of fluid in the right pericolic gutter. Partially loculated fluid is noted measuring 2.4 x 1.7 cm. No other dilated loops of bowel or bowel wall thickening. No free air. Pelvis: No pathologically enlarged adenopathy or free fluid. No bladder wall thickening. Peritoneum/Retroperitoneum: The aorta is normal in caliber with mild atherosclerosis. The celiac axis, SMA and NIGHAT are patent. The portal venous system is patent. There is a subtle filling defect within the proximal SMA however felt to be related to artifact as this is not reproduced on the sagittal or coronal reconstructions (for example image 67).  No pathologically enlarged adenopathy. No ascites or drainable fluid collection is otherwise identified. Bones/Soft Tissues: Osteopenia. No acute findings in the bones or soft tissues. 1. Acute appendicitis. Small amount of free fluid in the right lower quadrant/pericolic gutter with a more focal area of loculation measuring 2.4 x 1.7 cm which could reflect an early abscess or phlegmon. No free air. 2. Gallbladder sludge versus cholelithiasis. 3. There is a subtle filling defect within the proximal SMA however felt to be related to artifact as this is not reproduced on the sagittal or coronal reconstructions (for example image 67). Nonocclusive thrombus is felt to be less likely. Results regarding acute appendicitis were called by Dr. Eusebio Graves. Kathrine Tracy MD to Dr. Yanira Adams on 3/7/2023 at 19:28. Thank you for the interesting evaluation. Further recommendations to follow.     Phill Thomson DO  3/7/2023, 9:42 PM

## 2023-03-08 NOTE — PROGRESS NOTES
[facility])  Occupational Therapy Not Seen Note    DATE: 3/8/2023    NAME: Heri Lowery  MRN: [id number]   : 1950      Patient not seen this date for Occupational Therapy due to:    Surgery/Procedure: OR for appendectomy per chart     Next Scheduled Treatment: Ck 3/9     Electronically signed by Marjorie Cintron OT on 3/8/2023 at 7:48 AM

## 2023-03-08 NOTE — H&P
[facility]  Office: [address], DO, Marilynn Purdy, DO, Thuan Baires, DO, Kristin Randa Huffman, DO, Dorian Pompa MD, Sandra Allan MD, Joshua Haynes MD, Fanta Marion MD,  Manisha Molina MD, Víctor Abdalla MD, Jim Alexis, DO, Umesh Phillips MD,  Cherelle Talavera MD, Lieutenant Fermin MD, Perla Kay, DO, Shiv Rodriguez MD, Dorothy Steward MD, Pierre Sanchez DO, Gabbi Vivas MD, Rafaela Vang MD, Enrrique Cote MD, Shiva Adam MD, Odell San, DO, Carlito Salinas MD, Oz Hunt MD, Dylan Cooper, CNP,  [place], CNP, Stacey Rodney, CNP, Jared Richardson, CNP,  Aleena Gutierrez, [facility], Servando Reyes, CNP, Jayashree Farooq, CNP, Enoc Thompson, CNP, Angelica Salmeron, CNP, Arin Mancilla, CNP, Sunny Zamudio PA-C, Akhil Huitron, CNS, Augustin Paris, CNP, Gus Sanchez, [facility]         [address] [facility]    HISTORY AND PHYSICAL EXAMINATION            Date:   3/7/2023  Patient name:  Andrew Albert  Date of admission:  3/7/2023  4:31 PM  MRN:   [id number]  Account:  [de-identified]  YOB: 1950  PCP:    Ben Waters MD  Room:   ANNIKA/ANNIKA  Code Status:    Prior    Chief Complaint:     Chief Complaint   Patient presents with    Abdominal Pain    Abdominal Cramping     Right side    \" I had a bowel movement yesterday and when I was done I had pain\"    History Obtained From:     patient, spouse    History of Present Illness:     Andrew Albert is a 67 y.o. male with chronic ICMP/ SHF (ef 20-25%) with history of chronic occluded LAD with collaterals,  s/p ICD,  DM II, CKD 3, tobacco abuse ongoing who presents with Abdominal Pain and Abdominal Cramping (Right side )   and is admitted to the hospital for the management of Acute appendicitis. Patient describes progressive worsening right lower quadrant abdominal pain over the past 48 hours.   Initially woke up yesterday morning with vague abdominal pain, anorexia. Status post bowel movement around 1 or 2 PM yesterday with acute worsening right lower quadrant abdominal pain, stabbing pressure-like quality. Denies radiation. Symptoms waxed and waned with episodes lasting approximately 1 to 2 hours. S/p 4-5 episodes overnight with more severe pain prior to arrival.  + Chills. Past Medical History:     Past Medical History:   Diagnosis Date    Alcohol abuse 09/07/2014    Anxiety     CHF (congestive heart failure) (HCC)     Colon polyps     COPD (chronic obstructive pulmonary disease) ([facility] [address]     Diabetes mellitus ([facility]     Dupuytren contracture 2004    Hypertension     ICD (implantable cardioverter-defibrillator) battery depletion 09/15/2014    Ischemic cardiomyopathy     Obesity     SOB (shortness of breath)     Tendonitis, Achilles, left     Unstable angina ([facility] 09/07/2014    Ventricular tachyarrhythmia 08/30/2018        Past Surgical History:     Past Surgical History:   Procedure Laterality Date    [address] Left 9/2014    COLONOSCOPY  3/2015    polyps, bx-serrated adenoma        Medications Prior to Admission:     Prior to Admission medications    Medication Sig Start Date End Date Taking? Authorizing Provider   metFORMIN (GLUCOPHAGE) 500 MG tablet TAKE 1 TABLET BY MOUTH TWICE A  DAY WITH MEALS 2/14/23   Bill Powell MD   ELIQUIS 5 MG TABS tablet  12/12/22   Historical Provider, MD   midodrine (PROAMATINE) 2.5 MG tablet  5/19/22   Historical Provider, MD   blood glucose test strips (ASCENSIA AUTODISC VI;ONE TOUCH ULTRA TEST VI) strip 1 each by In Vitro route 3 times daily Use with associated glucose meter.  7/8/22   Bill Powell MD   furosemide (LASIX) 20 MG tablet TAKE 1 TABLET DAILY 1/4/22   Sury Saunders, APRN - CNP   ENTRESTO 24-26 MG per tablet 1 tablet once 1/ 2 tab in the PM 8/22/19   Historical Provider, MD   TOPROL XL 50 MG extended release tablet Take 50 mg by mouth daily  8/20/19   Historical Provider, MD   spironolactone (ALDACTONE) 25 MG tablet Take 25 mg by mouth daily    Historical Provider, MD   atorvastatin (LIPITOR) 40 MG tablet TAKE 1 TABLET DAILY 7/22/19   BRENDAN Chaudhary - CNP   amiodarone (CORDARONE) 200 MG tablet Take 2 tablets PO BID x3 days then take 1 tablet PO BID thereafter 12/10/18   Chantelle Beckham MD   Multiple Vitamin (MULTIVITAMIN) tablet Take 1 tablet by mouth daily 12/11/18   Ishmael Hall MD   aspirin 81 MG tablet Take 81 mg by mouth daily. Historical Provider, MD        Allergies:     Patient has no known allergies. Social History:     Tobacco:    reports that he has been smoking cigarettes. He started smoking about 64 years ago. He has a 58.00 pack-year smoking history. He has never used smokeless tobacco.  Alcohol:      reports current alcohol use. Drug Use:  reports no history of drug use. Patient does climb 1 flight of stairs daily without chest pain, worsening dyspnea, near-syncope or anginal equivalent. 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 Previous history of heavy alcohol use, now only drinks on rare occasions. Does continue to smoke 1 pack/day. Denies illegal drugs. Does use a walker occasionally in the morning but predominantly walks independently. Denies history of falls or injuries    Family History:     Family History   Problem Relation Age of Onset    Heart Disease Father     High Blood Pressure Father     Diabetes Father     Heart Disease Maternal Aunt     Heart Disease Maternal Uncle     Heart Disease Paternal Aunt     Heart Disease Paternal Uncle        Review of Systems:     Positive and Negative as described in HPI. Review of Systems  Review of systems is limited as patient got increasingly agitated with questioning saying \"I do not feel good and I just need sleep\". Admits to frustration with questions.   patient does struggle with chronic dyspnea, noted acute worsening symptoms of the past few days. Does have chronic cough with increasing thick phlegm over the past few weeks. Denies use of inhalers. Denies issues with recurrent bronchitis or pneumonia. Does struggle with chronic lower extremity edema, sleep apnea but intolerant of CPAP. Denies worsening lower extremity edema. Denies PND orthopnea. Prior history of syncopal spell with exertion in 2022 with prior history of V. tach with ICD firing 2021. Denies any recent episodes of near syncope collapse dizziness. Denies chest pain. Denies orthostasis, palpitations irregular heartbeat or tachycardia. Denies diarrhea or constipation. Denies rectal bleeding or melanotic stool. Denies indigestion or heartburn. Patient does struggle with urinary frequency with nocturia with 2-3 episodes per night. Denies difficulty evacuating bladder. Denies any exposure to COVID. Denies ear fullness ear pain, sore throat, headache. + Chronic sinus congestion without acute changes. Denies prior pulmonary work-up, denies home oxygen. Denies use of broncodilators. patient denies myalgias arthralgias or lumbago. Denies any issues with anesthesia. Denies prior stroke or TIA. Denies easy bruising or bleeding issues. Denies history of DVT PE. States blood sugars have been stable, denies any hyper or hypoglycemia. Denies complications associated with diabetes. Review of systems otherwise -12 systems reviewed and otherwise unremarkable  Physical Exam:   /64   Pulse 70   Temp 97.9 °F (36.6 °C) (Oral)   Resp 18   Ht 6' 1\" (1.854 m)   Wt 247 lb (112 kg)   SpO2 91%   BMI 32.59 kg/m²   Temp (24hrs), Av.9 °F (36.6 °C), Min:97.9 °F (36.6 °C), Max:97.9 °F (36.6 °C)    No results for input(s): POCGLU in the last 72 hours.   No intake or output data in the 24 hours ending 23 5643    Physical Exam  General awakens easily, slightly groggy upon awakening but appropriate, oriented, follows commands  Eye exam pupils equally round reactive sclera nonicteric normal horizontal gaze  ENT oral pharynx with moist mucous membranes face symmetric neck supple  Cardiovascular exam limited by pulmonary noise , regular rate and rhythm normal S1-S2, JVD noted at 30 degrees, no murmurs rubs or gallops  Lungs bilateral expiratory wheezing with limited air exchange, coughing with deep inspiration, mild resting tachypnea with mildly shallow labored breathing, no accessory muscle use, no rhonchi.   Diminished at bases  GI soft obese with diffuse tenderness palpation that localizes to the right lower quadrant with peritonitis, hypoactive bowel sounds, nondistended  Vascular 1+ dorsalis pedis posterior tibialis with edema up through lower ankles, varicosities present with chronic trophic changes  Derm adequate foot hygiene no rashes lesions or skin infections  Musculoskeletal passive range of motion of upper and lower limbs unremarkable no synovitis or joint effusions  Neuro nonfocal normal speech and cognition strength symmetric in upper and lower limbs sensation grossly intact    Investigations:      Laboratory Testing:  Recent Results (from the past 24 hour(s))   TOX SCR, BLD, ED    Collection Time: 03/07/23  4:54 PM   Result Value Ref Range    Acetaminophen Level <5 (L) 10 - 30 ug/mL    Ethanol <10 <10 mg/dL    Ethanol percent <9.680 <4.008 %    Salicylate Lvl <1 (L) 3 - 10 mg/dL    Toxic Tricyclic Sc,Blood NEGATIVE NEGATIVE   CBC with Auto Differential    Collection Time: 03/07/23  4:54 PM   Result Value Ref Range    WBC 14.8 (H) 3.5 - 11.3 k/uL    RBC 4.48 4.21 - 5.77 m/uL    Hemoglobin 15.4 13.0 - 17.0 g/dL    Hematocrit 45.1 40.7 - 50.3 %    .7 82.6 - 102.9 fL    MCH 34.4 (H) 25.2 - 33.5 pg    MCHC 34.1 28.4 - 34.8 g/dL    RDW 13.2 11.8 - 14.4 %    Platelets 802 564 - 405 k/uL    MPV 11.0 8.1 - 13.5 fL    NRBC Automated 0.0 0.0 per 100 WBC    Seg Neutrophils 83 (H) 36 - 65 %    Lymphocytes 9 (L) 24 - 43 %    Monocytes 8 3 - 12 %    Eosinophils % 0 (L) 1 - 4 %    Basophils 0 0 - 2 %    Immature Granulocytes 0 0 %    Segs Absolute 12.21 (H) 1.50 - 8.10 k/uL    Absolute Lymph # 1.27 1.10 - 3.70 k/uL    Absolute Mono # 1.19 0.10 - 1.20 k/uL    Absolute Eos # <0.03 0.00 - 0.44 k/uL    Basophils Absolute 0.04 0.00 - 0.20 k/uL    Absolute Immature Granulocyte 0.06 0.00 - 0.30 k/uL   Basic Metabolic Panel    Collection Time: 03/07/23  4:54 PM   Result Value Ref Range    Glucose 142 (H) 70 - 99 mg/dL    BUN 20 8 - 23 mg/dL    Creatinine 1.31 (H) 0.70 - 1.20 mg/dL    Est, Glom Filt Rate 58 (L) >60 mL/min/1.73m2    Calcium 9.6 8.6 - 10.4 mg/dL    Sodium 136 135 - 144 mmol/L    Potassium 4.5 3.7 - 5.3 mmol/L    Chloride 101 98 - 107 mmol/L    CO2 23 20 - 31 mmol/L    Anion Gap 12 9 - 17 mmol/L   Hepatic Function Panel    Collection Time: 03/07/23  4:54 PM   Result Value Ref Range    Albumin 4.2 3.5 - 5.2 g/dL    Alkaline Phosphatase 85 40 - 129 U/L    ALT 33 5 - 41 U/L    AST 29 <40 U/L    Total Bilirubin 1.2 0.3 - 1.2 mg/dL    Bilirubin, Direct 0.4 (H) <0.3 mg/dL    Bilirubin, Indirect 0.8 0.0 - 1.0 mg/dL    Total Protein 7.2 6.4 - 8.3 g/dL    Albumin/Globulin Ratio 1.4 1.0 - 2.5   Troponin    Collection Time: 03/07/23  4:54 PM   Result Value Ref Range    Troponin, High Sensitivity 26 (H) 0 - 22 ng/L   EKG 12 Lead    Collection Time: 03/07/23  5:00 PM   Result Value Ref Range    Ventricular Rate 64 BPM    Atrial Rate 64 BPM    P-R Interval 220 ms    QRS Duration 88 ms    Q-T Interval 452 ms    QTc Calculation (Bazett) 466 ms    P Axis 41 degrees    R Axis -4 degrees    T Axis 62 degrees   CBC    Collection Time: 03/07/23  8:26 PM   Result Value Ref Range    WBC 14.7 (H) 3.5 - 11.3 k/uL    RBC 4.47 4.21 - 5.77 m/uL    Hemoglobin 15.3 13.0 - 17.0 g/dL    Hematocrit 45.7 40.7 - 50.3 %    .2 82.6 - 102.9 fL    MCH 34.2 (H) 25.2 - 33.5 pg    MCHC 33.5 28.4 - 34.8 g/dL    RDW 13.4 11.8 - 14.4 %    Platelets 101 (L) 106 - 453 k/uL    MPV 10.5 8.1 - 13.5 fL    NRBC Automated 0.0 0.0 per 100 WBC   APTT    Collection Time: 03/07/23  8:26 PM   Result Value Ref Range    PTT 27.1 20.5 - 30.5 sec   Troponin    Collection Time: 03/07/23  9:02 PM   Result Value Ref Range    Troponin, High Sensitivity 32 (H) 0 - 22 ng/L   Magnesium    Collection Time: 03/07/23  9:02 PM   Result Value Ref Range    Magnesium 1.7 1.6 - 2.6 mg/dL   Lactate, Sepsis    Collection Time: 03/07/23  9:02 PM   Result Value Ref Range    Lactic Acid, Sepsis, Whole Blood 1.8 0.5 - 1.9 mmol/L       Imaging/Diagnostics:  CT ABDOMEN PELVIS W IV CONTRAST Additional Contrast? None    Result Date: 3/7/2023  1. Acute appendicitis. Small amount of free fluid in the right lower quadrant/pericolic gutter with a more focal area of loculation measuring 2.4 x 1.7 cm which could reflect an early abscess or phlegmon. No free air. 2. Gallbladder sludge versus cholelithiasis. 3. There is a subtle filling defect within the proximal SMA however felt to be related to artifact as this is not reproduced on the sagittal or coronal reconstructions (for example image 67). Nonocclusive thrombus is felt to be less likely. Results regarding acute appendicitis were called by Dr. Danae Larson. Brianda Benton MD to Dr. Jim Mayer on 3/7/2023 at 19:28.      EKG independently reviewed by myself with sinus mechanism, PVCs, ST changes anterior laterally    Assessment :      Hospital Problems             Last Modified POA    * (Principal) Acute appendicitis 3/7/2023 Yes    Chronic systolic (congestive) heart failure 3/7/2023 Yes    ICD (implantable cardioverter-defibrillator) in place 3/7/2023 Yes    History of ventricular tachycardia 3/7/2023 Yes    Left ventricular apical thrombus following MI ([address] 3/7/2023 Yes    Anticoagulated 3/7/2023 Yes    JAYCE (obstructive sleep apnea) 3/7/2023 Yes    CKD (chronic kidney disease) stage 3, GFR 30-59 ml/min ([address] 3/7/2023 Yes    Elevated troponin 3/7/2023 Yes    Smoking 3/7/2023 Yes    COPD with acute exacerbation ([facility] 3/7/2023 Yes    Morbid obesity, unspecified obesity type ([facility] [address] 3/7/2023 Yes    Shortness of breath 3/7/2023 Yes    Ischemic cardiomyopathy 3/7/2023 Yes    Essential hypertension 3/7/2023 Yes    Type 2 diabetes mellitus without complication, without long-term current use of insulin ([facility] [address] 3/7/2023 Yes       Plan:     Patient status inpatient in the Progressive Unit/Step down    Acute appendicitis continue empiric antibiotics pending surgery. Remains n.p.o. per surgery recommendations. Acute on chronic systolic heart failure with mild fluid overload/chronic ischemic cardiomyopathy with chronic occluded LAD. 1x dose IV Lasix. Resume cardiac regimen with hold parameters for hypotension. Acute exacerbation of COPD with bronchospasms/JAYCE not on CPAP ongoing tobacco abuse. Encourage tobacco cessation. Start bronchodilators, pulmonary hygiene. Hold steroids with concern for acute abdominal symptoms. Check x-ray  History of ventricular tachycardia status post ICD. Continue to optimize electrolytes. Remains on amiodarone, beta-blocker  Type 2 diabetes with diabetic neuropathy. Continue blood sugar control perioperatively. Hold oral hypoglycemics for now while n.p.o. Chronic sinus congestion continue decongestants, mucolytic's. Chronically troponin likely multifactorial with CKD, chronic heart failure. Pending cardiology input. Status post heparin drip. Remains off Eliquis for now. History of left ventricular mural thrombus-continue to hold Eliquis for now perioperatively. likely resume after surgery when okay with surgery team    Patient likely at least moderate risk for perioperative cardiopulmonary complications with known systolic heart failure, acutely decompensated with acute bronchospasms, COPD exacerbation, type 2 diabetes, JAYCE not on CPAP, tobacco abuse ongoing. Pending chest x-ray . Continue medical optimization overnight with plans per nursing for OR tomorrow morning . discussed with patient and wife. Updated surgery on patient's risk assessment. Anticipate likely discharge in 2 to 4 days contingent on clinical progress. Question concerns and treatment plan discussed with patient and wife. Discussed with ED nursing. Continued n.p.o. status discussed with patient. Prior records and labs and chart reviewed in detail independently by myself. Home medications reconciled. Consultations:   IP CONSULT TO GENERAL SURGERY  IP CONSULT TO HOSPITALIST  IP CONSULT TO CARDIOLOGY  IP CONSULT TO CARDIOLOGY  IP CONSULT TO GENERAL SURGERY  IP CONSULT TO CARDIOLOGY    Patient is admitted as inpatient status because of co-morbidities listed above, severity of signs and symptoms as outlined, requirement for current medical therapies and most importantly because of direct risk to patient if care not provided in a hospital setting. Expected length of stay > 48 hours.     Lorelei Howard MD  3/7/2023  11:55 PM    Copy sent to Dr. Walter Chavez MD

## 2023-03-08 NOTE — CONSULTS
[facility]    Consult / H&P               Today's Date: 3/8/2023  Patient Name: Destiny Powers  Date of admission: 3/7/2023  4:31 PM  Patient's age: 67 y.o., 1950  Admission Dx: Acute appendicitis [K35.80]  Acute appendicitis with generalized peritonitis without gangrene, unspecified whether abscess present, unspecified whether perforation present [K35.20]    Reason for Consult:  Cardiac evaluation    Requesting Physician: Janak Simental DO    CHIEF COMPLAINT: Abdominal pain    History Obtained From:  patient, electronic medical record    HISTORY OF PRESENT ILLNESS:    This patient 67y.o. years old with extensive cardiac history given below initially presented with right-sided abdominal pain found to have acute appendicitis with early abscess formation as per CT imaging. Cardiology was consulted for preop clearance. 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 Past Medical History:   has a past medical history of Alcohol abuse, Anxiety, CHF (congestive heart failure) ([address] Colon polyps, COPD (chronic obstructive pulmonary disease) ([address] Diabetes mellitus ([address] Dupuytren contracture, Hypertension, ICD (implantable cardioverter-defibrillator) battery depletion, Ischemic cardiomyopathy, Obesity, SOB (shortness of breath), Tendonitis, Achilles, left, Unstable angina ([address] and Ventricular tachyarrhythmia. Past Surgical History:   has a past surgical history that includes Cardiac defibrillator placement; Cardiac catheterization; Cardiac defibrillator placement (Left, 9/2014); and Colonoscopy (3/2015). Home Medications:    Prior to Admission medications    Medication Sig Start Date End Date Taking?  Authorizing Provider   metFORMIN (GLUCOPHAGE) 500 MG tablet TAKE 1 TABLET BY MOUTH TWICE A  DAY WITH MEALS 2/14/23   Walter Chavez MD   ELIQUIS 5 MG TABS tablet  12/12/22   Historical Provider, MD   midodrine (PROAMATINE) 2.5 MG tablet  5/19/22   Historical Provider, MD   blood glucose test strips (ASCENSIA AUTODISC VI;ONE TOUCH ULTRA TEST VI) strip 1 each by In Vitro route 3 times daily Use with associated glucose meter. 7/8/22   Oswaldo Stout MD   furosemide (LASIX) 20 MG tablet TAKE 1 TABLET DAILY 1/4/22   BRENDAN Barry CNP   ENTRESTO 24-26 MG per tablet 1 tablet once 1/ 2 tab in the PM 8/22/19   Historical Provider, MD   TOPROL XL 50 MG extended release tablet Take 50 mg by mouth daily  8/20/19   Historical Provider, MD   spironolactone (ALDACTONE) 25 MG tablet Take 25 mg by mouth daily    Historical Provider, MD   atorvastatin (LIPITOR) 40 MG tablet TAKE 1 TABLET DAILY 7/22/19   BRENDAN Barry CNP   amiodarone (CORDARONE) 200 MG tablet Take 2 tablets PO BID x3 days then take 1 tablet PO BID thereafter 12/10/18   Augusto Snellen, MD   Multiple Vitamin (MULTIVITAMIN) tablet Take 1 tablet by mouth daily 12/11/18   Ishmael Hall MD   aspirin 81 MG tablet Take 81 mg by mouth daily. Historical Provider, MD       Allergies:  Patient has no known allergies. Social History:   reports that he has been smoking cigarettes. He started smoking about 64 years ago. He has a 58.00 pack-year smoking history. He has never used smokeless tobacco. He reports current alcohol use. He reports that he does not use drugs. Family History: family history includes Diabetes in his father; Heart Disease in his father, maternal aunt, maternal uncle, paternal aunt, and paternal uncle; High Blood Pressure in his father. REVIEW OF SYSTEMS:    Constitutional: there has been no unanticipated weight loss. There's been No change in energy level, No change in activity level. Eyes: No visual changes or diplopia. No scleral icterus. ENT: No Headaches  Cardiovascular: as per HPI  Respiratory: as per HPI  Gastrointestinal: As above.   Genitourinary: No dysuria, trouble voiding, or hematuria. Musculoskeletal:  No gait disturbance, No weakness or joint complaints. Integumentary: No rash or pruritis. Neurological: No headache, diplopia, change in muscle strength, numbness or tingling. No change in gait, balance, coordination, mood, affect, memory, mentation, behavior. Endocrine: No temperature intolerance. No excessive thirst, fluid intake, or urination. No tremor. Hematologic/Lymphatic: No abnormal bruising or bleeding, blood clots or swollen lymph nodes. Allergic/Immunologic: No nasal congestion or hives. PHYSICAL EXAM:      /79   Pulse 72   Temp 98 °F (36.7 °C) (Tympanic)   Resp 22   Ht 6' 1\" (1.854 m)   Wt 247 lb (112 kg)   SpO2 94%   BMI 32.59 kg/m²    Constitutional and General Appearance: alert, cooperative, no distress and appears stated age  HEENT: PERRL, no cervical lymphadenopathy. No masses palpable. Normal oral mucosa  Respiratory:  Resp Auscultation: Good respiratory effort. No for increased work of breathing. On auscultation: clear to auscultation bilaterally  Cardiovascular: The apical impulse is not displaced  regular S1 and S2.  Jugular venous pulsation Normal  Peripheral pulses are symmetrical and full   Abdomen:   Tender to palpation in the right lower quadrant. Hypoactive bowel sounds. Extremities:   No Cyanosis or Clubbing   Lower extremity edema: No   Skin: Warm and dry  Neurological:  Deferred     DATA:    EKG: NSR no acute changes. ECHO:   ordered, but not yet obtained. Stress Test:   not obtained. CARDIAC CATHETERIZATION ( 8/2018)   Angiographic Findings      Cardiac Arteries and Lesion Findings     LMCA: Normal 0% stenosis. LAD: Chronic occlusion 100 % . with left to left and right to left  collaterals       Lesion on Prox LAD: 100% stenosis. LCx: Normal 0% stenosis. RCA: Normal 0% stenosis. Ramus: Normal 0% stenosis.      Cardiac collaterals  +-----------------------------------------+------------+---------+---------+  ! Origin                                   ! Destination ! Flow     ! Comments ! +-----------------------------------------+------------+---------+---------+  ! R PDA                                    ! Mid LAD     ! Moderate !         !  +-----------------------------------------+------------+---------+---------+  ! Dist CX                                  ! Dist LAD    ! Poor     !         !  +-----------------------------------------+------------+---------+---------+      Coronary Tree      Dominance: Right     LV Analysis  LV function assessed as:Abnormal.  Ejection Fraction  +----------------------------------------------------------------------+---+  ! Method                                                                ! EF%! +----------------------------------------------------------------------+---+  ! LV gram                                                               !25 !  +----------------------------------------------------------------------+---+    Labs:   CBC:   Recent Labs     03/07/23 2026 03/08/23 0457   WBC 14.7* 16.7*   HGB 15.3 14.1   HCT 45.7 41.4   * See Reflexed IPF Result     BMP:   Recent Labs     03/07/23 1654 03/08/23 0457    136   K 4.5 4.2   CO2 23 23   BUN 20 18   CREATININE 1.31* 1.23*   LABGLOM 58* >60   GLUCOSE 142* 169*     PT/INR:   Recent Labs     03/08/23 0457   PROTIME 13.1*   INR 1.2     APTT:  Recent Labs     03/07/23 2026 03/08/23  0304   APTT 27.1 >120.0*     FASTING LIPID PANEL:  Lab Results   Component Value Date/Time    HDL 45 09/28/2022 02:40 PM    TRIG 93 09/28/2022 02:40 PM     LIVER PROFILE:  Recent Labs     03/07/23 1654   AST 29   ALT 33   LABALBU 4.2       IMPRESSION:    Preop cardiac risk stratification for appendectomy  Acute appendicitis  Ischemic cardiomyopathy, chronic HFrEF last LVEF 20-25%.   Euvolemic   Cardiac cath in 2018 showed chronic LAD occlusion with left to left and right to left collaterals, normal left circumflex/RCA/ramus, severe LV dysfunction,  ICD in-situ ( [employer] Evera XT VR) implanted 2014  H/o LV apical aneurysm and thrombus. On Eliquis  Minimally elevated troponin due to above. Essential HTN  Type II DM    RECOMMENDATIONS:  Follow-up on echo  Based on patient cardiac history, patient is going to be moderate risk for general anesthesia  Continue current cardioprotective medication including aspirin, amiodarone, Lipitor, Toprol, midodrine and Entresto. Eliquis on hold, resume after surgery  Will follow      Plan discussed with Dr Denis Hughes MD. PGY- 4  Cardiology Fellow  [facility], [place], [place]        Attending Physician Statement  I have discussed the case of Dominga Sandra including pertinent history and exam findings with the student/resident/fellow. I have seen and examined the patient and the key elements of the encounter have been performed by me. I agree with the assessment, plan and orders as documented by the resident With changes made to the note.      Electronically signed by Indigo Padgett MD on 3/8/2023 at 11:07 AM.    [facility]      [phone]

## 2023-03-08 NOTE — PLAN OF CARE
Problem: Respiratory - Adult  Goal: Achieves optimal ventilation and oxygenation  3/8/2023 1521 by Veronica Lo RCP  Outcome: Progressing  3/8/2023 0839 by Jeb Fowler RCP  Outcome: Progressing

## 2023-03-08 NOTE — PROGRESS NOTES
Physical Therapy        Physical Therapy Cancel Note      DATE: 3/8/2023    NAME: Hernán Bravo  MRN: [id number]   : 1950      Patient not seen this date for Physical Therapy due to:    Surgery/Procedure: OR for appendectomy per chart.  Will check back 3/9      Electronically signed by Nessa Gorman on 3/8/2023 at 9:31 AM

## 2023-03-08 NOTE — BRIEF OP NOTE
Brief Postoperative Note      Patient: Amelia Corcoran  YOB: 1950  MRN: [id number]    Date of Procedure: 3/8/2023    Pre-Op Diagnosis: Appendicitis, unspecified appendicitis type [K37]    Post-Op Diagnosis:  Perforated appendicitis       Procedure(s):  APPENDECTOMY LAPAROSCOPIC    Surgeon(s):  Cristian Parks MD    Assistant:  Resident: Serina Garza MD    Anesthesia: General    Estimated Blood Loss (mL):10cc    Fluids: 800cc    UOP: 19YW    Complications: None    Specimens:   ID Type Source Tests Collected by Time Destination   1 : URINE FOR CULTURE FROM INDWELLING CATHETER Urine Urine, indwelling catheter CULTURE, URINE Cristian Parks MD 3/8/2023 1304    A : APPENDIX Tissue Appendix SURGICAL PATHOLOGY Cristian Parks MD 3/8/2023 1305        Implants:  * No implants in log *      Drains:   Urinary Catheter 03/08/23 2 Way (Active)       Findings: Perforated appendicitis, purulent peritonitis PATOS    Electronically signed by Serina Garza MD on 3/8/2023 at 3:18 PM

## 2023-03-08 NOTE — PROGRESS NOTES
SPIRITUAL CARE DEPARTMENT - [address]  PROGRESS NOTE    Shift date: 3/7/23  Shift day: Tuesday   Shift # 2    Room # 27/27   Name: Mariely Ewing                Pentecostal:    Place of Denominational:     Referral: Routine Visit    Admit Date & Time: 3/7/2023  4:31 PM    Assessment:  Mariely Ewing is a 67 y.o. male       Intervention:  Writer introduced self and title as . Patient did not appear to mind  presence and engaged in conversation. Writer offered space for patient  to express feelings, needs, and concerns and provided a ministry presence. Patient discussed his health over the past 48 hours and what led to his hospital visit. Patient had spouse of nearly 46 years present in room for additional support.  provided a supportive presence which validated his feelings and emotions. Patient stated he is of [address] [place] [address] but does not belong to a jeffy community at this time. Outcome:  Patient expressed gratitude for  visit. Plan:  Chaplains will remain available to offer spiritual and emotional support as needed.       Electronically signed by Ara Negron on 3/7/2023 at 10:01 PM.  Oscar Lemus  [phone]

## 2023-03-08 NOTE — PROGRESS NOTES
[facility]  Office: [address], DO, Sebastian Roland, DO, Maira Raquel, DO, Landy Garcia Blood, DO, Dashawn Coelho MD, Xiomara Cuellar MD, Enzo Merchant MD, Christa Blanchard MD,  Juanito Pelayo MD, Teddy Jorge MD, Gary Hankins, DO, Kirsten So MD,  Neha Carl, DO, Shira Gregorio MD, Huma Araujo MD, Asiya Lai, DO, Kevan Cali MD, Kerry Elizondo MD, Jaxson Leonardo DO, Misty Pal MD, Robin Godinez MD, Brendon Neil MD, Jason Johnson MD, Fior Fink DO, Arcenio Lopez MD, Dg Dasilva MD, Praveena Muniz, CNP,  Cornel Guerrero, CNP, Clint Angry, CNP, Catrachito Becker, CNP,  Cleone Denver, [facility], Marianne Ho, CNP, Kt Servin, CNP, Andres Velásquez, CNP, Jalil Velasco, CNP, Lizz Perkins, CNP, Olivier Casanova, PA-C, Jony Adams, CNS, Otoniel Sandoval, CNP, Edwige Landry, [facility]    Progress Note    3/8/2023    12:45 PM    Name:   Mor Sarabia  MRN:     [id number]     Acct:      [de-identified]   Room:   [facility] RM/NONE  IP Day:  1  Admit Date:  3/7/2023  4:31 PM    PCP:   Pepito Werner MD  Code Status:  Full Code    Subjective:     C/C:   Chief Complaint   Patient presents with    Abdominal Pain    Abdominal Cramping     Right side      Interval History Status: not changed. Patient is alert and oriented  Wife at bedside  Patient reports intermittent pain in the abdomen which is very tender to touch  Plan was for surgery today  Patient is n.p.o. He and his wife understands the risk of surgery given his cardiac condition. Brief History:     66-year-old male with known medical history of chronic systolic and diastolic heart failure, history of coronary artery disease, type 2 diabetes mellitus, ventricular tachycardia s/p ablation, CKD stage III, tobacco abuse presents to the hospital with constipation, abdominal pain.   Patient states that he had to strain for a bowel movement and after that started having right lower quadrant abdominal pain which was getting worse and radiating in the front. Patient was seen to have acute appendicitis. Initially patient was started on IV antibiotics, general surgery ordered cardiac clearance. Review of Systems:     Constitutional:  negative for chills, fevers, sweats  Respiratory:  negative for cough, dyspnea on exertion, shortness of breath, wheezing  Cardiovascular:  negative for chest pain, chest pressure/discomfort, lower extremity edema, palpitations  Gastrointestinal: positive for abdominal pain, no constipation, diarrhea, nausea, vomiting  Neurological:  negative for dizziness, headache    Medications:      Allergies:  No Known Allergies    Current Meds:   Scheduled Meds:    [MAR Hold] piperacillin-tazobactam  3,375 mg IntraVENous Q8H    [MAR Hold] sodium chloride flush  5-40 mL IntraVENous 2 times per day    [MAR Hold] insulin lispro  0-4 Units SubCUTAneous Q4H    [MAR Hold] ipratropium-albuterol  1 ampule Inhalation Q4H    [MAR Hold] amiodarone  200 mg Oral BID    [MAR Hold] aspirin  81 mg Oral Daily    [MAR Hold] atorvastatin  40 mg Oral Daily    [MAR Hold] sacubitril-valsartan  1 tablet Oral BID    [MAR Hold] midodrine  2.5 mg Oral TID WC    [MAR Hold] metoprolol succinate  50 mg Oral Daily    [MAR Hold] guaiFENesin  600 mg Oral BID     Continuous Infusions:    [MAR Hold] dextrose      [MAR Hold] sodium chloride      [Held by provider] heparin (PORCINE) Infusion 14 Units/kg/hr (03/08/23 0451)    [MAR Hold] lactated ringers IV soln 100 mL/hr at 03/08/23 0430     PRN Meds: [MAR Hold] glucose, [MAR Hold] dextrose bolus **OR** [MAR Hold] dextrose bolus, [MAR Hold] glucagon (rDNA), [MAR Hold] dextrose, [MAR Hold] sodium chloride flush, [MAR Hold] sodium chloride, [MAR Hold] potassium chloride **OR** [MAR Hold] potassium alternative oral replacement **OR** [MAR Hold] potassium chloride, [MAR Hold] magnesium sulfate, [MAR Hold] ondansetron **OR** [MAR Hold] ondansetron, [MAR Hold] polyethylene glycol, [MAR Hold] acetaminophen **OR** [MAR Hold] acetaminophen, [MAR Hold] morphine, [MAR Hold] heparin (porcine), [MAR Hold] heparin (porcine)    Data:     Past Medical History:   has a past medical history of Alcohol abuse, Anxiety, CHF (congestive heart failure) ([facility] Colon polyps, COPD (chronic obstructive [facility] disease) ([facility] Diabetes mellitus ([facility] Dupuytren contracture, Hypertension, ICD (implantable cardioverter-defibrillator) battery depletion, Ischemic cardiomyopathy, Obesity, SOB (shortness of breath), Tendonitis, Achilles, left, Unstable angina ([facility] and Ventricular tachyarrhythmia. Social History:   reports that he has been smoking cigarettes. He started smoking about 64 years ago. He has a 58.00 pack-year smoking history. He has never used smokeless tobacco. He reports current alcohol use. He reports that he does not use drugs. Family History:   Family History   Problem Relation Age of Onset    Heart Disease Father     High Blood Pressure Father     Diabetes Father     Heart Disease Maternal Aunt     Heart Disease Maternal Uncle     Heart Disease Paternal Aunt     Heart Disease Paternal Uncle        Vitals:  /74   Pulse 68   Temp 98.2 °F (36.8 °C) (Temporal)   Resp 22   Ht 6' 1\" (1.854 m)   Wt 247 lb (112 kg)   SpO2 91%   BMI 32.59 kg/m²   Temp (24hrs), Av.5 °F (36.9 °C), Min:97.8 °F (36.6 °C), Max:100.3 °F (37.9 °C)    Recent Labs     23  2355 23  0425 23  0729 23  1158   POCGLU 165* 167* 152* 150*       I/O (24Hr):     Intake/Output Summary (Last 24 hours) at 3/8/2023 1245  Last data filed at 3/8/2023 0345  Gross per 24 hour   Intake --   Output 900 ml   Net -900 ml       Labs:  Hematology:  Recent Labs     23  1654 23  0457   WBC 14.8* 14.7* 16.7*   RBC 4.48 4.47 4.13*   HGB 15.4 15.3 14.1   HCT 45.1 45.7 41.4   .7 102.2 100.2   MCH 34.4* 34.2* 34.1*   MCHC 34.1 33.5 34.1   RDW 13.2 13.4 13.3    118* See Reflexed IPF Result   MPV 11.0 10.5  --    INR  --   --  1.2     Chemistry:  Recent Labs     03/07/23  1654 03/07/23  2102 03/08/23  0457     --  136   K 4.5  --  4.2     --  100   CO2 23  --  23   GLUCOSE 142*  --  169*   BUN 20  --  18   CREATININE 1.31*  --  1.23*   MG  --  1.7  --    ANIONGAP 12  --  13   LABGLOM 58*  --  >60   CALCIUM 9.6  --  8.9   TROPHS 26* 32*  --      Recent Labs     03/07/23  1654 03/07/23  2355 03/08/23  0425 03/08/23  0729 03/08/23  1158   PROT 7.2  --   --   --   --    LABALBU 4.2  --   --   --   --    AST 29  --   --   --   --    ALT 33  --   --   --   --    ALKPHOS 85  --   --   --   --    BILITOT 1.2  --   --   --   --    BILIDIR 0.4*  --   --   --   --    POCGLU  --  165* 167* 152* 150*     ABG:  Lab Results   Component Value Date/Time    FIO2 NOT REPORTED 08/30/2018 08:10 AM     Lab Results   Component Value Date/Time    SPECIAL NOT REPORTED 06/25/2015 01:05 PM     Lab Results   Component Value Date/Time    CULTURE NO GROWTH 06/25/2015 01:05 PM    CULTURE  06/25/2015 01:05 PM     Performed at [address] [place] [address] [phone]       Radiology:  XR CHEST (SINGLE VIEW FRONTAL)    Result Date: 3/8/2023  Trace right pleural effusion suspected to represent an asymmetric pattern of mild edema or atelectasis. CT ABDOMEN PELVIS W IV CONTRAST Additional Contrast? None    Result Date: 3/7/2023  1. Acute appendicitis. Small amount of free fluid in the right lower quadrant/pericolic gutter with a more focal area of loculation measuring 2.4 x 1.7 cm which could reflect an early abscess or phlegmon. No free air. 2. Gallbladder sludge versus cholelithiasis. 3. There is a subtle filling defect within the proximal SMA however felt to be related to artifact as this is not reproduced on the sagittal or coronal reconstructions (for example image 67).   Nonocclusive thrombus is felt to be less likely. Results regarding acute appendicitis were called by Dr. Riana Saucedo. Leah Norris MD to Dr. Duane Castro on 3/7/2023 at 19:28. Physical Examination:        General appearance:  alert, cooperative and moderate distress, obese  Mental Status:  oriented to person, place and time and normal affect  Lungs:  clear to auscultation bilaterally, normal effort  Heart:  regular rate and rhythm, no murmur  Abdomen:  distended, abdominal tender, guarding, rebound tenderness +, rt lower quadrant normal bowel sounds, no masses, hepatomegaly, splenomegaly  Extremities:  no edema, redness, tenderness in the calves  Skin:  no gross lesions, rashes, induration    Assessment:        Hospital Problems             Last Modified POA    * (Principal) Acute appendicitis 3/7/2023 Yes    Chronic systolic (congestive) heart failure 3/7/2023 Yes    ICD (implantable cardioverter-defibrillator) in place 3/7/2023 Yes    History of ventricular tachycardia 3/7/2023 Yes    Left ventricular apical thrombus following MI ([address] 3/7/2023 Yes    Anticoagulated 3/7/2023 Yes    JAYCE (obstructive sleep apnea) 3/7/2023 Yes    CKD (chronic kidney disease) stage 3, GFR 30-59 ml/min ([facility]) 3/7/2023 Yes    Elevated troponin 3/7/2023 Yes    Smoking 3/7/2023 Yes    COPD with acute exacerbation ([address] 3/7/2023 Yes    Morbid obesity, unspecified obesity type ([address] 3/7/2023 Yes    Shortness of breath 3/7/2023 Yes    Ischemic cardiomyopathy 3/7/2023 Yes    Essential hypertension 3/7/2023 Yes    Type 2 diabetes mellitus without complication, without long-term current use of insulin ([address] 3/7/2023 Yes       Plan:        Acute appendicitis-General surgery following, plan for appendicectomy today, n.p.o. currently. Continue Zosyn. Moderate to high risk for surgery, understands the risk.     Chronic systolic and diastolic heart failure-1 dose Lasix given initially, echo shows slightly improved EF to 35 to 40%, cardiology following, watch for fluid overload, monitor intake and output.   Continue Toprol, Entresto    COPD- exacerbation improved, continue bronchodilators    Ventricular tachycardia with ICD in place-history of ablation, continue amiodarone    History of apical thrombus-on Eliquis, currently on heparin drip    CKD stage III-baseline creatinine, continue to monitor, avoid nephrotoxic drugs    Needs lifestyle modifications for weight loss    Manju Lim MD  3/8/2023  12:45 PM

## 2023-03-08 NOTE — PLAN OF CARE
Problem: Respiratory - Adult  Goal: Achieves optimal ventilation and oxygenation  Outcome: Progressing   BRONCHOSPASM/BRONCHOCONSTRICTION     [x]         IMPROVE AERATION/BREATH SOUNDS  [x]   ADMINISTER BRONCHODILATOR THERAPY AS APPROPRIATE  [x]   ASSESS BREATH SOUNDS  [x]   IMPLEMENT AEROSOL/MDI PROTOCOL  [x]   PATIENT EDUCATION AS NEEDED

## 2023-03-08 NOTE — ANESTHESIA POSTPROCEDURE EVALUATION
Department of Anesthesiology  Postprocedure Note    Patient: Librado Allan  MRN: [id number]  YOB: 1950  Date of evaluation: 3/8/2023      Procedure Summary     Date: 03/08/23 Room / Location: [address]    Anesthesia Start: 1223 Anesthesia Stop: 1511    Procedure: APPENDECTOMY LAPAROSCOPIC Diagnosis:       Appendicitis, unspecified appendicitis type      (Appendicitis, unspecified appendicitis type [K37])    Surgeons: Monica Wells MD Responsible Provider: Herber Maza MD    Anesthesia Type: general ASA Status: 4          Anesthesia Type: No value filed.    Elin Phase I:      Elin Phase II:        Anesthesia Post Evaluation    Patient location during evaluation: ICU  Patient participation: complete - patient cannot participate  Level of consciousness: sedated and ventilated  Pain score: 1  Airway patency: patent  Nausea & Vomiting: no nausea and no vomiting  Complications: yes (Patient went into VTach post extubation, ROSC was obatined and patient reintubated and taken to icu)  Cardiovascular status: tachycardic  Respiratory status: acceptable  Hydration status: euvolemic

## 2023-03-08 NOTE — ED NOTES
ED to inpatient nurses report      Chief Complaint:  Chief Complaint   Patient presents with    Abdominal Pain    Abdominal Cramping     Right side      Present to ED from: . MOA:     LOC: alert and orientated to name, place, date  Mobility: Requires assistance * 1  Oxygen Baseline: 93    Current needs required: None; COPD pt  Pending ED orders: none  Present condition: stable    Pertinent event(s): Pt to room 27 with c/o abd pain. Pt reports that yesterday he started having abd pain and states that the pain has increased over the last day. Pt states that he has not had a bowel movement and has not been passing any gas. Pt reports that he has some intermittent SOB. Pt placed on continuous cardiac monitor, bp and pulse ox. EKG completed, IV established, blood work drawn. Pt alert and oriented x4, talking in complete sentences, respirations even and unlabored. Pt acting age appropriate. White board updated, will continue to plan of care    Surgery states possible appendix rupture, so pt to have antibiotics and heparin rather than Eliquis. Pt states ok to have surgery after cardiology evaluation tomorrow.       Mental Status:  Level of Consciousness: Alert (0)    Psych Assessment:   Psychosocial  Psychosocial (WDL): Within Defined Limits  Vital signs   Vitals:    03/07/23 1700 03/07/23 1800 03/07/23 1922 03/07/23 1923   BP: 139/71 128/72  135/73   Pulse: 62 61  67   Resp: 13 17 16    Temp:       TempSrc:       SpO2: 96% 94% 94%    Weight:       Height:            Vitals:  Patient Vitals for the past 24 hrs:   BP Temp Temp src Pulse Resp SpO2 Height Weight   03/07/23 1923 135/73 -- -- 67 -- -- -- --   03/07/23 1922 -- -- -- -- 16 94 % -- --   03/07/23 1800 128/72 -- -- 61 17 94 % -- --   03/07/23 1700 139/71 -- -- 62 13 96 % -- --   03/07/23 1536 128/69 97.9 °F (36.6 °C) Oral 70 15 97 % 6' 1\" (1.854 m) 247 lb (112 kg)      Visit Vitals  /73   Pulse 67   Temp 97.9 °F (36.6 °C) (Oral)   Resp 16   Ht 6' 1\" (1.854 m)   Wt 247 lb (112 kg)   SpO2 94%   BMI 32.59 kg/m²        LDAs:   Peripheral IV 03/07/23 Right Antecubital (Active)   Site Assessment Clean, dry & intact 03/07/23 1649   Line Status Brisk blood return;Flushed;Specimen collected 03/07/23 1649   Phlebitis Assessment No symptoms 03/07/23 1649   Infiltration Assessment 0 03/07/23 1649   Dressing Status New dressing applied;Clean, dry & intact 03/07/23 1649   Dressing Type Transparent 03/07/23 1649   Dressing Intervention New 03/07/23 1649       Peripheral IV 03/07/23 Left;Proximal;Anterior Antecubital (Active)       Ambulatory Status:  Presents to emergency department  because of falls (Syncope, seizure, or loss of consciousness): No, Age > 79: Yes, Altered Mental Status, Intoxication with alcohol or substance confusion (Disorientation, impaired judgment, poor safety awaremess, or inability to follow instructions): No, Impaired Mobility: Ambulates or transfers with assistive devices or assistance;  Unable to ambulate or transer.: No, Nursing Judgement: No    Diagnosis:  DISPOSITION Admitted 03/07/2023 08:43:51 PM   Final diagnoses:   Acute appendicitis with generalized peritonitis without gangrene, unspecified whether abscess present, unspecified whether perforation present        Code Status: [unfilled]     Consults:  [x]  Hospitalist  Completed  [x] yes [] no  []  Medicine  Completed  [] yes [] No  [x]  Cardiology  Completed  [] yes [x] No  []  GI   Completed  [] yes [] No  []  Neurology  Completed  [] yes [] No  []  Nephrology Completed  [] yes [] No  []  Vascular  Completed  [] yes [] No   [x]  Surgery  Completed  [x] yes [] No   []  Urology  Completed  [] yes [] No   []  Plastics  Completed  [] yes [] No   []  ENT  Completed  [] yes [] No   []  Other:  Completed  [] yes [] No    Consults:  IP CONSULT TO GENERAL SURGERY  IP CONSULT TO HOSPITALIST  IP CONSULT TO CARDIOLOGY  IP CONSULT TO CARDIOLOGY  IP CONSULT TO [facility] Treatment Team:   Treatment Team: Attending Provider: Sabine Zuniga DO; Resident: Aliyah Gracia DO; Registered Nurse: Rakesh Pratt RN; Consulting Physician: Mathew Padilla MD; Consulting Physician: Akash Dominique MD; Consulting Physician: Shan Homans, MD    Treatment:  ED Course as of 03/07/23 2139   Tue Mar 07, 2023   1923 Called by [facility]. radiology. Patient does have acute appendicitis. Will discuss with general surgery team.  She does not see any free air. Does have a small amount of free fluid in the pelvis she states that could be an early abscess although she is unsure. Will discuss with general surgery team.  We will update patient that he will be admitted. [MA]   2059 Patient is going to room 423 awaiting cardiology to return page. Once I discussed with cardiology will call echo lab for stat echo. [MA]   2126 Cardiology paged back. They stated that echo will be performed in the morning. Cardiology updated. Patient to be admitted to them. We will update [facility] team. [MA]   2135 Cussed with general surgery team.  They plan for admission. They would like at a medicine admission. [facility] paged. They are excepting patient at this time. Cardiology paged for echo they will obtain echo in the morning for restratification for possible surgical intervention for acute appendicitis. Zosyn provided for IV antibiotic management for acute appendicitis at this time. Those were per recommendations by general surgery. They will follow patient. Patient admitted in vitally stable condition after remaining vitally stable throughout emergency department stay.  [MA]      ED Course User Index  [MA] Aliyah Garcia DO              Skin Assessment:        Pain Score:  Pain Assessment  Pain Assessment: 0-10  Pain Level: 10      SOCIAL HISTORY       Social History     Socioeconomic History    Marital status:      Spouse name: None    Number of children: None    Years of education: None    Highest education level: None   Tobacco Use    Smoking status: Every Day     Packs/day: 1.00     Years: 58.00     Pack years: 58.00     Types: Cigarettes     Start date: 1959     Last attempt to quit: 2014     Years since quittin.4    Smokeless tobacco: Never    Tobacco comments: There have been smokeless periods during lifetime   Substance and Sexual Activity    Alcohol use: Yes     Alcohol/week: 0.0 standard drinks     Comment: NO ALCOHOL SINCE 2018    Drug use: No     Social Determinants of Health     Financial Resource Strain: Low Risk     Difficulty of Paying Living Expenses: Not hard at all   Food Insecurity: No Food Insecurity    Worried About Running Out of Food in the Last Year: Never true    Aje of Food in the Last Year: Never true   Physical Activity: Inactive    Days of Exercise per Week: 0 days    Minutes of Exercise per Session: 0 min       FAMILY HISTORY       Family History   Problem Relation Age of Onset    Heart Disease Father     High Blood Pressure Father     Diabetes Father     Heart Disease Maternal Aunt     Heart Disease Maternal Uncle     Heart Disease Paternal Aunt     Heart Disease Paternal Uncle        ALLERGIES     Patient has no known allergies. CURRENT MEDICATIONS       Previous Medications    AMIODARONE (CORDARONE) 200 MG TABLET    Take 2 tablets PO BID x3 days then take 1 tablet PO BID thereafter    ASPIRIN 81 MG TABLET    Take 81 mg by mouth daily. ATORVASTATIN (LIPITOR) 40 MG TABLET    TAKE 1 TABLET DAILY    BLOOD GLUCOSE TEST STRIPS (ASCENSIA AUTODISC VI;ONE TOUCH ULTRA TEST VI) STRIP    1 each by In Vitro route 3 times daily Use with associated glucose meter.     ELIQUIS 5 MG TABS TABLET        ENTRESTO 24-26 MG PER TABLET    1 tablet once 1/ 2 tab in the PM    FUROSEMIDE (LASIX) 20 MG TABLET    TAKE 1 TABLET DAILY    METFORMIN (GLUCOPHAGE) 500 MG TABLET    TAKE 1 TABLET BY MOUTH TWICE A  DAY WITH MEALS MIDODRINE (PROAMATINE) 2.5 MG TABLET        MULTIPLE VITAMIN (MULTIVITAMIN) TABLET    Take 1 tablet by mouth daily    SPIRONOLACTONE (ALDACTONE) 25 MG TABLET    Take 25 mg by mouth daily    TOPROL XL 50 MG EXTENDED RELEASE TABLET    Take 50 mg by mouth daily      Orders Placed This Encounter   Medications    lactated ringers bolus    DISCONTD: Tetanus-Diphth-Acell Pertussis (BOOSTRIX) injection 0.5 mL    morphine injection 4 mg    ondansetron (ZOFRAN) injection 4 mg    iopamidol (ISOVUE-370) 76 % injection 75 mL    fentaNYL (SUBLIMAZE) injection 100 mcg    piperacillin-tazobactam (ZOSYN) 3,375 mg in sodium chloride 0.9 % 50 mL IVPB (mini-bag)     Order Specific Question:   Antimicrobial Indications     Answer:   Intra-Abdominal Infection    heparin (porcine) injection 8,960 Units    heparin (porcine) injection 8,960 Units    heparin (porcine) injection 4,480 Units    heparin 25,000 units in dextrose 5 % 250 mL infusion (rate based)    lactated ringers IV soln infusion       SURGICAL HISTORY       Past Surgical History:   Procedure Laterality Date    CARDIAC CATHETERIZATION      CARDIAC DEFIBRILLATOR PLACEMENT      CARDIAC DEFIBRILLATOR PLACEMENT Left 9/2014    COLONOSCOPY  3/2015    polyps, bx-serrated adenoma       PAST MEDICAL HISTORY       Past Medical History:   Diagnosis Date    Alcohol abuse 09/07/2014    Anxiety     CHF (congestive heart failure) (HCC)     Colon polyps     [facility]ch[facility]) [facility]     Diabetes mellitus ([facility]     Dupuytren contracture 2004    Hypertension     ICD (implantable cardioverter-defibrillator) battery depletion 09/15/2014    Ischemic cardiomyopathy     Obesity     SOB (shortness of breath)     Tendonitis, Achilles, left     Unstable angina (HCC) 09/07/2014    Ventricular tachyarrhythmia 08/30/2018       Labs:  Labs Reviewed   TOX SCR, BLD, ED - Abnormal; Notable for the following components:       Result Value    Acetaminophen Level <5 (*)     Salicylate Lvl <1 (*)     All other components within normal limits   CBC WITH AUTO DIFFERENTIAL - Abnormal; Notable for the following components:    WBC 14.8 (*)     MCH 34.4 (*)     Seg Neutrophils 83 (*)     Lymphocytes 9 (*)     Eosinophils % 0 (*)     Segs Absolute 12.21 (*)     All other components within normal limits   BASIC METABOLIC PANEL - Abnormal; Notable for the following components:    Glucose 142 (*)     Creatinine 1.31 (*)     Est, Glom Filt Rate 58 (*)     All other components within normal limits   HEPATIC FUNCTION PANEL - Abnormal; Notable for the following components:    Bilirubin, Direct 0.4 (*)     All other components within normal limits   TROPONIN - Abnormal; Notable for the following components:    Troponin, High Sensitivity 26 (*)     All other components within normal limits   CBC - Abnormal; Notable for the following components:    WBC 14.7 (*)     MCH 34.2 (*)     Platelets 921 (*)     All other components within normal limits   APTT   LACTATE, SEPSIS   APTT   APTT   TROPONIN   MAGNESIUM   LACTATE, SEPSIS   LACTATE, SEPSIS   LACTATE, SEPSIS   LACTATE, SEPSIS   LACTATE, SEPSIS   LACTATE, SEPSIS       Electronically signed by April Crabtree RN on 3/7/2023 at 9:39 PM       April Crabtree RN  03/07/23 7118

## 2023-03-08 NOTE — H&P
TRAUMA HISTORY AND PHYSICAL EXAMINATION  (V 2.0)    PATIENT NAME: Brinda Zamora  YOB: 1950  MEDICAL RECORD NO. [id number]   DATE: 3/8/2023  PRIMARY CARE PHYSICIAN: Irma Abreu MD  PATIENT EVALUATED AT THE REQUEST OF :   Priscilla BILL    IMPRESSION:     Patient Active Problem List   Diagnosis    Alcohol abuse    Smoking    COPD with acute exacerbation ([address]    Morbid obesity, unspecified obesity type ([address]    Shortness of breath    Ischemic cardiomyopathy    SOB (shortness of breath)    Anxiety    Hoarseness    Colon polyps    Back pain    Essential hypertension    Type 2 diabetes mellitus without complication, without long-term current use of insulin ([facility])    Ventricular tachycardia    MADISON (acute kidney injury) ([address]    ICD (implantable cardioverter-defibrillator) discharge    Chronic systolic (congestive) heart failure    Acute appendicitis    ICD (implantable cardioverter-defibrillator) in place    History of ventricular tachycardia    Left ventricular apical thrombus following MI ([address]    Anticoagulated    JAYCE (obstructive sleep apnea)    CKD (chronic kidney disease) stage 3, GFR 30-59 ml/min ([facility])    Elevated troponin     Telma CrisHarman Cramer is a 67year old male with past medical history of has a past medical history of Alcohol abuse, Anxiety, CHF (congestive heart failure) ([address] Colon polyps, COPD (chronic obstructive pulmonary disease) ([address] Diabetes mellitus ([address] Dupuytren contracture, Hypertension, ICD (implantable cardioverter-defibrillator) battery depletion, Ischemic cardiomyopathy, Obesity, SOB (shortness of breath), Tendonitis, Achilles, left, Unstable angina ([address] and Ventricular tachyarrhythmia. Presented for right-sided abdominal pain that began after straining to have a bowel movement. Was found to have acute appendicitis.  Of note, patient has extensive cardiac history including multivessel coronary artery disease, ICD/pacemaker, and a left ventricular mural thrombus. Is on Eliquis daily, which he took this am.     Patient was taken for appendectomy on 3/8/23. While in recovery post-appendectomy, was extubated, had several bouts of Vtach, shocked by his own ICD 4 times, was given lidocaine, magnesium and amiodarone. Had a decline in mental status so was intubated. Patient is currently stable, no longer in [facility]. Awaiting cardiology consult in the ICU. MEDICAL DECISION MAKING AND PLAN:     Neuro:   -Intubated, Sedated with propofol 20 mcg/kg/min, intubated - settings as below. -Morphine for pain control 4mg q4  -Tylenol 1000 q8  -Wean sedation as tolerated    CV:  -History of CHF, apical thrombus, is currently on Eliquis at home. Anticoagulation held for OR. Patient had episode of V. tach in the PACU, defibrillator went off approximately 4 times. Cardiology consulted, started on amiodarone drip. Initially required Levophed however titrated off currently. Pending echocardiogram, troponin. Hold off on anticoagulation until cardiology evaluation recommendations. Heme  Currently on home Eliquis for cardiac thrombus, started on heparin while hospitalized, held for surgery. Patient had a hemoglobin of 14.4, hematocrit 42.7. Pulm:  -History of COPD, DuoNebs every 4 scheduled  -Intubated: FiO2 60% PEEP of 8, Vt Set 550, Rate 16. Renal  -Lytes WNL creatinine at 1.23, GFR greater than 60  -UOP: 900 cc, jason catheter in    GI/FEN:  - cc an hour continuous, n.p.o.    Musculoskeletal   DAYDAY    Endo  Glucose 140-160  HDSSI     ID: Patient is afebrile, white blood cell count 18, trending up. In the setting of perforated appendicitis in the OR, will continue the patient on Zosyn. Lactate is normal, will continue to trend.     Lines: Bilateral PIV left radial art line, ET tube, Jason catheter R subclavian CVC    Dispo: ICU    CONSULT SERVICES    [] Neurosurgery     [] Orthopedic Surgery    [] Cardiothoracic     [] Facial Trauma    [] Plastic Surgery (Burn)    [] Pediatric Surgery     [] Internal Medicine    [] Pulmonary Medicine    [x] Other: Cardiology       HISTORY:     SOURCE OF INFORMATION  Patient information was obtained from General Surgeon, Dr. Jocelyn Turner. History/Exam limitations: Intubated post-appendectomy. INJURY SUMMARY    Brought to ICU s/p appendectomy for his bouts of Vtach in the recovery area post surgery. GENERAL DATA  Age 67 y.o.  male   Patient information was obtained from General Surgery Team post appendectomy. History/Exam limitations: patient sedated and inbubated. MEDICATIONS:     Prior to Admission medications    Medication Sig Start Date End Date Taking? Authorizing Provider   metFORMIN (GLUCOPHAGE) 500 MG tablet TAKE 1 TABLET BY MOUTH TWICE A  DAY WITH MEALS 2/14/23   Yuri Abdi MD   ELIQUIS 5 MG TABS tablet  12/12/22   Historical Provider, MD   midodrine (PROAMATINE) 2.5 MG tablet  5/19/22   Historical Provider, MD   blood glucose test strips (ASCENSIA AUTODISC VI;ONE TOUCH ULTRA TEST VI) strip 1 each by In Vitro route 3 times daily Use with associated glucose meter. 7/8/22   Yuri Abdi MD   furosemide (LASIX) 20 MG tablet TAKE 1 TABLET DAILY 1/4/22   BRENDAN Little CNP   ENTRESTO 24-26 MG per tablet 1 tablet once 1/ 2 tab in the PM 8/22/19   Historical Provider, MD   TOPROL XL 50 MG extended release tablet Take 50 mg by mouth daily  8/20/19   Historical Provider, MD   spironolactone (ALDACTONE) 25 MG tablet Take 25 mg by mouth daily    Historical Provider, MD   atorvastatin (LIPITOR) 40 MG tablet TAKE 1 TABLET DAILY 7/22/19   BRENDAN Little CNP   amiodarone (CORDARONE) 200 MG tablet Take 2 tablets PO BID x3 days then take 1 tablet PO BID thereafter 12/10/18   Cindy Vincent MD   Multiple Vitamin (MULTIVITAMIN) tablet Take 1 tablet by mouth daily 12/11/18   Ishmael Hall MD   aspirin 81 MG tablet Take 81 mg by mouth daily.     Historical Provider, MD       ALLERGIES:     Patient has no known allergies. PAST MEDICAL HISTORY:    has a past medical history of Alcohol abuse, Anxiety, CHF (congestive heart failure) ([address] Colon polyps, COPD (chronic obstructive pulmonary disease) ([address] Diabetes mellitus ([address] Dupuytren contracture, Hypertension, ICD (implantable cardioverter-defibrillator) battery depletion, Ischemic cardiomyopathy, Obesity, SOB (shortness of breath), Tendonitis, Achilles, left, Unstable angina ([address] and Ventricular tachyarrhythmia. has a past surgical history that includes Cardiac defibrillator placement; Cardiac catheterization; Cardiac defibrillator placement (Left, 09/01/2014); Colonoscopy (03/01/2015); and laparoscopic appendectomy (03/08/2023). FAMILY HISTORY     family history includes Diabetes in his father; Heart Disease in his father, maternal aunt, maternal uncle, paternal aunt, and paternal uncle; High Blood Pressure in his father. SOCIAL HISTORY      reports that he has been smoking cigarettes. He started smoking about 64 years ago. He has a 58.00 pack-year smoking history. He has never used smokeless tobacco.   reports current alcohol use. reports no history of drug use. PERTINENT SYSTEMIC REVIEW:  [x]   Information not available due to exam limitations documented above    PHYSICAL EXAMINATION:     PHYSICAL EXAMINATION  VITAL SIGNS:   Vitals:    03/08/23 1145   BP: 116/74   Pulse: 68   Resp: 22   Temp: 98.2 °F (36.8 °C)   SpO2: 91%       General Appearance: Intubated, sedated, GCS 10  Skin: warm and dry, no rash or erythema  Head: normocephalic and atraumatic  Eyes: pupils equal, round, and reactive to light, extraocular eye movements intact, conjunctivae normal  Neck: supple and non-tender without mass, no thyromegaly or thyroid nodules, no cervical lymphadenopathy, right subclavian CVC in place.   Pulmonary/Chest: clear to auscultation bilaterally- no wheezes, rales or rhonchi, normal air movement, no respiratory distress  Cardiovascular: normal rate, regular rhythm, normal S1 and S2, not currently in Vtach  Abdomen: soft, non-tender, non-distended, normal bowel sounds, no masses or organomegaly, lap sutures in place s/p appendectomy, no noted signs of infection. Extremities: no cyanosis, clubbing or edema  Neurologic: reflexes normal and symmetric         RADIOLOGY  PLAIN FILMS  Chest xray:  1. Cardiomegaly. 2. No acute cardiopulmonary findings. CT ABDOMEN PELVIS W IV CONTRAST Additional Contrast/None     Result Date: 3/7/2023  1. Acute appendicitis. Small amount of free fluid in the right lower quadrant/pericolic gutter with a more focal area of loculation measuring 2.4 x 1.7 cm which could reflect an early abscess or phlegmon. No free air. 2. Gallbladder sludge versus cholelithiasis. 3. There is a subtle filling defect within the proximal SMA however felt to be related to artifact as this is not reproduced on the sagittal or coronal reconstructions (for example image 67). Nonocclusive thrombus is felt to be less likely. Results regarding acute appendicitis were called by Dr. Robinson Sy. Antonella Tomas MD to Dr. Steve Guerrero on 3/7/2023 at 19:28.      Labs Reviewed   TOX SCR, BLD, ED - Abnormal; Notable for the following components:       Result Value    Acetaminophen Level <5 (*)     Salicylate Lvl <1 (*)     All other components within normal limits   CBC WITH AUTO DIFFERENTIAL - Abnormal; Notable for the following components:    WBC 14.8 (*)     MCH 34.4 (*)     Seg Neutrophils 83 (*)     Lymphocytes 9 (*)     Eosinophils % 0 (*)     Segs Absolute 12.21 (*)     All other components within normal limits   BASIC METABOLIC PANEL - Abnormal; Notable for the following components:    Glucose 142 (*)     Creatinine 1.31 (*)     Est, Glom Filt Rate 58 (*)     All other components within normal limits   HEPATIC FUNCTION PANEL - Abnormal; Notable for the following components:    Bilirubin, Direct 0.4 (*)     All other components within normal limits   TROPONIN - Abnormal; Notable for the following components:    Troponin, High Sensitivity 26 (*)     All other components within normal limits   CBC - Abnormal; Notable for the following components:    WBC 14.7 (*)     MCH 34.2 (*)     Platelets 434 (*)     All other components within normal limits   TROPONIN - Abnormal; Notable for the following components:    Troponin, High Sensitivity 32 (*)     All other components within normal limits   CBC WITH AUTO DIFFERENTIAL - Abnormal; Notable for the following components:    WBC 16.7 (*)     RBC 4.13 (*)     MCH 34.1 (*)     Seg Neutrophils 91 (*)     Lymphocytes 5 (*)     Eosinophils % 0 (*)     Immature Granulocytes 1 (*)     Segs Absolute 15.16 (*)     Absolute Lymph # 0.83 (*)     All other components within normal limits   BASIC METABOLIC PANEL W/ REFLEX TO MG FOR LOW K - Abnormal; Notable for the following components:    Glucose 169 (*)     Creatinine 1.23 (*)     All other components within normal limits   PROTIME-INR - Abnormal; Notable for the following components:    Protime 13.1 (*)     All other components within normal limits   APTT - Abnormal; Notable for the following components:    PTT >120.0 (*)     All other components within normal limits   IMMATURE PLATELET FRACTION - Abnormal; Notable for the following components:    Platelet, Fluorescence 122 (*)     All other components within normal limits   POC GLUCOSE FINGERSTICK - Abnormal; Notable for the following components:    POC Glucose 165 (*)     All other components within normal limits   POC GLUCOSE FINGERSTICK - Abnormal; Notable for the following components:    POC Glucose 167 (*)     All other components within normal limits   POC GLUCOSE FINGERSTICK - Abnormal; Notable for the following components:    POC Glucose 152 (*)     All other components within normal limits   POC GLUCOSE FINGERSTICK - Abnormal; Notable for the following components:    POC Glucose 150 (*)     All other components within normal limits   CULTURE, URINE   APTT   MAGNESIUM   LACTATE, SEPSIS   LACTATE, SEPSIS   LACTATE, SEPSIS   LACTATE, SEPSIS   LACTATE, SEPSIS   SURGICAL PATHOLOGY   POCT GLUCOSE   POCT GLUCOSE   POCT GLUCOSE   POCT GLUCOSE   POCT GLUCOSE   POCT GLUCOSE   POCT GLUCOSE           Glenn Kendrick MD  3/8/23, 3:07 PM               Trauma Attending Attestation      I have reviewed the above GCS note(s) and confirmed the key elements of the medical history and physical exam. I have seen and examined the pt. I have discussed the findings, established the care plan and recommendations with Resident.   Critical care min: [address], DO  3/8/2023  5:58 PM

## 2023-03-08 NOTE — PROCEDURES
Surgery Attending      Procedure: Emergent placement of R subclavian triple lumen catheter. Anesthesia: under general anesthesia    Details    The R clavicular region was prepped and draped in the usual sterile fashion. The introducer needle was used to access the L subclavian vein. Dark non-pulsatile blood was obtained. Wire was passed. Seldinger technique was used to insert cathter into the vein. Ports were flushed and locked with saline. Patient tolerated the procedure well.     Melani Malhotra MD

## 2023-03-09 ENCOUNTER — APPOINTMENT (OUTPATIENT)
Dept: GENERAL RADIOLOGY | Age: 73
End: 2023-03-09
Payer: MEDICARE

## 2023-03-09 LAB
ABSOLUTE EOS #: <0.03 K/UL (ref 0–0.44)
ABSOLUTE IMMATURE GRANULOCYTE: 0.1 K/UL (ref 0–0.3)
ABSOLUTE LYMPH #: 0.75 K/UL (ref 1.1–3.7)
ABSOLUTE MONO #: 0.55 K/UL (ref 0.1–1.2)
ANION GAP SERPL CALCULATED.3IONS-SCNC: 11 MMOL/L (ref 9–17)
BASOPHILS # BLD: 0 % (ref 0–2)
BASOPHILS ABSOLUTE: 0.04 K/UL (ref 0–0.2)
BUN SERPL-MCNC: 28 MG/DL (ref 8–23)
CA-I BLD-SCNC: 1.1 MMOL/L (ref 1.13–1.33)
CALCIUM SERPL-MCNC: 8.5 MG/DL (ref 8.6–10.4)
CHLORIDE SERPL-SCNC: 98 MMOL/L (ref 98–107)
CO2 SERPL-SCNC: 22 MMOL/L (ref 20–31)
CREAT SERPL-MCNC: 1.55 MG/DL (ref 0.7–1.2)
CREATININE URINE: 156.6 MG/DL (ref 39–259)
EKG ATRIAL RATE: 49 BPM
EKG P-R INTERVAL: 254 MS
EKG Q-T INTERVAL: 546 MS
EKG QRS DURATION: 122 MS
EKG QTC CALCULATION (BAZETT): 493 MS
EKG R AXIS: 12 DEGREES
EKG T AXIS: 14 DEGREES
EKG VENTRICULAR RATE: 49 BPM
EOSINOPHILS RELATIVE PERCENT: 0 % (ref 1–4)
GFR SERPL CREATININE-BSD FRML MDRD: 47 ML/MIN/1.73M2
GLUCOSE BLD-MCNC: 148 MG/DL (ref 75–110)
GLUCOSE BLD-MCNC: 164 MG/DL (ref 75–110)
GLUCOSE BLD-MCNC: 165 MG/DL (ref 74–100)
GLUCOSE BLD-MCNC: 165 MG/DL (ref 75–110)
GLUCOSE BLD-MCNC: 173 MG/DL (ref 75–110)
GLUCOSE BLD-MCNC: 192 MG/DL (ref 75–110)
GLUCOSE BLD-MCNC: 227 MG/DL (ref 75–110)
GLUCOSE SERPL-MCNC: 184 MG/DL (ref 70–99)
HCT VFR BLD AUTO: 40.9 % (ref 40.7–50.3)
HGB BLD-MCNC: 13.4 G/DL (ref 13–17)
IMMATURE GRANULOCYTES: 1 %
LYMPHOCYTES # BLD: 4 % (ref 24–43)
MCH RBC QN AUTO: 34.3 PG (ref 25.2–33.5)
MCHC RBC AUTO-ENTMCNC: 32.8 G/DL (ref 28.4–34.8)
MCV RBC AUTO: 104.6 FL (ref 82.6–102.9)
MICROORGANISM SPEC CULT: NO GROWTH
MONOCYTES # BLD: 3 % (ref 3–12)
NEGATIVE BASE EXCESS, ART: 1 (ref 0–2)
NRBC AUTOMATED: 0 PER 100 WBC
PDW BLD-RTO: 13.1 % (ref 11.8–14.4)
PLATELET # BLD AUTO: ABNORMAL K/UL (ref 138–453)
PLATELET, FLUORESCENCE: 132 K/UL (ref 138–453)
PLATELET, IMMATURE FRACTION: 6.1 % (ref 1.1–10.3)
POC HCO3: 24.3 MMOL/L (ref 21–28)
POC LACTIC ACID: 1.32 MMOL/L (ref 0.56–1.39)
POC O2 SATURATION: 98 % (ref 94–98)
POC PCO2: 43.4 MM HG (ref 35–48)
POC PH: 7.36 (ref 7.35–7.45)
POC PO2: 114.9 MM HG (ref 83–108)
POTASSIUM SERPL-SCNC: 4.9 MMOL/L (ref 3.7–5.3)
RBC # BLD: 3.91 M/UL (ref 4.21–5.77)
RBC # BLD: ABNORMAL 10*6/UL
SEG NEUTROPHILS: 92 % (ref 36–65)
SEGMENTED NEUTROPHILS ABSOLUTE COUNT: 15.49 K/UL (ref 1.5–8.1)
SODIUM SERPL-SCNC: 131 MMOL/L (ref 135–144)
SODIUM,UR: 32 MMOL/L
SPECIMEN DESCRIPTION: NORMAL
TROPONIN I SERPL DL<=0.01 NG/ML-MCNC: 74 NG/L (ref 0–22)
WBC # BLD AUTO: 16.9 K/UL (ref 3.5–11.3)

## 2023-03-09 PROCEDURE — 97110 THERAPEUTIC EXERCISES: CPT

## 2023-03-09 PROCEDURE — 82803 BLOOD GASES ANY COMBINATION: CPT

## 2023-03-09 PROCEDURE — 2500000003 HC RX 250 WO HCPCS: Performed by: STUDENT IN AN ORGANIZED HEALTH CARE EDUCATION/TRAINING PROGRAM

## 2023-03-09 PROCEDURE — 80048 BASIC METABOLIC PNL TOTAL CA: CPT

## 2023-03-09 PROCEDURE — 6360000002 HC RX W HCPCS: Performed by: NURSE PRACTITIONER

## 2023-03-09 PROCEDURE — 6370000000 HC RX 637 (ALT 250 FOR IP): Performed by: NURSE PRACTITIONER

## 2023-03-09 PROCEDURE — 2580000003 HC RX 258: Performed by: STUDENT IN AN ORGANIZED HEALTH CARE EDUCATION/TRAINING PROGRAM

## 2023-03-09 PROCEDURE — 97162 PT EVAL MOD COMPLEX 30 MIN: CPT

## 2023-03-09 PROCEDURE — 84484 ASSAY OF TROPONIN QUANT: CPT

## 2023-03-09 PROCEDURE — 94640 AIRWAY INHALATION TREATMENT: CPT

## 2023-03-09 PROCEDURE — 2580000003 HC RX 258: Performed by: NURSE PRACTITIONER

## 2023-03-09 PROCEDURE — 82570 ASSAY OF URINE CREATININE: CPT

## 2023-03-09 PROCEDURE — 2000000000 HC ICU R&B

## 2023-03-09 PROCEDURE — 93005 ELECTROCARDIOGRAM TRACING: CPT | Performed by: STUDENT IN AN ORGANIZED HEALTH CARE EDUCATION/TRAINING PROGRAM

## 2023-03-09 PROCEDURE — 6360000002 HC RX W HCPCS: Performed by: STUDENT IN AN ORGANIZED HEALTH CARE EDUCATION/TRAINING PROGRAM

## 2023-03-09 PROCEDURE — 71045 X-RAY EXAM CHEST 1 VIEW: CPT

## 2023-03-09 PROCEDURE — 97530 THERAPEUTIC ACTIVITIES: CPT

## 2023-03-09 PROCEDURE — 51798 US URINE CAPACITY MEASURE: CPT

## 2023-03-09 PROCEDURE — 36415 COLL VENOUS BLD VENIPUNCTURE: CPT

## 2023-03-09 PROCEDURE — 83605 ASSAY OF LACTIC ACID: CPT

## 2023-03-09 PROCEDURE — 93010 ELECTROCARDIOGRAM REPORT: CPT | Performed by: INTERNAL MEDICINE

## 2023-03-09 PROCEDURE — 94003 VENT MGMT INPAT SUBQ DAY: CPT

## 2023-03-09 PROCEDURE — 6370000000 HC RX 637 (ALT 250 FOR IP): Performed by: STUDENT IN AN ORGANIZED HEALTH CARE EDUCATION/TRAINING PROGRAM

## 2023-03-09 PROCEDURE — 2700000000 HC OXYGEN THERAPY PER DAY

## 2023-03-09 PROCEDURE — 82947 ASSAY GLUCOSE BLOOD QUANT: CPT

## 2023-03-09 PROCEDURE — 37799 UNLISTED PX VASCULAR SURGERY: CPT

## 2023-03-09 PROCEDURE — 84300 ASSAY OF URINE SODIUM: CPT

## 2023-03-09 PROCEDURE — 85055 RETICULATED PLATELET ASSAY: CPT

## 2023-03-09 PROCEDURE — 85025 COMPLETE CBC W/AUTO DIFF WBC: CPT

## 2023-03-09 PROCEDURE — 82330 ASSAY OF CALCIUM: CPT

## 2023-03-09 PROCEDURE — 94761 N-INVAS EAR/PLS OXIMETRY MLT: CPT

## 2023-03-09 RX ORDER — MIDODRINE HYDROCHLORIDE 5 MG/1
5 TABLET ORAL
Status: DISCONTINUED | OUTPATIENT
Start: 2023-03-09 | End: 2023-03-11 | Stop reason: HOSPADM

## 2023-03-09 RX ORDER — OXYCODONE HYDROCHLORIDE 5 MG/1
5 TABLET ORAL
Status: DISCONTINUED | OUTPATIENT
Start: 2023-03-09 | End: 2023-03-10

## 2023-03-09 RX ORDER — MIDODRINE HYDROCHLORIDE 5 MG/1
2.5 TABLET ORAL ONCE
Status: COMPLETED | OUTPATIENT
Start: 2023-03-09 | End: 2023-03-09

## 2023-03-09 RX ORDER — OXYCODONE HYDROCHLORIDE 5 MG/1
5 TABLET ORAL EVERY 6 HOURS PRN
Status: DISCONTINUED | OUTPATIENT
Start: 2023-03-09 | End: 2023-03-09

## 2023-03-09 RX ORDER — INSULIN LISPRO 100 [IU]/ML
0-16 INJECTION, SOLUTION INTRAVENOUS; SUBCUTANEOUS
Status: DISCONTINUED | OUTPATIENT
Start: 2023-03-09 | End: 2023-03-11 | Stop reason: HOSPADM

## 2023-03-09 RX ORDER — SODIUM CHLORIDE, SODIUM LACTATE, POTASSIUM CHLORIDE, AND CALCIUM CHLORIDE .6; .31; .03; .02 G/100ML; G/100ML; G/100ML; G/100ML
500 INJECTION, SOLUTION INTRAVENOUS ONCE
Status: COMPLETED | OUTPATIENT
Start: 2023-03-09 | End: 2023-03-09

## 2023-03-09 RX ADMIN — ACETAMINOPHEN 1000 MG: 500 TABLET ORAL at 21:08

## 2023-03-09 RX ADMIN — VASOPRESSIN 0.04 UNITS/MIN: 20 INJECTION INTRAVENOUS at 07:25

## 2023-03-09 RX ADMIN — APIXABAN 5 MG: 5 TABLET, FILM COATED ORAL at 10:54

## 2023-03-09 RX ADMIN — IPRATROPIUM BROMIDE AND ALBUTEROL SULFATE 1 AMPULE: 2.5; .5 SOLUTION RESPIRATORY (INHALATION) at 16:00

## 2023-03-09 RX ADMIN — ASPIRIN 81 MG: 81 TABLET, CHEWABLE ORAL at 08:56

## 2023-03-09 RX ADMIN — ACETAMINOPHEN 1000 MG: 500 TABLET ORAL at 04:59

## 2023-03-09 RX ADMIN — IPRATROPIUM BROMIDE AND ALBUTEROL SULFATE 1 AMPULE: 2.5; .5 SOLUTION RESPIRATORY (INHALATION) at 23:27

## 2023-03-09 RX ADMIN — SODIUM CHLORIDE, POTASSIUM CHLORIDE, SODIUM LACTATE AND CALCIUM CHLORIDE 500 ML: 600; 310; 30; 20 INJECTION, SOLUTION INTRAVENOUS at 14:40

## 2023-03-09 RX ADMIN — SODIUM CHLORIDE, PRESERVATIVE FREE 10 ML: 5 INJECTION INTRAVENOUS at 07:49

## 2023-03-09 RX ADMIN — MIDODRINE HYDROCHLORIDE 5 MG: 5 TABLET ORAL at 17:09

## 2023-03-09 RX ADMIN — PIPERACILLIN AND TAZOBACTAM 3375 MG: 3; .375 INJECTION, POWDER, LYOPHILIZED, FOR SOLUTION INTRAVENOUS at 21:13

## 2023-03-09 RX ADMIN — INSULIN LISPRO 4 UNITS: 100 INJECTION, SOLUTION INTRAVENOUS; SUBCUTANEOUS at 00:54

## 2023-03-09 RX ADMIN — PIPERACILLIN AND TAZOBACTAM 3375 MG: 3; .375 INJECTION, POWDER, LYOPHILIZED, FOR SOLUTION INTRAVENOUS at 04:44

## 2023-03-09 RX ADMIN — PROPOFOL 10 MCG/KG/MIN: 10 INJECTION, EMULSION INTRAVENOUS at 06:16

## 2023-03-09 RX ADMIN — FAMOTIDINE 20 MG: 20 TABLET, FILM COATED ORAL at 08:56

## 2023-03-09 RX ADMIN — IPRATROPIUM BROMIDE AND ALBUTEROL SULFATE 1 AMPULE: 2.5; .5 SOLUTION RESPIRATORY (INHALATION) at 20:27

## 2023-03-09 RX ADMIN — DESMOPRESSIN ACETATE 40 MG: 0.2 TABLET ORAL at 08:56

## 2023-03-09 RX ADMIN — ACETAMINOPHEN 1000 MG: 500 TABLET ORAL at 14:25

## 2023-03-09 RX ADMIN — APIXABAN 5 MG: 5 TABLET, FILM COATED ORAL at 21:08

## 2023-03-09 RX ADMIN — IPRATROPIUM BROMIDE AND ALBUTEROL SULFATE 1 AMPULE: 2.5; .5 SOLUTION RESPIRATORY (INHALATION) at 11:18

## 2023-03-09 RX ADMIN — SODIUM CHLORIDE, PRESERVATIVE FREE 10 ML: 5 INJECTION INTRAVENOUS at 21:08

## 2023-03-09 RX ADMIN — IPRATROPIUM BROMIDE AND ALBUTEROL SULFATE 1 AMPULE: 2.5; .5 SOLUTION RESPIRATORY (INHALATION) at 04:07

## 2023-03-09 RX ADMIN — MIDODRINE HYDROCHLORIDE 2.5 MG: 2.5 TABLET ORAL at 11:59

## 2023-03-09 RX ADMIN — IPRATROPIUM BROMIDE AND ALBUTEROL SULFATE 1 AMPULE: 2.5; .5 SOLUTION RESPIRATORY (INHALATION) at 08:09

## 2023-03-09 RX ADMIN — IPRATROPIUM BROMIDE AND ALBUTEROL SULFATE 1 AMPULE: 2.5; .5 SOLUTION RESPIRATORY (INHALATION) at 00:30

## 2023-03-09 RX ADMIN — MIDODRINE HYDROCHLORIDE 2.5 MG: 2.5 TABLET ORAL at 08:56

## 2023-03-09 RX ADMIN — MIDODRINE HYDROCHLORIDE 2.5 MG: 5 TABLET ORAL at 15:15

## 2023-03-09 RX ADMIN — AMIODARONE HYDROCHLORIDE 0.5 MG/MIN: 50 INJECTION, SOLUTION INTRAVENOUS at 14:23

## 2023-03-09 RX ADMIN — PIPERACILLIN AND TAZOBACTAM 3375 MG: 3; .375 INJECTION, POWDER, LYOPHILIZED, FOR SOLUTION INTRAVENOUS at 12:55

## 2023-03-09 ASSESSMENT — PAIN SCALES - GENERAL: PAINLEVEL_OUTOF10: 2

## 2023-03-09 ASSESSMENT — PULMONARY FUNCTION TESTS
PIF_VALUE: 22
PIF_VALUE: 21
PIF_VALUE: 12
PIF_VALUE: 11
PIF_VALUE: 21
PIF_VALUE: 21
PIF_VALUE: 20
PIF_VALUE: 21
PIF_VALUE: 20

## 2023-03-09 NOTE — CARE COORDINATION
Case Management Assessment  Initial Evaluation    Date/Time of Evaluation: 3/9/2023 10:17 AM  Assessment Completed by: Luba Escudero RN    If patient is discharged prior to next notation, then this note serves as note for discharge by case management. Patient Name: Marisa Almaraz                   YOB: 1950  Diagnosis: Acute appendicitis [K35.80]  Acute appendicitis with generalized peritonitis without gangrene, unspecified whether abscess present, unspecified whether perforation present [K35.20]                   Date / Time: 3/7/2023  4:31 PM    Patient Admission Status: Inpatient   Readmission Risk (Low < 19, Mod (19-27), High > 27): Readmission Risk Score: 14.7    Current PCP: Amanda Lomeli MD  PCP verified by CM? (P) Yes    Chart Reviewed: Yes      History Provided by: (P) Patient  Patient Orientation: (P) Alert and Oriented    Patient Cognition: (P) Alert    Hospitalization in the last 30 days (Readmission):  No    If yes, Readmission Assessment in  Navigator will be completed. Advance Directives:      Code Status: Full Code   Patient's Primary Decision Maker is: (P) Legal Next of Kin    Primary Decision Maker: Kendal Burk - Child - [phone]    Secondary Decision Maker: Ernestina Allan - Spouse - [phone]    Discharge Planning:    Patient lives with: (P) Spouse/Significant Other Type of Home: (P) House  Primary Care Giver: (P) Self  Patient Support Systems include: (P) Spouse/Significant Other, Children   Current Financial resources: (P) Medicare  Current community resources:    Current services prior to admission: (P) Durable Medical Equipment            Current DME: (P) Other (Comment) (Pt states that he has a walker and cane that he uses on occasion, a hospital bed he purchased, and a shower chair.  He also states that there are grab bars in the shower and around the toilet.)            Type of Home Care services:  [facility] [facility]  Prior functional level: (P) Independent in ADLs/IADLs  Current functional level: (P) Assistance with the following:, Housework, Shopping, Cooking, Bathing, Dressing, Toileting    PT AM-PAC:   /24  OT AM-PAC:   /24    Family can provide assistance at DC: (P) Yes (Family, if needed.)  Would you like Case Management to discuss the discharge plan with any other family members/significant others, and if so, who? (P) Yes (Family, if needed.)  Plans to Return to Present Housing: (P) Yes  Other Identified Issues/Barriers to RETURNING to current housing: None noted at this time. Potential Assistance needed at discharge: (P) Other (Comment) (Writer offered Delilah Hewitt, pt declined.)            Potential DME:    Patient expects to discharge to: (P) [address] [place] [address] for transportation at discharge: (P) Other (see comment) (Pt states family likely; however, he may need transportation assistance as he hasn't discussed it yet.)    Financial    Payor: Dora Coates / Plan: [place] / Product Type: *No Product type* /     Does insurance require precert for SNF: Yes    Potential assistance Purchasing Medications: (P) No  Meds-to-Beds request: Yes      [facility] [address] [facility][id number] - [address] [phone]  [address] [place] [address]  Phone: [phone] Fax: [phone]    [facility] ([facility] ) - Maris Johnston 3 [phone] Arthur Garrido [phone]  [facility] [address] [place] [address]  Phone: [phone] Fax: [phone]      Notes:    Factors facilitating achievement of predicted outcomes: Cooperative and Pleasant    Barriers to discharge: Medical complications    Additional Case Management Notes: Pt's goal is home, declines Delilah Hewitt, may need transportation assistance.     The Plan for Transition of Care is related to the following treatment goals of Acute appendicitis [K35.80]  Acute appendicitis with generalized peritonitis without gangrene, unspecified whether abscess present, unspecified whether perforation present [K35.20]    IF APPLICABLE: The Patient and/or patient representative Librado and his family were provided with a choice of provider and agrees with the discharge plan. Freedom of choice list with basic dialogue that supports the patient's individualized plan of care/goals and shares the quality data associated with the providers was provided to:     Patient Representative Name:       The Patient and/or Patient Representative Agree with the Discharge Plan?  Yes    Nancy Deleon RN  Case Management Department  Ph: [phone] Fax: [phone]

## 2023-03-09 NOTE — ADDENDUM NOTE
Addendum  created 03/09/23 1321 by BRENDAN Boo - CRNA    [facility] properties accepted

## 2023-03-09 NOTE — PROGRESS NOTES
Physical Therapy Cancel Note      DATE: 3/9/2023    NAME: Talha Bustillo  MRN: [id number]   : 1950      Patient not seen this date for Physical Therapy due to:     Other: pt weaning with plan to extubate very shortly if he tolerates; check back later as time allows      Electronically signed by Shanon Ragland PT on 3/9/2023 at 8:15 AM

## 2023-03-09 NOTE — PROGRESS NOTES
Physical Therapy  Facility/Department: [facility] CAR 1- SICU  Physical Therapy Initial Assessment    Name: Darwin Plata  :   MRN: [id number]  Date of Service: 3/9/2023  Darwin Plata is a 67 y.o. male with chronic ICMP/ SHF (ef 20-25%) with history of chronic occluded LAD with collaterals,  s/p ICD,  DM II, CKD 3, tobacco abuse ongoing who presents with Abdominal Pain and Abdominal Cramping (Right side and is admitted to the hospital for the management of Acute appendicitis. Patient describes progressive worsening right lower quadrant abdominal pain over the past 48 hours. Initially woke up yesterday morning with vague abdominal pain, anorexia. Status post bowel movement around 1 or 2 PM yesterday with acute worsening right lower quadrant abdominal pain, stabbing pressure-like quality. Denies radiation. Symptoms waxed and waned with episodes lasting approximately 1 to 2 hours. S/p 4-5 episodes overnight with more severe pain prior to arrival.  + Chills. Pt underwent APPENDECTOMY LAPAROSCOPIC 3/8/23; extubated AM 3/9/23. Discharge Recommendations:  Further therapy recommended at discharge. PT Equipment Recommendations  Equipment Needed: No (pt has equipment at home)      Patient Diagnosis(es): The primary encounter diagnosis was Acute appendicitis with generalized peritonitis without gangrene, unspecified whether abscess present, unspecified whether perforation present. A diagnosis of Appendicitis, unspecified appendicitis type was also pertinent to this visit.   Past Medical History:  has a past medical history of Alcohol abuse, Anxiety, CHF (congestive heart failure) ([address] Colon polyps, COPD (chronic obstructive pulmonary disease) ([address] Diabetes mellitus ([address] Dupuytren contracture, Hypertension, ICD (implantable cardioverter-defibrillator) battery depletion, Ischemic cardiomyopathy, Obesity, SOB (shortness of breath), Tendonitis, Achilles, left, Unstable angina ([address] and Ventricular tachyarrhythmia. Past Surgical History:  has a past surgical history that includes Cardiac defibrillator placement; Cardiac catheterization; Cardiac defibrillator placement (Left, 09/01/2014); Colonoscopy (03/01/2015); and laparoscopic appendectomy (03/08/2023). Assessment   Pt hypotensive with mild light headedness; unsteady on his feet, used rwalker (doesn't use device at baseline); cares for his wife (per pt, he's her primary caregiver). Pt likely OK for home dc if family able to assist prn. Body Structures, Functions, Activity Limitations Requiring Skilled Therapeutic Intervention: Decreased functional mobility ; Decreased tolerance to work activity; Decreased endurance;Decreased balance  Therapy Prognosis: Good  Decision Making: Medium Complexity  Requires PT Follow-Up: Yes  Activity Tolerance  Activity Tolerance: Patient limited by endurance     Plan   Physcial Therapy Plan  General Plan:  (5-6 visits weekly)  Current Treatment Recommendations: Strengthening, ROM, Balance training, Functional mobility training, Transfer training, Endurance training, Gait training, Stair training, Home exercise program, Safety education & training, Patient/Caregiver education & training, Equipment evaluation, education, & procurement, Positioning, Therapeutic activities  Safety Devices  Type of Devices: Call light within reach, Gait belt, Patient at risk for falls, Left in chair, Nurse notified  Restraints  Restraints Initially in Place: No     Restrictions  Restrictions/Precautions  Restrictions/Precautions: Surgical Protocols, Fall Risk, General Precautions, Up as Tolerated (pt extubated earlier this AM)  Required Braces or Orthoses?: No     Subjective   General  Patient assessed for rehabilitation services?: Yes  Response To Previous Treatment: Not applicable  Family / Caregiver Present: No  Follows Commands: Within Functional Limits  Subjective  Subjective: 2/10 R lower abdomen         Social/Functional History  Social/Functional History  Lives With: Spouse (pt states he is primary care giver for his wife; he states he doesn't physically have to mobilize her; family likely will assist at DC)  Type of Home: House  Home Layout: Two level, Laundry in basement, Able to Live on Main level with bedroom/bathroom  Home Access: Stairs to enter with rails  Entrance Stairs - Number of Steps: 1-2 PEDRO depending on which entry--all entrys have 1 handrail  [facility] [address] [facility] [address], Walker, rolling, Lift chair, Oxygen, Hospital bed (transport chair; pt states both he and wife have hospital beds; pt states he has a lift chair but uses the lift feature infrequently)  Has the patient had two or more falls in the past year or any fall with injury in the past year?: No  ADL Assistance: Independent  Ambulation Assistance: Independent (primarily ambulates without a device, was using a rwalker a few weeks ago s/p ankle sprain, but states he hasn't used it recently)  Transfer Assistance: Independent  Active : Yes  Vision/Hearing  Vision  Vision: Impaired  Vision Exceptions: Wears glasses at all times  Hearing  Hearing: Within functional limits    Cognition   Orientation  Orientation Level: Oriented X4  Cognition  Overall Cognitive Status: WNL     Objective   Heart Rate: 59  Heart Rate Source: Monitor  BP: (!) 84/57 (at end of PT session; 86/55 in chair reclined, 86/51 in chair feet dependent; 95/54 after ambulating 25' with rwalker; 84/57 after ambulating 100' rwalker--c/o mild light headedness but denied feeling about to pass out)  BP Location: Right Arm  BP Method: Automatic  Patient Position:  (seated and standing)  MAP (Calculated): 66  Resp: 12  SpO2: 93 % (at end of session after ambulating on 4L)  O2 Device: Nasal cannula (4L)     Observation/Palpation  Posture: Good        AROM RLE (degrees)  RLE AROM: WFL  AROM LLE (degrees)  LLE AROM : WFL  AROM RUE (degrees)  RUE AROM : WFL  AROM LUE (degrees)  LUE AROM : WFL  Strength RLE  Strength RLE: WFL  Strength LLE  Strength LLE: WFL  Strength RUE  Strength RUE: WFL  Strength LUE  Strength LUE: WFL           Bed mobility  Bed Mobility Comments: pt had recently gotten up to chair prior to PT session, retired to chair at end of PT session; will assess in subsequent PT sessions  Transfers  Sit to Stand: Stand by assistance  Stand to Sit: Stand by assistance (verbal cues to use UEs and control descent)  Stand Pivot Transfers: Contact guard assistance  Ambulation  Surface: Level tile  Device: Rolling Walker  Other Apparatus: O2  Assistance: Contact guard assistance  Quality of Gait: pt moves slowly, cautiously; able to increase step length with frequent verbal cues  Gait Deviations: Slow Debbi; Increased KARELY; Decreased step length;Decreased step height;Decreased arm swing  Distance: 25'x1; seated rest break with vitals taken; 100'x1  Stairs/Curb  Stairs?: No     Balance  Posture: Good  Sitting - Static: Good  Sitting - Dynamic: Good  Standing - Static: Good  Standing - Dynamic: Fair  A/AROM Exercises: AROM x 4, 10 reps all planes   AM-PAC Score  AM-PAC Inpatient Mobility Raw Score : 18 (03/09/23 1313)  AM-PAC Inpatient T-Scale Score : 43.63 (03/09/23 1313)  Mobility Inpatient CMS 0-100% Score: 46.58 (03/09/23 1313)  Mobility Inpatient CMS G-Code Modifier : CK (03/09/23 1313)          Goals  Short Term Goals  Time Frame for Short Term Goals: 8 visits  Short Term Goal 1: independent bed mobility with use of bed rails and controls (pt has hospital bed)  Short Term Goal 2: independent transfers  Short Term Goal 3: independent gait w/o device vs rwalker x 150'  Short Term Goal 4: independent stair ambulation x 1 flight steps with 1 HR  Patient Goals   Patient Goals : go home independently       Education  Patient Education  Education Given To: Patient  Education Provided: Role of Therapy;Plan of Care;Precautions;Transfer Training;Equipment; Fall Prevention Strategies  Education Method: Verbal  Barriers to Learning: None  Education Outcome: Continued education needed      Therapy Time   Individual Concurrent Group Co-treatment   Time In 1151         Time Out 1250         Minutes 59         Timed Code Treatment Minutes: [facility]       Damaris Al, PT

## 2023-03-09 NOTE — PLAN OF CARE
Problem: Respiratory - Adult  Goal: Achieves optimal ventilation and oxygenation  3/8/2023 2106 by Rita Menendez RCP  Flowsheets (Taken 3/8/2023 2106)  Achieves optimal ventilation and oxygenation:   Assess for changes in respiratory status   Assess for changes in mentation and behavior   Position to facilitate oxygenation and minimize respiratory effort   Oxygen supplementation based on oxygen saturation or arterial blood gases   Assess the need for suctioning and aspirate as needed   Respiratory therapy support as indicated

## 2023-03-09 NOTE — PROGRESS NOTES
Order obtained for extubation. SpO2 of 97 on 40% FiO2. Patient extubated and placed on 4 liters/min via nasal cannula. Post extubation SpO2 is 93% with HR  67 bpm and RR 21 breaths/min. Patient had mild cough that was productive of clear  and white sputum. Extubation Well tolerated by patient. .   Breath Sounds: clear/dim    Ame Pinks, RCP   8:42 AM

## 2023-03-09 NOTE — PROGRESS NOTES
Assessment: Patient was reclining in his chair when  visited. Family was not present at the time. However, patient made mention of his wife and three children. Patient was in good spirit with great sense of humor. When asked about how he was feeling, patient responded; \"I am feeling better. They are treating me well. \" Patient said he was raised [employer] and a member of [facility] [address] [place] [address]. Patient received sacrament of anointing of the sick. Intervention:  maintained listening presence, offered support, prayed patient and reassured him that he was in good hands. Outcome: Patient expressed gratitude for the anointing and blessing he received. Plan: Follow up visits recommended for more prayers and support.

## 2023-03-09 NOTE — PROGRESS NOTES
[facility]   Progress Note                   Date:   3/9/2023  Patient name: Gianna Alejandra  Date of admission:  3/7/2023  4:31 PM  MRN:   [id number]  YOB: 1950  PCP: Gerrianne Cranker, MD    Reason for Admission:     Subjective:   Patient remained to be patient remain intubated and sedated. Overnight issues noted. Hemodynamically stable. Heart rate is in high 50s. Continues to be on IV amiodarone and norepinephrine. Device interrogation done yesterday showed 2 episode of V. tach terminated by ATP with 35 J shock x7.       Intake/Output Summary (Last 24 hours) at 3/9/2023 0650  Last data filed at 3/9/2023 0500  Gross per 24 hour   Intake 2751.69 ml   Output 528 ml   Net 2223.69 ml     Medications:   Scheduled Meds:   sodium chloride flush  5-40 mL IntraVENous 2 times per day    acetaminophen  1,000 mg Oral 3 times per day    piperacillin-tazobactam  3,375 mg IntraVENous Q8H    insulin lispro  0-16 Units SubCUTAneous Q4H    metoprolol tartrate  25 mg Oral BID    famotidine  20 mg Oral BID    furosemide  20 mg Oral Daily    ipratropium-albuterol  1 ampule Inhalation Q4H    aspirin  81 mg Oral Daily    atorvastatin  40 mg Oral Daily    sacubitril-valsartan  1 tablet Oral BID    midodrine  2.5 mg Oral TID WC    [Held by provider] guaiFENesin  600 mg Oral BID     Continuous Infusions:   dextrose      amiodarone 0.5 mg/min (03/08/23 2059)    propofol 10 mcg/kg/min (03/09/23 0616)    fentaNYL 50 mcg/mL 25 mcg/hr (03/08/23 1821)    dextrose      vasopressin (Septic Shock) infusion 0.04 Units/min (03/08/23 2312)    norepinephrine 15 mcg/min (03/08/23 2305)    lactated ringers IV soln Stopped (03/08/23 1116)     CBC:   Recent Labs     03/08/23  0457 03/08/23  1531 03/09/23  0314   WBC 16.7* 18.7* 16.9*   HGB 14.1 14.4 13.4   PLT See Reflexed IPF Result See Reflexed IPF Result See Reflexed IPF Result     BMP:    Recent Labs     03/08/23  0457 03/08/23  1531 03/09/23  0314    133* 131* K 4.2 5.3 4.9    96* 98   CO2 23 21 22   BUN 18 20 28*   CREATININE 1.23* 1.19 1.55*   GLUCOSE 169* 234* 184*     Hepatic:   Recent Labs     03/07/23  1654   AST 29   ALT 33   BILITOT 1.2   ALKPHOS 85     Troponin: No results for input(s): TROPONINI in the last 72 hours. BNP: No results for input(s): BNP in the last 72 hours. Lipids: No results for input(s): CHOL, HDL in the last 72 hours. Invalid input(s): LDLCALCU  INR:   Recent Labs     03/08/23  0457   INR 1.2       Objective:   Vitals: /66   Pulse (!) 44   Temp 97.9 °F (36.6 °C) (Axillary)   Resp 16   Ht 6' 1\" (1.854 m)   Wt 247 lb (112 kg)   SpO2 98%   BMI 32.59 kg/m²     General appearance: Intubated and sedated  HEENT: Head: Normocephalic, no lesions, without obvious abnormality. Neck: no adenopathy, no carotid bruit, no JVD, supple, symmetrical, trachea midline and thyroid not enlarged, symmetric, no tenderness/mass/nodules  Lungs: To auscultation on anterior chest  Heart: regular rate and rhythm, S1, S2 normal, no murmur, click, rub or gallop  Abdomen: Soft, bowel sound positive  Extremities: extremities normal, atraumatic, no cyanosis or edema  Neurologic: Not done    DATA:    EKG: NSR no acute changes. ECHO: 3/7/2023  Summary  Left ventricle is normal in size. Global left ventricular systolic function is moderately reduced. Estimated LVEF 35-40% with Akinesis of apex, apical anteroseptal,  inferoseptal and anterolateral walls. Calculated EF via Santana's method is 34 %. Grade I (mild) left ventricular diastolic dysfunction. Pacemaker / ICD lead seen in right ventricle. Mild mitral regurgitation. Mild tricuspid regurgitation. Estimated right ventricular systolic pressure is 34 mmHg. No significant pericardial effusion is seen. Stress Test:   not obtained. CARDIAC CATHETERIZATION ( 8/2018)   Angiographic Findings      Cardiac Arteries and Lesion Findings     LMCA: Normal 0% stenosis.      LAD: Chronic occlusion 100 % . with left to left and right to left  collaterals       Lesion on Prox LAD: 100% stenosis. LCx: Normal 0% stenosis. RCA: Normal 0% stenosis. Ramus: Normal 0% stenosis. Cardiac collaterals  +-----------------------------------------+------------+---------+---------+  ! Origin                                   ! Destination ! Flow     ! Comments ! +-----------------------------------------+------------+---------+---------+  ! R PDA                                    ! Mid LAD     ! Moderate !         !  +-----------------------------------------+------------+---------+---------+  ! Dist CX                                  ! Dist LAD    ! Poor     !         !  +-----------------------------------------+------------+---------+---------+      Coronary Tree      Dominance: Right     LV Analysis  LV function assessed as:Abnormal.  Ejection Fraction  +----------------------------------------------------------------------+---+  ! Method                                                                ! EF%! +----------------------------------------------------------------------+---+  ! LV gram                                                               !25 !  +----------------------------------------------------------------------+---+    Assessment:   Acute appendicitis s/p laparoscopic appendectomy on 3/8  Postop V. tach terminated with antitachycardia pacing and 35 J shock x7  Ischemic cardiomyopathy, chronic HFrEF last LVEF 20-25%. Cardiac cath in 2018 showed chronic LAD occlusion with left to left and right to left collaterals, normal left circumflex/RCA/ramus, severe LV dysfunction,  ICD in-situ ( Medtronic Evera XT VR) implanted 2014  H/o LV apical aneurysm and thrombus. On Eliquis at home  Elevated troponin with a downward trend now due to above. Essential HTN  Type II DM  Acute kidney injury, postop    Plan:   Patient remained intubated and sedated.   Currently on IV Levophed, wean as tolerated  Continue IV amiodarone at 0.5 mg/min  Continue aspirin 81 mg, Lipitor 40 mg nightly. Hold Lopressor, Lasix, Entresto for now due to hypotension  Continue with as needed midodrine 2.5 mg 3 times daily to help him come off of pressor support. We will consult EP for further recommendation  Will follow      This plan need to be discuss with rounding attending. Meghan Duffy MD. PGY- 4  Fellow, cardiovascular disease   [facility], [facility], [address] [place] [address] signed by      Attending Physician Statement:    I have discussed the care of  [facility] , including pertinent history and exam findings, with the Cardiology fellow/resident. I have seen and examined the patient and the key elements of all parts of the encounter have been performed by me. I agree with the assessment, plan and orders as documented by the fellow/resident, after I modified exam findings and plan of treatments, and the final version is my approved version of the assessment. Additional Comments: Seen and examined agree with evaluation and plan documented above. Extubated. No chest pain or shortness of breath. We will continue IV amiodarone. Blood pressure is on the low side. We will keep holding blood pressure medications. PT to evaluate the patient. We will follow.

## 2023-03-09 NOTE — PLAN OF CARE
Problem: Pain  Goal: Verbalizes/displays adequate comfort level or baseline comfort level  3/9/2023 0223 by Robbie Larson RN  Outcome: Progressing     Problem: Skin/Tissue Integrity  Goal: Absence of new skin breakdown  3/9/2023 0223 by Robbie Larson RN  Outcome: Progressing     Problem: Respiratory - Adult  Goal: Achieves optimal ventilation and oxygenation  3/9/2023 0223 by Robbie Larson RN  Outcome: Progressing     Problem: Safety - Medical Restraint  Goal: Remains free of injury from restraints (Restraint for Interference with Medical Device)  3/9/2023 0223 by Robbie Larson RN  Outcome: Progressing  Flowsheets  Taken 3/9/2023 0200  Remains free of injury from restraints (restraint for interference with medical device): Determine that other, less restrictive measures have been tried or would not be effective before applying the restraint  Taken 3/9/2023 0000  Remains free of injury from restraints (restraint for interference with medical device): Determine that other, less restrictive measures have been tried or would not be effective before applying the restraint  Taken 3/8/2023 2200  Remains free of injury from restraints (restraint for interference with medical device): Every 2 hours: Monitor safety, psychosocial status, comfort, nutrition and hydration  Taken 3/8/2023 2000  Remains free of injury from restraints (restraint for interference with medical device): Determine that other, less restrictive measures have been tried or would not be effective before applying the restraint

## 2023-03-09 NOTE — PLAN OF CARE
Problem: Pain  Goal: Verbalizes/displays adequate comfort level or baseline comfort level  3/9/2023 1455 by Lyubov Rodríguez RN  Outcome: Progressing     Problem: Skin/Tissue Integrity  Goal: Absence of new skin breakdown  Description: 1. Monitor for areas of redness and/or skin breakdown  2. Assess vascular access sites hourly  3. Every 4-6 hours minimum:  Change oxygen saturation probe site  4. Every 4-6 hours:  If on nasal continuous positive airway pressure, respiratory therapy assess nares and determine need for appliance change or resting period.   3/9/2023 1455 by Lyubov Rodríguez RN  Outcome: Progressing     Problem: Safety - Adult  Goal: Free from fall injury  Outcome: Progressing     Problem: ABCDS Injury Assessment  Goal: Absence of physical injury  Outcome: Progressing     Problem: Respiratory - Adult  Goal: Achieves optimal ventilation and oxygenation  3/9/2023 1455 by Lyubov Rodríguez RN  Outcome: Progressing  Flowsheets (Taken 3/9/2023 1200)  Achieves optimal ventilation and oxygenation:   Assess for changes in respiratory status   Assess for changes in mentation and behavior   Position to facilitate oxygenation and minimize respiratory effort   Oxygen supplementation based on oxygen saturation or arterial blood gases     Problem: Chronic Conditions and Co-morbidities  Goal: Patient's chronic conditions and co-morbidity symptoms are monitored and maintained or improved  Outcome: Progressing

## 2023-03-09 NOTE — PROGRESS NOTES
ICU PROGRESS NOTE        PATIENT NAME: Anju RECORD NO. [id number]  DATE: 3/9/2023    PRIMARY CARE PHYSICIAN: Herbie Owens MD    HD: # 2    ASSESSMENT    Patient Active Problem List   Diagnosis    Alcohol abuse    Smoking    COPD with acute exacerbation ([facility]    Morbid obesity, unspecified obesity type ([facility]    Shortness of breath    Ischemic cardiomyopathy    SOB (shortness of breath)    Anxiety    Hoarseness    Colon polyps    Back pain    Essential hypertension    Type 2 diabetes mellitus without complication, without long-term current use of insulin ([facility])    Ventricular tachycardia    [place] (acute kidney injury) ([facility]    ICD (implantable cardioverter-defibrillator) discharge    Chronic systolic (congestive) heart failure    Acute appendicitis    ICD (implantable cardioverter-defibrillator) in place    History of ventricular tachycardia    Left ventricular apical thrombus following MI ([facility]    Anticoagulated    JAYCE (obstructive sleep apnea)    CKD (chronic kidney disease) stage 3, GFR 30-59 ml/min ([facility])    Elevated troponin       MEDICAL DECISION MAKING AND PLAN  Neuro:  Pain management: Tylenol   CV  History of CHF, HTN, AICD  Ventricular tachycardia post operatively with 7x shocks administered   Cardiology consulted: follow up recommendations regarding home medications. Continue amiodarone gtt  Electrophysiology to evaluate patient today  Pulm  Extubated this morning   Encourage incentive spirometry   GI/Nutrition  Adult diet  Renal/lytes  Monitor electrolytes  BUN/ Cr: 28 /1.55  Heme  Resume home Eliquis   Endocrine  HDISS  Musculoskeletal  PT/OT   Out of bed today  Skin  Monitor surgical incisions okay to be open to air  Micro  Zosyn for 4 days. Family/dispo  Extubated today  Keep in ICU  Lines  Arterial line removed today. Remove jason catheter.      CHECKLIST    CAM-ICU RASS: 0  RESTRAINTS: no  IVF: off  NUTRITION: adult diet  ANTIBIOTICS: zosyn  GI: n/a  DVT: Eliquis  GLYCEMIC CONTROL: HDISS  HOB >45: yes  MOBILITY: Out of bed  IS: will assess     Chief Complaint: \"I want the tube out\"    [address]  was seen and evaluated. Extubated this morning, overall doing well. Weaned off levophed overnight. OBJECTIVE  VITALS: Temp: Temp: 97.9 °F (36.6 °C)Temp  Av.1 °F (36.7 °C)  Min: 97.9 °F (36.6 °C)  Max: 98.9 °F (19.1 °C) BP Systolic (80TZM), OPN:466 , Min:70 , SOJ:602   Diastolic (51DID), VRD:63, Min:46, Max:92   Pulse Pulse  Av.5  Min: 41  Max: 112 Resp Resp  Av.9  Min: 11  Max: 22 Pulse ox SpO2  Av.5 %  Min: 91 %  Max: 99 %    CONSTITUTIONAL: awake alert, interactive   HEENT: normocephalic  LUNGS: equal chest rise  CV: RRR  GI: soft, NT, ND, surigcal incision C/D/I  MUSCULOSKELETAL: no muscle wasting  NEUROLOGIC: moving all extremities no acute deficits   SKIN: no surrounding erythema or drainage from surgical incisions       LAB:  CBC:   Recent Labs     237 23  1531 23  0314   WBC 16.7* 18.7* 16.9*   HGB 14.1 14.4 13.4   HCT 41.4 42.7 40.9   .2 101.7 104.6*   PLT See Reflexed IPF Result See Reflexed IPF Result See Reflexed IPF Result     BMP:   Recent Labs     23  1531 23  0314    133* 131*   K 4.2 5.3 4.9    96* 98   CO2 23 21 22   BUN 18 20 28*   CREATININE 1.23* 1.19 1.55*   GLUCOSE 169* 234* 184*         RADIOLOGY:  XR CHEST (SINGLE VIEW FRONTAL)    Result Date: 3/8/2023  EXAMINATION: ONE XRAY VIEW OF THE CHEST 3/8/2023 2:17 am COMPARISON: 2022 radiograph HISTORY: ORDERING SYSTEM PROVIDED HISTORY: preop eval TECHNOLOGIST PROVIDED HISTORY: preop eval Reason for Exam: upr,pre op appendix,no chest complaints FINDINGS: The heart is enlarged. Stable ICD in the left chest.  Pulmonary vascular markings are normal.  Mild ground-glass attenuation at the right base with trace pleural fluid. Left lung clear. No skeletal findings.      Trace right pleural effusion suspected to represent an asymmetric pattern of mild edema or atelectasis. CT ABDOMEN PELVIS W IV CONTRAST Additional Contrast? None    Result Date: 3/7/2023  EXAMINATION: CT OF THE ABDOMEN AND PELVIS WITH CONTRAST, 3/7/2023 6:40 pm TECHNIQUE: CT of the abdomen and pelvis was performed with the administration of intravenous contrast. Multiplanar reformatted images are provided for review. Automated exposure control, iterative reconstruction, and/or weight based adjustment of the mA/kV was utilized to reduce the radiation dose to as low as reasonably achievable. COMPARISON: None HISTORY: ORDERING SYSTEM PROVIDED HISTORY:  Abdominal pain, peritoneal, concern for SBO vs other intra-abdominal cause. TECHNOLOGIST PROVIDED HISTORY: Abdominal pain, peritoneal, concern for SBO vs other intra-abdominal cause. Decision Support Exception - unselect if not a suspected or confirmed emergency medical condition->Emergency Medical Condition (MA) Reason for Exam:  Abdominal pain, peritoneal, concern for SBO vs other intra-abdominal cause. FINDINGS: Lower Chest: Right basilar atelectasis. Organs: The liver, spleen, pancreas and adrenal glands are without focal abnormality. Subtle high-density material within the gallbladder. Excreted contrast material within the renal collecting system limiting evaluation for underlying stones. No hydronephrosis or perinephric stranding. Left renal cysts the largest measuring up to 5.2 cm. The kidneys otherwise enhance symmetrically. No biliary duct dilation. GI/Bowel: The appendix is mildly dilated with wall thickening and enhancement measuring up to 11 mm in maximum diameter. Inflammatory stranding is noted adjacent to the appendix with a trace amount of fluid in the right pericolic gutter. Partially loculated fluid is noted measuring 2.4 x 1.7 cm. No other dilated loops of bowel or bowel wall thickening. No free air. Pelvis: No pathologically enlarged adenopathy or free fluid.   No bladder wall thickening. Peritoneum/Retroperitoneum: The aorta is normal in caliber with mild atherosclerosis. The celiac axis, SMA and NIGHAT are patent. The portal venous system is patent. There is a subtle filling defect within the proximal SMA however felt to be related to artifact as this is not reproduced on the sagittal or coronal reconstructions (for example image 67). No pathologically enlarged adenopathy. No ascites or drainable fluid collection is otherwise identified. Bones/Soft Tissues: Osteopenia. No acute findings in the bones or soft tissues. 1. Acute appendicitis. Small amount of free fluid in the right lower quadrant/pericolic gutter with a more focal area of loculation measuring 2.4 x 1.7 cm which could reflect an early abscess or phlegmon. No free air. 2. Gallbladder sludge versus cholelithiasis. 3. There is a subtle filling defect within the proximal SMA however felt to be related to artifact as this is not reproduced on the sagittal or coronal reconstructions (for example image 67). Nonocclusive thrombus is felt to be less likely. Results regarding acute appendicitis were called by Dr. Topher Baird. Dina Chapa MD to Dr. Hawa Sarmiento on 3/7/2023 at 19:28. XR CHEST PORTABLE    Result Date: 3/9/2023  EXAMINATION: ONE XRAY VIEW OF THE CHEST 3/9/2023 5:48 am COMPARISON: 03/08/2023 HISTORY: ORDERING SYSTEM PROVIDED HISTORY: intubated TECHNOLOGIST PROVIDED HISTORY: intubated FINDINGS: Endotracheal tube projects 6.5 cm above the cathy. Left-sided cardiac pacemaker/AICD. No pleural effusion, pneumothorax or focal consolidation. The cardiac silhouette is enlarged, unchanged. Right PICC line over the SVC. Unchanged mild interstitial prominence. No new focal consolidation. XR CHEST PORTABLE    Result Date: 3/8/2023  EXAMINATION: ONE XRAY VIEW OF THE CHEST 3/8/2023 3:51 pm COMPARISON: 03/08/2023 2:14 a.m.  HISTORY: ORDERING SYSTEM PROVIDED HISTORY: intubation TECHNOLOGIST PROVIDED HISTORY: intubation FINDINGS: Endotracheal tube in satisfactory position above the cathy.  Enteric catheter courses into the abdomen.  Right jugular central venous line terminates in the SVC.  Transvenous pacer in place.  Cardiomegaly.  Pulmonary vascular congestion.  Pulmonary edema.     Findings suggest congestive heart failure Endotracheal tube in satisfactory position above the cathy     XR ABDOMEN FOR NG/OG/NE TUBE PLACEMENT    Result Date: 3/8/2023  EXAMINATION: ONE SUPINE XRAY VIEW(S) OF THE ABDOMEN 3/8/2023 3:23 pm COMPARISON: Earlier exam of same date HISTORY: ORDERING SYSTEM PROVIDED HISTORY: Confirmation of course of NG/OG/NE tube and location of tip of tube TECHNOLOGIST PROVIDED HISTORY: Confirmation of course of NG/OG/NE tube and location of tip of tube Portable?->Yes Reason for Exam: ng placement   supine port FINDINGS:IMPRESSION: NG tube is in stable position with side port at the GE junction.  Recommend advancement by 8-10 cm.     XR ABDOMEN FOR NG/OG/NE TUBE PLACEMENT    Result Date: 3/8/2023  EXAMINATION: ONE SUPINE XRAY VIEW(S) OF THE ABDOMEN 3/8/2023 3:51 pm COMPARISON: None. HISTORY: ORDERING SYSTEM PROVIDED HISTORY: Confirmation of course of NG/OG/NE tube and location of tip of tube TECHNOLOGIST PROVIDED HISTORY: Confirmation of course of NG/OG/NE tube and location of tip of tube Portable?->Yes FINDINGS: The tip of the OG/NG tube is just beyond the GE junction.  Side/port is in the distal esophagus above the GE junction.  There is an AICD lead.     The OG/NG tube should be advanced 8-10 cm and a follow-up image obtained.          Marcia Garcia MD  03/09/23, 9:57 AM            Trauma Attending Attestation      I have reviewed the above [facility] note(s) and confirmed the key elements of the medical history and physical exam. I have seen and examined the pt.  I have discussed the findings, established the care plan and recommendations with Resident.      Herber Best DO  3/10/2023  5:32 PM

## 2023-03-09 NOTE — PROGRESS NOTES
Physician Progress Note      Jonathan Smith  CSN #:                  [id number]  :                       1950  ADMIT DATE:       3/7/2023 4:31 PM  [address] [place] DATE:  RESPONDING  PROVIDER #:        Victor Manuel Peres MD          QUERY TEXT:    Pt admitted with  Perforated appendicitis. Pt noted to have Postop V. tach   terminated with antitachycardia pacing and 35 J shock x7 and was place on   Levophed and Vasopressin gtts. If possible, please document in the progress   notes and discharge summary if you are evaluating and/or treating any of the   following: The medical record reflects the following:  Risk Factors: Age 67 , Acute appendicitis, HTN, Dm, COPD, CKD, CHF  Clinical Indicators: > 39, BP  92/24> 86/51, Temp 100.3, RR 22,  SpO2   89, FiO2 100>60>50, Cre 1.23>1.55,  per cardiology 3/9 note; Postop V. tach   terminated with antitachycardia pacing and 35 J shock x7, [id number], Trop   32>119>74, WBC 18.7  Treatment: intubated and vented post surgery , -> NC 4L/min, heparin gtt,   lactated ringers bolus times 2, lactated ringers IV soln infusion 100ml/hr,   midodrine,  Levophed gtt, vasopressin  gtt,  amiodarone gtt, Zosyn IV, ICU   monitoring    Thank you, Please call if questions  Maeve Mantilla RN [facility]  [phone]  Options provided:  -- Cardiogenic Shock  -- Traumatic Shock  -- Hypovolemic Shock  -- Other - I will add my own diagnosis  -- Disagree - Not applicable / Not valid  -- Disagree - Clinically unable to determine / Unknown  -- Refer to Clinical Documentation Reviewer    PROVIDER RESPONSE TEXT:    This patient is in cardiogenic shock.     Query created by: [facility] on 3/9/2023 1:14 PM      Electronically signed by:  Victor Manuel Peres MD 3/9/2023 1:17 PM

## 2023-03-10 ENCOUNTER — APPOINTMENT (OUTPATIENT)
Dept: GENERAL RADIOLOGY | Age: 73
End: 2023-03-10
Payer: MEDICARE

## 2023-03-10 LAB
ABSOLUTE EOS #: 0 K/UL (ref 0–0.4)
ABSOLUTE IMMATURE GRANULOCYTE: 0 K/UL (ref 0–0.3)
ABSOLUTE LYMPH #: 0.42 K/UL (ref 1–4.8)
ABSOLUTE MONO #: 0.83 K/UL (ref 0.1–0.8)
ANION GAP SERPL CALCULATED.3IONS-SCNC: 11 MMOL/L (ref 9–17)
ANION GAP SERPL CALCULATED.3IONS-SCNC: 17 MMOL/L (ref 9–17)
BASOPHILS # BLD: 0 % (ref 0–2)
BASOPHILS ABSOLUTE: 0 K/UL (ref 0–0.2)
BUN SERPL-MCNC: 40 MG/DL (ref 8–23)
BUN SERPL-MCNC: 41 MG/DL (ref 8–23)
CALCIUM SERPL-MCNC: 8.6 MG/DL (ref 8.6–10.4)
CALCIUM SERPL-MCNC: 8.8 MG/DL (ref 8.6–10.4)
CHLORIDE SERPL-SCNC: 100 MMOL/L (ref 98–107)
CHLORIDE SERPL-SCNC: 99 MMOL/L (ref 98–107)
CO2 SERPL-SCNC: 20 MMOL/L (ref 20–31)
CO2 SERPL-SCNC: 22 MMOL/L (ref 20–31)
CREAT SERPL-MCNC: 1.52 MG/DL (ref 0.7–1.2)
CREAT SERPL-MCNC: 1.87 MG/DL (ref 0.7–1.2)
EOSINOPHILS RELATIVE PERCENT: 0 % (ref 1–4)
GFR SERPL CREATININE-BSD FRML MDRD: 38 ML/MIN/1.73M2
GFR SERPL CREATININE-BSD FRML MDRD: 48 ML/MIN/1.73M2
GLUCOSE BLD-MCNC: 165 MG/DL (ref 75–110)
GLUCOSE BLD-MCNC: 181 MG/DL (ref 75–110)
GLUCOSE BLD-MCNC: 217 MG/DL (ref 75–110)
GLUCOSE BLD-MCNC: 219 MG/DL (ref 75–110)
GLUCOSE BLD-MCNC: 238 MG/DL (ref 75–110)
GLUCOSE SERPL-MCNC: 207 MG/DL (ref 70–99)
GLUCOSE SERPL-MCNC: 214 MG/DL (ref 70–99)
HCT VFR BLD AUTO: 39.1 % (ref 40.7–50.3)
HGB BLD-MCNC: 12.5 G/DL (ref 13–17)
IMMATURE GRANULOCYTES: 0 %
LYMPHOCYTES # BLD: 3 % (ref 24–44)
MCH RBC QN AUTO: 34 PG (ref 25.2–33.5)
MCHC RBC AUTO-ENTMCNC: 32 G/DL (ref 28.4–34.8)
MCV RBC AUTO: 106.3 FL (ref 82.6–102.9)
MONOCYTES # BLD: 6 % (ref 1–7)
MORPHOLOGY: NORMAL
NRBC AUTOMATED: 0 PER 100 WBC
PDW BLD-RTO: 13.3 % (ref 11.8–14.4)
PLATELET # BLD AUTO: 109 K/UL (ref 138–453)
PMV BLD AUTO: 11.1 FL (ref 8.1–13.5)
POTASSIUM SERPL-SCNC: 3.9 MMOL/L (ref 3.7–5.3)
POTASSIUM SERPL-SCNC: 4.1 MMOL/L (ref 3.7–5.3)
RBC # BLD: 3.68 M/UL (ref 4.21–5.77)
SEG NEUTROPHILS: 91 % (ref 36–66)
SEGMENTED NEUTROPHILS ABSOLUTE COUNT: 12.65 K/UL (ref 1.8–7.7)
SODIUM SERPL-SCNC: 132 MMOL/L (ref 135–144)
SODIUM SERPL-SCNC: 137 MMOL/L (ref 135–144)
SURGICAL PATHOLOGY REPORT: NORMAL
WBC # BLD AUTO: 13.9 K/UL (ref 3.5–11.3)

## 2023-03-10 PROCEDURE — 36415 COLL VENOUS BLD VENIPUNCTURE: CPT

## 2023-03-10 PROCEDURE — 85025 COMPLETE CBC W/AUTO DIFF WBC: CPT

## 2023-03-10 PROCEDURE — 2000000000 HC ICU R&B

## 2023-03-10 PROCEDURE — 80048 BASIC METABOLIC PNL TOTAL CA: CPT

## 2023-03-10 PROCEDURE — 71045 X-RAY EXAM CHEST 1 VIEW: CPT

## 2023-03-10 PROCEDURE — 97535 SELF CARE MNGMENT TRAINING: CPT

## 2023-03-10 PROCEDURE — 6360000002 HC RX W HCPCS: Performed by: NURSE PRACTITIONER

## 2023-03-10 PROCEDURE — 2700000000 HC OXYGEN THERAPY PER DAY

## 2023-03-10 PROCEDURE — 94640 AIRWAY INHALATION TREATMENT: CPT

## 2023-03-10 PROCEDURE — 97166 OT EVAL MOD COMPLEX 45 MIN: CPT

## 2023-03-10 PROCEDURE — 6370000000 HC RX 637 (ALT 250 FOR IP): Performed by: STUDENT IN AN ORGANIZED HEALTH CARE EDUCATION/TRAINING PROGRAM

## 2023-03-10 PROCEDURE — 94761 N-INVAS EAR/PLS OXIMETRY MLT: CPT

## 2023-03-10 PROCEDURE — 6360000002 HC RX W HCPCS: Performed by: STUDENT IN AN ORGANIZED HEALTH CARE EDUCATION/TRAINING PROGRAM

## 2023-03-10 PROCEDURE — 2580000003 HC RX 258: Performed by: NURSE PRACTITIONER

## 2023-03-10 PROCEDURE — 6370000000 HC RX 637 (ALT 250 FOR IP): Performed by: NURSE PRACTITIONER

## 2023-03-10 PROCEDURE — 51798 US URINE CAPACITY MEASURE: CPT

## 2023-03-10 PROCEDURE — 82947 ASSAY GLUCOSE BLOOD QUANT: CPT

## 2023-03-10 PROCEDURE — 2060000000 HC ICU INTERMEDIATE R&B

## 2023-03-10 PROCEDURE — 6370000000 HC RX 637 (ALT 250 FOR IP)

## 2023-03-10 PROCEDURE — 2580000003 HC RX 258: Performed by: STUDENT IN AN ORGANIZED HEALTH CARE EDUCATION/TRAINING PROGRAM

## 2023-03-10 RX ORDER — METOPROLOL SUCCINATE 50 MG/1
50 TABLET, EXTENDED RELEASE ORAL DAILY
Status: DISCONTINUED | OUTPATIENT
Start: 2023-03-10 | End: 2023-03-10

## 2023-03-10 RX ORDER — FUROSEMIDE 10 MG/ML
40 INJECTION INTRAMUSCULAR; INTRAVENOUS ONCE
Status: COMPLETED | OUTPATIENT
Start: 2023-03-10 | End: 2023-03-10

## 2023-03-10 RX ORDER — FUROSEMIDE 10 MG/ML
40 INJECTION INTRAMUSCULAR; INTRAVENOUS DAILY
Status: DISCONTINUED | OUTPATIENT
Start: 2023-03-11 | End: 2023-03-11

## 2023-03-10 RX ORDER — POLYETHYLENE GLYCOL 3350 17 G/17G
17 POWDER, FOR SOLUTION ORAL DAILY
Status: DISCONTINUED | OUTPATIENT
Start: 2023-03-11 | End: 2023-03-11 | Stop reason: HOSPADM

## 2023-03-10 RX ORDER — METOPROLOL SUCCINATE 50 MG/1
50 TABLET, EXTENDED RELEASE ORAL DAILY
Status: DISCONTINUED | OUTPATIENT
Start: 2023-03-11 | End: 2023-03-11

## 2023-03-10 RX ORDER — FUROSEMIDE 10 MG/ML
20 INJECTION INTRAMUSCULAR; INTRAVENOUS ONCE
Status: DISCONTINUED | OUTPATIENT
Start: 2023-03-10 | End: 2023-03-10

## 2023-03-10 RX ORDER — BISACODYL 10 MG
10 SUPPOSITORY, RECTAL RECTAL DAILY PRN
Status: DISCONTINUED | OUTPATIENT
Start: 2023-03-10 | End: 2023-03-11 | Stop reason: HOSPADM

## 2023-03-10 RX ORDER — SPIRONOLACTONE 25 MG/1
25 TABLET ORAL DAILY
Status: DISCONTINUED | OUTPATIENT
Start: 2023-03-10 | End: 2023-03-11 | Stop reason: HOSPADM

## 2023-03-10 RX ORDER — AMIODARONE HYDROCHLORIDE 200 MG/1
200 TABLET ORAL 2 TIMES DAILY
Status: DISCONTINUED | OUTPATIENT
Start: 2023-03-10 | End: 2023-03-11 | Stop reason: HOSPADM

## 2023-03-10 RX ADMIN — SPIRONOLACTONE 25 MG: 25 TABLET ORAL at 14:50

## 2023-03-10 RX ADMIN — ASPIRIN 81 MG: 81 TABLET, CHEWABLE ORAL at 09:01

## 2023-03-10 RX ADMIN — DESMOPRESSIN ACETATE 40 MG: 0.2 TABLET ORAL at 09:01

## 2023-03-10 RX ADMIN — IPRATROPIUM BROMIDE AND ALBUTEROL SULFATE 1 AMPULE: 2.5; .5 SOLUTION RESPIRATORY (INHALATION) at 12:55

## 2023-03-10 RX ADMIN — ACETAMINOPHEN 1000 MG: 500 TABLET ORAL at 22:13

## 2023-03-10 RX ADMIN — IPRATROPIUM BROMIDE AND ALBUTEROL SULFATE 1 AMPULE: 2.5; .5 SOLUTION RESPIRATORY (INHALATION) at 09:20

## 2023-03-10 RX ADMIN — POLYETHYLENE GLYCOL 3350 17 G: 17 POWDER, FOR SOLUTION ORAL at 09:48

## 2023-03-10 RX ADMIN — AMIODARONE HYDROCHLORIDE 0.5 MG/MIN: 50 INJECTION, SOLUTION INTRAVENOUS at 06:03

## 2023-03-10 RX ADMIN — SODIUM CHLORIDE, PRESERVATIVE FREE 10 ML: 5 INJECTION INTRAVENOUS at 09:02

## 2023-03-10 RX ADMIN — FUROSEMIDE 40 MG: 10 INJECTION, SOLUTION INTRAMUSCULAR; INTRAVENOUS at 09:02

## 2023-03-10 RX ADMIN — BISACODYL 10 MG: 10 SUPPOSITORY RECTAL at 16:55

## 2023-03-10 RX ADMIN — MIDODRINE HYDROCHLORIDE 5 MG: 5 TABLET ORAL at 09:01

## 2023-03-10 RX ADMIN — PIPERACILLIN AND TAZOBACTAM 3375 MG: 3; .375 INJECTION, POWDER, LYOPHILIZED, FOR SOLUTION INTRAVENOUS at 13:16

## 2023-03-10 RX ADMIN — INSULIN LISPRO 4 UNITS: 100 INJECTION, SOLUTION INTRAVENOUS; SUBCUTANEOUS at 08:57

## 2023-03-10 RX ADMIN — PIPERACILLIN AND TAZOBACTAM 3375 MG: 3; .375 INJECTION, POWDER, LYOPHILIZED, FOR SOLUTION INTRAVENOUS at 20:34

## 2023-03-10 RX ADMIN — MIDODRINE HYDROCHLORIDE 5 MG: 5 TABLET ORAL at 16:55

## 2023-03-10 RX ADMIN — IPRATROPIUM BROMIDE AND ALBUTEROL SULFATE 1 AMPULE: 2.5; .5 SOLUTION RESPIRATORY (INHALATION) at 20:48

## 2023-03-10 RX ADMIN — MIDODRINE HYDROCHLORIDE 5 MG: 5 TABLET ORAL at 12:16

## 2023-03-10 RX ADMIN — SODIUM CHLORIDE, PRESERVATIVE FREE 10 ML: 5 INJECTION INTRAVENOUS at 20:32

## 2023-03-10 RX ADMIN — PIPERACILLIN AND TAZOBACTAM 3375 MG: 3; .375 INJECTION, POWDER, LYOPHILIZED, FOR SOLUTION INTRAVENOUS at 04:54

## 2023-03-10 RX ADMIN — ACETAMINOPHEN 1000 MG: 500 TABLET ORAL at 04:54

## 2023-03-10 RX ADMIN — IPRATROPIUM BROMIDE AND ALBUTEROL SULFATE 1 AMPULE: 2.5; .5 SOLUTION RESPIRATORY (INHALATION) at 03:44

## 2023-03-10 RX ADMIN — AMIODARONE HYDROCHLORIDE 200 MG: 200 TABLET ORAL at 20:32

## 2023-03-10 RX ADMIN — INSULIN LISPRO 4 UNITS: 100 INJECTION, SOLUTION INTRAVENOUS; SUBCUTANEOUS at 20:32

## 2023-03-10 RX ADMIN — APIXABAN 5 MG: 5 TABLET, FILM COATED ORAL at 09:01

## 2023-03-10 RX ADMIN — INSULIN LISPRO 4 UNITS: 100 INJECTION, SOLUTION INTRAVENOUS; SUBCUTANEOUS at 16:56

## 2023-03-10 RX ADMIN — AMIODARONE HYDROCHLORIDE 200 MG: 200 TABLET ORAL at 09:02

## 2023-03-10 RX ADMIN — ACETAMINOPHEN 1000 MG: 500 TABLET ORAL at 13:17

## 2023-03-10 RX ADMIN — IPRATROPIUM BROMIDE AND ALBUTEROL SULFATE 1 AMPULE: 2.5; .5 SOLUTION RESPIRATORY (INHALATION) at 15:55

## 2023-03-10 RX ADMIN — APIXABAN 5 MG: 5 TABLET, FILM COATED ORAL at 20:32

## 2023-03-10 ASSESSMENT — PAIN DESCRIPTION - ORIENTATION
ORIENTATION: RIGHT

## 2023-03-10 ASSESSMENT — PAIN SCALES - GENERAL
PAINLEVEL_OUTOF10: 5
PAINLEVEL_OUTOF10: 3
PAINLEVEL_OUTOF10: 5
PAINLEVEL_OUTOF10: 0
PAINLEVEL_OUTOF10: 3
PAINLEVEL_OUTOF10: 0
PAINLEVEL_OUTOF10: 5

## 2023-03-10 ASSESSMENT — PAIN DESCRIPTION - DESCRIPTORS
DESCRIPTORS: ACHING

## 2023-03-10 ASSESSMENT — PAIN DESCRIPTION - LOCATION
LOCATION: ABDOMEN

## 2023-03-10 NOTE — PLAN OF CARE
Problem: Pain  Goal: Verbalizes/displays adequate comfort level or baseline comfort level  3/10/2023 0059 by Gwendolyn Lang RN  Outcome: Progressing     Problem: Skin/Tissue Integrity  Goal: Absence of new skin breakdown  3/10/2023 0059 by Gwendolyn Lang RN  Outcome: Progressing     Problem: Safety - Adult  Goal: Free from fall injury  3/10/2023 0059 by Gwendolyn Lang RN  Outcome: Progressing     Problem: Chronic Conditions and Co-morbidities  Goal: Patient's chronic conditions and co-morbidity symptoms are monitored and maintained or improved  3/10/2023 0059 by Gwendolyn Lang RN  Outcome: Progressing

## 2023-03-10 NOTE — PROGRESS NOTES
Dr. Kary Wyatt at bedside assessing patient. Per Dr. Yennifer Henderson order, continue Amio gtt until bag is complete, administer PO Amio and Lasix 40 mg now. No further orders received.

## 2023-03-10 NOTE — CONSULTS
[facility]  Inpatient Cardiology Consult             Date:   3/10/2023  Patient name: Gabi Goins  Date of admission:  3/7/2023  4:31 PM  MRN:   [id number]  YOB: 1950      Reason for Consultation:  Ventricular tachycardia    CHIEF COMPLAINT:  ICD shocks     History Obtained From:  Patient and medical record    HISTORY OF PRESENT ILLNESS:    The patient is a 67  y.o gentleman with stable ischemic cardiomyopathy, h/o VT s/p ablation and ICD in-situ, admitted 3/7 for acute appendicitis with perforation. He underwent successful appendectomy on 3/8, however just after extubation in PACU developed wide-complex tachycardia,  msec, and received ATP and 7 shocks prior to termination. Review of stored electrograms show probable PAT with aberrant conduction. He has remained in sinus rhythm, HR 55-60 bpm overnight, with no ectopy, and remains on IV amiodarone at 0.5 mg/min. Past Medical History:   has a past medical history of Alcohol abuse, Anxiety, CHF (congestive heart failure) ([address] Colon polyps, COPD (chronic obstructive pulmonary disease) ([address] Diabetes mellitus ([address] Dupuytren contracture, Hypertension, ICD (implantable cardioverter-defibrillator) battery depletion, Ischemic cardiomyopathy, Obesity, SOB (shortness of breath), Tendonitis, Achilles, left, Unstable angina ([address] and Ventricular tachyarrhythmia. Past Surgical History:   has a past surgical history that includes Cardiac defibrillator placement; Cardiac catheterization; Cardiac defibrillator placement (Left, 09/01/2014); Colonoscopy (03/01/2015); laparoscopic appendectomy (03/08/2023); and laparoscopic appendectomy (N/A, 3/8/2023). Home Medications:    Prior to Admission medications    Medication Sig Start Date End Date Taking?  Authorizing Provider   metFORMIN (GLUCOPHAGE) 500 MG tablet TAKE 1 TABLET BY MOUTH TWICE A  DAY WITH MEALS 2/14/23   Herbie Owens MD   ELIQUIS 5 MG TABS tablet  12/12/22 Historical Provider, MD   midodrine (PROAMATINE) 2.5 MG tablet  5/19/22   Historical Provider, MD   blood glucose test strips (ASCENSIA AUTODISC VI;ONE TOUCH ULTRA TEST VI) strip 1 each by In Vitro route 3 times daily Use with associated glucose meter. 7/8/22   Bill Powell MD   furosemide (LASIX) 20 MG tablet TAKE 1 TABLET DAILY 1/4/22   BRENDAN Hickman CNP   ENTRESTO 24-26 MG per tablet 1 tablet once 1/ 2 tab in the PM 8/22/19   Historical Provider, MD   TOPROL XL 50 MG extended release tablet Take 50 mg by mouth daily  8/20/19   Historical Provider, MD   spironolactone (ALDACTONE) 25 MG tablet Take 25 mg by mouth daily    Historical Provider, MD   atorvastatin (LIPITOR) 40 MG tablet TAKE 1 TABLET DAILY 7/22/19   BRENDAN Hickman CNP   amiodarone (CORDARONE) 200 MG tablet Take 2 tablets PO BID x3 days then take 1 tablet PO BID thereafter 12/10/18   Karlee Crabtree MD   Multiple Vitamin (MULTIVITAMIN) tablet Take 1 tablet by mouth daily 12/11/18   Ishmael Hall MD   aspirin 81 MG tablet Take 81 mg by mouth daily. Historical Provider, MD       Allergies:  Patient has no known allergies. Social History:   reports that he has been smoking cigarettes. He started smoking about 64 years ago. He has a 58.00 pack-year smoking history. He has never used smokeless tobacco. He reports current alcohol use. He reports that he does not use drugs. Family History:   Positive for early CAD    REVIEW OF SYSTEMS:    Constitutional: there has been no unanticipated weight loss. There's been No change in energy level, No change in activity level. Eyes: No visual changes or diplopia. No scleral icterus. ENT: No Headaches, hearing loss or vertigo. No mouth sores or sore throat. Cardiovascular: No problem  Respiratory: No previous reported problems  Gastrointestinal: No abdominal pain, appetite loss, blood in stools. No change in bowel or bladder habits.   Genitourinary: No dysuria, trouble voiding, or hematuria. Musculoskeletal:  No gait disturbance, No weakness or joint complaints. Integumentary: No rash or pruritis. Neurological: No headache, diplopia, change in muscle strength, numbness or tingling. No change in gait, balance, coordination, mood, affect, memory, mentation, behavior. Psychiatric: No anxiety, or depression. Endocrine: No temperature intolerance. No excessive thirst, fluid intake, or urination. No tremor. Hematologic/Lymphatic: No abnormal bruising or bleeding, blood clots or swollen lymph nodes. Allergic/Immunologic: No nasal congestion or hives. PHYSICAL EXAM:    Physical Examination:    BP (!) 105/54   Pulse 57   Temp 97.3 °F (36.3 °C) (Oral)   Resp 11   Ht 6' 1\" (1.854 m)   Wt 247 lb (112 kg)   SpO2 97%   BMI 32.59 kg/m²    Constitutional and General Appearance: alert, cooperative, no distress and appears stated age  HEENT: PERRL, no cervical lymphadenopathy. No masses palpable. Normal oral mucosa  Respiratory:  Normal excursion and expansion without use of accessory muscles  Resp Auscultation: Good respiratory effort. No for increased work of breathing. On auscultation: clear to auscultation bilaterally  Cardiovascular: The apical impulse is not displaced  Heart tones are crisp and normal. regular S1 and S2. Murmurs: None  Jugular venous pulsation Normal  The carotid upstroke is normal in amplitude and contour without delay or bruit  Peripheral pulses are symmetrical and full   Abdomen:  No masses or tenderness  Bowel sounds present  Extremities:   No Cyanosis or Clubbing   Lower extremity edema: None   Skin: Warm and dry  Neurological:  Alert and oriented. Moves all extremities well  No abnormalities of mood, affect, memory, mentation, or behavior are noted    DATA:    Diagnostics:      EKG:  Sinus rhythm; LAFB with non-specific ST and T wave abnormality    2D ECHO ( 3/7/23)  Summary  Left ventricle is normal in size.   Global left ventricular systolic function is moderately reduced. Estimated LVEF 35-40% with Akinesis of apex, apical anteroseptal, inferoseptal and anterolateral walls. Calculated EF via Santana's method is 34 %. Grade I (mild) left ventricular diastolic dysfunction. Pacemaker / ICD lead seen in right ventricle. Mild mitral regurgitation. Mild tricuspid regurgitation. Estimated right ventricular systolic pressure is 34 mmHg. No significant pericardial effusion is seen. Labs:     CBC:   Recent Labs     03/09/23  0314 03/10/23  0425   WBC 16.9* 13.9*   HGB 13.4 12.5*   HCT 40.9 39.1*   PLT See Reflexed IPF Result 109*     BMP:   Recent Labs     03/09/23  0314 03/10/23  0425   * 132*   K 4.9 4.1   CO2 22 22   BUN 28* 40*   CREATININE 1.55* 1.87*   LABGLOM 47* 38*   GLUCOSE 184* 207*     BNP: No results for input(s): BNP in the last 72 hours. PT/INR:   Recent Labs     03/08/23  0457   PROTIME 13.1*   INR 1.2     APTT:  Recent Labs     03/07/23 2026 03/08/23  0304   APTT 27.1 >120.0*     CARDIAC ENZYMES:No results for input(s): CKTOTAL, CKMB, CKMBINDEX, TROPONINI in the last 72 hours. FASTING LIPID PANEL:  Lab Results   Component Value Date/Time    HDL 45 09/28/2022 02:40 PM    TRIG 93 09/28/2022 02:40 PM     LIVER PROFILE:  Recent Labs     03/07/23  1654   AST 29   ALT 33   LABALBU 4.2         IMPRESSION:    Wide-complex tachycardia in PACU c/w PAT and aberrant conduction  H/o VT, s/p ablation  CAD and cardiomyopathy; currently stable with no recent angina or CHF  ICD in-situ, functioning normally on interrogation. COPD  Acute appendicitis with perforation, s/p appendectomy 3/8/23.     Patient Active Problem List   Diagnosis    Alcohol abuse    Smoking    COPD with acute exacerbation (HCC)    Morbid obesity, unspecified obesity type ([address]    Shortness of breath    Ischemic cardiomyopathy    SOB (shortness of breath)    Anxiety    Hoarseness    Colon polyps    Back pain    Essential hypertension    Type 2 diabetes mellitus without complication, without long-term current use of insulin ([facility])    Ventricular tachycardia    MADISON (acute kidney injury) ([facility]    ICD (implantable cardioverter-defibrillator) discharge    Chronic systolic (congestive) heart failure    Acute appendicitis    ICD (implantable cardioverter-defibrillator) in place    History of ventricular tachycardia    Left ventricular apical thrombus following MI ([facility])    Anticoagulated    JAYCE (obstructive sleep apnea)    CKD (chronic kidney disease) stage 3, GFR 30-59 ml/min ([facility])    Elevated troponin       RECOMMENDATIONS:  Complete current IV amiodarone infusion today  Increase oral amiodarone to 200 mg po bid  Continue midodrine 5 mg po TID  Restart metoprolol and Entresto when BP allows. Continue current ICD programmed parameters with three-zone tachyarrhythmia detection. Will plan for close f/u at [facility] two weeks after discharge. Discussed with patient and nursing.     Electronically signed by Tootie Conley MD on 3/10/2023 at 8:41 AM.  [facility]

## 2023-03-10 NOTE — PROGRESS NOTES
ICU PROGRESS NOTE        PATIENT NAME: Anju RECORD NO. [id number]  DATE: 3/10/2023    PRIMARY CARE PHYSICIAN: Ana Dickinson MD    HD: # 3    ASSESSMENT    Patient Active Problem List   Diagnosis    Alcohol abuse    Smoking    COPD with acute exacerbation ([facility]    Morbid obesity, unspecified obesity type ([facility]    Shortness of breath    Ischemic cardiomyopathy    SOB (shortness of breath)    Anxiety    Hoarseness    Colon polyps    Back pain    Essential hypertension    Type 2 diabetes mellitus without complication, without long-term current use of insulin ([facility])    Ventricular tachycardia    MADISON (acute kidney injury) ([facility]    ICD (implantable cardioverter-defibrillator) discharge    Chronic systolic (congestive) heart failure    Acute appendicitis    ICD (implantable cardioverter-defibrillator) in place    History of ventricular tachycardia    Left ventricular apical thrombus following MI ([facility]    Anticoagulated    JAYCE (obstructive sleep apnea)    CKD (chronic kidney disease) stage 3, GFR 30-59 ml/min ([facility])    Elevated troponin       MEDICAL DECISION MAKING AND PLAN  Neuro:  Pain management: Tylenol   CV  HR 53-68  Mild hypotension overnight, improving   History of CHF, HTN, AICD  Ventricular tachycardia post operatively with 7x shocks administered   Cardiology consulted: follow up recommendations regarding home medications. Continue amiodarone gtt  Awaiting EP recommendations   Pulm  Extubated this morning   Encourage incentive spirometry 1500cc  GI/Nutrition  Adult diet  Renal/lytes  Monitor electrolytes  BUN/ Cr: 40/1.87 (28 /1.55)  UOP: 0.5 cc/kg/hr  Spontaneously voided   Heme  Continue Eliquis   Hgb: 13.4 -12.5  Endocrine  HDISS -12 units /24hr  Musculoskeletal  PT/OT   Out of bed today  Skin  Monitor surgical incisions okay to be open to air  Micro  Zosyn for 4 days.  Stop date 3/12  Family/dispo  Awaiting EP evaluation, f/u cardiology recs  Possible txf to stepdown Lines  PIV    CHECKLIST    CAM-ICU RASS: 0  RESTRAINTS: no  IVF: off  NUTRITION: adult diet  ANTIBIOTICS: zosyn  GI: n/a  DVT: Eliquis  GLYCEMIC CONTROL: HDISS  HOB >45: yes  MOBILITY: Out of bed  IS: will assess     Chief Complaint: \"I have some abd pain today\"    Víctor Pickard was evaluated at bedside. No acute events overnight. Blood pressure improved. He was able to void spontaneously. IS 1500cc. Afebrile.       OBJECTIVE  VITALS: Temp: Temp: 97.3 °F (36.3 °C)Temp  Av.4 °F (36.3 °C)  Min: 97.3 °F (36.3 °C)  Max: 98.1 °F (82.7 °C) BP Systolic (39DES), FZA:64 , Min:76 , UBE:545   Diastolic (39TYX), OXT:21, Min:24, Max:92   Pulse Pulse  Av.7  Min: 47  Max: 66 Resp Resp  Av  Min: 9  Max: 19 Pulse ox SpO2  Av.5 %  Min: 90 %  Max: 97 %    CONSTITUTIONAL: awake alert, up to chair   HEENT: normocephalic, atraumatic   LUNGS: equal chest rise, normal effort   CV: RRR  GI: soft, mild tenderness to RLQ, ND, surigcal incision C/D/I  MUSCULOSKELETAL: no muscle wasting  NEUROLOGIC: moving all extremities no acute deficits   SKIN: no surrounding erythema or drainage from surgical incisions       LAB:  CBC:   Recent Labs     23  1531 23  0314 03/10/23  0425   WBC 18.7* 16.9* 13.9*   HGB 14.4 13.4 12.5*   HCT 42.7 40.9 39.1*   .7 104.6* 106.3*   PLT See Reflexed IPF Result See Reflexed IPF Result 109*       BMP:   Recent Labs     23  1531 23  0314 03/10/23  0425   * 131* 132*   K 5.3 4.9 4.1   CL 96* 98 99   CO2 21 22 22   BUN 20 28* 40*   CREATININE 1.19 1.55* 1.87*   GLUCOSE 234* 184* 207*           RADIOLOGY:  XR CHEST PORTABLE    Result Date: 3/10/2023  EXAMINATION: ONE XRAY VIEW OF THE CHEST 3/10/2023 5:28 am COMPARISON: Chest x-ray 2023 HISTORY: ORDERING SYSTEM PROVIDED HISTORY: evaluate for pulmonary edema, h/o CHF TECHNOLOGIST PROVIDED HISTORY: evaluate for pulmonary edema, h/o CHF Reason for Exam: upr,hx appendicitis FINDINGS: Cardiac silhouette is enlarged but stable. Left-sided AICD device remains in place. Enteric tube and endotracheal tube have been removed. Right-sided PICC line remains in place. Suspect trace right pleural effusion similar to previous exams. Likely mild pulmonary edema. No pneumothorax. 1. Grossly stable chest with likely mild pulmonary edema and trace right pleural effusion. 2. Removal of endotracheal and enteric tubes. XR CHEST PORTABLE    Result Date: 3/9/2023  EXAMINATION: ONE XRAY VIEW OF THE CHEST 3/9/2023 5:48 am COMPARISON: 03/08/2023 HISTORY: ORDERING SYSTEM PROVIDED HISTORY: intubated TECHNOLOGIST PROVIDED HISTORY: intubated FINDINGS: Endotracheal tube projects 6.5 cm above the cathy. Left-sided cardiac pacemaker/AICD. No pleural effusion, pneumothorax or focal consolidation. The cardiac silhouette is enlarged, unchanged. Right PICC line over the SVC. Unchanged mild interstitial prominence. No new focal consolidation. XR CHEST PORTABLE    Result Date: 3/8/2023  EXAMINATION: ONE XRAY VIEW OF THE CHEST 3/8/2023 3:51 pm COMPARISON: 03/08/2023 2:14 a.m. HISTORY: ORDERING SYSTEM PROVIDED HISTORY: intubation TECHNOLOGIST PROVIDED HISTORY: intubation FINDINGS: Endotracheal tube in satisfactory position above the cathy. Enteric catheter courses into the abdomen. Right jugular central venous line terminates in the SVC. Transvenous pacer in place. Cardiomegaly. Pulmonary vascular congestion. Pulmonary edema.      Findings suggest congestive heart failure Endotracheal tube in satisfactory position above the cathy     XR ABDOMEN FOR NG/OG/NE TUBE PLACEMENT    Result Date: 3/8/2023  EXAMINATION: ONE SUPINE XRAY VIEW(S) OF THE ABDOMEN 3/8/2023 3:23 pm COMPARISON: Earlier exam of same date HISTORY: ORDERING SYSTEM PROVIDED HISTORY: Confirmation of course of NG/OG/NE tube and location of tip of tube TECHNOLOGIST PROVIDED HISTORY: Confirmation of course of NG/OG/NE tube and location of tip of tube Portable? ->Yes Reason for Exam: ng placement   supine port FINDINGS:IMPRESSION: NG tube is in stable position with side port at the GE junction. Recommend advancement by 8-10 cm. XR ABDOMEN FOR NG/OG/NE TUBE PLACEMENT    Result Date: 3/8/2023  EXAMINATION: ONE SUPINE XRAY VIEW(S) OF THE ABDOMEN 3/8/2023 3:51 pm COMPARISON: None. HISTORY: ORDERING SYSTEM PROVIDED HISTORY: Confirmation of course of NG/OG/NE tube and location of tip of tube TECHNOLOGIST PROVIDED HISTORY: Confirmation of course of NG/OG/NE tube and location of tip of tube Portable? ->Yes FINDINGS: The tip of the OG/NG tube is just beyond the GE junction. Side/port is in the distal esophagus above the GE junction. There is an AICD lead. The OG/NG tube should be advanced 8-10 cm and a follow-up image obtained. Steve Walker MD  03/10/23, 7:12 AM            Trauma Attending Anuja Espinoza      I have reviewed the above GCS note(s) and confirmed the key elements of the medical history and physical exam. I have seen and examined the pt. I have discussed the findings, established the care plan and recommendations with Resident. Pt post op lap appy   Has had several bouts of VTACH and both Cards and EP following   Overall pt states doing great.    PT/OT     Audrey Levy DO  3/10/2023  4:34 PM

## 2023-03-10 NOTE — PROGRESS NOTES
Trauma/Surgical Critical Care Sign Out Note:       Code Status: Full Code    Mode of provider to provider communication:        [] Via telephone   [x] In person     Date and time of sign-out: 3/10/2023 2:02 PM     Criteria Met for Transfer:  [x]   Oxygen saturation is > 90% on FiO2< 50% (exceptions may be made for patient pathophysiology)    [x]   Vital signs remain at or near baseline without pharmaceutical adjuncts, blood products, or > 2L fluid bolus in the last 24 hours  [x]   No suspicion or evidence of a new untreated infection (confusion, cool or cyanotic extremities, poor capillary refill, metabolic acidosis, low urine output)  [x]   Stable GCS, seizures controlled, no invasive neurological monitoring   [x]   Altered mental status is stable and able to be safely managed outside the ICU  [x]   No deterioration in renal function in the last 24 hours (creatinine > 50% increase, new onset oliguria)  [x]   Patient care needs do not exceed the capabilities of the unit they are being transferred to (suctioning needs, glucose monitoring, neurological monitoring, neurovascular checks, vital sign monitoring, I&O monitoring, drain management  [x]   No longer requiring mechanical ventilation via endotracheal intubation and/or decreasing O2/CPAP requirements  [x]   No need for medications that cannot be administered outside the ICU      Reason for ICU admission:  Ventricular tachycardia s/p laparoscopic appendectomy     Injuries:  Acute appendicitis   Ventricular tachycardia     ICU course summary:  Admitted to ICU post operatively from laparoscopic appendectomy after he went into ventricular tachycardia and required shock administration by his AICD 7 times. Patient intubated and the postanesthesia care unit and transferred to the ICU. He was started on an amiodarone drip which was tapered off and transition to oral medication. He received 2x 500cc boluses over his ICU stay for hypotension.   He was extubated successfully and placed on a regular diet. Patient is ambulatory around his room. Weaning oxygen requirements are 2 L nasal cannula. He did not require oxygen prior to admission. Procedures during ICU stay:  Laparoscopic appendectomy    Current vitals:  Temp: Temp: 97.7 °F (36.5 °C)Temp  Av.5 °F (36.4 °C)  Min: 97.3 °F (36.3 °C)  Max: 98 °F (86.8 °C) BP Systolic (16DXD), BBP:20 , Min:77 , ROS:571   Diastolic (82UQQ), PEH:61, Min:43, Max:78   Pulse Pulse  Av.2  Min: 52  Max: 66 Resp Resp  Av.1  Min: 9  Max: 17 Pulse ox SpO2  Av.7 %  Min: 92 %  Max: 97 %    Consults:  Electrophysiology-continue amiodarone, resume home medications when able. Continue current AICD parameters. Cardiology-continue p.o. amiodarone, resume home meds when blood pressures are appropriate. Follow-up outpatient. Transfer  Checklist:  [x]   Vital signs changed to q4 hours x 48 hours, continuous telemetry x 48 hours  [x]   Provider assessment BID x 48 hours  [x]   Daily labs x 48 hours  []   Tertiary note completed -not applicable   []   Frailty index completed  []   Geriatrics consult placed/called if applicable  [x]   Medications/orders reviewed  []   Updated photographs of wounds   []   Wound dressing orders placed if applicable    Plan and recommended follow-up:    Neuro: Tylenol pain management  CV: Cardiology -oral amiodarone, resume Toprol XL 3/11, spironolactone resumed today. EP- continue current AICD parameters  Heme: hgb stable. Continue Eliquis  Pulm: encourage IS 1500. Wean nasal cannula. No previous oxygen requirements prior to admission  Renal: Monitor Cr. improved today from 1.55-1.87 -1.52  GI: regular diet, s/p laparoscopic appendectomy   ID: Zosyn for 4 days, stop date Noah 3/12  Endo: HDISS   MSK: Encourage ambulation   Dispo: Transfer to stepdown unit.  Resume Toprol XR tomorrow        Electronically signed by Brenda Brooks MD on 3/10/2023 at 2:02 PM

## 2023-03-10 NOTE — PROGRESS NOTES
[facility]   Progress Note                   Date:   3/10/2023  Patient name: Darwin Plata  Date of admission:  3/7/2023  4:31 PM  MRN:   [id number]  YOB: 1950  PCP: Sanjay Kelly MD    Reason for Admission:     Subjective:   Overnight issues noted. Patient blood pressure remains on the lower side. Patient was started on midodrine 5 mg 3 times daily with meals. EP was consulted yesterday, formal consult to follow today. Creatinine is worsening 1.8.   Continue to be on IV amiodarone at 0.5 mg/minute      Intake/Output Summary (Last 24 hours) at 3/10/2023 0653  Last data filed at 3/10/2023 0400  Gross per 24 hour   Intake 3711.01 ml   Output 1224 ml   Net 2487.01 ml       Medications:   Scheduled Meds:   furosemide  20 mg IntraVENous Once    apixaban  5 mg Oral BID    insulin lispro  0-16 Units SubCUTAneous 4x Daily AC & HS    midodrine  5 mg Oral TID WC    sodium chloride flush  5-40 mL IntraVENous 2 times per day    acetaminophen  1,000 mg Oral 3 times per day    piperacillin-tazobactam  3,375 mg IntraVENous Q8H    [Held by provider] metoprolol tartrate  25 mg Oral BID    [Held by provider] furosemide  20 mg Oral Daily    ipratropium-albuterol  1 ampule Inhalation Q4H    aspirin  81 mg Oral Daily    atorvastatin  40 mg Oral Daily    [Held by provider] sacubitril-valsartan  1 tablet Oral BID    [Held by provider] guaiFENesin  600 mg Oral BID     Continuous Infusions:   dextrose      amiodarone 0.5 mg/min (03/10/23 0603)     CBC:   Recent Labs     03/08/23  1531 03/09/23  0314 03/10/23  0425   WBC 18.7* 16.9* 13.9*   HGB 14.4 13.4 12.5*   PLT See Reflexed IPF Result See Reflexed IPF Result 109*       BMP:    Recent Labs     03/08/23  1531 03/09/23 0314 03/10/23  0425   * 131* 132*   K 5.3 4.9 4.1   CL 96* 98 99   CO2 21 22 22   BUN 20 28* 40*   CREATININE 1.19 1.55* 1.87*   GLUCOSE 234* 184* 207*       Hepatic:   Recent Labs     03/07/23  1654   AST 29   ALT 33   BILITOT 1. 2   ALKPHOS 85       Troponin: No results for input(s): TROPONINI in the last 72 hours. BNP: No results for input(s): BNP in the last 72 hours. Lipids: No results for input(s): CHOL, HDL in the last 72 hours. Invalid input(s): LDLCALCU  INR:   Recent Labs     03/08/23  0457   INR 1.2         Objective:   Vitals: /60   Pulse 57   Temp 97.3 °F (36.3 °C) (Oral)   Resp 12   Ht 6' 1\" (1.854 m)   Wt 247 lb (112 kg)   SpO2 95%   BMI 32.59 kg/m²     General appearance: Awake, alert, following commands. HEENT: Head: Normocephalic, no lesions, without obvious abnormality. Neck: no adenopathy, no carotid bruit, no JVD, supple, symmetrical, trachea midline and thyroid not enlarged, symmetric, no tenderness/mass/nodules  Lungs: To auscultation on anterior chest  Heart: regular rate and rhythm, S1, S2 normal, no murmur, click, rub or gallop  Abdomen: Soft, bowel sound positive  Extremities: extremities normal, atraumatic, no cyanosis or edema  Neurologic: Not done    DATA:    EKG: NSR no acute changes. ECHO: 3/7/2023  Summary  Left ventricle is normal in size. Global left ventricular systolic function is moderately reduced. Estimated LVEF 35-40% with Akinesis of apex, apical anteroseptal,  inferoseptal and anterolateral walls. Calculated EF via Santana's method is 34 %. Grade I (mild) left ventricular diastolic dysfunction. Pacemaker / ICD lead seen in right ventricle. Mild mitral regurgitation. Mild tricuspid regurgitation. Estimated right ventricular systolic pressure is 34 mmHg. No significant pericardial effusion is seen. Stress Test:   not obtained. CARDIAC CATHETERIZATION ( 8/2018)   Angiographic Findings      Cardiac Arteries and Lesion Findings     LMCA: Normal 0% stenosis. LAD: Chronic occlusion 100 % . with left to left and right to left  collaterals       Lesion on Prox LAD: 100% stenosis. LCx: Normal 0% stenosis. RCA: Normal 0% stenosis.      Ramus: Normal 0% stenosis. Cardiac collaterals  +-----------------------------------------+------------+---------+---------+  ! Origin                                   ! Destination ! Flow     ! Comments ! +-----------------------------------------+------------+---------+---------+  ! R PDA                                    ! Mid LAD     ! Moderate !         !  +-----------------------------------------+------------+---------+---------+  ! Dist CX                                  ! Dist LAD    ! Poor     !         !  +-----------------------------------------+------------+---------+---------+      Coronary Tree      Dominance: Right     LV Analysis  LV function assessed as:Abnormal.  Ejection Fraction  +----------------------------------------------------------------------+---+  ! Method                                                                ! EF%! +----------------------------------------------------------------------+---+  ! LV gram                                                               !25 !  +----------------------------------------------------------------------+---+    Assessment:   Acute appendicitis s/p laparoscopic appendectomy on 3/8  Postop V. tach terminated with antitachycardia pacing and 35 J shock x7  Ischemic cardiomyopathy, chronic HFrEF last LVEF 20-25%. Cardiac cath in 2018 showed chronic LAD occlusion with left to left and right to left collaterals, normal left circumflex/RCA/ramus, severe LV dysfunction,  ICD in-situ ( Medtronic Evera XT VR) implanted 2014  H/o LV apical aneurysm and thrombus. On Eliquis at home  Elevated troponin with a downward trend now due to above. Essential HTN  Type II DM  AI on CKD    Plan:   Patient blood pressure remains on the lower side. Continue midodrine 5 mg 3 times daily with meals. Will stop Iv amiodarone and change to po amiodarone 200 mg bid   Continue aspirin 81 mg, Lipitor 40 mg nightly.   Continue po eliquis 5 mg bid   Hold LopressorSavanahsto for now due to hypotension and MADISON  We will give 1 dose of IV Lasix 20 mg due to positive fluid balance. If creatinine worsens then will consult nephrology. EP has been consulted, formal consult to follow today. PT/OT  Will follow      This plan need to be discuss with rounding attending. Justin Jha MD. PGY- 4  Fellow, cardiovascular disease   [place], [place]      Attending Physician Statement  I have discussed the case of [facility] including pertinent history and exam findings with the student/resident/fellow. I have seen and examined the patient and the key elements of the encounter have been performed by me. I agree with the assessment, plan and orders as documented by the resident With changes made to the note.      Electronically signed by Fuad Patel MD on 3/10/2023 at 2:48 PM.    [facility]      [phone]

## 2023-03-10 NOTE — PROGRESS NOTES
Occupational Therapy  Facility/Department: [facility]  Occupational Therapy Initial Assessment    Name: Joceline Sams  : 1950  MRN: [id number]  Date of Service: 3/10/2023    Discharge Recommendations:   No occupational therapy recommended at discharge        Patient Diagnosis(es): The primary encounter diagnosis was Acute appendicitis with generalized peritonitis without gangrene, unspecified whether abscess present, unspecified whether perforation present. A diagnosis of Appendicitis, unspecified appendicitis type was also pertinent to this visit. Past Medical History:  has a past medical history of Alcohol abuse, Anxiety, CHF (congestive heart failure) ([address] Colon polyps, COPD (chronic obstructive pulmonary disease) ([facility] [address] Diabetes mellitus ([facility] [address] Dupuytren contracture, Hypertension, ICD (implantable cardioverter-defibrillator) battery depletion, Ischemic cardiomyopathy, Obesity, SOB (shortness of breath), Tendonitis, Achilles, left, Unstable angina ([address] and Ventricular tachyarrhythmia. Past Surgical History:  has a past surgical history that includes Cardiac defibrillator placement; Cardiac catheterization; Cardiac defibrillator placement (Left, 2014); Colonoscopy (2015); laparoscopic appendectomy (2023); and laparoscopic appendectomy (N/A, 3/8/2023). Assessment   Performance deficits / Impairments: Decreased functional mobility ; Decreased ADL status; Decreased endurance;Decreased high-level IADLs;Decreased balance  Assessment: pt demonstrated above deficits impacting occupational performance. pt would benefit from continued acute OT in order to increase safety and independence with ADLS and functional transfers/functional mobility.   Prognosis: Good  Decision Making: Medium Complexity  REQUIRES OT FOLLOW-UP: Yes  Activity Tolerance  Activity Tolerance: Patient Tolerated treatment well        Plan   Occupational Therapy Plan  Times Per Week: 3x/wk Restrictions  Restrictions/Precautions  Restrictions/Precautions: Fall Risk  Required Braces or Orthoses?: No  Position Activity Restriction  Other position/activity restrictions: up with assist, APPENDECTOMY 3/8    Subjective   General  Patient assessed for rehabilitation services?: Yes  Family / Caregiver Present: No  General Comment  Comments: RN ok'd for therapy this morning. pt agreeable to participate in session and cooperative/pleasant throughout. pt reported 4/10 abdominal pain, pt able to continue with session     Social/Functional History  Social/Functional History  Lives With: Spouse  Type of Home: House  Home Layout: Two level, Laundry in basement, Able to Live on Main level with bedroom/bathroom  Home Access: Stairs to enter with rails  Entrance Stairs - Number of Steps: 3  Entrance Stairs - Rails: Both  Bathroom Shower/Tub: Tub/Shower unit  Bathroom Toilet: Handicap height  Bathroom Equipment: Grab bars in shower, Grab bars around toilet, Shower chair  Home Equipment: [address], Walker, rolling, [address] chair, Hospital bed (pt reported no use of DME at baseline)  Has the patient had two or more falls in the past year or any fall with injury in the past year?: No  ADL Assistance: [address]: [address] [facility] [address] Responsibilities: Yes (pt reported groceries are delivered)  Meal Prep Responsibility: Primary  Laundry Responsibility: Primary  Cleaning Responsibility: Primary  Ambulation Assistance: Independent  Transfer Assistance: Independent  Active : Yes  Mode of Transportation: 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 pt completed dynamic standing at sink for oral care and functional mobility to chair from bathroom. pt provided withCGA throughout)  Transfer Training  Transfer Training: Yes  Overall Level of Assistance: Contact-guard assistance (with RW)  Sit to Stand: Contact-guard assistance  Stand to Sit: Contact-guard assistance  Toilet Transfer: Contact-guard assistance  Gait  Overall Level of Assistance: Contact-guard assistance (with RW)       AROM: Within functional limits  Strength: Within functional limits  Coordination: Within functional limits  Tone: Normal  Sensation: Intact    ADL  Feeding: Independent  Grooming: Modified independent   UE Bathing: Stand by assistance  LE Bathing: Minimal assistance  UE Dressing: Stand by assistance  LE Dressing: Minimal assistance  Toileting: Contact guard assistance  Additional Comments: pt in restroom upon arrival, pt able to complete darwin-care/bottom care while seated with SBA. OT facilitated pt in brushing teeth at sink in bathroom with supervision for safety.                 Vision  Vision: Impaired  Vision Exceptions: Wears glasses at all times  Hearing  Hearing: Within functional limits    Cognition  Overall Cognitive Status: WFL  Orientation  Overall Orientation Status: Within Functional Limits                    Education Given To: Patient  Education Provided: Role of Therapy;Plan of Care;Transfer Training  Education Method: Verbal  Barriers to Learning: None  Education Outcome: Verbalized understanding    LUE AROM (degrees)  LUE AROM : WFL  Left Hand AROM (degrees)  Left Hand AROM: WFL  RUE AROM (degrees)  RUE AROM : WFL  Right Hand AROM (degrees)  Right Hand AROM: WFL        Hand Dominance  Hand Dominance: Right                                                          AM-PAC Score        AM-PAC Inpatient Daily Activity Raw Score: 20 (03/10/23 1450)  AM-PAC Inpatient ADL T-Scale Score : 42.03 (03/10/23 1450)  ADL Inpatient CMS 0-100% Score: 38.32 (03/10/23 1450)  ADL Inpatient CMS G-Code Modifier : CJ (03/10/23 1450)           Goals  Short Term Goals  Time Frame for Short Term Goals: pt will, by discharge  Short Term Goal 1: complete LB ADLs and toileting tasks with supervision and set up  Short Term Goal 2: complete UB ADLs with mod I  Short Term Goal 3: increase activity tolerance to 25+ minutes in order to participate in daily tasks  Short Term Goal 4: dem supevision during functional transfers/functional mobility with LRD ,as needed  Short Term Goal 5: dem ~8 minutes dynamic standing tolerance with supervision in order to complete functional tasks       Therapy Time   Individual Concurrent Group Co-treatment   Time In 1015         Time Out 1044         Minutes 29         Timed Code Treatment Minutes: [address], OTR/L

## 2023-03-10 NOTE — PLAN OF CARE
Problem: Respiratory - Adult  Goal: Achieves optimal ventilation and oxygenation  3/10/2023 1303 by Rafaela Odonnell RCP  Outcome: Progressing

## 2023-03-10 NOTE — PROGRESS NOTES
Patient had a 13 beat run of V-Tach. Patient is asymptomatic and vitals remain stable. Dr. Ivy Nassar made aware at this time.

## 2023-03-10 NOTE — PLAN OF CARE
Problem: Discharge Planning  Goal: Discharge to home or other facility with appropriate resources  Outcome: Progressing  Flowsheets (Taken 3/10/2023 0800)  Discharge to home or other facility with appropriate resources:   Identify barriers to discharge with patient and caregiver   Arrange for needed discharge resources and transportation as appropriate   Identify discharge learning needs (meds, wound care, etc)   Arrange for interpreters to assist at discharge as needed   Refer to discharge planning if patient needs post-hospital services based on physician order or complex needs related to functional status, cognitive ability or social support system     Problem: Pain  Goal: Verbalizes/displays adequate comfort level or baseline comfort level  Outcome: Progressing  Flowsheets (Taken 3/10/2023 0800)  Verbalizes/displays adequate comfort level or baseline comfort level:   Encourage patient to monitor pain and request assistance   Assess pain using appropriate pain scale   Administer analgesics based on type and severity of pain and evaluate response   Implement non-pharmacological measures as appropriate and evaluate response   Consider cultural and social influences on pain and pain management   Notify Licensed Independent Practitioner if interventions unsuccessful or patient reports new pain     Problem: Skin/Tissue Integrity  Goal: Absence of new skin breakdown  Description: 1. Monitor for areas of redness and/or skin breakdown  2. Assess vascular access sites hourly  3. Every 4-6 hours minimum:  Change oxygen saturation probe site  4. Every 4-6 hours:  If on nasal continuous positive airway pressure, respiratory therapy assess nares and determine need for appliance change or resting period.   Outcome: Progressing     Problem: Safety - Adult  Goal: Free from fall injury  Outcome: Progressing     Problem: ABCDS Injury Assessment  Goal: Absence of physical injury  Outcome: Progressing     Problem: Respiratory - Adult  Goal: Achieves optimal ventilation and oxygenation  3/10/2023 1714 by Alicia Byrne RN  Outcome: Progressing  3/10/2023 1303 by Steph Moreno RCP  Outcome: Progressing  Flowsheets (Taken 3/10/2023 0800 by Alicia Byrne RN)  Achieves optimal ventilation and oxygenation:   Assess for changes in respiratory status   Assess for changes in mentation and behavior   Position to facilitate oxygenation and minimize respiratory effort   Oxygen supplementation based on oxygen saturation or arterial blood gases   Encourage broncho-pulmonary hygiene including cough, deep breathe, incentive spirometry   Assess the need for suctioning and aspirate as needed   Assess and instruct to report shortness of breath or any respiratory difficulty   Respiratory therapy support as indicated  3/10/2023 0636 by Glynn Floyd RCP  Outcome: Progressing     Problem: Chronic Conditions and Co-morbidities  Goal: Patient's chronic conditions and co-morbidity symptoms are monitored and maintained or improved  Outcome: Progressing  Flowsheets (Taken 3/10/2023 0800)  Care Plan - Patient's Chronic Conditions and Co-Morbidity Symptoms are Monitored and Maintained or Improved:   Monitor and assess patient's chronic conditions and comorbid symptoms for stability, deterioration, or improvement   Collaborate with multidisciplinary team to address chronic and comorbid conditions and prevent exacerbation or deterioration   Update acute care plan with appropriate goals if chronic or comorbid symptoms are exacerbated and prevent overall improvement and discharge

## 2023-03-11 VITALS
DIASTOLIC BLOOD PRESSURE: 70 MMHG | TEMPERATURE: 97.9 F | WEIGHT: 263.67 LBS | BODY MASS INDEX: 34.95 KG/M2 | HEART RATE: 70 BPM | HEIGHT: 73 IN | RESPIRATION RATE: 15 BRPM | SYSTOLIC BLOOD PRESSURE: 135 MMHG | OXYGEN SATURATION: 95 %

## 2023-03-11 LAB
ABSOLUTE EOS #: 0.03 K/UL (ref 0–0.44)
ABSOLUTE IMMATURE GRANULOCYTE: 0.06 K/UL (ref 0–0.3)
ABSOLUTE LYMPH #: 0.7 K/UL (ref 1.1–3.7)
ABSOLUTE MONO #: 0.49 K/UL (ref 0.1–1.2)
ANION GAP SERPL CALCULATED.3IONS-SCNC: 12 MMOL/L (ref 9–17)
BASOPHILS # BLD: 0 % (ref 0–2)
BASOPHILS ABSOLUTE: <0.03 K/UL (ref 0–0.2)
BUN SERPL-MCNC: 39 MG/DL (ref 8–23)
CALCIUM SERPL-MCNC: 8.7 MG/DL (ref 8.6–10.4)
CHLORIDE SERPL-SCNC: 103 MMOL/L (ref 98–107)
CO2 SERPL-SCNC: 24 MMOL/L (ref 20–31)
CREAT SERPL-MCNC: 1.44 MG/DL (ref 0.7–1.2)
EOSINOPHILS RELATIVE PERCENT: 0 % (ref 1–4)
GFR SERPL CREATININE-BSD FRML MDRD: 52 ML/MIN/1.73M2
GLUCOSE BLD-MCNC: 187 MG/DL (ref 75–110)
GLUCOSE SERPL-MCNC: 219 MG/DL (ref 70–99)
HCT VFR BLD AUTO: 38.6 % (ref 40.7–50.3)
HGB BLD-MCNC: 12.5 G/DL (ref 13–17)
IMMATURE GRANULOCYTES: 1 %
LYMPHOCYTES # BLD: 7 % (ref 24–43)
MAGNESIUM SERPL-MCNC: 2 MG/DL (ref 1.6–2.6)
MCH RBC QN AUTO: 33.9 PG (ref 25.2–33.5)
MCHC RBC AUTO-ENTMCNC: 32.4 G/DL (ref 28.4–34.8)
MCV RBC AUTO: 104.6 FL (ref 82.6–102.9)
MONOCYTES # BLD: 5 % (ref 3–12)
NRBC AUTOMATED: 0 PER 100 WBC
PDW BLD-RTO: 13.6 % (ref 11.8–14.4)
PLATELET # BLD AUTO: 115 K/UL (ref 138–453)
PMV BLD AUTO: 10.9 FL (ref 8.1–13.5)
POTASSIUM SERPL-SCNC: 3.5 MMOL/L (ref 3.7–5.3)
RBC # BLD: 3.69 M/UL (ref 4.21–5.77)
RBC # BLD: ABNORMAL 10*6/UL
SEG NEUTROPHILS: 87 % (ref 36–65)
SEGMENTED NEUTROPHILS ABSOLUTE COUNT: 8.46 K/UL (ref 1.5–8.1)
SODIUM SERPL-SCNC: 139 MMOL/L (ref 135–144)
WBC # BLD AUTO: 9.8 K/UL (ref 3.5–11.3)

## 2023-03-11 PROCEDURE — 94761 N-INVAS EAR/PLS OXIMETRY MLT: CPT

## 2023-03-11 PROCEDURE — 6370000000 HC RX 637 (ALT 250 FOR IP): Performed by: STUDENT IN AN ORGANIZED HEALTH CARE EDUCATION/TRAINING PROGRAM

## 2023-03-11 PROCEDURE — 36415 COLL VENOUS BLD VENIPUNCTURE: CPT

## 2023-03-11 PROCEDURE — 6360000002 HC RX W HCPCS: Performed by: STUDENT IN AN ORGANIZED HEALTH CARE EDUCATION/TRAINING PROGRAM

## 2023-03-11 PROCEDURE — 85025 COMPLETE CBC W/AUTO DIFF WBC: CPT

## 2023-03-11 PROCEDURE — 2580000003 HC RX 258: Performed by: STUDENT IN AN ORGANIZED HEALTH CARE EDUCATION/TRAINING PROGRAM

## 2023-03-11 PROCEDURE — 82947 ASSAY GLUCOSE BLOOD QUANT: CPT

## 2023-03-11 PROCEDURE — 6370000000 HC RX 637 (ALT 250 FOR IP): Performed by: NURSE PRACTITIONER

## 2023-03-11 PROCEDURE — 80048 BASIC METABOLIC PNL TOTAL CA: CPT

## 2023-03-11 PROCEDURE — 83735 ASSAY OF MAGNESIUM: CPT

## 2023-03-11 PROCEDURE — 94640 AIRWAY INHALATION TREATMENT: CPT

## 2023-03-11 PROCEDURE — 2700000000 HC OXYGEN THERAPY PER DAY

## 2023-03-11 RX ORDER — METOPROLOL SUCCINATE 50 MG/1
50 TABLET, EXTENDED RELEASE ORAL DAILY
Status: DISCONTINUED | OUTPATIENT
Start: 2023-03-11 | End: 2023-03-11 | Stop reason: HOSPADM

## 2023-03-11 RX ORDER — AMIODARONE HYDROCHLORIDE 200 MG/1
200 TABLET ORAL 2 TIMES DAILY
Qty: 90 TABLET | Refills: 3 | Status: SHIPPED | OUTPATIENT
Start: 2023-03-11

## 2023-03-11 RX ORDER — FUROSEMIDE 40 MG/1
40 TABLET ORAL DAILY
Qty: 60 TABLET | Refills: 3 | Status: SHIPPED | OUTPATIENT
Start: 2023-03-12

## 2023-03-11 RX ORDER — MAGNESIUM CARB/ALUMINUM HYDROX 105-160MG
30 TABLET,CHEWABLE ORAL ONCE
Status: COMPLETED | OUTPATIENT
Start: 2023-03-11 | End: 2023-03-11

## 2023-03-11 RX ORDER — POTASSIUM CHLORIDE 20 MEQ/1
20 TABLET, EXTENDED RELEASE ORAL ONCE
Status: COMPLETED | OUTPATIENT
Start: 2023-03-11 | End: 2023-03-11

## 2023-03-11 RX ORDER — POTASSIUM CHLORIDE 20 MEQ/1
40 TABLET, EXTENDED RELEASE ORAL ONCE
Status: COMPLETED | OUTPATIENT
Start: 2023-03-11 | End: 2023-03-11

## 2023-03-11 RX ORDER — FUROSEMIDE 40 MG/1
40 TABLET ORAL DAILY
Status: DISCONTINUED | OUTPATIENT
Start: 2023-03-11 | End: 2023-03-11 | Stop reason: HOSPADM

## 2023-03-11 RX ADMIN — IPRATROPIUM BROMIDE AND ALBUTEROL SULFATE 1 AMPULE: 2.5; .5 SOLUTION RESPIRATORY (INHALATION) at 07:48

## 2023-03-11 RX ADMIN — AMIODARONE HYDROCHLORIDE 200 MG: 200 TABLET ORAL at 09:10

## 2023-03-11 RX ADMIN — SPIRONOLACTONE 25 MG: 25 TABLET ORAL at 09:09

## 2023-03-11 RX ADMIN — PIPERACILLIN AND TAZOBACTAM 3375 MG: 3; .375 INJECTION, POWDER, LYOPHILIZED, FOR SOLUTION INTRAVENOUS at 15:26

## 2023-03-11 RX ADMIN — FUROSEMIDE 40 MG: 40 TABLET ORAL at 09:10

## 2023-03-11 RX ADMIN — POTASSIUM CHLORIDE 20 MEQ: 1500 TABLET, EXTENDED RELEASE ORAL at 09:14

## 2023-03-11 RX ADMIN — APIXABAN 5 MG: 5 TABLET, FILM COATED ORAL at 09:10

## 2023-03-11 RX ADMIN — METOPROLOL SUCCINATE 50 MG: 50 TABLET, FILM COATED, EXTENDED RELEASE ORAL at 05:57

## 2023-03-11 RX ADMIN — POLYETHYLENE GLYCOL 3350 17 G: 17 POWDER, FOR SOLUTION ORAL at 09:10

## 2023-03-11 RX ADMIN — PIPERACILLIN AND TAZOBACTAM 3375 MG: 3; .375 INJECTION, POWDER, LYOPHILIZED, FOR SOLUTION INTRAVENOUS at 05:52

## 2023-03-11 RX ADMIN — ACETAMINOPHEN 1000 MG: 500 TABLET ORAL at 15:23

## 2023-03-11 RX ADMIN — ASPIRIN 81 MG: 81 TABLET, CHEWABLE ORAL at 09:10

## 2023-03-11 RX ADMIN — IPRATROPIUM BROMIDE AND ALBUTEROL SULFATE 1 AMPULE: 2.5; .5 SOLUTION RESPIRATORY (INHALATION) at 00:12

## 2023-03-11 RX ADMIN — MINERAL OIL 30 ML: 1000 SOLUTION ORAL at 09:10

## 2023-03-11 RX ADMIN — IPRATROPIUM BROMIDE AND ALBUTEROL SULFATE 1 AMPULE: 2.5; .5 SOLUTION RESPIRATORY (INHALATION) at 11:40

## 2023-03-11 RX ADMIN — ACETAMINOPHEN 1000 MG: 500 TABLET ORAL at 06:57

## 2023-03-11 RX ADMIN — POTASSIUM CHLORIDE 40 MEQ: 1500 TABLET, EXTENDED RELEASE ORAL at 05:51

## 2023-03-11 RX ADMIN — DESMOPRESSIN ACETATE 40 MG: 0.2 TABLET ORAL at 09:10

## 2023-03-11 ASSESSMENT — PAIN SCALES - GENERAL
PAINLEVEL_OUTOF10: 5
PAINLEVEL_OUTOF10: 4
PAINLEVEL_OUTOF10: 2

## 2023-03-11 NOTE — CARE COORDINATION
Transitional planning    Unable to speak to patient as he is on commode. 1455 Spoke to patient about plan for discharge. Plan is for him to return home. He lives with his wife. He is her caretaker. His son is going to pick him up. Son and dtr are available if needed. He has a walker at home. He declines home care.  He is agreeable to this plan

## 2023-03-11 NOTE — PROGRESS NOTES
[facility]   Progress Note                   Date:   3/11/2023  Patient name: Sandy Horton  Date of admission:  3/7/2023  4:31 PM  MRN:   [id number]  YOB: 1950  PCP: Elwin Aase, MD    Reason for Admission: tachycardia     Subjective:   Overnight issues noted. Patient denied any symptoms. Creatinine improved to 1.4 after starting on IV Lasix. IV amiodarone changed to amiodarone 200 mg twice daily as per EP recommendation. Patient had few seconds of nonsustained V. tach this morning. Intake/Output Summary (Last 24 hours) at 3/11/2023 0559  Last data filed at 3/11/2023 0500  Gross per 24 hour   Intake 2042.88 ml   Output 3575 ml   Net -1532.12 ml       Medications:   Scheduled Meds:   metoprolol succinate  50 mg Oral Daily    amiodarone  200 mg Oral BID    spironolactone  25 mg Oral Daily    polyethylene glycol  17 g Oral Daily    furosemide  40 mg IntraVENous Daily    apixaban  5 mg Oral BID    insulin lispro  0-16 Units SubCUTAneous 4x Daily AC & HS    midodrine  5 mg Oral TID WC    sodium chloride flush  5-40 mL IntraVENous 2 times per day    acetaminophen  1,000 mg Oral 3 times per day    piperacillin-tazobactam  3,375 mg IntraVENous Q8H    ipratropium-albuterol  1 ampule Inhalation Q4H    aspirin  81 mg Oral Daily    atorvastatin  40 mg Oral Daily    [Held by provider] sacubitril-valsartan  1 tablet Oral BID     Continuous Infusions:   dextrose       CBC:   Recent Labs     03/09/23  0314 03/10/23  0425 03/11/23  0415   WBC 16.9* 13.9* 9.8   HGB 13.4 12.5* 12.5*   PLT See Reflexed IPF Result 109* 115*       BMP:    Recent Labs     03/10/23  0425 03/10/23  1344 03/11/23  0415   * 137 139   K 4.1 3.9 3.5*   CL 99 100 103   CO2 22 20 24   BUN 40* 41* 39*   CREATININE 1.87* 1.52* 1.44*   GLUCOSE 207* 214* 219*       Hepatic:   No results for input(s): AST, ALT, ALB, BILITOT, ALKPHOS in the last 72 hours.     Troponin: No results for input(s): TROPONINI in the last 72 hours.  BNP: No results for input(s): BNP in the last 72 hours. Lipids: No results for input(s): CHOL, HDL in the last 72 hours. Invalid input(s): LDLCALCU  INR:   No results for input(s): INR in the last 72 hours. Objective:   Vitals: BP (!) 141/88   Pulse 96   Temp 97.9 °F (36.6 °C) (Axillary)   Resp 20   Ht 6' 1\" (1.854 m)   Wt 263 lb 10.7 oz (119.6 kg)   SpO2 93%   BMI 34.79 kg/m²     General appearance: Awake, alert, following commands. HEENT: Head: Normocephalic, no lesions, without obvious abnormality. Neck: no adenopathy, no carotid bruit, no JVD, supple, symmetrical, trachea midline and thyroid not enlarged, symmetric, no tenderness/mass/nodules  Lungs: Minimal basal crackles on posterior lung exam.  Heart: regular rate and rhythm, S1, S2 normal, no murmur, click, rub or gallop  Abdomen: Soft, bowel sound positive  Extremities: extremities normal, atraumatic, no cyanosis or edema  Neurologic: Not done    DATA:    EKG: NSR no acute changes. ECHO: 3/7/2023  Summary  Left ventricle is normal in size. Global left ventricular systolic function is moderately reduced. Estimated LVEF 35-40% with Akinesis of apex, apical anteroseptal,  inferoseptal and anterolateral walls. Calculated EF via Santana's method is 34 %. Grade I (mild) left ventricular diastolic dysfunction. Pacemaker / ICD lead seen in right ventricle. Mild mitral regurgitation. Mild tricuspid regurgitation. Estimated right ventricular systolic pressure is 34 mmHg. No significant pericardial effusion is seen. Stress Test:   not obtained. CARDIAC CATHETERIZATION ( 8/2018)   Angiographic Findings      Cardiac Arteries and Lesion Findings     LMCA: Normal 0% stenosis. LAD: Chronic occlusion 100 % . with left to left and right to left  collaterals       Lesion on Prox LAD: 100% stenosis. LCx: Normal 0% stenosis. RCA: Normal 0% stenosis. Ramus: Normal 0% stenosis.      Cardiac collaterals  +-----------------------------------------+------------+---------+---------+  ! Origin                                   ! Destination ! Flow     ! Comments ! +-----------------------------------------+------------+---------+---------+  ! R PDA                                    ! Mid LAD     ! Moderate !         !  +-----------------------------------------+------------+---------+---------+  ! Dist CX                                  ! Dist LAD    ! Poor     !         !  +-----------------------------------------+------------+---------+---------+      Coronary Tree      Dominance: Right     LV Analysis  LV function assessed as:Abnormal.  Ejection Fraction  +----------------------------------------------------------------------+---+  ! Method                                                                ! EF%! +----------------------------------------------------------------------+---+  ! LV gram                                                               !25 !  +----------------------------------------------------------------------+---+    Assessment:   Acute appendicitis s/p laparoscopic appendectomy on 3/8  Wide-complex tachycardia in PACU c/w PAT and aberrant conduction  Ischemic cardiomyopathy, chronic HFrEF last LVEF 20-25%. Cardiac cath in 2018 showed chronic LAD occlusion with left to left and right to left collaterals, normal left circumflex/RCA/ramus, severe LV dysfunction,  ICD in-situ ( [employer] Evera XT VR) implanted 2014  H/o LV apical aneurysm and thrombus. On Eliquis at home  Elevated troponin with a downward trend now due to above. Essential HTN  Type II DM  MADISON on CKD. Creatinine improved. Plan:   Patient remained hemodynamically stable. In NSR  IV amiodarone changed to p.o. amiodarone 200 mg twice daily as per EP recommendation. Continue aspirin 81 mg, Lipitor 40 mg nightly. Continue po eliquis 5 mg bid   Continue Toprol 50 mg daily. Change IV Lasix to p.o.  Lasix 40 mg daily. Resume Entresto upon discharge when creatinine back to baseline. Continue Aldactone 25 mg daily  Maintain K> 4 and Mg> 2  Plan for close f/u at [facility] two weeks after discharge  Okay for the stepdown      This plan need to be discuss with rounding attending. Jeramie Gtz MD. PGY- 4  Fellow, cardiovascular disease   [facility], [place], [place]      Attending Physician Statement  I have discussed the care of [facility], including pertinent history and exam findings,  with the cardiology fellow/resident. I have seen and examined the patient and the key elements of all parts of the encounter have been performed by me. I have completed at least one if not all key elements of the E/M (history, physical exam, and MDM).         Yenifer Junior MD, [facility] - [place], [place]

## 2023-03-11 NOTE — PLAN OF CARE
Okay to discharge from cardiology standpoint. Following medication changes has been made to the current home medication. 1) Amiodarone 200 mg has been changed to 200 mg twice daily as per EP recommendation  2) Change Lasix to 40 mg p.o. daily  3) Continue to hold Entresto for the next 2 days  4) Repeat BMP in 2 days, if creatinine back to baseline then start taking Entresto again.       Aleena Trejo MD.  Fellow, cardiovascular disease   [facility], [facility], [place]

## 2023-03-11 NOTE — PLAN OF CARE
Problem: Discharge Planning  Goal: Discharge to home or other facility with appropriate resources  3/10/2023 2155 by Steve Keller RN  Outcome: Progressing  Problem: Pain  Goal: Verbalizes/displays adequate comfort level or baseline comfort level  3/10/2023 2155 by Steve Keller RN  Outcome: Progressing  Flowsheets (Taken 3/10/2023 0800)  Verbalizes/displays adequate comfort level or baseline comfort level:   Encourage patient to monitor pain and request assistance   Assess pain using appropriate pain scale   Administer analgesics based on type and severity of pain and evaluate response   Implement non-pharmacological measures as appropriate and evaluate response   Consider cultural and social influences on pain and pain management   Notify Licensed Independent Practitioner if interventions unsuccessful or patient reports new pain      Flowsheets (Taken 3/10/2023 0800)  Discharge to home or other facility with appropriate resources:   Identify barriers to discharge with patient and caregiver   Arrange for needed discharge resources and transportation as appropriate   Identify discharge learning needs (meds, wound care, etc)   Arrange for interpreters to assist at discharge as needed   Refer to discharge planning if patient needs post-hospital services based on physician order or complex needs related to functional status, cognitive ability or social support system     Problem: Skin/Tissue Integrity  Goal: Absence of new skin breakdown  Description: 1. Monitor for areas of redness and/or skin breakdown  2. Assess vascular access sites hourly  3. Every 4-6 hours minimum:  Change oxygen saturation probe site  4. Every 4-6 hours:  If on nasal continuous positive airway pressure, respiratory therapy assess nares and determine need for appliance change or resting period.   3/10/2023 2155 by Steve Keller RN  Outcome: Progressing    Problem: Safety - Adult  Goal: Free from fall injury  3/10/2023 2155 by Zabrina Mauricio RN  Outcome: Progressing    Problem: ABCDS Injury Assessment  Goal: Absence of physical injury  3/10/2023 2155 by Zabrina Mauricio RN  Outcome: Progressing    Problem: Respiratory - Adult  Goal: Achieves optimal ventilation and oxygenation  3/10/2023 2155 by Zabrina Mauricio RN  Outcome: Progressing  Flowsheets (Taken 3/10/2023 0800 by Trista Greene RN)  Achieves optimal ventilation and oxygenation:   Assess for changes in respiratory status   Assess for changes in mentation and behavior   Position to facilitate oxygenation and minimize respiratory effort   Oxygen supplementation based on oxygen saturation or arterial blood gases   Encourage broncho-pulmonary hygiene including cough, deep breathe, incentive spirometry   Assess the need for suctioning and aspirate as needed   Assess and instruct to report shortness of breath or any respiratory difficulty   Respiratory therapy support as indicated     Problem: Chronic Conditions and Co-morbidities  Goal: Patient's chronic conditions and co-morbidity symptoms are monitored and maintained or improved  3/10/2023 2155 by Zabrina Mauricio RN  Outcome: Progressing  Flowsheets (Taken 3/10/2023 0800)  Care Plan - Patient's Chronic Conditions and Co-Morbidity Symptoms are Monitored and Maintained or Improved:   Monitor and assess patient's chronic conditions and comorbid symptoms for stability, deterioration, or improvement   Collaborate with multidisciplinary team to address chronic and comorbid conditions and prevent exacerbation or deterioration   Update acute care plan with appropriate goals if chronic or comorbid symptoms are exacerbated and prevent overall improvement and discharge

## 2023-03-11 NOTE — PLAN OF CARE
Problem: Discharge Planning  Goal: Discharge to home or other facility with appropriate resources  Outcome: Adequate for Discharge     Problem: Pain  Goal: Verbalizes/displays adequate comfort level or baseline comfort level  Outcome: Adequate for Discharge     Problem: Skin/Tissue Integrity  Goal: Absence of new skin breakdown  Description: 1. Monitor for areas of redness and/or skin breakdown  2. Assess vascular access sites hourly  3. Every 4-6 hours minimum:  Change oxygen saturation probe site  4. Every 4-6 hours:  If on nasal continuous positive airway pressure, respiratory therapy assess nares and determine need for appliance change or resting period.   Outcome: Adequate for Discharge     Problem: Safety - Adult  Goal: Free from fall injury  Outcome: Adequate for Discharge     Problem: ABCDS Injury Assessment  Goal: Absence of physical injury  Outcome: Adequate for Discharge     Problem: Respiratory - Adult  Goal: Achieves optimal ventilation and oxygenation  3/11/2023 1653 by Tono Noel RN  Outcome: Adequate for Discharge  3/11/2023 1248 by Belia Obando RCP  Outcome: Progressing     Problem: Chronic Conditions and Co-morbidities  Goal: Patient's chronic conditions and co-morbidity symptoms are monitored and maintained or improved  Outcome: Adequate for Discharge

## 2023-03-13 NOTE — DISCHARGE SUMMARY
DISCHARGE SUMMARY:    PATIENT NAME:  Jody Ty  YOB: 1950  MEDICAL RECORD NO. [id number]  DATE: 03/13/23  PRIMARY CARE PHYSICIAN: Fadia Bruce MD  ADMIT DATE: 3/7/2023   DISPOSITION:  home  DISCHARGE DATE: 3/11/2023  ADMITTING DIAGNOSIS:    acute appendicitis    DIAGNOSIS:   Patient Active Problem List   Diagnosis    Alcohol abuse    Smoking    COPD with acute exacerbation (HCC)    Morbid obesity, unspecified obesity type ([facility]    Shortness of breath    Ischemic cardiomyopathy    SOB (shortness of breath)    Anxiety    Hoarseness    Colon polyps    Back pain    Essential hypertension    Type 2 diabetes mellitus without complication, without long-term current use of insulin ([facility])    Ventricular tachycardia    MADISON (acute kidney injury) ([facility]    ICD (implantable cardioverter-defibrillator) discharge    Chronic systolic (congestive) heart failure    Acute appendicitis    ICD (implantable cardioverter-defibrillator) in place    History of ventricular tachycardia    Left ventricular apical thrombus following MI ([facility]    Anticoagulated    JAYCE (obstructive sleep apnea)    CKD (chronic kidney disease) stage 3, GFR 30-59 ml/min ([facility])    Elevated troponin       CONSULTANTS:  cardiology    PROCEDURES:   Laparoscopic appendectomy    HOSPITAL COURSE:   Jody Ty is a 67 y.o. male who was admitted on 3/7/2023 with acute appendicitis. Patient was taken to the operating room for laparoscopic appendectomy after cardiac restratification. Unfortunately patient went into ventricular tachycardia postoperatively and required intubation. His AICD fired 7 times he was converted back into normal sinus rhythm. Cardiology provided recommendations on medications. Labs and imaging were followed daily.       At time of discharge, Jody Ty was tolerating a regular diet, having bowel movements, ambulating on his own accord, had adequate analgesia on oral pain medications, and had no signs of symptoms of complications. He was deemed medically stable and discharged to home on 3/11 with instructions to follow up with cardiology and electrophysiology. Pt expressed understanding of and agreement with DC plans. PHYSICAL EXAMINATION:        Discharge Vitals:  height is 6' 1\" (1.854 m) and weight is 263 lb 10.7 oz (119.6 kg). His axillary temperature is 97.9 °F (36.6 °C). His blood pressure is 135/70 and his pulse is 70. His respiration is 15 and oxygen saturation is 95%. General appearance - alert, well appearing, and in no distress  Chest - clear to ausculation  Heart - normal rate and regular rhythm  Abdomen - soft, non tender, non distended, surgical incisions clean dry and intact. Neurological - motor and sensory grossly normal bilaterally  Musculoskeletal - full range of motion without pain  Extremities - peripheral pulses normal, no pedal edema, no clubbing or cyanosis    LABS:     Recent Labs     03/10/23  1344 03/11/23  0415   WBC  --  9.8   HGB  --  12.5*   HCT  --  38.6*   PLT  --  115*    139   K 3.9 3.5*    103   CO2 20 24   BUN 41* 39*   CREATININE 1.52* 1.44*       DIAGNOSTIC TESTS:    XR CHEST (SINGLE VIEW FRONTAL)    Result Date: 3/8/2023  EXAMINATION: ONE XRAY VIEW OF THE CHEST 3/8/2023 2:17 am COMPARISON: 12/20/2022 radiograph HISTORY: ORDERING SYSTEM PROVIDED HISTORY: preop eval TECHNOLOGIST PROVIDED HISTORY: preop eval Reason for Exam: upr,pre op appendix,no chest complaints FINDINGS: The heart is enlarged. Stable ICD in the left chest.  Pulmonary vascular markings are normal.  Mild ground-glass attenuation at the right base with trace pleural fluid. Left lung clear. No skeletal findings. Trace right pleural effusion suspected to represent an asymmetric pattern of mild edema or atelectasis.      CT ABDOMEN PELVIS W IV CONTRAST Additional Contrast? None    Result Date: 3/10/2023  EXAMINATION: CT OF THE ABDOMEN AND PELVIS WITH CONTRAST, 3/7/2023 6:40 pm TECHNIQUE: CT of the abdomen and pelvis was performed with the administration of intravenous contrast. Multiplanar reformatted images are provided for review. Automated exposure control, iterative reconstruction, and/or weight based adjustment of the mA/kV was utilized to reduce the radiation dose to as low as reasonably achievable. COMPARISON: None HISTORY: ORDERING SYSTEM PROVIDED HISTORY:  Abdominal pain, peritoneal, concern for SBO vs other intra-abdominal cause. TECHNOLOGIST PROVIDED HISTORY: Abdominal pain, peritoneal, concern for SBO vs other intra-abdominal cause. Decision Support Exception - unselect if not a suspected or confirmed emergency medical condition->Emergency Medical Condition (MA) Reason for Exam:  Abdominal pain, peritoneal, concern for SBO vs other intra-abdominal cause. FINDINGS: Lower Chest: Right basilar atelectasis. Organs: The liver, spleen, pancreas and adrenal glands are without focal abnormality. Subtle high-density material within the gallbladder. Excreted contrast material within the renal collecting system limiting evaluation for underlying stones. No hydronephrosis or perinephric stranding. Left renal cysts the largest measuring up to 5.2 cm. The kidneys otherwise enhance symmetrically. No biliary duct dilation. GI/Bowel: The appendix is mildly dilated with wall thickening and enhancement measuring up to 11 mm in maximum diameter. Inflammatory stranding is noted adjacent to the appendix with a trace amount of fluid in the right pericolic gutter. Partially loculated fluid is noted measuring 2.4 x 1.7 cm. No other dilated loops of bowel or bowel wall thickening. No free air. Pelvis: No pathologically enlarged adenopathy or free fluid. No bladder wall thickening. Peritoneum/Retroperitoneum: The aorta is normal in caliber with mild atherosclerosis. The celiac axis, SMA and NIGHAT are patent. The portal venous system is patent.   There is a subtle filling defect within the proximal SMA however felt to be related to artifact as this is not reproduced on the sagittal or coronal reconstructions (for example image 67). No pathologically enlarged adenopathy. No ascites or drainable fluid collection is otherwise identified. Bones/Soft Tissues: Osteopenia. No acute findings in the bones or soft tissues. 1. Acute appendicitis. Small amount of free fluid in the right lower quadrant/pericolic gutter with a more focal area of loculation measuring 2.4 x 1.7 cm which could reflect an early abscess or phlegmon. No free air. 2. Gallbladder sludge versus cholelithiasis. 3. There is a subtle filling defect within the proximal SMA however felt to be related to artifact as this is not reproduced on the sagittal or coronal reconstructions (for example image 67). Nonocclusive thrombus is felt to be less likely. Results regarding acute appendicitis were called by Dr. Rufus Andersen. Malik Lopes MD to Dr. India Campos on 3/7/2023 at 19:28. XR CHEST PORTABLE    Result Date: 3/8/2023  EXAMINATION: ONE XRAY VIEW OF THE CHEST 3/8/2023 3:51 pm COMPARISON: 03/08/2023 2:14 a.m. HISTORY: ORDERING SYSTEM PROVIDED HISTORY: intubation TECHNOLOGIST PROVIDED HISTORY: intubation FINDINGS: Endotracheal tube in satisfactory position above the cathy. Enteric catheter courses into the abdomen. Right jugular central venous line terminates in the SVC. Transvenous pacer in place. Cardiomegaly. Pulmonary vascular congestion. Pulmonary edema. Findings suggest congestive heart failure Endotracheal tube in satisfactory position above the cathy     XR ABDOMEN FOR NG/OG/NE TUBE PLACEMENT    Result Date: 3/8/2023  EXAMINATION: ONE SUPINE XRAY VIEW(S) OF THE ABDOMEN 3/8/2023 3:23 pm COMPARISON: Earlier exam of same date HISTORY: ORDERING SYSTEM PROVIDED HISTORY: Confirmation of course of NG/OG/NE tube and location of tip of tube TECHNOLOGIST PROVIDED HISTORY: Confirmation of course of NG/OG/NE tube and location of tip of tube Portable? ->Yes Reason for Exam: ng placement   supine port FINDINGS:IMPRESSION: NG tube is in stable position with side port at the GE junction. Recommend advancement by 8-10 cm. XR ABDOMEN FOR NG/OG/NE TUBE PLACEMENT    Result Date: 3/8/2023  EXAMINATION: ONE SUPINE XRAY VIEW(S) OF THE ABDOMEN 3/8/2023 3:51 pm COMPARISON: None. HISTORY: ORDERING SYSTEM PROVIDED HISTORY: Confirmation of course of NG/OG/NE tube and location of tip of tube TECHNOLOGIST PROVIDED HISTORY: Confirmation of course of NG/OG/NE tube and location of tip of tube Portable? ->Yes FINDINGS: The tip of the OG/NG tube is just beyond the GE junction. Side/port is in the distal esophagus above the GE junction. There is an AICD lead. The OG/NG tube should be advanced 8-10 cm and a follow-up image obtained. DISCHARGE INSTRUCTIONS     Discharge Medications:        Medication List        CHANGE how you take these medications      * amiodarone 200 MG tablet  Commonly known as: CORDARONE  Take 2 tablets PO BID x3 days then take 1 tablet PO BID thereafter  What changed: Another medication with the same name was added. Make sure you understand how and when to take each. * amiodarone 200 MG tablet  Commonly known as: CORDARONE  Take 1 tablet by mouth 2 times daily  What changed: You were already taking a medication with the same name, and this prescription was added. Make sure you understand how and when to take each. furosemide 40 MG tablet  Commonly known as: LASIX  Take 1 tablet by mouth daily  What changed:   medication strength  See the new instructions. * This list has 2 medication(s) that are the same as other medications prescribed for you. Read the directions carefully, and ask your doctor or other care provider to review them with you.                 CONTINUE taking these medications      aspirin 81 MG tablet     atorvastatin 40 MG tablet  Commonly known as: LIPITOR  TAKE 1 TABLET DAILY     blood glucose test strips strip  Commonly known as: [employer] AUTODISC VI;ONE TOUCH ULTRA TEST VI  1 each by In Vitro route 3 times daily Use with associated glucose meter.      Eliquis 5 MG Tabs tablet  Generic drug: apixaban     Entresto 24-26 MG per tablet  Generic drug: sacubitril-valsartan     metFORMIN 500 MG tablet  Commonly known as: GLUCOPHAGE  TAKE 1 TABLET BY MOUTH TWICE A  DAY WITH MEALS     midodrine 2.5 MG tablet  Commonly known as: PROAMATINE     multivitamin tablet  Take 1 tablet by mouth daily     spironolactone 25 MG tablet  Commonly known as: ALDACTONE     Toprol XL 50 MG extended release tablet  Generic drug: metoprolol succinate               Where to Get Your Medications        These medications were sent to [facility] [address] [facility], [address]  [address] [facility] [address] [facility] [address]      Phone: [phone]   amiodarone 200 MG tablet  furosemide 40 MG tablet       Diet: diet as tolerated  Activity: - Avoid strenuous activity or exercise until cleared during follow-up appointment  - No driving or operating heavy machinery while taking narcotics   Wound Care: Daily and as needed  Follow-up:   Call [facility] to make appointment with Dr. Shruthi Powell in:  10-14 days   Follow up in the next few weeks with PCP: Elsa Granger MD    Time Spent for discharge: 30 minutes    Morenita La MD  3/13/2023, 8:11 AM

## 2023-03-23 ENCOUNTER — HOSPITAL ENCOUNTER (OUTPATIENT)
Age: 73
Setting detail: SPECIMEN
Discharge: HOME | End: 2023-03-23

## 2023-03-23 DIAGNOSIS — N18.31 STAGE 3A CHRONIC KIDNEY DISEASE (HCC): ICD-10-CM

## 2023-03-23 LAB
ANION GAP SERPL CALCULATED.3IONS-SCNC: 14 MMOL/L (ref 9–17)
BUN SERPL-MCNC: 15 MG/DL (ref 8–23)
CALCIUM SERPL-MCNC: 10 MG/DL (ref 8.6–10.4)
CHLORIDE SERPL-SCNC: 101 MMOL/L (ref 98–107)
CO2 SERPL-SCNC: 26 MMOL/L (ref 20–31)
CREAT SERPL-MCNC: 1.32 MG/DL (ref 0.7–1.2)
GFR SERPL CREATININE-BSD FRML MDRD: 57 ML/MIN/1.73M2
GLUCOSE SERPL-MCNC: 114 MG/DL (ref 70–99)
POTASSIUM SERPL-SCNC: 5.2 MMOL/L (ref 3.7–5.3)
SODIUM SERPL-SCNC: 141 MMOL/L (ref 135–144)

## 2023-03-28 ENCOUNTER — TELEPHONE (OUTPATIENT)
Dept: INTERNAL MEDICINE CLINIC | Age: 73
End: 2023-03-28

## 2023-03-28 DIAGNOSIS — E11.9 TYPE 2 DIABETES MELLITUS WITHOUT COMPLICATION, WITHOUT LONG-TERM CURRENT USE OF INSULIN (HCC): Primary | ICD-10-CM

## 2023-03-29 ENCOUNTER — OFFICE VISIT (OUTPATIENT)
Dept: INTERNAL MEDICINE CLINIC | Age: 73
End: 2023-03-29
Payer: MEDICARE

## 2023-03-29 ENCOUNTER — HOSPITAL ENCOUNTER (OUTPATIENT)
Age: 73
Setting detail: SPECIMEN
Discharge: HOME | End: 2023-03-29

## 2023-03-29 VITALS
HEART RATE: 60 BPM | WEIGHT: 241.1 LBS | OXYGEN SATURATION: 97 % | BODY MASS INDEX: 31.95 KG/M2 | HEIGHT: 73 IN | DIASTOLIC BLOOD PRESSURE: 72 MMHG | SYSTOLIC BLOOD PRESSURE: 120 MMHG

## 2023-03-29 DIAGNOSIS — J44.9 CHRONIC OBSTRUCTIVE PULMONARY DISEASE, UNSPECIFIED COPD TYPE (HCC): ICD-10-CM

## 2023-03-29 DIAGNOSIS — E66.01 MORBID OBESITY, UNSPECIFIED OBESITY TYPE (HCC): ICD-10-CM

## 2023-03-29 DIAGNOSIS — I50.22 CHRONIC SYSTOLIC (CONGESTIVE) HEART FAILURE (HCC): ICD-10-CM

## 2023-03-29 DIAGNOSIS — E11.9 TYPE 2 DIABETES MELLITUS WITHOUT COMPLICATION, WITHOUT LONG-TERM CURRENT USE OF INSULIN (HCC): ICD-10-CM

## 2023-03-29 DIAGNOSIS — Z45.02 ICD (IMPLANTABLE CARDIOVERTER-DEFIBRILLATOR) DISCHARGE: ICD-10-CM

## 2023-03-29 DIAGNOSIS — I10 ESSENTIAL HYPERTENSION: ICD-10-CM

## 2023-03-29 DIAGNOSIS — J44.1 COPD WITH ACUTE EXACERBATION (HCC): Primary | ICD-10-CM

## 2023-03-29 LAB
EST. AVERAGE GLUCOSE BLD GHB EST-MCNC: 131 MG/DL
HBA1C MFR BLD: 6.2 % (ref 4–6)

## 2023-03-29 PROCEDURE — G8427 DOCREV CUR MEDS BY ELIG CLIN: HCPCS | Performed by: INTERNAL MEDICINE

## 2023-03-29 PROCEDURE — 3044F HG A1C LEVEL LT 7.0%: CPT | Performed by: INTERNAL MEDICINE

## 2023-03-29 PROCEDURE — 1111F DSCHRG MED/CURRENT MED MERGE: CPT | Performed by: INTERNAL MEDICINE

## 2023-03-29 PROCEDURE — G8417 CALC BMI ABV UP PARAM F/U: HCPCS | Performed by: INTERNAL MEDICINE

## 2023-03-29 PROCEDURE — G8484 FLU IMMUNIZE NO ADMIN: HCPCS | Performed by: INTERNAL MEDICINE

## 2023-03-29 PROCEDURE — 3017F COLORECTAL CA SCREEN DOC REV: CPT | Performed by: INTERNAL MEDICINE

## 2023-03-29 PROCEDURE — 4004F PT TOBACCO SCREEN RCVD TLK: CPT | Performed by: INTERNAL MEDICINE

## 2023-03-29 PROCEDURE — 3074F SYST BP LT 130 MM HG: CPT | Performed by: INTERNAL MEDICINE

## 2023-03-29 PROCEDURE — 3023F SPIROM DOC REV: CPT | Performed by: INTERNAL MEDICINE

## 2023-03-29 PROCEDURE — 3078F DIAST BP <80 MM HG: CPT | Performed by: INTERNAL MEDICINE

## 2023-03-29 PROCEDURE — 1123F ACP DISCUSS/DSCN MKR DOCD: CPT | Performed by: INTERNAL MEDICINE

## 2023-03-29 PROCEDURE — 99214 OFFICE O/P EST MOD 30 MIN: CPT | Performed by: INTERNAL MEDICINE

## 2023-03-29 PROCEDURE — 2022F DILAT RTA XM EVC RTNOPTHY: CPT | Performed by: INTERNAL MEDICINE

## 2023-03-29 SDOH — ECONOMIC STABILITY: FOOD INSECURITY: WITHIN THE PAST 12 MONTHS, THE FOOD YOU BOUGHT JUST DIDN'T LAST AND YOU DIDN'T HAVE MONEY TO GET MORE.: NEVER TRUE

## 2023-03-29 SDOH — ECONOMIC STABILITY: HOUSING INSECURITY
IN THE LAST 12 MONTHS, WAS THERE A TIME WHEN YOU DID NOT HAVE A STEADY PLACE TO SLEEP OR SLEPT IN A SHELTER (INCLUDING NOW)?: NO

## 2023-03-29 SDOH — ECONOMIC STABILITY: INCOME INSECURITY: HOW HARD IS IT FOR YOU TO PAY FOR THE VERY BASICS LIKE FOOD, HOUSING, MEDICAL CARE, AND HEATING?: NOT HARD AT ALL

## 2023-03-29 SDOH — ECONOMIC STABILITY: FOOD INSECURITY: WITHIN THE PAST 12 MONTHS, YOU WORRIED THAT YOUR FOOD WOULD RUN OUT BEFORE YOU GOT MONEY TO BUY MORE.: NEVER TRUE

## 2023-03-29 ASSESSMENT — PATIENT HEALTH QUESTIONNAIRE - PHQ9
SUM OF ALL RESPONSES TO PHQ QUESTIONS 1-9: 0
SUM OF ALL RESPONSES TO PHQ QUESTIONS 1-9: 0
2. FEELING DOWN, DEPRESSED OR HOPELESS: 0
SUM OF ALL RESPONSES TO PHQ QUESTIONS 1-9: 0
SUM OF ALL RESPONSES TO PHQ9 QUESTIONS 1 & 2: 0
SUM OF ALL RESPONSES TO PHQ QUESTIONS 1-9: 0
1. LITTLE INTEREST OR PLEASURE IN DOING THINGS: 0

## 2023-03-29 NOTE — PROGRESS NOTES
Subjective:      Patient ID: Talha Bustillo is a 67 y.o. male. HPIHistory was obtained from the patient. Talha Bustillo is a 67 y.o. is here for evaluation Of multiple medical Problems , he has CHF,DM, CKD, HTN,  COPD on eliquis , S/p AICD , follows with Cardiologist, on Entresto  . He is still Smoking 1 PPD  Tolerating 12 of CPAP   Seen ophthalmologist yesterday   Checks BP and DM - Controlled   Had recent ECHO, EF - 35-40, will see Cardiologist on 4/4   Had Appendix surgery recently , has Occasional SOB,  with excertion   Review of Systems   Constitutional:  Negative for activity change, appetite change, chills and diaphoresis. HENT:  Negative for congestion, dental problem, ear discharge, facial swelling and hearing loss. Respiratory:  Positive for shortness of breath (occasional). Negative for apnea, cough, chest tightness and wheezing. Orthopnea present    Cardiovascular:  Positive for palpitations (occaiosnal ). Negative for chest pain and leg swelling. Gastrointestinal:  Negative for abdominal distention, abdominal pain, constipation and diarrhea. Genitourinary:  Negative for difficulty urinating, dysuria, enuresis, flank pain and frequency. Musculoskeletal:  Negative for arthralgias, back pain, gait problem and joint swelling. Skin:  Negative for color change, pallor and rash. Neurological:  Negative for dizziness, seizures, facial asymmetry, light-headedness, numbness and headaches. Psychiatric/Behavioral:  Negative for agitation, behavioral problems, confusion, decreased concentration and dysphoric mood. Objective:   Physical Exam  Vitals and nursing note reviewed. Constitutional:       General: He is not in acute distress. Appearance: He is well-developed. He is obese. He is not diaphoretic. HENT:      Head: Normocephalic and atraumatic. Mouth/Throat:      Pharynx: No oropharyngeal exudate. Eyes:      General: No scleral icterus.         Right eye: No

## 2023-03-30 ASSESSMENT — ENCOUNTER SYMPTOMS
ABDOMINAL PAIN: 0
COUGH: 0
BACK PAIN: 0
COLOR CHANGE: 0
DIARRHEA: 0
CHEST TIGHTNESS: 0
WHEEZING: 0
ABDOMINAL DISTENTION: 0
FACIAL SWELLING: 0
APNEA: 0
CONSTIPATION: 0
SHORTNESS OF BREATH: 1

## 2023-04-06 PROBLEM — R77.8 ELEVATED TROPONIN: Status: RESOLVED | Noted: 2023-03-07 | Resolved: 2023-04-06

## 2023-04-06 PROBLEM — R79.89 ELEVATED TROPONIN: Status: RESOLVED | Noted: 2023-03-07 | Resolved: 2023-04-06

## 2023-06-06 ENCOUNTER — OFFICE VISIT (OUTPATIENT)
Dept: INTERNAL MEDICINE CLINIC | Age: 73
End: 2023-06-06
Payer: MEDICARE

## 2023-06-06 VITALS
HEART RATE: 55 BPM | OXYGEN SATURATION: 96 % | BODY MASS INDEX: 31.78 KG/M2 | DIASTOLIC BLOOD PRESSURE: 64 MMHG | WEIGHT: 239.8 LBS | HEIGHT: 73 IN | SYSTOLIC BLOOD PRESSURE: 110 MMHG

## 2023-06-06 DIAGNOSIS — J44.1 COPD WITH ACUTE EXACERBATION (HCC): Primary | ICD-10-CM

## 2023-06-06 DIAGNOSIS — I50.22 CHRONIC SYSTOLIC (CONGESTIVE) HEART FAILURE (HCC): ICD-10-CM

## 2023-06-06 DIAGNOSIS — I10 ESSENTIAL HYPERTENSION: ICD-10-CM

## 2023-06-06 DIAGNOSIS — E11.9 TYPE 2 DIABETES MELLITUS WITHOUT COMPLICATION, WITHOUT LONG-TERM CURRENT USE OF INSULIN (HCC): ICD-10-CM

## 2023-06-06 DIAGNOSIS — Z45.02 ICD (IMPLANTABLE CARDIOVERTER-DEFIBRILLATOR) DISCHARGE: ICD-10-CM

## 2023-06-06 PROCEDURE — G8427 DOCREV CUR MEDS BY ELIG CLIN: HCPCS | Performed by: INTERNAL MEDICINE

## 2023-06-06 PROCEDURE — 4004F PT TOBACCO SCREEN RCVD TLK: CPT | Performed by: INTERNAL MEDICINE

## 2023-06-06 PROCEDURE — G8417 CALC BMI ABV UP PARAM F/U: HCPCS | Performed by: INTERNAL MEDICINE

## 2023-06-06 PROCEDURE — 2022F DILAT RTA XM EVC RTNOPTHY: CPT | Performed by: INTERNAL MEDICINE

## 2023-06-06 PROCEDURE — 1123F ACP DISCUSS/DSCN MKR DOCD: CPT | Performed by: INTERNAL MEDICINE

## 2023-06-06 PROCEDURE — 3023F SPIROM DOC REV: CPT | Performed by: INTERNAL MEDICINE

## 2023-06-06 PROCEDURE — 3044F HG A1C LEVEL LT 7.0%: CPT | Performed by: INTERNAL MEDICINE

## 2023-06-06 PROCEDURE — 3074F SYST BP LT 130 MM HG: CPT | Performed by: INTERNAL MEDICINE

## 2023-06-06 PROCEDURE — 3078F DIAST BP <80 MM HG: CPT | Performed by: INTERNAL MEDICINE

## 2023-06-06 PROCEDURE — 99214 OFFICE O/P EST MOD 30 MIN: CPT | Performed by: INTERNAL MEDICINE

## 2023-06-06 PROCEDURE — 3017F COLORECTAL CA SCREEN DOC REV: CPT | Performed by: INTERNAL MEDICINE

## 2023-06-06 RX ORDER — TIOTROPIUM BROMIDE 18 UG/1
18 CAPSULE ORAL; RESPIRATORY (INHALATION) DAILY
Qty: 90 CAPSULE | Refills: 1 | Status: SHIPPED | OUTPATIENT
Start: 2023-06-06

## 2023-06-06 RX ORDER — ALBUTEROL SULFATE 90 UG/1
2 AEROSOL, METERED RESPIRATORY (INHALATION) 4 TIMES DAILY PRN
Qty: 54 G | Refills: 1 | Status: SHIPPED | OUTPATIENT
Start: 2023-06-06

## 2023-06-06 NOTE — PROGRESS NOTES
(congestive) heart failure (HCC)  Stable        Return in about 3 months (around 9/6/2023). Reviewed prior labs and health maintenance. Discussed use, benefit, and side effects of prescribed medications. Barriers to medication compliance addressed. All patient questions answered. Pt voiced understanding. Evelia Moreno MD  [facility]  6/7/2023, 10:43 AM    Please note that this chart was generated using voice recognition Dragon dictation software. Although every effort was made to ensure the accuracy of this automated transcription, some errors in transcription may have occurred. Visit Information    Have you changed or started any medications since your last visit including any over-the-counter medicines, vitamins, or herbal medicines? no   Are you having any side effects from any of your medications? -  no  Have you stopped taking any of your medications? Is so, why? -  no    Have you seen any other physician or provider since your last visit? No  Have you had any other diagnostic tests since your last visit? No  Have you been seen in the emergency room and/or had an admission to a hospital since we last saw you? No  Have you had your routine dental cleaning in the past 6 months? yes -     Have you activated your [email or web address] account? If not, what are your barriers?  Yes     Patient Care Team:  Evelia Moreno MD as PCP - General (Internal Medicine)  Evelia Moreno MD as PCP - Empaneled Provider  Car Martinez MD as Consulting Physician (Gastroenterology)    Medical History Review  Past Medical, Family, and Social History reviewed and does contribute to the patient presenting condition    Health Maintenance   Topic Date Due    DTaP/Tdap/Td vaccine (1 - Tdap) Never done    Shingles vaccine (1 of 2) Never done    Diabetic retinal exam  08/06/2019    Annual Wellness Visit (AWV)  07/09/2023    Low dose CT lung screening  09/15/2023    Lipids  09/28/2023    Diabetic foot exam

## 2023-06-07 ASSESSMENT — ENCOUNTER SYMPTOMS
CONSTIPATION: 0
SHORTNESS OF BREATH: 1
COLOR CHANGE: 0
APNEA: 0
DIARRHEA: 0
WHEEZING: 0
ABDOMINAL PAIN: 0
FACIAL SWELLING: 0
BACK PAIN: 0
CHEST TIGHTNESS: 0
COUGH: 0
ABDOMINAL DISTENTION: 0

## 2023-07-27 DIAGNOSIS — E11.9 TYPE 2 DIABETES MELLITUS WITHOUT COMPLICATION, WITHOUT LONG-TERM CURRENT USE OF INSULIN (HCC): ICD-10-CM

## 2023-07-27 RX ORDER — BLOOD SUGAR DIAGNOSTIC
STRIP MISCELLANEOUS
Qty: 100 STRIP | Refills: 11 | Status: SHIPPED | OUTPATIENT
Start: 2023-07-27

## 2023-08-12 DIAGNOSIS — J44.1 COPD WITH ACUTE EXACERBATION (HCC): ICD-10-CM

## 2023-08-14 RX ORDER — ALBUTEROL SULFATE 90 UG/1
AEROSOL, METERED RESPIRATORY (INHALATION)
Qty: 72 G | Refills: 3 | Status: SHIPPED | OUTPATIENT
Start: 2023-08-14

## 2023-08-17 ENCOUNTER — OFFICE VISIT (OUTPATIENT)
Dept: PODIATRY | Age: 73
End: 2023-08-17
Payer: MEDICARE

## 2023-08-17 VITALS — WEIGHT: 238 LBS | BODY MASS INDEX: 31.54 KG/M2 | HEIGHT: 73 IN

## 2023-08-17 DIAGNOSIS — M79.675 PAIN OF TOES OF BOTH FEET: ICD-10-CM

## 2023-08-17 DIAGNOSIS — M79.674 PAIN OF TOES OF BOTH FEET: ICD-10-CM

## 2023-08-17 DIAGNOSIS — E11.42 DIABETIC POLYNEUROPATHY ASSOCIATED WITH TYPE 2 DIABETES MELLITUS (HCC): ICD-10-CM

## 2023-08-17 DIAGNOSIS — E11.51 TYPE 2 DIABETES MELLITUS WITH PERIPHERAL VASCULAR DISEASE (HCC): ICD-10-CM

## 2023-08-17 DIAGNOSIS — B35.1 ONYCHOMYCOSIS OF TOENAIL: Primary | ICD-10-CM

## 2023-08-17 PROCEDURE — 99203 OFFICE O/P NEW LOW 30 MIN: CPT | Performed by: PODIATRIST

## 2023-08-17 PROCEDURE — 11721 DEBRIDE NAIL 6 OR MORE: CPT | Performed by: PODIATRIST

## 2023-08-17 PROCEDURE — 1123F ACP DISCUSS/DSCN MKR DOCD: CPT | Performed by: PODIATRIST

## 2023-08-17 PROCEDURE — G8417 CALC BMI ABV UP PARAM F/U: HCPCS | Performed by: PODIATRIST

## 2023-08-17 PROCEDURE — 3017F COLORECTAL CA SCREEN DOC REV: CPT | Performed by: PODIATRIST

## 2023-08-17 PROCEDURE — G8427 DOCREV CUR MEDS BY ELIG CLIN: HCPCS | Performed by: PODIATRIST

## 2023-08-17 PROCEDURE — 3044F HG A1C LEVEL LT 7.0%: CPT | Performed by: PODIATRIST

## 2023-08-17 PROCEDURE — 2022F DILAT RTA XM EVC RTNOPTHY: CPT | Performed by: PODIATRIST

## 2023-08-17 PROCEDURE — 4004F PT TOBACCO SCREEN RCVD TLK: CPT | Performed by: PODIATRIST

## 2023-08-17 ASSESSMENT — ENCOUNTER SYMPTOMS
NAUSEA: 0
BACK PAIN: 0
DIARRHEA: 0
COLOR CHANGE: 0
SHORTNESS OF BREATH: 0

## 2023-08-17 NOTE — PROGRESS NOTES
during lifetime   Substance Use Topics    Alcohol use: Yes     Alcohol/week: 0.0 standard drinks     Comment: NO ALCOHOL SINCE 08/2018       Review of Systems   Constitutional:  Negative for activity change, appetite change, chills, diaphoresis, fatigue and fever. Respiratory:  Negative for shortness of breath. Cardiovascular:  Negative for leg swelling. Gastrointestinal:  Negative for diarrhea and nausea. Endocrine: Negative for cold intolerance, heat intolerance and polyuria. Musculoskeletal:  Positive for arthralgias. Negative for back pain, gait problem, joint swelling and myalgias. Skin:  Negative for color change, pallor, rash and wound. Allergic/Immunologic: Negative for environmental allergies and food allergies. Neurological:  Positive for numbness. Negative for dizziness, weakness and light-headedness. Hematological:  Does not bruise/bleed easily. Psychiatric/Behavioral:  Negative for behavioral problems, confusion and self-injury. The patient is not nervous/anxious. Vitals: There were no vitals filed for this visit. General: AAO x 3 in NAD. Integument: There is hyperkeratotic tissue formation noted to the ball of the right foot. There is pain with direct palpation of the lesion(s). Debridement of the lesion(s) with a fifteen blade does reveal a central core. The core of the lesion(s) was debrided with a fifteen blade. No signs of bacterial infection are noted to the lesion(s). There is no induration, subcutaneous nodules, or tightening of the skin noted to the bilateral.     Toenails 1-5 of the right foot do present with thickness, elongation, discoloration, brittleness, subungual debris. Toenails 1-5 of the left foot do present with thickness, elongation, discoloration, brittleness, subungual debris. There is pain with palpation and debridement of toenails 1-5 of the right foot and 1-5 of the left foot.     Interdigital maceration absent to web spaces 1-4,

## 2023-08-23 ENCOUNTER — TELEPHONE (OUTPATIENT)
Dept: ONCOLOGY | Age: 73
End: 2023-08-23

## 2023-08-23 DIAGNOSIS — Z87.891 PERSONAL HISTORY OF NICOTINE DEPENDENCE: Primary | ICD-10-CM

## 2023-08-23 NOTE — TELEPHONE ENCOUNTER
Our records indicate that your patient is coming due for their annual lung cancer screening follow up testing. For your convenience, we have pended the order for the scan for you. If you do not agree with the need for the test, please cancel the order and let us know. Sincerely,    [id number] Screening Program    Auto printed reminder letter sent to patient.

## 2023-09-08 ENCOUNTER — OFFICE VISIT (OUTPATIENT)
Dept: INTERNAL MEDICINE CLINIC | Age: 73
End: 2023-09-08
Payer: MEDICARE

## 2023-09-08 VITALS
HEART RATE: 51 BPM | BODY MASS INDEX: 31.51 KG/M2 | SYSTOLIC BLOOD PRESSURE: 122 MMHG | WEIGHT: 237.8 LBS | DIASTOLIC BLOOD PRESSURE: 64 MMHG | OXYGEN SATURATION: 97 % | HEIGHT: 73 IN

## 2023-09-08 DIAGNOSIS — E11.9 TYPE 2 DIABETES MELLITUS WITHOUT COMPLICATION, WITHOUT LONG-TERM CURRENT USE OF INSULIN (HCC): ICD-10-CM

## 2023-09-08 DIAGNOSIS — I50.22 CHRONIC SYSTOLIC (CONGESTIVE) HEART FAILURE (HCC): ICD-10-CM

## 2023-09-08 DIAGNOSIS — I10 ESSENTIAL HYPERTENSION: ICD-10-CM

## 2023-09-08 DIAGNOSIS — J44.1 COPD WITH ACUTE EXACERBATION (HCC): Primary | ICD-10-CM

## 2023-09-08 DIAGNOSIS — E66.01 MORBID OBESITY, UNSPECIFIED OBESITY TYPE (HCC): ICD-10-CM

## 2023-09-08 PROCEDURE — G8427 DOCREV CUR MEDS BY ELIG CLIN: HCPCS | Performed by: INTERNAL MEDICINE

## 2023-09-08 PROCEDURE — 2022F DILAT RTA XM EVC RTNOPTHY: CPT | Performed by: INTERNAL MEDICINE

## 2023-09-08 PROCEDURE — 99214 OFFICE O/P EST MOD 30 MIN: CPT | Performed by: INTERNAL MEDICINE

## 2023-09-08 PROCEDURE — 3044F HG A1C LEVEL LT 7.0%: CPT | Performed by: INTERNAL MEDICINE

## 2023-09-08 PROCEDURE — 3017F COLORECTAL CA SCREEN DOC REV: CPT | Performed by: INTERNAL MEDICINE

## 2023-09-08 PROCEDURE — 1123F ACP DISCUSS/DSCN MKR DOCD: CPT | Performed by: INTERNAL MEDICINE

## 2023-09-08 PROCEDURE — 3023F SPIROM DOC REV: CPT | Performed by: INTERNAL MEDICINE

## 2023-09-08 PROCEDURE — 3074F SYST BP LT 130 MM HG: CPT | Performed by: INTERNAL MEDICINE

## 2023-09-08 PROCEDURE — 4004F PT TOBACCO SCREEN RCVD TLK: CPT | Performed by: INTERNAL MEDICINE

## 2023-09-08 PROCEDURE — G8417 CALC BMI ABV UP PARAM F/U: HCPCS | Performed by: INTERNAL MEDICINE

## 2023-09-08 PROCEDURE — 3078F DIAST BP <80 MM HG: CPT | Performed by: INTERNAL MEDICINE

## 2023-09-08 ASSESSMENT — ENCOUNTER SYMPTOMS
SHORTNESS OF BREATH: 1
COLOR CHANGE: 0
ABDOMINAL PAIN: 0
CHEST TIGHTNESS: 0
WHEEZING: 0
APNEA: 0
COUGH: 0
FACIAL SWELLING: 0
ABDOMINAL DISTENTION: 0
DIARRHEA: 0
BACK PAIN: 0
CONSTIPATION: 0

## 2023-09-08 NOTE — PROGRESS NOTES
C screen  Completed    Hepatitis A vaccine  Aged Out    Hib vaccine  Aged Out    Meningococcal (ACWY) vaccine  Aged Out

## 2023-09-15 ENCOUNTER — TELEPHONE (OUTPATIENT)
Dept: INTERNAL MEDICINE CLINIC | Age: 73
End: 2023-09-15

## 2023-09-15 NOTE — TELEPHONE ENCOUNTER
Medical surgical clearance request received 09/15/23    Surgeon: Associate [facility]     Procedure: Cataract Surgery Under Monitored anesthesia     Date of Procedure: 10/4/67609 and 11/1/2023    Last appt: 09/08/2023    Next appt: 12/19/2023    PATs received:   No

## 2023-09-15 NOTE — TELEPHONE ENCOUNTER
Clear for Simeon from Medical stand point , Intermediate Risk , once celeared by cardiology   Needs to Talk to his Cardiologist Too   Tx

## 2023-10-07 DIAGNOSIS — J44.1 COPD WITH ACUTE EXACERBATION (HCC): ICD-10-CM

## 2023-10-09 RX ORDER — TIOTROPIUM BROMIDE 18 UG/1
CAPSULE ORAL; RESPIRATORY (INHALATION)
Qty: 90 CAPSULE | Refills: 3 | Status: SHIPPED | OUTPATIENT
Start: 2023-10-09

## 2023-10-11 ENCOUNTER — APPOINTMENT (OUTPATIENT)
Dept: GENERAL RADIOLOGY | Age: 73
End: 2023-10-11
Payer: MEDICARE

## 2023-10-11 ENCOUNTER — HOSPITAL ENCOUNTER (INPATIENT)
Age: 73
LOS: 2 days | Discharge: HOME | End: 2023-10-13
Attending: EMERGENCY MEDICINE | Admitting: INTERNAL MEDICINE
Payer: MEDICARE

## 2023-10-11 DIAGNOSIS — I47.29 NONSUSTAINED VENTRICULAR TACHYCARDIA (HCC): ICD-10-CM

## 2023-10-11 DIAGNOSIS — I50.9 ACUTE ON CHRONIC CONGESTIVE HEART FAILURE, UNSPECIFIED HEART FAILURE TYPE (HCC): Primary | ICD-10-CM

## 2023-10-11 PROBLEM — J96.01 ACUTE RESPIRATORY FAILURE WITH HYPOXIA AND HYPERCAPNIA (HCC): Status: ACTIVE | Noted: 2023-10-11

## 2023-10-11 PROBLEM — I50.23 ACUTE ON CHRONIC SYSTOLIC CHF (CONGESTIVE HEART FAILURE) (HCC): Status: ACTIVE | Noted: 2023-10-11

## 2023-10-11 PROBLEM — J44.9 CHRONIC OBSTRUCTIVE PULMONARY DISEASE (HCC): Status: ACTIVE | Noted: 2023-10-11

## 2023-10-11 PROBLEM — J81.0 ACUTE PULMONARY EDEMA (HCC): Status: ACTIVE | Noted: 2023-10-11

## 2023-10-11 PROBLEM — J96.02 ACUTE RESPIRATORY FAILURE WITH HYPOXIA AND HYPERCAPNIA (HCC): Status: ACTIVE | Noted: 2023-10-11

## 2023-10-11 LAB
ANION GAP SERPL CALCULATED.3IONS-SCNC: 14 MMOL/L (ref 9–17)
B PARAP IS1001 DNA NPH QL NAA+NON-PROBE: NOT DETECTED
B PERT DNA SPEC QL NAA+PROBE: NOT DETECTED
BASOPHILS # BLD: 0.07 K/UL (ref 0–0.2)
BASOPHILS NFR BLD: 1 % (ref 0–2)
BNP SERPL-MCNC: 4645 PG/ML
BODY TEMPERATURE: 37
BUN SERPL-MCNC: 20 MG/DL (ref 8–23)
C PNEUM DNA NPH QL NAA+NON-PROBE: NOT DETECTED
CALCIUM SERPL-MCNC: 9 MG/DL (ref 8.6–10.4)
CHLORIDE SERPL-SCNC: 106 MMOL/L (ref 98–107)
CO2 SERPL-SCNC: 19 MMOL/L (ref 20–31)
COHGB MFR BLD: 5.4 % (ref 0–5)
CREAT SERPL-MCNC: 1.3 MG/DL (ref 0.7–1.2)
EOSINOPHIL # BLD: 0.16 K/UL (ref 0–0.44)
EOSINOPHILS RELATIVE PERCENT: 1 % (ref 1–4)
ERYTHROCYTE [DISTWIDTH] IN BLOOD BY AUTOMATED COUNT: 14.4 % (ref 11.8–14.4)
EST. AVERAGE GLUCOSE BLD GHB EST-MCNC: 108 MG/DL
FIO2 ON VENT: ABNORMAL %
FLUAV RNA NPH QL NAA+NON-PROBE: NOT DETECTED
FLUBV RNA NPH QL NAA+NON-PROBE: NOT DETECTED
GFR SERPL CREATININE-BSD FRML MDRD: 58 ML/MIN/1.73M2
GLUCOSE BLD-MCNC: 202 MG/DL (ref 75–110)
GLUCOSE BLD-MCNC: 207 MG/DL (ref 75–110)
GLUCOSE SERPL-MCNC: 200 MG/DL (ref 70–99)
HADV DNA NPH QL NAA+NON-PROBE: NOT DETECTED
HBA1C MFR BLD: 5.4 % (ref 4–6)
HCO3 VENOUS: 24.6 MMOL/L (ref 24–30)
HCOV 229E RNA NPH QL NAA+NON-PROBE: NOT DETECTED
HCOV HKU1 RNA NPH QL NAA+NON-PROBE: NOT DETECTED
HCOV NL63 RNA NPH QL NAA+NON-PROBE: NOT DETECTED
HCOV OC43 RNA NPH QL NAA+NON-PROBE: NOT DETECTED
HCT VFR BLD AUTO: 44.4 % (ref 40.7–50.3)
HGB BLD-MCNC: 14.4 G/DL (ref 13–17)
HMPV RNA NPH QL NAA+NON-PROBE: NOT DETECTED
HPIV1 RNA NPH QL NAA+NON-PROBE: NOT DETECTED
HPIV2 RNA NPH QL NAA+NON-PROBE: NOT DETECTED
HPIV3 RNA NPH QL NAA+NON-PROBE: NOT DETECTED
HPIV4 RNA NPH QL NAA+NON-PROBE: NOT DETECTED
IMM GRANULOCYTES # BLD AUTO: 0.05 K/UL (ref 0–0.3)
IMM GRANULOCYTES NFR BLD: 0 %
LYMPHOCYTES NFR BLD: 1.51 K/UL (ref 1.1–3.7)
LYMPHOCYTES RELATIVE PERCENT: 13 % (ref 24–43)
M PNEUMO DNA NPH QL NAA+NON-PROBE: NOT DETECTED
MAGNESIUM SERPL-MCNC: 1.8 MG/DL (ref 1.6–2.6)
MCH RBC QN AUTO: 33.6 PG (ref 25.2–33.5)
MCHC RBC AUTO-ENTMCNC: 32.4 G/DL (ref 28.4–34.8)
MCV RBC AUTO: 103.5 FL (ref 82.6–102.9)
MONOCYTES NFR BLD: 0.65 K/UL (ref 0.1–1.2)
MONOCYTES NFR BLD: 6 % (ref 3–12)
NEGATIVE BASE EXCESS, VEN: 1.9 MMOL/L (ref 0–2)
NEUTROPHILS NFR BLD: 79 % (ref 36–65)
NEUTS SEG NFR BLD: 9.05 K/UL (ref 1.5–8.1)
NRBC BLD-RTO: 0 PER 100 WBC
O2 SAT, VEN: 78.6 % (ref 60–85)
PCO2, VEN: 51.2 MM HG (ref 39–55)
PH VENOUS: 7.3 (ref 7.32–7.42)
PLATELET # BLD AUTO: 164 K/UL (ref 138–453)
PMV BLD AUTO: 11.7 FL (ref 8.1–13.5)
PO2, VEN: 46.7 MM HG (ref 30–50)
POTASSIUM SERPL-SCNC: 4.7 MMOL/L (ref 3.7–5.3)
RBC # BLD AUTO: 4.29 M/UL (ref 4.21–5.77)
RBC # BLD: ABNORMAL 10*6/UL
RSV RNA NPH QL NAA+NON-PROBE: NOT DETECTED
RV+EV RNA NPH QL NAA+NON-PROBE: NOT DETECTED
SARS-COV-2 RNA NPH QL NAA+NON-PROBE: NOT DETECTED
SODIUM SERPL-SCNC: 139 MMOL/L (ref 135–144)
SPECIMEN DESCRIPTION: NORMAL
TROPONIN I SERPL HS-MCNC: 28 NG/L (ref 0–22)
TROPONIN I SERPL HS-MCNC: 60 NG/L (ref 0–22)
WBC OTHER # BLD: 11.5 K/UL (ref 3.5–11.3)

## 2023-10-11 PROCEDURE — 83735 ASSAY OF MAGNESIUM: CPT

## 2023-10-11 PROCEDURE — 83880 ASSAY OF NATRIURETIC PEPTIDE: CPT

## 2023-10-11 PROCEDURE — 84484 ASSAY OF TROPONIN QUANT: CPT

## 2023-10-11 PROCEDURE — 85025 COMPLETE CBC W/AUTO DIFF WBC: CPT

## 2023-10-11 PROCEDURE — 93005 ELECTROCARDIOGRAM TRACING: CPT | Performed by: INTERNAL MEDICINE

## 2023-10-11 PROCEDURE — 94640 AIRWAY INHALATION TREATMENT: CPT

## 2023-10-11 PROCEDURE — 0202U NFCT DS 22 TRGT SARS-COV-2: CPT

## 2023-10-11 PROCEDURE — 80048 BASIC METABOLIC PNL TOTAL CA: CPT

## 2023-10-11 PROCEDURE — 83036 HEMOGLOBIN GLYCOSYLATED A1C: CPT

## 2023-10-11 PROCEDURE — 2580000003 HC RX 258

## 2023-10-11 PROCEDURE — 82947 ASSAY GLUCOSE BLOOD QUANT: CPT

## 2023-10-11 PROCEDURE — 6360000002 HC RX W HCPCS: Performed by: EMERGENCY MEDICINE

## 2023-10-11 PROCEDURE — 94660 CPAP INITIATION&MGMT: CPT

## 2023-10-11 PROCEDURE — 99223 1ST HOSP IP/OBS HIGH 75: CPT | Performed by: INTERNAL MEDICINE

## 2023-10-11 PROCEDURE — 82805 BLOOD GASES W/O2 SATURATION: CPT

## 2023-10-11 PROCEDURE — 6370000000 HC RX 637 (ALT 250 FOR IP)

## 2023-10-11 PROCEDURE — 99285 EMERGENCY DEPT VISIT HI MDM: CPT

## 2023-10-11 PROCEDURE — 2060000000 HC ICU INTERMEDIATE R&B

## 2023-10-11 PROCEDURE — 6360000002 HC RX W HCPCS

## 2023-10-11 PROCEDURE — 36415 COLL VENOUS BLD VENIPUNCTURE: CPT

## 2023-10-11 PROCEDURE — 71045 X-RAY EXAM CHEST 1 VIEW: CPT

## 2023-10-11 RX ORDER — PREDNISONE 20 MG/1
40 TABLET ORAL DAILY
Status: DISCONTINUED | OUTPATIENT
Start: 2023-10-11 | End: 2023-10-13 | Stop reason: HOSPADM

## 2023-10-11 RX ORDER — ONDANSETRON 4 MG/1
4 TABLET, ORALLY DISINTEGRATING ORAL EVERY 8 HOURS PRN
Status: DISCONTINUED | OUTPATIENT
Start: 2023-10-11 | End: 2023-10-13 | Stop reason: HOSPADM

## 2023-10-11 RX ORDER — AMIODARONE HYDROCHLORIDE 200 MG/1
200 TABLET ORAL 2 TIMES DAILY
Status: DISCONTINUED | OUTPATIENT
Start: 2023-10-11 | End: 2023-10-13 | Stop reason: HOSPADM

## 2023-10-11 RX ORDER — SODIUM CHLORIDE 9 MG/ML
INJECTION, SOLUTION INTRAVENOUS PRN
Status: DISCONTINUED | OUTPATIENT
Start: 2023-10-11 | End: 2023-10-13 | Stop reason: HOSPADM

## 2023-10-11 RX ORDER — FUROSEMIDE 10 MG/ML
40 INJECTION INTRAMUSCULAR; INTRAVENOUS 2 TIMES DAILY
Status: DISCONTINUED | OUTPATIENT
Start: 2023-10-11 | End: 2023-10-12

## 2023-10-11 RX ORDER — ACETAMINOPHEN 650 MG/1
650 SUPPOSITORY RECTAL EVERY 6 HOURS PRN
Status: DISCONTINUED | OUTPATIENT
Start: 2023-10-11 | End: 2023-10-13 | Stop reason: HOSPADM

## 2023-10-11 RX ORDER — ASPIRIN 81 MG/1
81 TABLET, CHEWABLE ORAL DAILY
Status: DISCONTINUED | OUTPATIENT
Start: 2023-10-11 | End: 2023-10-13 | Stop reason: HOSPADM

## 2023-10-11 RX ORDER — DEXTROSE MONOHYDRATE 100 MG/ML
INJECTION, SOLUTION INTRAVENOUS CONTINUOUS PRN
Status: DISCONTINUED | OUTPATIENT
Start: 2023-10-11 | End: 2023-10-13 | Stop reason: HOSPADM

## 2023-10-11 RX ORDER — INSULIN LISPRO 100 [IU]/ML
0-4 INJECTION, SOLUTION INTRAVENOUS; SUBCUTANEOUS NIGHTLY
Status: DISCONTINUED | OUTPATIENT
Start: 2023-10-11 | End: 2023-10-13 | Stop reason: HOSPADM

## 2023-10-11 RX ORDER — INSULIN LISPRO 100 [IU]/ML
0-4 INJECTION, SOLUTION INTRAVENOUS; SUBCUTANEOUS
Status: DISCONTINUED | OUTPATIENT
Start: 2023-10-11 | End: 2023-10-13 | Stop reason: HOSPADM

## 2023-10-11 RX ORDER — METOPROLOL SUCCINATE 50 MG/1
50 TABLET, EXTENDED RELEASE ORAL DAILY
Status: DISCONTINUED | OUTPATIENT
Start: 2023-10-11 | End: 2023-10-13 | Stop reason: HOSPADM

## 2023-10-11 RX ORDER — SODIUM CHLORIDE 0.9 % (FLUSH) 0.9 %
5-40 SYRINGE (ML) INJECTION EVERY 12 HOURS SCHEDULED
Status: DISCONTINUED | OUTPATIENT
Start: 2023-10-11 | End: 2023-10-13 | Stop reason: HOSPADM

## 2023-10-11 RX ORDER — ONDANSETRON 2 MG/ML
4 INJECTION INTRAMUSCULAR; INTRAVENOUS EVERY 6 HOURS PRN
Status: DISCONTINUED | OUTPATIENT
Start: 2023-10-11 | End: 2023-10-13 | Stop reason: HOSPADM

## 2023-10-11 RX ORDER — MAGNESIUM SULFATE IN WATER 40 MG/ML
2000 INJECTION, SOLUTION INTRAVENOUS ONCE
Status: DISCONTINUED | OUTPATIENT
Start: 2023-10-11 | End: 2023-10-13 | Stop reason: HOSPADM

## 2023-10-11 RX ORDER — POLYETHYLENE GLYCOL 3350 17 G/17G
17 POWDER, FOR SOLUTION ORAL DAILY PRN
Status: DISCONTINUED | OUTPATIENT
Start: 2023-10-11 | End: 2023-10-13 | Stop reason: HOSPADM

## 2023-10-11 RX ORDER — ACETAMINOPHEN 325 MG/1
650 TABLET ORAL EVERY 6 HOURS PRN
Status: DISCONTINUED | OUTPATIENT
Start: 2023-10-11 | End: 2023-10-13 | Stop reason: HOSPADM

## 2023-10-11 RX ORDER — FUROSEMIDE 10 MG/ML
40 INJECTION INTRAMUSCULAR; INTRAVENOUS ONCE
Status: COMPLETED | OUTPATIENT
Start: 2023-10-11 | End: 2023-10-11

## 2023-10-11 RX ORDER — SODIUM CHLORIDE 0.9 % (FLUSH) 0.9 %
5-40 SYRINGE (ML) INJECTION PRN
Status: DISCONTINUED | OUTPATIENT
Start: 2023-10-11 | End: 2023-10-13 | Stop reason: HOSPADM

## 2023-10-11 RX ORDER — IPRATROPIUM BROMIDE AND ALBUTEROL SULFATE 2.5; .5 MG/3ML; MG/3ML
1 SOLUTION RESPIRATORY (INHALATION)
Status: DISCONTINUED | OUTPATIENT
Start: 2023-10-11 | End: 2023-10-13 | Stop reason: HOSPADM

## 2023-10-11 RX ORDER — ATORVASTATIN CALCIUM 40 MG/1
40 TABLET, FILM COATED ORAL DAILY
Status: DISCONTINUED | OUTPATIENT
Start: 2023-10-11 | End: 2023-10-13 | Stop reason: HOSPADM

## 2023-10-11 RX ADMIN — IPRATROPIUM BROMIDE AND ALBUTEROL SULFATE 1 DOSE: 2.5; .5 SOLUTION RESPIRATORY (INHALATION) at 16:11

## 2023-10-11 RX ADMIN — APIXABAN 5 MG: 5 TABLET, FILM COATED ORAL at 21:17

## 2023-10-11 RX ADMIN — FUROSEMIDE 40 MG: 10 INJECTION, SOLUTION INTRAMUSCULAR; INTRAVENOUS at 12:10

## 2023-10-11 RX ADMIN — SACUBITRIL AND VALSARTAN 1 TABLET: 24; 26 TABLET, FILM COATED ORAL at 22:06

## 2023-10-11 RX ADMIN — FUROSEMIDE 40 MG: 10 INJECTION, SOLUTION INTRAMUSCULAR; INTRAVENOUS at 21:17

## 2023-10-11 RX ADMIN — AMIODARONE HYDROCHLORIDE 200 MG: 200 TABLET ORAL at 21:18

## 2023-10-11 RX ADMIN — PREDNISONE 40 MG: 20 TABLET ORAL at 14:09

## 2023-10-11 RX ADMIN — SODIUM CHLORIDE, PRESERVATIVE FREE 10 ML: 5 INJECTION INTRAVENOUS at 21:19

## 2023-10-11 RX ADMIN — AZITHROMYCIN MONOHYDRATE 500 MG: 500 INJECTION, POWDER, LYOPHILIZED, FOR SOLUTION INTRAVENOUS at 14:13

## 2023-10-11 RX ADMIN — IPRATROPIUM BROMIDE AND ALBUTEROL SULFATE 1 DOSE: 2.5; .5 SOLUTION RESPIRATORY (INHALATION) at 11:56

## 2023-10-11 RX ADMIN — INSULIN LISPRO 1 UNITS: 100 INJECTION, SOLUTION INTRAVENOUS; SUBCUTANEOUS at 14:09

## 2023-10-11 RX ADMIN — SODIUM CHLORIDE, PRESERVATIVE FREE 10 ML: 5 INJECTION INTRAVENOUS at 12:10

## 2023-10-11 RX ADMIN — IPRATROPIUM BROMIDE AND ALBUTEROL SULFATE 1 DOSE: 2.5; .5 SOLUTION RESPIRATORY (INHALATION) at 20:08

## 2023-10-11 RX ADMIN — CEFTRIAXONE SODIUM 1000 MG: 10 INJECTION, POWDER, FOR SOLUTION INTRAVENOUS at 14:09

## 2023-10-11 ASSESSMENT — ENCOUNTER SYMPTOMS
NAUSEA: 0
ABDOMINAL PAIN: 1
CONSTIPATION: 0
DIARRHEA: 0
SHORTNESS OF BREATH: 1
WHEEZING: 1
CHEST TIGHTNESS: 1
VOMITING: 0
COUGH: 1

## 2023-10-11 ASSESSMENT — LIFESTYLE VARIABLES
HOW OFTEN DO YOU HAVE A DRINK CONTAINING ALCOHOL: MONTHLY OR LESS
HOW MANY STANDARD DRINKS CONTAINING ALCOHOL DO YOU HAVE ON A TYPICAL DAY: 1 OR 2

## 2023-10-11 ASSESSMENT — PAIN - FUNCTIONAL ASSESSMENT: PAIN_FUNCTIONAL_ASSESSMENT: NONE - DENIES PAIN

## 2023-10-11 NOTE — PROGRESS NOTES
77-year-old male with medical history of ischemic cardiomyopathy, stable CAD, HFrEF last echo in March 2023 showed EF of 35 to 08% grade 1 diastolic dysfunction, history of ventricular tachycardia, on amiodarone status post ablation in 2022, ICD in place, history of apical LV thrombus on chronic anticoagulation with Eliquis, essential hypertension, history of obstructive sleep apnea, CKD baseline creatinine around 1.3-1.5, active smoker smokes a pack a day, diabetes mellitus    Home medications include atorvastatin, Entresto, Eliquis, Toprol-XL, aspirin, amiodarone, Ventolin, Spiriva, metformin, Lasix, midodrine    Patient came in with patient came in with complaints of shortness of breath, patient reported having cough for the past few days, denies any fevers or chills, denies any chest pain, denies any nausea vomiting or diarrhea, denies any urinary symptoms. Reports that patient was compliant with his medications, compliant with his inhalers. Does not use any oxygen at baseline. Patient has CPAP at home, but patient does not use it due to his wife being disabled and he cannot hear her if she calls him when he wears the CPAP, on and off patient uses oxygen overnight maybe 2-3 times a week.       As per ED team, upon EMS arrival patient was in respiratory distress, admitted to the ED patient was afebrile, hypertensive, hypoxic    Lab work showed hyperglycemia, elevated proBNP, elevated troponin's    As per ED team, lilliee interrogated the ICD, reported episodes of NSVT, no shocks delivered    On evaluation patient is awake alert oriented, on BiPAP resting comfortably on the bed  Able to talk in full sentences  Decreased breath sounds bilaterally  Significant pitting pedal edema bilateral lower extremities        Assessment and plan -   Acute hypoxic respiratory failure likely secondary to CHF and COPD exacerbation  Acute on chronic CHF  COPD exacerbation  NSVT  Ischemic cardiomyopathy  HFrEF  History of ventricular tachycardia, s/p ablation in 2022, ICD in place, on amiodarone  LV thrombus  CKD with baseline creatinine around 1.3-1.5   obstructive sleep apnea  Active smoker  Diabetes mellitus        - Admit to stepdown floor  - Continuous bedside telemetry  - Continue diuresis with IV Lasix 40 mg twice daily  - Strict intake and output monitoring  - Follow-up on respiratory panel  - DuoNeb every 4 hours while awake  - Prednisone 40 mg p.o. daily  - Rocephin and azithromycin for 3 days  - Cardiology consult  - Continue home medications aspirin, statin, amiodarone, beta-blocker, Entresto, eliquis  - Low-dose sliding scale for hyperglycemia  - Hypoglycemia protocol in place  - PT/OT  - follow up on formal AICD interrogation from the [employer]      Neeta Nj  PGY-3  Internal Medicine  [facility], [place], [place]   10:47 AM 10/11/2023

## 2023-10-11 NOTE — PROGRESS NOTES
Attending Physician Statement  I have discussed the care of [facility] AND [facility], including pertinent history and exam findings with the resident. I have reviewed the key elements of all parts of the encounter with the resident. I have seen and examined the patient with the resident. I agree with the assessment and plan and status of the problem list as documented. See full H&P note by medicine resident. I have seen the patient in the emergency department for admission to medicine service. I have reviewed the imaging studies, labs seen, chest x-ray reviewed and medications seen. He has history of ischemic cardiomyopathy with last ejection fraction 35 to 40% in March 3269, diastolic dysfunction, history of ventricular tachycardia, obstructive sleep apnea, on chronic anticoagulation, chronic kidney disease, diabetes mellitus and history of LV thrombus on anticoagulation, history of COPD severity not known. He is not compliant with CPAP although he did not have any problem with CPAP at his wife is disabled and he cannot hear her when he use CPAP. He presented to emergency room with rather acute shortness of breath this morning that he could not breathe and was brought in by EMS he was in respiratory distress requiring noninvasive ventilation/BiPAP he was given Lasix and his troponin was 60 proBNP was elevated greater than 4500. He had a blood gas done which was 7.3 0/51/46/20 4.6 which was a venous blood gas. When I saw the patient patient at that time was off BiPAP he was supposed to be nasal cannula but he was saturating 92% at room air at that time he was much more comfortable according to patient is feeling better. Patient was seen by cardiology and AICD interrogation was planned he is on amiodarone 200 mg twice daily which will be continued. Patient was given antibiotic also in the emergency room. Chest x-ray was consistent with pulmonary edema.   He has acute hypoxic respiratory failure secondary to

## 2023-10-11 NOTE — H&P
[id number]     Department of Internal Medicine - Staff Internal Medicine Teaching Service          ADMISSION NOTE/HISTORY AND PHYSICAL EXAMINATION   Date: 10/11/2023  Patient Name: Jannette Wolfe  Date of admission: 10/11/2023  7:03 AM  YOB: 1950  PCP: Theodore Kc MD  History Obtained From:  patient, electronic medical record    CHIEF COMPLAINT     Chief complaint: SHORTNESS OF BREATH    HISTORY OF PRESENTING ILLNESS     The patient is a pleasant 68 y.o. male with PMHx sig for ischemic cardiomyopathy, able CAD with known LAD  with collaterals on heart cath 2018, HFrEF LVEF 35 to 40% with grade 1 DD Echo 3/23 s/p AICD 2014, Hx of ventricular tachycardia s/p successful ablation 2022, recurrent, on amiodarone, apical LV thrombus 2019-Eliquis, HTN, HLD, COPD (as needed O2 at home), JAYCE (not using CPAP at home), CKD (baseline CR 1.3-1.5), active smoker, type II DM (metformin), presents with a chief complaint of shortness of breath worsening for the past few days with cough, more at night and exertion,, woke up this morning out of breath, started feeling dizzy lightheaded about to pass out, could not reach his O2 at home, called 911. Denies any fever, chills, diaphoresis, chest pain, nausea, vomiting, urinary symptoms. Has CPAP at home not using because his wife is disabled and cannot hear her if she calls them while he is on CPAP, describes orthopnea for which he uses O2 overnight 2 to 3 days/week.     In ED patient was found to be in respiratory distress, febrile, hypoxic was started on BiPAP by EMS, received nitro and 60 mg of prednisone, patient was afebrile, sick, /92 initially, went down to 120/72 on evaluation, weaned off of BiPAP was on room air on evaluation saturating 94%, describes some chest tightness but SOB much better, CXR showed cardiomegaly with pulmonary vascular congestion with left pleural effusion and bilateral Basilar atelectasis 10/11/23  7:26 AM   Result Value Ref Range    WBC 11.5 (H) 3.5 - 11.3 k/uL    RBC 4.29 4. 21 - 5.77 m/uL    Hemoglobin 14.4 13.0 - 17.0 g/dL    Hematocrit 44.4 40.7 - 50.3 %    .5 (H) 82.6 - 102.9 fL    MCH 33.6 (H) 25.2 - 33.5 pg    MCHC 32.4 28.4 - 34.8 g/dL    RDW 14.4 11.8 - 14.4 %    Platelets 948 164 - 921 k/uL    MPV 11.7 8.1 - 13.5 fL    NRBC Automated 0.0 0.0 per 100 WBC    Neutrophils % 79 (H) 36 - 65 %    Lymphocytes % 13 (L) 24 - 43 %    Monocytes % 6 3 - 12 %    Eosinophils % 1 1 - 4 %    Basophils % 1 0 - 2 %    Immature Granulocytes 0 0 %    Neutrophils Absolute 9.05 (H) 1.50 - 8.10 k/uL    Lymphocytes Absolute 1.51 1.10 - 3.70 k/uL    Monocytes Absolute 0.65 0.10 - 1.20 k/uL    Eosinophils Absolute 0.16 0.00 - 0.44 k/uL    Basophils Absolute 0.07 0.00 - 0.20 k/uL    Absolute Immature Granulocyte 0.05 0.00 - 0.30 k/uL    RBC Morphology MACROCYTOSIS PRESENT    Brain Natriuretic Peptide    Collection Time: 10/11/23  7:26 AM   Result Value Ref Range    Pro-BNP 4,645 (H) <300 pg/mL   Basic Metabolic Panel    Collection Time: 10/11/23  7:26 AM   Result Value Ref Range    Sodium 139 135 - 144 mmol/L    Potassium 4.7 3.7 - 5.3 mmol/L    Chloride 106 98 - 107 mmol/L    CO2 19 (L) 20 - 31 mmol/L    Anion Gap 14 9 - 17 mmol/L    Glucose 200 (H) 70 - 99 mg/dL    BUN 20 8 - 23 mg/dL    Creatinine 1.3 (H) 0.7 - 1.2 mg/dL    Est, Glom Filt Rate 58 (L) >60 mL/min/1.73m2    Calcium 9.0 8.6 - 10.4 mg/dL   Troponin    Collection Time: 10/11/23  7:26 AM   Result Value Ref Range    Troponin, High Sensitivity 28 (H) 0 - 22 ng/L   Troponin    Collection Time: 10/11/23  8:29 AM   Result Value Ref Range    Troponin, High Sensitivity 60 (HH) 0 - 22 ng/L   BLOOD GAS, VENOUS    Collection Time: 10/11/23 10:12 AM   Result Value Ref Range    pH, Osman 7.303 (L) 7.320 - 7.420    pCO2, Osman 51.2 39 - 55 mm Hg    pO2, Osman 46.7 30 - 50 mm Hg    HCO3, Venous 24.6 24 - 30 mmol/L    Negative Base Excess, Osman 1.9 0.0 - 2.0 mmol/L

## 2023-10-11 NOTE — CONSULTS
SOB (shortness of breath), Tendonitis, Achilles, left, Unstable angina ([address]), and Ventricular tachyarrhythmia ([address]). Past Surgical History:   has a past surgical history that includes Cardiac defibrillator placement; Cardiac catheterization; Cardiac defibrillator placement (Left, 09/01/2014); Colonoscopy (03/01/2015); laparoscopic appendectomy (03/08/2023); and laparoscopic appendectomy (N/A, 3/8/2023). Home Medications:    Prior to Admission medications    Medication Sig Start Date End Date Taking?  Authorizing Provider   Noemy Blackman 18 MCG inhalation capsule INHALE THE CONTENTS OF 1 CAPSULE BY MOUTH VIA HANDIHALER DAILY 10/9/23   Daylin Jansen MD   amiodarone (CORDARONE) 200 MG tablet TAKE 1 TABLET BY MOUTH TWICE  DAILY 9/18/23   Mckayla Villareal MD   VENTOLIN  (90 Base) MCG/ACT inhaler INHALE 2 INHALATIONS BY MOUTH  INTO THE LUNGS 4 TIMES DAILY AS  NEEDED FOR WHEEZING 8/14/23   Daylin Jansen MD   ACCU-CHEK BALDEMAR PLUS strip use 1 TEST STRIP to TEST BLOOD SUGAR three times a day 7/27/23   Daylin Jansen MD   midodrine (PROAMATINE) 2.5 MG tablet Take 1 tablet by mouth 3 times daily 5/17/23   Mckayla Villareal MD   atorvastatin (LIPITOR) 40 MG tablet TAKE 1 TABLET DAILY 5/17/23   Mckayla Villareal MD   furosemide (LASIX) 40 MG tablet Take 1 tablet by mouth daily 5/4/23   Mckayla Villareal MD   ENTRESTO 24-26 MG per tablet TAKE 1 TABLET BY MOUTH IN THE  MORNING AND TAKE ONE-HALF TABLET IN THE EVENING 5/1/23   Mckayla Villareal MD   ELIQUIS 5 MG TABS tablet TAKE 1 TABLET TWICE A DAY 5/1/23   Mckayla Villareal MD   metoprolol succinate (TOPROL XL) 50 MG extended release tablet Take 1 tablet by mouth daily 4/25/23   Mckayla Villareal MD   metFORMIN (GLUCOPHAGE) 500 MG tablet TAKE 1 TABLET BY MOUTH TWICE A  DAY WITH MEALS 2/14/23   Daylin Jansen MD   Multiple Vitamin (MULTIVITAMIN) tablet Take 1 tablet by mouth daily 12/11/18   Ishmael Hall MD   aspirin 81 MG tablet Take 1 tablet by mouth

## 2023-10-11 NOTE — ACP (ADVANCE CARE PLANNING)
Advance Care Planning     Advance Care Planning Activator (Inpatient)  Conversation Note      Date of ACP Conversation: 10/11/2023     Conversation Conducted with: Patient with Decision Making Capacity    ACP Activator: Emory Kaur RN    Pt is own decision maker  Full code     Health Care Decision Maker:     Current Designated Health Care Decision Maker:     Primary Decision Maker: [facility] Child - [phone]    Secondary Decision Maker: SanjanaErnestina - Spouse - [phone]  Click here to complete Healthcare Decision Makers including section of the Healthcare Decision Maker Relationship (ie \"Primary\")  Today we documented Decision Maker(s) consistent with Legal Next of Kin hierarchy. Care Preferences    Ventilation: \"If you were in your present state of health and suddenly became very ill and were unable to breathe on your own, what would your preference be about the use of a ventilator (breathing machine) if it were available to you? \"      Would the patient desire the use of ventilator (breathing machine)?: yes    \"If your health worsens and it becomes clear that your chance of recovery is unlikely, what would your preference be about the use of a ventilator (breathing machine) if it were available to you? \"     Would the patient desire the use of ventilator (breathing machine)?: Yes      Resuscitation  \"CPR works best to restart the heart when there is a sudden event, like a heart attack, in someone who is otherwise healthy. Unfortunately, CPR does not typically restart the heart for people who have serious health conditions or who are very sick. \"    \"In the event your heart stopped as a result of an underlying serious health condition, would you want attempts to be made to restart your heart (answer \"yes\" for attempt to resuscitate) or would you prefer a natural death (answer \"no\" for do not attempt to resuscitate)? \" yes       [x] Yes   [] No   Educated Patient / Decision Maker regarding differences between Advance Directives and portable DNR orders.     Length of ACP Conversation in minutes:      Conversation Outcomes:  ACP discussion completed    Follow-up plan:    [x] Schedule follow-up conversation to continue planning  [] Referred individual to Provider for additional questions/concerns   [] Advised patient/agent/surrogate to review completed ACP document and update if needed with changes in condition, patient preferences or care setting    [] This note routed to one or more involved healthcare providers

## 2023-10-11 NOTE — ED NOTES
ED to inpatient nurses report      Chief Complaint:  Chief Complaint   Patient presents with    Shortness of Breath     Present to ED from: home    MOA:EMS     LOC: alert and orientated to name, place, date  Mobility: Independent  Oxygen Baseline: RA    Current needs required: bipap   Pending ED orders: none  Present condition: stable    Why did the patient come to the ED? Pt arrives to ED 2 via EMS c/o shortness of breath starting last night before bed. Pt states that he took his Spiriva and afterwards felt short of breath. Pt was given prednisone and nitro PTA and was put on a CPAP with good response. Pt states that his SOB is worse with exertion. Pt denies chest pain at this time but states he has a [employer] pacemaker. Pt arrives on CPAP with semi-labored breathing and an even RR. Pt placed on full cardiac monitor, EKG completed and labs drawn. Resident is at the bedside to assess, plan of care ongoing. What is the plan? Admit for CHF  Any procedures or intervention occur?  X-ray, bipap    Mental Status:  Level of Consciousness: Alert (0)    Psych Assessment:   Psychosocial  Psychosocial (WDL): Within Defined Limits  Vital signs   Vitals:    10/11/23 1030 10/11/23 1045 10/11/23 1115 10/11/23 1130   BP: 125/76 123/75 120/72 120/72   Pulse: 59 59 58 58   Resp:       Temp:       TempSrc:       SpO2: 100% 100% 100% 100%   Weight:            Vitals:  Patient Vitals for the past 24 hrs:   BP Temp Temp src Pulse Resp SpO2 Weight   10/11/23 1130 120/72 -- -- 58 -- 100 % --   10/11/23 1115 120/72 -- -- 58 -- 100 % --   10/11/23 1045 123/75 -- -- 59 -- 100 % --   10/11/23 1030 125/76 -- -- 59 -- 100 % --   10/11/23 1015 137/80 -- -- 60 -- 100 % --   10/11/23 1000 126/75 -- -- 54 14 100 % --   10/11/23 0945 125/70 -- -- 53 13 100 % --   10/11/23 0931 -- -- -- 53 15 91 % --   10/11/23 0930 118/77 -- -- 54 10 -- --   10/11/23 0915 115/67 -- -- 50 13 98 % --   10/11/23 0900 115/70 -- -- 51 13 99 % --   10/11/23 0707 -- -- --

## 2023-10-11 NOTE — ED NOTES
Pt taken off bipap by Rrt for treatment and to trial off.  Pt 93% on RA     Em Crouch RN  10/11/23 0979

## 2023-10-11 NOTE — CARE COORDINATION
Case Management Assessment  Initial Evaluation    Date/Time of Evaluation: 10/11/2023 2:35 PM  Assessment Completed by: Pily Meyer RN    If patient is discharged prior to next notation, then this note serves as note for discharge by case management. Patient Name: Amando Perkins                   YOB: 1950  Diagnosis: Acute on chronic systolic CHF (congestive heart failure) ([address]) [I50.23]                   Date / Time: 10/11/2023  7:03 AM    Patient Admission Status: Inpatient   Readmission Risk (Low < 19, Mod (19-27), High > 27): Readmission Risk Score: 14.5    Current PCP: Yessenia Horn MD  PCP verified by CM? (P) Yes    Chart Reviewed: Yes      History Provided by: (P) Patient  Patient Orientation: (P) Alert and Oriented    Patient Cognition: (P) Alert    Hospitalization in the last 30 days (Readmission):  No    If yes, Readmission Assessment in CM Navigator will be completed. Advance Directives:      Code Status: Prior   Patient's Primary Decision Maker is:      Primary Decision Maker: Jr. Shadia Allan Leader - Child - [phone]    Secondary Decision Maker: Ernestina Allan - Spouse - [phone]    Discharge Planning:    Patient lives with: (P) Spouse/Significant Other Type of Home: (P) House  Primary Care Giver: (P) Self  Patient Support Systems include: (P) Spouse/Significant Other   Current Financial resources: (P) Medicare  Current community resources:    Current services prior to admission: (P) C-pap, Oxygen Therapy            Current DME:              Type of Home Care services:  (P) None    ADLS  Prior functional level: (P) Independent in ADLs/IADLs  Current functional level: (P) Independent in ADLs/IADLs    PT AM-PAC:   /24  OT AM-PAC:   /24    Family can provide assistance at DC: (P) Yes  Would you like Case Management to discuss the discharge plan with any other family members/significant others, and if so, who? (P) No  Plans to Return to Present Housing: (P) Yes  Other Identified Issues/Barriers to RETURNING to current housing: none  Potential Assistance needed at discharge: (P) N/A            Potential DME:    Patient expects to discharge to: (P) [address] [facility] for transportation at discharge: (P) Family    Financial    Payor: [id number] / Plan: [address] / Product Type: *No Product type* /     Does insurance require precert for SNF: Yes    Potential assistance Purchasing Medications: (P) No (fills mail in scripts at [facility] [place] [facility] and short term scripts at [employer] on [facility])  Meds-to-Beds request:        Sammi Monte [address] [facility] [phone] - F [address] [place] [phone]  Phone: [phone] Fax: [phone] [address] [address] [phone] Charan Gaurang [phone]  [address] [place] [address]  Phone: [phone] Fax: [phone]      Notes:    Factors facilitating achievement of predicted outcomes: Family support, Cooperative, and Pleasant    Barriers to discharge: medical clearance    Additional Case Management Notes: goal is home with spouse, son  to provide transportation has DME    The Plan for Transition of Care is related to the following treatment goals of Acute on chronic systolic CHF (congestive heart failure) ([address] ) [P91.88]    IF APPLICABLE: The Patient and/or patient representative Jose Eduardo Wilson and his family were provided with a choice of provider and agrees with the discharge plan. Freedom of choice list with basic dialogue that supports the patient's individualized plan of care/goals and shares the quality data associated with the providers was provided to: (P) Patient   Patient Representative Name:       The Patient and/or Patient Representative Agree with the Discharge Plan?  (P) Yes    Daphnie Kimble RN  Case Management Department  Ph: [phone]

## 2023-10-11 NOTE — ED NOTES
Pt arrives to ED 2 via EMS c/o shortness of breath starting last night before bed. Pt states that he took his Spiriva and afterwards felt short of breath. Pt was given prednisone and nitro PTA and was put on a CPAP with good response. Pt states that his SOB is worse with exertion. Pt denies chest pain at this time but states he has a [employer]onic pacemaker. Pt arrives on CPAP with semi-labored breathing and an even RR. Pt placed on full cardiac monitor, EKG completed and labs drawn. Resident is at the bedside to assess, plan of care ongoing.      Duke Tyson RN  10/11/23 6623

## 2023-10-12 ENCOUNTER — APPOINTMENT (OUTPATIENT)
Age: 73
End: 2023-10-12
Payer: MEDICARE

## 2023-10-12 LAB
ANION GAP SERPL CALCULATED.3IONS-SCNC: 14 MMOL/L (ref 9–17)
BASOPHILS # BLD: 0 K/UL (ref 0–0.2)
BASOPHILS NFR BLD: 0 % (ref 0–2)
BUN SERPL-MCNC: 25 MG/DL (ref 8–23)
CALCIUM SERPL-MCNC: 8.8 MG/DL (ref 8.6–10.4)
CHLORIDE SERPL-SCNC: 103 MMOL/L (ref 98–107)
CO2 SERPL-SCNC: 18 MMOL/L (ref 20–31)
CREAT SERPL-MCNC: 1.4 MG/DL (ref 0.7–1.2)
ECHO BSA: 2.37 M2
ECHO LV EJECTION FRACTION BIPLANE: 23 % (ref 55–100)
EOSINOPHIL # BLD: 0 K/UL (ref 0–0.4)
EOSINOPHILS RELATIVE PERCENT: 0 % (ref 1–4)
ERYTHROCYTE [DISTWIDTH] IN BLOOD BY AUTOMATED COUNT: 14.3 % (ref 11.8–14.4)
GFR SERPL CREATININE-BSD FRML MDRD: 53 ML/MIN/1.73M2
GLUCOSE BLD-MCNC: 132 MG/DL (ref 75–110)
GLUCOSE BLD-MCNC: 172 MG/DL (ref 75–110)
GLUCOSE BLD-MCNC: 187 MG/DL (ref 75–110)
GLUCOSE BLD-MCNC: 217 MG/DL (ref 75–110)
GLUCOSE SERPL-MCNC: 167 MG/DL (ref 70–99)
HCT VFR BLD AUTO: 42.2 % (ref 40.7–50.3)
HGB BLD-MCNC: 13.2 G/DL (ref 13–17)
IMM GRANULOCYTES # BLD AUTO: 0 K/UL (ref 0–0.3)
IMM GRANULOCYTES NFR BLD: 0 %
LYMPHOCYTES NFR BLD: 0.4 K/UL (ref 1–4.8)
LYMPHOCYTES RELATIVE PERCENT: 4 % (ref 24–44)
MAGNESIUM SERPL-MCNC: 2.2 MG/DL (ref 1.6–2.6)
MCH RBC QN AUTO: 34.1 PG (ref 25.2–33.5)
MCHC RBC AUTO-ENTMCNC: 31.3 G/DL (ref 28.4–34.8)
MCV RBC AUTO: 109 FL (ref 82.6–102.9)
MONOCYTES NFR BLD: 0.2 K/UL (ref 0.1–0.8)
MONOCYTES NFR BLD: 2 % (ref 1–7)
MORPHOLOGY: ABNORMAL
NEUTROPHILS NFR BLD: 94 % (ref 36–66)
NEUTS SEG NFR BLD: 9.3 K/UL (ref 1.8–7.7)
NRBC BLD-RTO: 0 PER 100 WBC
PLATELET # BLD AUTO: 123 K/UL (ref 138–453)
PMV BLD AUTO: 11.4 FL (ref 8.1–13.5)
POTASSIUM SERPL-SCNC: 4.7 MMOL/L (ref 3.7–5.3)
RBC # BLD AUTO: 3.87 M/UL (ref 4.21–5.77)
SODIUM SERPL-SCNC: 135 MMOL/L (ref 135–144)
TROPONIN I SERPL HS-MCNC: 62 NG/L (ref 0–22)
TROPONIN I SERPL HS-MCNC: 63 NG/L (ref 0–22)
WBC OTHER # BLD: 9.9 K/UL (ref 3.5–11.3)

## 2023-10-12 PROCEDURE — 6360000002 HC RX W HCPCS

## 2023-10-12 PROCEDURE — 6360000002 HC RX W HCPCS: Performed by: STUDENT IN AN ORGANIZED HEALTH CARE EDUCATION/TRAINING PROGRAM

## 2023-10-12 PROCEDURE — 99233 SBSQ HOSP IP/OBS HIGH 50: CPT | Performed by: NURSE PRACTITIONER

## 2023-10-12 PROCEDURE — 82947 ASSAY GLUCOSE BLOOD QUANT: CPT

## 2023-10-12 PROCEDURE — 6370000000 HC RX 637 (ALT 250 FOR IP)

## 2023-10-12 PROCEDURE — 94660 CPAP INITIATION&MGMT: CPT

## 2023-10-12 PROCEDURE — 83735 ASSAY OF MAGNESIUM: CPT

## 2023-10-12 PROCEDURE — 93308 TTE F-UP OR LMTD: CPT | Performed by: INTERNAL MEDICINE

## 2023-10-12 PROCEDURE — 6370000000 HC RX 637 (ALT 250 FOR IP): Performed by: NURSE PRACTITIONER

## 2023-10-12 PROCEDURE — 94761 N-INVAS EAR/PLS OXIMETRY MLT: CPT

## 2023-10-12 PROCEDURE — 84484 ASSAY OF TROPONIN QUANT: CPT

## 2023-10-12 PROCEDURE — 97112 NEUROMUSCULAR REEDUCATION: CPT

## 2023-10-12 PROCEDURE — 2060000000 HC ICU INTERMEDIATE R&B

## 2023-10-12 PROCEDURE — 99233 SBSQ HOSP IP/OBS HIGH 50: CPT | Performed by: INTERNAL MEDICINE

## 2023-10-12 PROCEDURE — 6370000000 HC RX 637 (ALT 250 FOR IP): Performed by: STUDENT IN AN ORGANIZED HEALTH CARE EDUCATION/TRAINING PROGRAM

## 2023-10-12 PROCEDURE — 6370000000 HC RX 637 (ALT 250 FOR IP): Performed by: INTERNAL MEDICINE

## 2023-10-12 PROCEDURE — 94640 AIRWAY INHALATION TREATMENT: CPT

## 2023-10-12 PROCEDURE — 97116 GAIT TRAINING THERAPY: CPT

## 2023-10-12 PROCEDURE — C8924 2D TTE W OR W/O FOL W/CON,FU: HCPCS

## 2023-10-12 PROCEDURE — 80048 BASIC METABOLIC PNL TOTAL CA: CPT

## 2023-10-12 PROCEDURE — 97162 PT EVAL MOD COMPLEX 30 MIN: CPT

## 2023-10-12 PROCEDURE — 2580000003 HC RX 258

## 2023-10-12 PROCEDURE — 85025 COMPLETE CBC W/AUTO DIFF WBC: CPT

## 2023-10-12 PROCEDURE — 36415 COLL VENOUS BLD VENIPUNCTURE: CPT

## 2023-10-12 PROCEDURE — 2700000000 HC OXYGEN THERAPY PER DAY

## 2023-10-12 RX ORDER — SPIRONOLACTONE 25 MG/1
12.5 TABLET ORAL DAILY
Status: DISCONTINUED | OUTPATIENT
Start: 2023-10-12 | End: 2023-10-13 | Stop reason: HOSPADM

## 2023-10-12 RX ORDER — MIDODRINE HYDROCHLORIDE 5 MG/1
2.5 TABLET ORAL
Status: DISCONTINUED | OUTPATIENT
Start: 2023-10-12 | End: 2023-10-13 | Stop reason: HOSPADM

## 2023-10-12 RX ORDER — AZITHROMYCIN 250 MG/1
500 TABLET, FILM COATED ORAL DAILY
Status: COMPLETED | OUTPATIENT
Start: 2023-10-12 | End: 2023-10-13

## 2023-10-12 RX ORDER — GUAIFENESIN 600 MG/1
600 TABLET, EXTENDED RELEASE ORAL 2 TIMES DAILY
Status: DISCONTINUED | OUTPATIENT
Start: 2023-10-12 | End: 2023-10-13 | Stop reason: HOSPADM

## 2023-10-12 RX ORDER — FUROSEMIDE 10 MG/ML
40 INJECTION INTRAMUSCULAR; INTRAVENOUS ONCE
Status: COMPLETED | OUTPATIENT
Start: 2023-10-12 | End: 2023-10-12

## 2023-10-12 RX ADMIN — FUROSEMIDE 40 MG: 10 INJECTION, SOLUTION INTRAMUSCULAR; INTRAVENOUS at 14:16

## 2023-10-12 RX ADMIN — CEFTRIAXONE SODIUM 1000 MG: 10 INJECTION, POWDER, FOR SOLUTION INTRAVENOUS at 11:27

## 2023-10-12 RX ADMIN — FUROSEMIDE 40 MG: 10 INJECTION, SOLUTION INTRAMUSCULAR; INTRAVENOUS at 10:09

## 2023-10-12 RX ADMIN — SODIUM CHLORIDE, PRESERVATIVE FREE 10 ML: 5 INJECTION INTRAVENOUS at 20:17

## 2023-10-12 RX ADMIN — MIDODRINE HYDROCHLORIDE 2.5 MG: 5 TABLET ORAL at 11:36

## 2023-10-12 RX ADMIN — PREDNISONE 40 MG: 20 TABLET ORAL at 10:09

## 2023-10-12 RX ADMIN — SPIRONOLACTONE 12.5 MG: 25 TABLET, FILM COATED ORAL at 11:26

## 2023-10-12 RX ADMIN — IPRATROPIUM BROMIDE AND ALBUTEROL SULFATE 1 DOSE: 2.5; .5 SOLUTION RESPIRATORY (INHALATION) at 16:12

## 2023-10-12 RX ADMIN — APIXABAN 5 MG: 5 TABLET, FILM COATED ORAL at 10:08

## 2023-10-12 RX ADMIN — GUAIFENESIN 600 MG: 600 TABLET, EXTENDED RELEASE ORAL at 20:16

## 2023-10-12 RX ADMIN — AZITHROMYCIN 500 MG: 250 TABLET, FILM COATED ORAL at 11:26

## 2023-10-12 RX ADMIN — SODIUM CHLORIDE, PRESERVATIVE FREE 10 ML: 5 INJECTION INTRAVENOUS at 10:12

## 2023-10-12 RX ADMIN — PERFLUTREN 1.5 ML: 6.52 INJECTION, SUSPENSION INTRAVENOUS at 12:26

## 2023-10-12 RX ADMIN — METOPROLOL SUCCINATE 50 MG: 50 TABLET, FILM COATED, EXTENDED RELEASE ORAL at 10:09

## 2023-10-12 RX ADMIN — SACUBITRIL AND VALSARTAN 1 TABLET: 24; 26 TABLET, FILM COATED ORAL at 20:17

## 2023-10-12 RX ADMIN — ATORVASTATIN CALCIUM 40 MG: 40 TABLET, FILM COATED ORAL at 10:08

## 2023-10-12 RX ADMIN — APIXABAN 5 MG: 5 TABLET, FILM COATED ORAL at 20:16

## 2023-10-12 RX ADMIN — IPRATROPIUM BROMIDE AND ALBUTEROL SULFATE 1 DOSE: 2.5; .5 SOLUTION RESPIRATORY (INHALATION) at 20:28

## 2023-10-12 RX ADMIN — MIDODRINE HYDROCHLORIDE 2.5 MG: 5 TABLET ORAL at 17:12

## 2023-10-12 RX ADMIN — IPRATROPIUM BROMIDE AND ALBUTEROL SULFATE 1 DOSE: 2.5; .5 SOLUTION RESPIRATORY (INHALATION) at 08:22

## 2023-10-12 RX ADMIN — GUAIFENESIN 600 MG: 600 TABLET, EXTENDED RELEASE ORAL at 11:26

## 2023-10-12 RX ADMIN — AMIODARONE HYDROCHLORIDE 200 MG: 200 TABLET ORAL at 10:09

## 2023-10-12 RX ADMIN — SACUBITRIL AND VALSARTAN 1 TABLET: 24; 26 TABLET, FILM COATED ORAL at 10:09

## 2023-10-12 RX ADMIN — ASPIRIN 81 MG: 81 TABLET, CHEWABLE ORAL at 10:09

## 2023-10-12 NOTE — PLAN OF CARE
Problem: Discharge Planning  Goal: Discharge to home or other facility with appropriate resources  Outcome: Progressing  Flowsheets (Taken 10/12/2023 0800)  Discharge to home or other facility with appropriate resources:   Identify barriers to discharge with patient and caregiver   Refer to discharge planning if patient needs post-hospital services based on physician order or complex needs related to functional status, cognitive ability or social support system   Arrange for needed discharge resources and transportation as appropriate   Identify discharge learning needs (meds, wound care, etc)   Arrange for interpreters to assist at discharge as needed     Problem: Safety - Adult  Goal: Free from fall injury  Outcome: Progressing     Problem: Chronic Conditions and Co-morbidities  Goal: Patient's chronic conditions and co-morbidity symptoms are monitored and maintained or improved  Outcome: Progressing  Flowsheets (Taken 10/12/2023 0800)  Care Plan - Patient's Chronic Conditions and Co-Morbidity Symptoms are Monitored and Maintained or Improved:   Monitor and assess patient's chronic conditions and comorbid symptoms for stability, deterioration, or improvement   Collaborate with multidisciplinary team to address chronic and comorbid conditions and prevent exacerbation or deterioration   Update acute care plan with appropriate goals if chronic or comorbid symptoms are exacerbated and prevent overall improvement and discharge     Problem: Respiratory - Adult  Goal: Achieves optimal ventilation and oxygenation  10/12/2023 1853 by Otho Aase, RN  Outcome: Progressing  10/12/2023 0824 by Roseline Babinski, RCP  Outcome: Progressing  Flowsheets (Taken 10/12/2023 0800 by Otho Aase, RN)  Achieves optimal ventilation and oxygenation:   Assess for changes in respiratory status   Assess for changes in mentation and behavior   Position to facilitate oxygenation and minimize respiratory effort   Oxygen supplementation based on oxygen saturation or arterial blood gases   Initiate smoking cessation protocol as indicated   Encourage broncho-pulmonary hygiene including cough, deep breathe, incentive spirometry   Assess and instruct to report shortness of breath or any respiratory difficulty   Respiratory therapy support as indicated   Assess the need for suctioning and aspirate as needed

## 2023-10-12 NOTE — PROGRESS NOTES
Physical Therapy  [facility]  Physical Therapy Initial Assessment    Name: Lauro Warren  :   MRN: [id number]  Date of Service: 10/12/2023    Chief Complaint   Patient presents with    Shortness of Breath        Discharge Recommendations:  Patient would benefit from continued therapy after discharge, Therapy recommended at discharge   PT Equipment Recommendations  Equipment Needed: No  Other: Pt has no DME needs      Patient Diagnosis(es): The primary encounter diagnosis was Acute on chronic congestive heart failure, unspecified heart failure type ([address]). A diagnosis of Nonsustained ventricular tachycardia (HCC) was also pertinent to this visit. Past Medical History:  has a past medical history of Alcohol abuse, Anxiety, CAD (coronary artery disease), CHF (congestive heart failure) ([address]), Colon polyps, COPD (chronic obstructive pulmonary disease) ([address]), Diabetes mellitus ([address]), Dupuytren contracture, Hypertension, ICD (implantable cardioverter-defibrillator) battery depletion, Ischemic cardiomyopathy, Obesity, SOB (shortness of breath), Tendonitis, Achilles, left, Unstable angina ([address]), and Ventricular tachyarrhythmia ([address]). Past Surgical History:  has a past surgical history that includes Cardiac defibrillator placement; Cardiac catheterization; Cardiac defibrillator placement (Left, 2014); Colonoscopy (2015); laparoscopic appendectomy (2023); and laparoscopic appendectomy (N/A, 3/8/2023). Assessment   Body Structures, Functions, Activity Limitations Requiring Skilled Therapeutic Intervention: Decreased functional mobility ; Decreased strength;Decreased endurance;Decreased balance  Assessment: Pt report long standing cardiac condition with limited activity tolerance. Pt able to complete bed mobilty with Mod I, sit <<> stand mod I, ambulate 70 ft std walker SBA.  Pt will continue to benefit from PT to progress activity and promote functional Supervision  Rolling to Right: Supervision  Supine to Sit: Supervision  Sit to Supine: Supervision  Scooting: Supervision  Transfers  Sit to Stand: Contact guard assistance  Stand to Sit: Contact guard assistance  Bed to Chair: Contact guard assistance  Ambulation  Surface: Level tile  Device: Rolling Walker  Gait Deviations: Slow Debbi;Decreased step length  Distance: 70 ft STD walker CGA  Comments: Pt report baseline indepedent gait without AD     Balance  Posture: Good  Sitting - Static: Good  Sitting - Dynamic: Good  Standing - Static: Good  Standing - Dynamic: Fair;+  Comments: Pt report general weakness with occasional unsteadiness and intermittent use of AD  Functional Reach Test  Fall Risk (Score of <10 inches is fall risk): Yes                                                         AM-PAC Score  AM-PAC Inpatient Mobility Raw Score : 22 (10/12/23 1505)  AM-PAC Inpatient T-Scale Score : 53.28 (10/12/23 1505)  Mobility Inpatient CMS 0-100% Score: 20.91 (10/12/23 1505)  Mobility Inpatient CMS G-Code Modifier : CJ (10/12/23 1505)               Goals  Short Term Goals  Time Frame for Short Term Goals: 14 visits  Short Term Goal 1: Pt to ambulate 100 ft with least AD SBA  Short Term Goal 2: Pt to asecend /descend 3 steps with rigth railing  Short Term Goal 3: Pt to transfer mod I to alternate heigth surfaces demonstrating safety with use of AD  Patient Goals   Patient Goals : to return home       Education  Patient Education  Education Given To: Patient  Education Provided: Role of Therapy;Plan of Care;Transfer Training; Fall Prevention Strategies  Education Method: Demonstration  Barriers to Learning: None  Education Outcome: Verbalized understanding      Therapy Time   Individual Concurrent Group Co-treatment   Time In 1400         Time Out 1450         Minutes 50         Timed Code Treatment Minutes: [facility]       Clyde Siegel, PT, DPT

## 2023-10-12 NOTE — PROGRESS NOTES
[facility]  Internal Medicine Teaching Residency Program  Inpatient Daily Progress Note  ______________________________________________________________________________    Patient: Lazarus Jones  YOB: 1950   [id number]:[id number]    Acct: [de-identified]     Room: 2023/2023-01  Admit date: 10/11/2023  Today's date: 10/12/23  Number of days in the hospital: 1    SUBJECTIVE   Admitting Diagnosis: Acute on chronic systolic CHF (congestive heart failure) ([address])  CC: Shortness of breath    Pt seen and examined. No acute events overnight. Labs and charts reviewed. Afebrile, hemodynamically stable, saturating well on room air. Required 2 L NC overnight didn't use BiPAP las night, encouraging NIV overnight- agreeable. -Trop 28-60-63-62        Review of Systems   Constitutional:  Positive for activity change. Negative for appetite change, chills and fever. HENT:  Negative for congestion. Respiratory:  Positive for cough, and wheezing. Negative for chest tightness, shortness of breath   Cardiovascular:  Positive for leg swelling (improved) . Negative for palpitations and Chest pain. Gastrointestinal:  Negative for constipation, diarrhea, nausea and vomiting. Genitourinary:  Negative for difficulty urinating and hematuria. Neurological:   Negative for syncope and headaches, dizziness and lightheadedness. Psychiatric/Behavioral:  Negative for agitation and confusion.       BRIEF HISTORY     The patient is a pleasant 68 y.o. male with PMHx sig for ischemic cardiomyopathy, able CAD with known LAD  with collaterals on heart cath 2018, HFrEF LVEF 35 to 40% with grade 1 DD Echo 3/23 s/p AICD 2014, Hx of ventricular tachycardia s/p successful ablation 2022, recurrent, on amiodarone, apical LV thrombus 2019-Eliquis, HTN, HLD, COPD (as needed O2 at home), JAYCE (not using CPAP at home), CKD (baseline CR 1.3-1.5), active smoker, type II DM (metformin), presents with a transcription, some errors in transcription may have occurred.      Eulalio Severance, MD  10/12/2023 8:06 PM

## 2023-10-12 NOTE — PROGRESS NOTES
[place] [id number] [place] [address]  PROGRESS NOTE    Shift date: 10/11/23  Shift day: Wednesday   Shift # 2    Room # 37/37   Name: Lenore Franco                Amish:  [id number] of Denominational:  [facility]    Referral: Routine Visit    Admit Date & Time: 10/11/2023  7:03 AM    Assessment:  Lenore Franco is a 68 y.o. male in the hospital     Intervention:  Writer introduced self and title as . Patient did not appear to mind  presence and engaged in conversation. Patient appeared calm and coping when conversing with  about his health and hospital visit. Patient stated he is Restorationism and belongs to Erin Pe[employer] the [employer].  provided a supportive presence through active listening and words of affirmation. Outcome:  Patient appeared receptive to  visit. Plan:  Chaplains will remain available to offer spiritual and emotional support as needed.       Electronically signed by Rosanne Strauss on 10/11/2023 at 8:38 PM.  [address] [place] [address]  [phone]

## 2023-10-13 VITALS
TEMPERATURE: 98 F | RESPIRATION RATE: 16 BRPM | SYSTOLIC BLOOD PRESSURE: 104 MMHG | WEIGHT: 240.74 LBS | HEIGHT: 73 IN | OXYGEN SATURATION: 92 % | HEART RATE: 55 BPM | BODY MASS INDEX: 31.91 KG/M2 | DIASTOLIC BLOOD PRESSURE: 48 MMHG

## 2023-10-13 LAB
ANION GAP SERPL CALCULATED.3IONS-SCNC: 13 MMOL/L (ref 9–17)
BASOPHILS # BLD: <0.03 K/UL (ref 0–0.2)
BASOPHILS NFR BLD: 0 % (ref 0–2)
BUN SERPL-MCNC: 32 MG/DL (ref 8–23)
CALCIUM SERPL-MCNC: 9.2 MG/DL (ref 8.6–10.4)
CHLORIDE SERPL-SCNC: 101 MMOL/L (ref 98–107)
CO2 SERPL-SCNC: 23 MMOL/L (ref 20–31)
CREAT SERPL-MCNC: 1.5 MG/DL (ref 0.7–1.2)
EKG ATRIAL RATE: 74 BPM
EKG P AXIS: 48 DEGREES
EKG P-R INTERVAL: 238 MS
EKG Q-T INTERVAL: 414 MS
EKG QRS DURATION: 132 MS
EKG QTC CALCULATION (BAZETT): 459 MS
EKG R AXIS: -50 DEGREES
EKG T AXIS: 99 DEGREES
EKG VENTRICULAR RATE: 74 BPM
EOSINOPHIL # BLD: <0.03 K/UL (ref 0–0.44)
EOSINOPHILS RELATIVE PERCENT: 0 % (ref 1–4)
ERYTHROCYTE [DISTWIDTH] IN BLOOD BY AUTOMATED COUNT: 14.3 % (ref 11.8–14.4)
GFR SERPL CREATININE-BSD FRML MDRD: 49 ML/MIN/1.73M2
GLUCOSE SERPL-MCNC: 136 MG/DL (ref 70–99)
HCT VFR BLD AUTO: 44.4 % (ref 40.7–50.3)
HGB BLD-MCNC: 14.2 G/DL (ref 13–17)
IMM GRANULOCYTES # BLD AUTO: 0.05 K/UL (ref 0–0.3)
IMM GRANULOCYTES NFR BLD: 0 %
LYMPHOCYTES NFR BLD: 0.83 K/UL (ref 1.1–3.7)
LYMPHOCYTES RELATIVE PERCENT: 5 % (ref 24–43)
MCH RBC QN AUTO: 33.7 PG (ref 25.2–33.5)
MCHC RBC AUTO-ENTMCNC: 32 G/DL (ref 28.4–34.8)
MCV RBC AUTO: 105.5 FL (ref 82.6–102.9)
MONOCYTES NFR BLD: 0.91 K/UL (ref 0.1–1.2)
MONOCYTES NFR BLD: 6 % (ref 3–12)
NEUTROPHILS NFR BLD: 88 % (ref 36–65)
NEUTS SEG NFR BLD: 13.47 K/UL (ref 1.5–8.1)
NRBC BLD-RTO: 0 PER 100 WBC
PLATELET # BLD AUTO: 140 K/UL (ref 138–453)
PMV BLD AUTO: 11.1 FL (ref 8.1–13.5)
POTASSIUM SERPL-SCNC: 4.1 MMOL/L (ref 3.7–5.3)
RBC # BLD AUTO: 4.21 M/UL (ref 4.21–5.77)
RBC # BLD: ABNORMAL 10*6/UL
SODIUM SERPL-SCNC: 137 MMOL/L (ref 135–144)
WBC OTHER # BLD: 15.3 K/UL (ref 3.5–11.3)

## 2023-10-13 PROCEDURE — 6370000000 HC RX 637 (ALT 250 FOR IP)

## 2023-10-13 PROCEDURE — 85025 COMPLETE CBC W/AUTO DIFF WBC: CPT

## 2023-10-13 PROCEDURE — 97530 THERAPEUTIC ACTIVITIES: CPT

## 2023-10-13 PROCEDURE — 2580000003 HC RX 258

## 2023-10-13 PROCEDURE — 36415 COLL VENOUS BLD VENIPUNCTURE: CPT

## 2023-10-13 PROCEDURE — 97535 SELF CARE MNGMENT TRAINING: CPT

## 2023-10-13 PROCEDURE — 94761 N-INVAS EAR/PLS OXIMETRY MLT: CPT

## 2023-10-13 PROCEDURE — 2700000000 HC OXYGEN THERAPY PER DAY

## 2023-10-13 PROCEDURE — 99232 SBSQ HOSP IP/OBS MODERATE 35: CPT | Performed by: INTERNAL MEDICINE

## 2023-10-13 PROCEDURE — 6370000000 HC RX 637 (ALT 250 FOR IP): Performed by: NURSE PRACTITIONER

## 2023-10-13 PROCEDURE — 94640 AIRWAY INHALATION TREATMENT: CPT

## 2023-10-13 PROCEDURE — 97166 OT EVAL MOD COMPLEX 45 MIN: CPT

## 2023-10-13 PROCEDURE — 99233 SBSQ HOSP IP/OBS HIGH 50: CPT | Performed by: NURSE PRACTITIONER

## 2023-10-13 PROCEDURE — 6360000002 HC RX W HCPCS

## 2023-10-13 PROCEDURE — 6370000000 HC RX 637 (ALT 250 FOR IP): Performed by: INTERNAL MEDICINE

## 2023-10-13 PROCEDURE — 80048 BASIC METABOLIC PNL TOTAL CA: CPT

## 2023-10-13 PROCEDURE — 6370000000 HC RX 637 (ALT 250 FOR IP): Performed by: STUDENT IN AN ORGANIZED HEALTH CARE EDUCATION/TRAINING PROGRAM

## 2023-10-13 RX ORDER — GUAIFENESIN 600 MG/1
600 TABLET, EXTENDED RELEASE ORAL 2 TIMES DAILY
Qty: 20 TABLET | Refills: 1 | Status: SHIPPED | OUTPATIENT
Start: 2023-10-13 | End: 2023-10-13 | Stop reason: SDUPTHER

## 2023-10-13 RX ORDER — PREDNISONE 20 MG/1
40 TABLET ORAL DAILY
Qty: 4 TABLET | Refills: 0 | Status: SHIPPED | OUTPATIENT
Start: 2023-10-14 | End: 2023-10-16

## 2023-10-13 RX ORDER — SPIRONOLACTONE 25 MG/1
12.5 TABLET ORAL DAILY
Qty: 30 TABLET | Refills: 3 | Status: SHIPPED | OUTPATIENT
Start: 2023-10-14 | End: 2023-10-20 | Stop reason: SDUPTHER

## 2023-10-13 RX ORDER — SPIRONOLACTONE 25 MG/1
12.5 TABLET ORAL DAILY
Qty: 30 TABLET | Refills: 3 | Status: SHIPPED | OUTPATIENT
Start: 2023-10-14 | End: 2023-10-13 | Stop reason: SDUPTHER

## 2023-10-13 RX ORDER — FUROSEMIDE 40 MG/1
40 TABLET ORAL DAILY
Status: DISCONTINUED | OUTPATIENT
Start: 2023-10-14 | End: 2023-10-13 | Stop reason: HOSPADM

## 2023-10-13 RX ORDER — GUAIFENESIN 600 MG/1
600 TABLET, EXTENDED RELEASE ORAL 2 TIMES DAILY
Qty: 20 TABLET | Refills: 1 | Status: SHIPPED | OUTPATIENT
Start: 2023-10-13

## 2023-10-13 RX ORDER — FUROSEMIDE 10 MG/ML
40 INJECTION INTRAMUSCULAR; INTRAVENOUS DAILY
Status: DISCONTINUED | OUTPATIENT
Start: 2023-10-13 | End: 2023-10-13

## 2023-10-13 RX ORDER — PREDNISONE 20 MG/1
40 TABLET ORAL DAILY
Qty: 4 TABLET | Refills: 0 | Status: SHIPPED | OUTPATIENT
Start: 2023-10-14 | End: 2023-10-13 | Stop reason: SDUPTHER

## 2023-10-13 RX ADMIN — ATORVASTATIN CALCIUM 40 MG: 40 TABLET, FILM COATED ORAL at 10:17

## 2023-10-13 RX ADMIN — PREDNISONE 40 MG: 20 TABLET ORAL at 10:15

## 2023-10-13 RX ADMIN — SACUBITRIL AND VALSARTAN 1 TABLET: 24; 26 TABLET, FILM COATED ORAL at 10:16

## 2023-10-13 RX ADMIN — SODIUM CHLORIDE, PRESERVATIVE FREE 10 ML: 5 INJECTION INTRAVENOUS at 10:17

## 2023-10-13 RX ADMIN — IPRATROPIUM BROMIDE AND ALBUTEROL SULFATE 1 DOSE: 2.5; .5 SOLUTION RESPIRATORY (INHALATION) at 08:17

## 2023-10-13 RX ADMIN — APIXABAN 5 MG: 5 TABLET, FILM COATED ORAL at 10:16

## 2023-10-13 RX ADMIN — IPRATROPIUM BROMIDE AND ALBUTEROL SULFATE 1 DOSE: 2.5; .5 SOLUTION RESPIRATORY (INHALATION) at 12:12

## 2023-10-13 RX ADMIN — CEFTRIAXONE SODIUM 1000 MG: 10 INJECTION, POWDER, FOR SOLUTION INTRAVENOUS at 13:35

## 2023-10-13 RX ADMIN — AMIODARONE HYDROCHLORIDE 200 MG: 200 TABLET ORAL at 10:16

## 2023-10-13 RX ADMIN — GUAIFENESIN 600 MG: 600 TABLET, EXTENDED RELEASE ORAL at 10:16

## 2023-10-13 RX ADMIN — FUROSEMIDE 40 MG: 10 INJECTION, SOLUTION INTRAMUSCULAR; INTRAVENOUS at 10:16

## 2023-10-13 RX ADMIN — ASPIRIN 81 MG: 81 TABLET, CHEWABLE ORAL at 10:16

## 2023-10-13 RX ADMIN — SPIRONOLACTONE 12.5 MG: 25 TABLET, FILM COATED ORAL at 10:15

## 2023-10-13 RX ADMIN — MIDODRINE HYDROCHLORIDE 2.5 MG: 5 TABLET ORAL at 13:35

## 2023-10-13 RX ADMIN — MIDODRINE HYDROCHLORIDE 2.5 MG: 5 TABLET ORAL at 10:16

## 2023-10-13 RX ADMIN — EMPAGLIFLOZIN 10 MG: 10 TABLET, FILM COATED ORAL at 10:30

## 2023-10-13 RX ADMIN — AZITHROMYCIN 500 MG: 250 TABLET, FILM COATED ORAL at 10:15

## 2023-10-13 RX ADMIN — METOPROLOL SUCCINATE 50 MG: 50 TABLET, FILM COATED, EXTENDED RELEASE ORAL at 10:15

## 2023-10-13 NOTE — PLAN OF CARE
Problem: Respiratory - Adult  Goal: Achieves optimal ventilation and oxygenation  10/13/2023 0821 by Lesvia Falcon RCP  Outcome: Progressing  BRONCHOSPASM/BRONCHOCONSTRICTION     [x]         IMPROVE AERATION/BREATH SOUNDS  [x]   ADMINISTER BRONCHODILATOR THERAPY AS APPROPRIATE  [x]   ASSESS BREATH SOUNDS  []   IMPLEMENT AEROSOL/MDI PROTOCOL  [x]   PATIENT EDUCATION AS NEEDED     PROVIDE ADEQUATE OXYGENATION WITH ACCEPTABLE SP02/ABG'S    [x]  IDENTIFY APPROPRIATE OXYGEN THERAPY  [x]   MONITOR SP02/ABG'S AS NEEDED   [x]   PATIENT EDUCATION AS NEEDED

## 2023-10-13 NOTE — PLAN OF CARE
Problem: Respiratory - Adult  Goal: Achieves optimal ventilation and oxygenation  10/12/2023 2023 by Raheem Patrikc RCP  Outcome: Progressing

## 2023-10-13 NOTE — PROGRESS NOTES
Pt changed into home clothing, IV removed, Tele box removed, Discharge teaching completed with patient and son. All questions answered.   Pt discharged to son with belongings and AVS.

## 2023-10-13 NOTE — PROGRESS NOTES
Attending Physician Statement  I have discussed the care of [facility] AND [facility], including pertinent history and exam findings with the resident. I have reviewed the key elements of all parts of the encounter with the resident. I have seen and examined the patient with the resident. I agree with the assessment and plan and status of the problem list as documented. Please see full aggressive note by medicine resident. I have seen the patient during the rounds this morning. Events noted chart seen medications reviewed. Patient is afebrile hemodynamically patient is stable when I saw him he was on nasal cannula 1 L and he was saturating 96% on room air he is saturating above 92%. Clinically he is better and cough is improving mild wheezing present on exam sputum is slightly yellow in color. Echocardiogram shows severely reduced LV systolic function with ejection fraction of 25 to 30% severe global hypokinesis. Walt Marin was recommended by cardiology at the time of discharge. Patient is on Entresto. He is on Lasix 40 mg IV daily. We will transition him to oral Lasix 40 mg daily. We will continue with bronchodilators he uses Spiriva at home I told him that he need to use Spiriva daily as he use it as needed. He does have oxygen at home which he uses at his needed. He also have CPAP at home advise compliance with oxygen and CPAP both. If okay we will discharge him today. He will need follow-up with cardiology. Please note that this chart was generated using voice recognition Dragon dictation software. Although every effort was made to ensure the accuracy of this automated transcription, some errors in transcription may have occurred.      Francisca Ontiveros MD  10/13/2023 12:42 PM

## 2023-10-13 NOTE — PROGRESS NOTES
Congestive Heart Failure Education completed and charted. CHF booklet given. Patient was receptive to education. Discussed the  importance of medication compliance. Discussed the importance of a heart healthy diet. Discussed 2000 mg sodium-restricted daily diet. Patient instructed to limit fluid intake to  1.5 to 2 liters per day. Patient instructed to weigh self at the same time of each day each morning, reinforced teaching to monitor for 3-5 lb weight increase over 1-2 days notify physician if change noted. Signs and symptoms of CHF discussed with patient, such as feeling more tired than normal, feeling short of breath, coughing that increases when lying down, sudden weight gain, swelling of the feet, legs or belly. Patient verbalized understanding to notify physician office if these symptoms occur.   EF 25-30%

## 2023-10-13 NOTE — PROGRESS NOTES
Plan   Occupational Therapy Plan  Times Per Week: 1-3x/wk  Current Treatment Recommendations: Balance training, Functional mobility training, Endurance training, Safety education & training, Patient/Caregiver education & training, Equipment evaluation, education, & procurement, Home management training, Self-Care / ADL     Restrictions  Restrictions/Precautions  Restrictions/Precautions: General Precautions, Up as Tolerated, Fall Risk  Required Braces or Orthoses?: No  Implants present? : Pacemaker  Position Activity Restriction  Other position/activity restrictions: Activity as tolerated    Subjective   General  Patient assessed for rehabilitation services?: Yes  Family / Caregiver Present: No  General Comment  Comments: RN ok'd for OT elinor this AM. Pt agreeable to session, pleasent/cooperative throughout. Denies any pain. Social/Functional History  Social/Functional History  Lives With: Spouse  Type of Home: House  Home Layout: Two level, Laundry in basement, Performs ADL's on one level, Able to Live on Main level with bedroom/bathroom  Home Access: Stairs to enter with rails  Entrance Stairs - Number of Steps: 3 steps with B rails on front porch;  Side door 3 steps with rail on R side  Entrance Stairs - Rails: Both  Bathroom Shower/Tub: Tub/Shower unit, Shower chair with back  [employer]: Handicap height  Bathroom Equipment: Grab bars in shower, Tub transfer bench, Hand-held shower, Grab bars around toilet  Bathroom Accessibility: Walker accessible  Home Equipment: Walker, rolling  Has the patient had two or more falls in the past year or any fall with injury in the past year?: Yes (A few loss of balances/falls since March)  Receives Help From: Family  ADL Assistance: [address] Independent  Homemaking Responsibilities: Yes  Meal Prep Responsibility: Primary  Laundry Responsibility: Primary  Cleaning Responsibility: Primary  Bill Paying/Finance Responsibility: Primary  Shopping Responsibility: Primary  Dependent Care Responsibility: Primary  Health Care Management: Primary  Ambulation Assistance: Independent  Transfer Assistance: Independent  Active : Yes  Mode of Transportation: SUV  Occupation: Retired  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, Restorationist       Objective     Safety Devices  Type of Devices: Gait belt;Left in bed;Nurse notified  Restraints  Restraints Initially in Place: No    Balance  Sitting: Intact (SUP EOB 8 min, No LOB static/dynamic)  Standing: With support (Stood static/dynamic at sinkside with SBA and no LOB for ADL tasks ~8 minutes)    Gait  Overall Level of Assistance: Contact-guard assistance (RW/ around room and to/from bathroom\)     AROM: Within functional limits  Strength: Within functional limits (B UE Grossly 4/5)  Coordination: Within functional limits  Tone: Normal  Sensation: Intact (Pt denies any numbness/tingling)    ADL  Feeding: Independent  Grooming: Independent  Grooming Skilled Clinical Factors: Stood sinkside to complete grooming tasks including oral hygiene/hand hygiene. Bed mobility  Supine to Sit: Modified independent  Sit to Supine: Modified independent  Scooting: Independent    Transfers  Sit to stand: Stand by assistance  Stand to sit: Stand by assistance  Transfer Comments:  With RW    Vision  Vision: Impaired  Vision Exceptions: Wears glasses at all times  Hearing  Hearing: Within functional limits  Cognition  Overall Cognitive Status: [facility]                  Education Given To: Patient  Education Provided: Role of Therapy;Plan of Care;Transfer Training;Energy Conservation  Education Provided Comments: activity promotion  Education Method: Verbal;Demonstration  Barriers to Learning: None  Education Outcome: Verbalized understanding;Continued education needed           AM-PAC Score        AM-PAC Inpatient Daily Activity Raw Score: 21 (10/13/23 2537)  AM-PAC Inpatient ADL

## 2023-10-13 NOTE — PROGRESS NOTES
[facility]  Internal Medicine Teaching Residency Program  Inpatient Daily Progress Note  ______________________________________________________________________________    Patient: Ginger Ulloa  YOB: 1950   [id number]    Acct: [de-identified]     Room: 2023/2023-01  Admit date: 10/11/2023  Today's date: 10/13/23  Number of days in the hospital: 2    SUBJECTIVE   Admitting Diagnosis: Acute on chronic systolic CHF (congestive heart failure) ([address])  CC: Shortness of breath    Pt seen and examined. No acute events overnight. Labs and charts reviewed. Afebrile, hemodynamically stable, saturating well on room air, weaned off NC 1L, used CPAP 5 hrs las night.  -Patient has home O2.  -Echo  10/12: LVEF 25-30%,  Severe global hypokinesis present with minor regional variation.  -Continue aspirin, Lipitor, Toprol-XL, Entresto, Aldactone, midodrine, add Jardiance on discharge.  -Lasix 40 daily switch to PO on DC.  -Follow-up with cardiology outpatient in 2 to 4 weeks.  -Completed Zithromax, on IV Rocephin, will switch to p.o. antibiotic on discharge.  -Received 3 days of 40 prednisone, continue for 2 more days on discharge.  -Continue Spiriva daily, Ventolin as needed at home.  -medically stable for discharge, likely today. Review of Systems   Constitutional:  Positive for activity change. Negative for appetite change, chills and fever. HENT:  Negative for congestion. Respiratory:  Positive for cough, and wheezing (mild). Negative for chest tightness, shortness of breath   Cardiovascular:  Positive for leg swelling (improved) . Negative for palpitations and Chest pain. Gastrointestinal:  Negative for constipation, diarrhea, nausea and vomiting. Genitourinary:  Negative for difficulty urinating and hematuria. Neurological:   Negative for syncope and headaches, dizziness and lightheadedness.   Psychiatric/Behavioral:  Negative for agitation and

## 2023-10-13 NOTE — DISCHARGE INSTRUCTIONS
You were here for shortness of breath and found to have congestive heart failure exacerbation  Cardiology evaluated you  New medication has been prescribed to you by cardiology at discharge because of your low EF  Continue to take medication as prescribed  Follow-up with Dr. Pushpa Lacey in 1 week  Take medication as prescribed  Follow-up with all of your follow-ups  Return to the ED if symptoms worsen

## 2023-10-13 NOTE — CARE COORDINATION
Transitional planning    Spoke to patient about plan for discharge. Plan is home with spouse. He has DME at home. Plan is for a neighbor to provide transportation home. MAR says IV rocephin to stop 10/14,    1427 no IV antibiotics ordered at discharge.

## 2023-10-13 NOTE — PLAN OF CARE
Problem: Discharge Planning  Goal: Discharge to home or other facility with appropriate resources  Outcome: Adequate for Discharge  Flowsheets (Taken 10/13/2023 5253)  Discharge to home or other facility with appropriate resources:   Identify barriers to discharge with patient and caregiver   Identify discharge learning needs (meds, wound care, etc)   Arrange for needed discharge resources and transportation as appropriate   Arrange for interpreters to assist at discharge as needed   Refer to discharge planning if patient needs post-hospital services based on physician order or complex needs related to functional status, cognitive ability or social support system     Problem: Safety - Adult  Goal: Free from fall injury  Outcome: Adequate for Discharge     Problem: Chronic Conditions and Co-morbidities  Goal: Patient's chronic conditions and co-morbidity symptoms are monitored and maintained or improved  Outcome: Adequate for Discharge  Flowsheets (Taken 10/13/2023 0733)  Care Plan - Patient's Chronic Conditions and Co-Morbidity Symptoms are Monitored and Maintained or Improved:   Monitor and assess patient's chronic conditions and comorbid symptoms for stability, deterioration, or improvement   Collaborate with multidisciplinary team to address chronic and comorbid conditions and prevent exacerbation or deterioration   Update acute care plan with appropriate goals if chronic or comorbid symptoms are exacerbated and prevent overall improvement and discharge     Problem: Respiratory - Adult  Goal: Achieves optimal ventilation and oxygenation  10/13/2023 1433 by Veena Nj RN  Outcome: Adequate for Discharge  10/13/2023 0821 by Harinder Ross RCP  Outcome: Progressing  Flowsheets (Taken 10/13/2023 0733 by Veena Nj RN)  Achieves optimal ventilation and oxygenation:   Assess for changes in respiratory status   Assess for changes in mentation and behavior   Position to facilitate oxygenation and

## 2023-10-13 NOTE — PROGRESS NOTES
[facility]  Progress Note                   Date:   10/13/2023  Patient name: Duncan Louise  Date of admission:  10/11/2023  7:03 AM  MRN:   [id number]  YOB: 1950  PCP: Liliana Sanchez MD    Reason for Admission: Nonsustained ventricular tachycardia ([address]) [I47.29]  Acute on chronic systolic CHF (congestive heart failure) ([address]) [I50.23]  Acute on chronic congestive heart failure, unspecified heart failure type ([address]) [I50.9]    Subjective:       Clinical Changes /Abnormalities: Seen & examined in room. States he is feeling better and \"back to normal.\" States he did wear the CPAP last night. No acute CV issues/concern overnight. Labs, vitals, & tele reviewed.      Review of Systems    Medications:   Scheduled Meds:   furosemide  40 mg IntraVENous Daily    spironolactone  12.5 mg Oral Daily    guaiFENesin  600 mg Oral BID    empagliflozin  10 mg Oral Daily    midodrine  2.5 mg Oral TID WC    sodium chloride flush  5-40 mL IntraVENous 2 times per day    ipratropium 0.5 mg-albuterol 2.5 mg  1 Dose Inhalation Q4H WA RT    predniSONE  40 mg Oral Daily    cefTRIAXone (ROCEPHIN) IV  1,000 mg IntraVENous Q24H    amiodarone  200 mg Oral BID    aspirin  81 mg Oral Daily    atorvastatin  40 mg Oral Daily    apixaban  5 mg Oral BID    sacubitril-valsartan  1 tablet Oral BID    metoprolol succinate  50 mg Oral Daily    insulin lispro  0-4 Units SubCUTAneous TID WC    insulin lispro  0-4 Units SubCUTAneous Nightly    magnesium sulfate  2,000 mg IntraVENous Once     Continuous Infusions:   sodium chloride      dextrose       CBC:   Recent Labs     10/11/23  0726 10/12/23  0042 10/13/23  0626   WBC 11.5* 9.9 15.3*   HGB 14.4 13.2 14.2    123* 140       BMP:    Recent Labs     10/11/23  0726 10/12/23  0042 10/13/23  0626    135 137   K 4.7 4.7 4.1    103 101   CO2 19* 18* 23   BUN 20 25* 32*   CREATININE 1.3* 1.4* 1.5*   GLUCOSE 200* 167* 136*       Hepatic:No results for estimated EF of 25 - 30%. EF by 2D Simpsons Biplane is 23%. Definity was administered to optimize images. No evidence of LV thrombus. Severe global hypokinesis present with minor regional variation. Contrast used: Definity.     Assessment / Acute Cardiac Problems:   Acute decompensated systolic heart failure  Severely reduced LV systolic function, LVEF 64% Entresto, metoprolol, Lasix  Status post single-chamber Med[employer] AICD  CAD with chronic LAD occlusion with collaterals on left heart cath in 2018   Recurrent VT status post ablation at [facility] on 1/4/2022 with recurrent episodes of VT in November 2022, March 2023, currently on amiodarone  Hypertension  History of apical LV thrombus in 2019, on Eliquis  Hypertension  Hyperlipidemia  COPD on home oxygen as needed    Patient Active Problem List:     Alcohol abuse     Smoking     COPD with acute exacerbation ([address])     Morbid obesity, unspecified obesity type ([address])     Shortness of breath     Ischemic cardiomyopathy     SOB (shortness of breath)     Anxiety     Hoarseness     Colon polyps     Back pain     Essential hypertension     Type 2 diabetes mellitus without complication, without long-term current use of insulin (HCC)     Nonsustained ventricular tachycardia (HCC)     MADISON (acute kidney injury) ([address])     ICD (implantable cardioverter-defibrillator) discharge     Chronic systolic (congestive) heart failure     Acute appendicitis     ICD (implantable cardioverter-defibrillator) in place     History of ventricular tachycardia     Left ventricular apical thrombus following MI ([address])     Anticoagulated     JAYCE (obstructive sleep apnea)     CKD (chronic kidney disease) stage 3, GFR 30-59 ml/min (HCC)     Acute on chronic systolic CHF (congestive heart failure) ([address])     Acute pulmonary edema (HCC)     Acute respiratory failure with hypoxia and hypercapnia ([address])     Chronic obstructive pulmonary disease ([address])     Acute on chronic congestive heart failure ([address])      Plan

## 2023-10-16 ENCOUNTER — TELEPHONE (OUTPATIENT)
Dept: INTERNAL MEDICINE CLINIC | Age: 73
End: 2023-10-16

## 2023-10-16 ENCOUNTER — HOSPITAL ENCOUNTER (OUTPATIENT)
Dept: CT IMAGING | Age: 73
Discharge: HOME | End: 2023-10-18
Attending: INTERNAL MEDICINE
Payer: MEDICARE

## 2023-10-16 DIAGNOSIS — Z87.891 PERSONAL HISTORY OF NICOTINE DEPENDENCE: ICD-10-CM

## 2023-10-16 PROCEDURE — 71271 CT THORAX LUNG CANCER SCR C-: CPT

## 2023-10-16 NOTE — TELEPHONE ENCOUNTER
Care Transitions Initial Follow Up Call    Outreach made within 2 business days of discharge: Yes    Patient: José Miguel Phan Patient : 1950   MRN: [id number]  Reason for Admission: There are no discharge diagnoses documented for the most recent discharge. Discharge Date: 10/13/23       Spoke with: Patient    Discharge department/facility: [facility]    [facility] Interactive Patient Contact:  Was patient able to fill all prescriptions: Yes  Was patient instructed to bring all medications to the follow-up visit: Yes  Is patient taking all medications as directed in the discharge summary?  Yes  Does patient understand their discharge instructions: Yes  Does patient have questions or concerns that need addressed prior to 7-14 day follow up office visit: no    Scheduled appointment with PCP within 7-14 days    Follow Up  Future Appointments   Date Time Provider [address] [place] [address]   2023  9:00 AM Pepe Abreu  [facility]   2023 11:30 AM Rasheed Santos MD [address] [place] [address]   2024  9:00 AM Bobbi De La Cruz MD [place], [place]

## 2023-10-17 NOTE — DISCHARGE SUMMARY
[facility]     Department of Internal Medicine - Staff Internal Medicine Teaching Service    INPATIENT DISCHARGE SUMMARY      Patient Identification:  Lana Causey is a 68 y.o. male. :  1950  MRN: [id number]     Acct: [de-identified]   PCP: Harley Arboleda MD  Admit Date:  10/11/2023  Discharge date and time: 10/13/2023  4:11 PM   Attending Provider: No att. providers found                                     [address] [facility] Problem Lists:  Principal Problem:    Acute on chronic systolic CHF (congestive heart failure) (HCC)  Active Problems:    Nonsustained ventricular tachycardia (HCC)    Acute pulmonary edema (HCC)    Acute respiratory failure with hypoxia and hypercapnia (HCC)    Chronic obstructive pulmonary disease ([address])    Acute on chronic congestive heart failure ([address])  Resolved Problems:    * No resolved hospital problems. [facility] STAY     Brief Inpatient course:   Lana Causey is a 68 y.o. male who was admitted for the management of Acute hypoxic respiratory failure secondary to Acute on chronic systolic CHF (congestive heart failure) ([address]), and COPD exacerbatio. Patient has PMHx sig for ischemic cardiomyopathy, stable CAD with known LAD  with collaterals on heart cath , HFrEF LVEF 35 to 40% with grade 1 DD Echo 3/23 s/p AICD , Hx of ventricular tachycardia s/p successful ablation , recurrent, on amiodarone, apical LV thrombus 2019- Eliquis, HTN, HLD, COPD (as needed O2 at home), JAYCE (not using CPAP at home), CKD (baseline CR 1.3-1.5), active smoker, type II DM (metformin), presents with a chief complaint of shortness of breath worsening for the past few days with cough, more at night and exertion, woke up that morning out of breath, started feeling dizzy lightheaded about to pass out, could not reach his O2 at home, called 911.  patient has JAYCE and CPAP at home not using because his wife is disabled and cannot hear her

## 2023-10-19 ENCOUNTER — TELEPHONE (OUTPATIENT)
Dept: INTERNAL MEDICINE CLINIC | Age: 73
End: 2023-10-19

## 2023-10-19 NOTE — TELEPHONE ENCOUNTER
Medical surgical clearance request received 10/19/23    Surgeon: Dr. Koko Chavez    Procedure: eye surgery    Date of Procedure: no date yet    Last appt: 9/8/23    Next appt: 10/20/2023    PATs received:   No

## 2023-10-20 ENCOUNTER — OFFICE VISIT (OUTPATIENT)
Dept: INTERNAL MEDICINE CLINIC | Age: 73
End: 2023-10-20

## 2023-10-20 VITALS
SYSTOLIC BLOOD PRESSURE: 108 MMHG | DIASTOLIC BLOOD PRESSURE: 60 MMHG | HEIGHT: 73 IN | OXYGEN SATURATION: 97 % | BODY MASS INDEX: 31.81 KG/M2 | HEART RATE: 60 BPM | WEIGHT: 240 LBS

## 2023-10-20 DIAGNOSIS — N18.9 CHRONIC KIDNEY DISEASE, UNSPECIFIED CKD STAGE: Primary | ICD-10-CM

## 2023-10-20 RX ORDER — KETOROLAC TROMETHAMINE 5 MG/ML
SOLUTION OPHTHALMIC
COMMUNITY
Start: 2023-10-16

## 2023-10-20 RX ORDER — MOXIFLOXACIN 5 MG/ML
SOLUTION/ DROPS OPHTHALMIC
COMMUNITY
Start: 2023-10-16

## 2023-10-20 RX ORDER — SPIRONOLACTONE 25 MG/1
12.5 TABLET ORAL DAILY
Qty: 30 TABLET | Refills: 3 | Status: SHIPPED | OUTPATIENT
Start: 2023-10-20

## 2023-10-20 RX ORDER — PREDNISOLONE ACETATE 10 MG/ML
SUSPENSION/ DROPS OPHTHALMIC
COMMUNITY
Start: 2023-10-16

## 2023-10-20 NOTE — PROGRESS NOTES
Visit Information    Have you changed or started any medications since your last visit including any over-the-counter medicines, vitamins, or herbal medicines? yes -    Have you stopped taking any of your medications? Is so, why? -  yes -   Are you having any side effects from any of your medications? - no    Have you seen any other physician or provider since your last visit? yes - cardiology   Have you had any other diagnostic tests since your last visit? yes -    Have you been seen in the emergency room and/or had an admission in a hospital since we last saw you?  yes -    Have you had your routine dental cleaning in the past 6 months?  yes -      Do you have an active MyChart account? If no, what is the barrier?   Yes    Patient Care Team:  Jaelyn Magallon MD as PCP - General (Internal Medicine)  [facility]  [address] [place] [address], Luke Whitley MD as Consulting Physician (Gastroenterology)    Medical History Review  Past Medical, Family, and Social History reviewed and does not contribute to the patient presenting condition    Health Maintenance   Topic Date Due    DTaP/Tdap/Td vaccine (1 - Tdap) Never done    Shingles vaccine (1 of 2) Never done    Hepatitis B vaccine (1 of 3 - Risk 3-dose series) Never done    Diabetic retinal exam  08/06/2019    COVID-19 Vaccine (5 - Pfizer series) 01/23/2023    Annual Wellness Visit (AWV)  07/09/2023    Flu vaccine (1) 08/01/2023    Lipids  09/28/2023    Diabetic Alb to Cr ratio (uACR) test  12/20/2023    Depression Screen  03/29/2024    Diabetic foot exam  08/17/2024    A1C test (Diabetic or Prediabetic)  10/11/2024    GFR test (Diabetes, CKD 3-4, OR last GFR 15-59)  10/13/2024    Low dose CT lung screening &/or counseling  10/16/2024    Colorectal Cancer Screen  03/15/2025    Pneumococcal 65+ years Vaccine  Completed    AAA screen  Completed    Hepatitis C screen  Completed    Hepatitis A vaccine  Aged Out    Hib vaccine  Aged Out    Meningococcal
the following healthy behaviors: nutrition, exercise, medication adherence, and compliance with CPAP    Discussed use, benefit, and side effects of prescribed medications. Barriers to medication compliance addressed. All patient questions answered. Pt voiced understanding.      Patient given educational materials - see patient instructions    MD [facility]  10/20/2023, 7:39 PM

## 2023-10-23 ENCOUNTER — TELEPHONE (OUTPATIENT)
Dept: INTERNAL MEDICINE CLINIC | Age: 73
End: 2023-10-23

## 2023-10-23 NOTE — TELEPHONE ENCOUNTER
----- Message from Nik Loja MD sent at 10/20/2023  7:46 PM EDT -----  Please have patient to stop taking metformin. This would be contraindicated with his congestive heart failure. He does not need to be on medicines at this time for diabetes, most recent HbA1c was 5.4.

## 2023-10-23 NOTE — TELEPHONE ENCOUNTER
Patient states that he has not heard from [facility] cardiology, will call us once he hears from them

## 2023-11-14 ENCOUNTER — HOSPITAL ENCOUNTER (OUTPATIENT)
Age: 73
Setting detail: SPECIMEN
Discharge: HOME | End: 2023-11-14

## 2023-11-14 DIAGNOSIS — N18.9 CHRONIC KIDNEY DISEASE, UNSPECIFIED CKD STAGE: ICD-10-CM

## 2023-11-14 LAB
ANION GAP SERPL CALCULATED.3IONS-SCNC: 16 MMOL/L (ref 9–17)
BUN SERPL-MCNC: 24 MG/DL (ref 8–23)
CALCIUM SERPL-MCNC: 9.5 MG/DL (ref 8.6–10.4)
CHLORIDE SERPL-SCNC: 100 MMOL/L (ref 98–107)
CO2 SERPL-SCNC: 25 MMOL/L (ref 20–31)
CREAT SERPL-MCNC: 1.8 MG/DL (ref 0.7–1.2)
GFR SERPL CREATININE-BSD FRML MDRD: 39 ML/MIN/1.73M2
GLUCOSE SERPL-MCNC: 117 MG/DL (ref 70–99)
MAGNESIUM SERPL-MCNC: 2.4 MG/DL (ref 1.6–2.6)
POTASSIUM SERPL-SCNC: 5 MMOL/L (ref 3.7–5.3)
SODIUM SERPL-SCNC: 141 MMOL/L (ref 135–144)

## 2023-11-15 DIAGNOSIS — N18.9 CHRONIC KIDNEY DISEASE, UNSPECIFIED CKD STAGE: Primary | ICD-10-CM

## 2023-11-15 NOTE — RESULT ENCOUNTER NOTE
Creatinine is uptrending. It seems some medication has been changed recently. No intervention needed for now but recommend  repeat in 4 weeks if continues to go higher may need change of medication or referral to to nephrology.

## 2023-11-27 NOTE — PLAN OF CARE
Problem: Discharge Planning  Goal: Discharge to home or other facility with appropriate resources  Outcome: Progressing  Flowsheets (Taken 3/8/2023 0800 by Ada Wylie, RN)  Discharge to home or other facility with appropriate resources: Identify barriers to discharge with patient and caregiver     Problem: Pain  Goal: Verbalizes/displays adequate comfort level or baseline comfort level  Outcome: Progressing     Problem: Skin/Tissue Integrity  Goal: Absence of new skin breakdown  Description: 1. Monitor for areas of redness and/or skin breakdown  2. Assess vascular access sites hourly  3. Every 4-6 hours minimum:  Change oxygen saturation probe site  4. Every 4-6 hours:  If on nasal continuous positive airway pressure, respiratory therapy assess nares and determine need for appliance change or resting period. Outcome: Progressing     Problem: Safety - Adult  Goal: Free from fall injury  Outcome: Progressing     Problem: ABCDS Injury Assessment  Goal: Absence of physical injury  Outcome: Progressing     Problem: Respiratory - Adult  Goal: Achieves optimal ventilation and oxygenation  3/8/2023 1958 by Darrall Goltz, RN  Outcome: Progressing  3/8/2023 1521 by Santhosh Millan RCP  Outcome: Wallace Bias  3/8/2023 0839 by Deshawn Dasilva RCP  Outcome: Progressing     Problem: Chronic Conditions and Co-morbidities  Goal: Patient's chronic conditions and co-morbidity symptoms are monitored and maintained or improved  Outcome: Progressing  Flowsheets (Taken 3/8/2023 0800 by Ada Wylie RN)  Care Plan - Patient's Chronic Conditions and Co-Morbidity Symptoms are Monitored and Maintained or Improved: Monitor and assess patient's chronic conditions and comorbid symptoms for stability, deterioration, or improvement     Problem: Safety - Medical Restraint  Goal: Remains free of injury from restraints (Restraint for Interference with Medical Device)  Description: INTERVENTIONS:  1.  Determine that [x] [facility] ([facility] &  Therapy  [address] [facility].  [phone]  F: [phone]     Physical Therapy Daily Treatment Note    Date:  2023  Patient Name:  Leanne Kumar    :  1957  MRN: [id number]  Physician: Dr. Sommer Landeros MD                                   Insurance: Medicare ([address] [place] [address])  Medical Diagnosis: N71.032 (ICD-10-CM) - Spastic hemiplegia of left nondominant side as late effect of cerebral infarction [facility])  Rehab Codes: R26.89, M62.81, R29.3, G81.1  Next 's appt.: 23  Date of symptom onset: 21  Visit# / total visits: ; Progress note for Medicare patient due at visit 10     Cancels/No Shows: 0    Subjective:    Pain:  [x] Yes  [] No Location: L leg (piriformis) Pain Rating: (0-10 scale) 0/10 at rest  Pain altered Tx:  [x] No  [] Yes  Action:   Comments:  Patient reports she had severe cramps over the weekend that prevented almost any movement. Pt states she has no pain today in BLEs but notes headache is still present although decreased severity compared to last week.     Objective:      Modalities:   Precautions: Gait belt donned for all gait and standing therex  Exercises: bold completed 23   Exercise Reps/ Time Weight/ Level Comments   NuStep 5' Level 4 ModA  Pt request assist - encouraged self assist  Billed under TE  Use of gait belt for adductors   BITS training 12 min Visual Scanning W/C to lynette  ModA with side stepping to screen  User Pace   1 min at mid quad  1 min Upper quad full screen  1 min Lateral quad  Patient declined with cross reaching far past midline - encouraged to try  Sit rest breaks b/t each  After 1st set, brought in clinic WC  Poor stepping strategy to the side   Xcite Training 15 min  EOB and supine  Gastroc/Ant Fib 2x3.5\" pads  Quads/Hams 3x4\" pads    DF on bolster     10x3   Heel slides with Xcite     10x2     7x; 8x without Xcite  SAQ     [employer]     Next session:  Add other, less restrictive measures have been tried or would not be effective before applying the restraint  2. Evaluate the patient's condition at the time of restraint application  3. Inform patient/family regarding the reason for restraint  4.  Q2H: Monitor safety, psychosocial status, comfort, nutrition and hydration  Outcome: Progressing  Flowsheets (Taken 3/8/2023 1830)  Remains free of injury from restraints (restraint for interference with medical device): Determine that other, less restrictive measures have been tried or would not be effective before applying the restraint

## 2023-12-07 ENCOUNTER — OFFICE VISIT (OUTPATIENT)
Dept: PODIATRY | Age: 73
End: 2023-12-07
Payer: MEDICARE

## 2023-12-07 VITALS — WEIGHT: 230 LBS | BODY MASS INDEX: 30.48 KG/M2 | HEIGHT: 73 IN

## 2023-12-07 DIAGNOSIS — E11.42 DIABETIC POLYNEUROPATHY ASSOCIATED WITH TYPE 2 DIABETES MELLITUS (HCC): ICD-10-CM

## 2023-12-07 DIAGNOSIS — L98.9 BENIGN SKIN LESION: ICD-10-CM

## 2023-12-07 DIAGNOSIS — B35.1 ONYCHOMYCOSIS OF TOENAIL: Primary | ICD-10-CM

## 2023-12-07 DIAGNOSIS — M79.675 PAIN OF TOES OF BOTH FEET: ICD-10-CM

## 2023-12-07 DIAGNOSIS — M79.671 PAIN IN RIGHT FOOT: ICD-10-CM

## 2023-12-07 DIAGNOSIS — M79.674 PAIN OF TOES OF BOTH FEET: ICD-10-CM

## 2023-12-07 DIAGNOSIS — E11.51 TYPE 2 DIABETES MELLITUS WITH PERIPHERAL VASCULAR DISEASE (HCC): ICD-10-CM

## 2023-12-07 PROCEDURE — 11721 DEBRIDE NAIL 6 OR MORE: CPT | Performed by: PODIATRIST

## 2023-12-07 PROCEDURE — 17110 DESTRUCTION B9 LES UP TO 14: CPT | Performed by: PODIATRIST

## 2023-12-07 PROCEDURE — 99999 PR OFFICE/OUTPT VISIT,PROCEDURE ONLY: CPT | Performed by: PODIATRIST

## 2023-12-07 ASSESSMENT — ENCOUNTER SYMPTOMS
SHORTNESS OF BREATH: 0
BACK PAIN: 0
DIARRHEA: 0
COLOR CHANGE: 0
NAUSEA: 0

## 2023-12-09 DIAGNOSIS — E11.9 TYPE 2 DIABETES MELLITUS WITHOUT COMPLICATION, WITHOUT LONG-TERM CURRENT USE OF INSULIN (HCC): ICD-10-CM

## 2024-01-04 ENCOUNTER — OFFICE VISIT (OUTPATIENT)
Dept: INTERNAL MEDICINE CLINIC | Age: 74
End: 2024-01-04
Payer: MEDICARE

## 2024-01-04 VITALS
SYSTOLIC BLOOD PRESSURE: 120 MMHG | BODY MASS INDEX: 29.82 KG/M2 | OXYGEN SATURATION: 97 % | WEIGHT: 226 LBS | HEART RATE: 53 BPM | DIASTOLIC BLOOD PRESSURE: 76 MMHG

## 2024-01-04 DIAGNOSIS — I10 ESSENTIAL HYPERTENSION: ICD-10-CM

## 2024-01-04 DIAGNOSIS — E66.01 MORBID OBESITY, UNSPECIFIED OBESITY TYPE (HCC): ICD-10-CM

## 2024-01-04 DIAGNOSIS — E11.9 TYPE 2 DIABETES MELLITUS WITHOUT COMPLICATION, WITHOUT LONG-TERM CURRENT USE OF INSULIN (HCC): Primary | ICD-10-CM

## 2024-01-04 DIAGNOSIS — Z45.02 ICD (IMPLANTABLE CARDIOVERTER-DEFIBRILLATOR) DISCHARGE: ICD-10-CM

## 2024-01-04 DIAGNOSIS — N18.9 CHRONIC KIDNEY DISEASE, UNSPECIFIED CKD STAGE: ICD-10-CM

## 2024-01-04 PROCEDURE — 4004F PT TOBACCO SCREEN RCVD TLK: CPT | Performed by: INTERNAL MEDICINE

## 2024-01-04 PROCEDURE — 3046F HEMOGLOBIN A1C LEVEL >9.0%: CPT | Performed by: INTERNAL MEDICINE

## 2024-01-04 PROCEDURE — 99214 OFFICE O/P EST MOD 30 MIN: CPT | Performed by: INTERNAL MEDICINE

## 2024-01-04 PROCEDURE — 1123F ACP DISCUSS/DSCN MKR DOCD: CPT | Performed by: INTERNAL MEDICINE

## 2024-01-04 PROCEDURE — 2022F DILAT RTA XM EVC RTNOPTHY: CPT | Performed by: INTERNAL MEDICINE

## 2024-01-04 PROCEDURE — G8427 DOCREV CUR MEDS BY ELIG CLIN: HCPCS | Performed by: INTERNAL MEDICINE

## 2024-01-04 PROCEDURE — 3074F SYST BP LT 130 MM HG: CPT | Performed by: INTERNAL MEDICINE

## 2024-01-04 PROCEDURE — 3017F COLORECTAL CA SCREEN DOC REV: CPT | Performed by: INTERNAL MEDICINE

## 2024-01-04 PROCEDURE — G8484 FLU IMMUNIZE NO ADMIN: HCPCS | Performed by: INTERNAL MEDICINE

## 2024-01-04 PROCEDURE — 3078F DIAST BP <80 MM HG: CPT | Performed by: INTERNAL MEDICINE

## 2024-01-04 PROCEDURE — G8417 CALC BMI ABV UP PARAM F/U: HCPCS | Performed by: INTERNAL MEDICINE

## 2024-01-04 ASSESSMENT — ENCOUNTER SYMPTOMS
APNEA: 0
BACK PAIN: 0
DIARRHEA: 0
ABDOMINAL PAIN: 0
CHEST TIGHTNESS: 0
WHEEZING: 0
CONSTIPATION: 0
COUGH: 0
COLOR CHANGE: 0
ABDOMINAL DISTENTION: 0
SHORTNESS OF BREATH: 1
FACIAL SWELLING: 0

## 2024-01-04 ASSESSMENT — PATIENT HEALTH QUESTIONNAIRE - PHQ9
SUM OF ALL RESPONSES TO PHQ QUESTIONS 1-9: 0
1. LITTLE INTEREST OR PLEASURE IN DOING THINGS: 0
SUM OF ALL RESPONSES TO PHQ QUESTIONS 1-9: 0
2. FEELING DOWN, DEPRESSED OR HOPELESS: 0
SUM OF ALL RESPONSES TO PHQ QUESTIONS 1-9: 0
SUM OF ALL RESPONSES TO PHQ QUESTIONS 1-9: 0
SUM OF ALL RESPONSES TO PHQ9 QUESTIONS 1 & 2: 0

## 2024-01-04 NOTE — PROGRESS NOTES
last GFR 15-59)  12/21/2024    Colorectal Cancer Screen  03/15/2025    Flu vaccine  Completed    Pneumococcal 65+ years Vaccine  Completed    AAA screen  Completed    Hepatitis C screen  Completed    Hepatitis A vaccine  Aged Out    Hepatitis B vaccine  Aged Out    Hib vaccine  Aged Out    Polio vaccine  Aged Out    Meningococcal (ACWY) vaccine  Aged Out

## 2024-02-23 ENCOUNTER — ENROLLMENT (OUTPATIENT)
Dept: PHARMACY | Facility: CLINIC | Age: 74
End: 2024-02-23

## 2024-03-07 ENCOUNTER — OFFICE VISIT (OUTPATIENT)
Dept: PODIATRY | Age: 74
End: 2024-03-07
Payer: MEDICARE

## 2024-03-07 VITALS — BODY MASS INDEX: 31.14 KG/M2 | HEIGHT: 73 IN | WEIGHT: 235 LBS

## 2024-03-07 DIAGNOSIS — E11.42 DIABETIC POLYNEUROPATHY ASSOCIATED WITH TYPE 2 DIABETES MELLITUS (HCC): ICD-10-CM

## 2024-03-07 DIAGNOSIS — L98.9 BENIGN SKIN LESION: ICD-10-CM

## 2024-03-07 DIAGNOSIS — M79.674 PAIN OF TOES OF BOTH FEET: ICD-10-CM

## 2024-03-07 DIAGNOSIS — M79.675 PAIN OF TOES OF BOTH FEET: ICD-10-CM

## 2024-03-07 DIAGNOSIS — E11.51 TYPE 2 DIABETES MELLITUS WITH PERIPHERAL VASCULAR DISEASE (HCC): ICD-10-CM

## 2024-03-07 DIAGNOSIS — M79.671 PAIN IN RIGHT FOOT: ICD-10-CM

## 2024-03-07 DIAGNOSIS — B35.1 ONYCHOMYCOSIS OF TOENAIL: Primary | ICD-10-CM

## 2024-03-07 PROCEDURE — 99999 PR OFFICE/OUTPT VISIT,PROCEDURE ONLY: CPT | Performed by: PODIATRIST

## 2024-03-07 PROCEDURE — 11721 DEBRIDE NAIL 6 OR MORE: CPT | Performed by: PODIATRIST

## 2024-03-07 PROCEDURE — 17110 DESTRUCTION B9 LES UP TO 14: CPT | Performed by: PODIATRIST

## 2024-03-13 ASSESSMENT — ENCOUNTER SYMPTOMS
DIARRHEA: 0
SHORTNESS OF BREATH: 0
BACK PAIN: 0
NAUSEA: 0
COLOR CHANGE: 0

## 2024-03-13 NOTE — PROGRESS NOTES
SUBJECTIVE: Librado Allan is a 73 y.o. male who returns to the office with chief complaint of painful fungal toenails. Patient relates toe nails are thickened/difficult to trim as well as painful with ambulation and with shoe gear.   Chief Complaint   Patient presents with    Nail Problem     Nail trim/last saw Dr.Puneet Landry 1/4/2024    Diabetes     Last A1c 6.7     Review of Systems   Constitutional:  Negative for activity change, appetite change, chills, diaphoresis, fatigue and fever.   Respiratory:  Negative for shortness of breath.    Cardiovascular:  Negative for leg swelling.   Gastrointestinal:  Negative for diarrhea and nausea.   Endocrine: Negative for cold intolerance, heat intolerance and polyuria.   Musculoskeletal:  Positive for arthralgias. Negative for back pain, gait problem, joint swelling and myalgias.   Skin:  Negative for color change, pallor, rash and wound.   Allergic/Immunologic: Negative for environmental allergies and food allergies.   Neurological:  Positive for numbness. Negative for dizziness, weakness and light-headedness.   Hematological:  Does not bruise/bleed easily.   Psychiatric/Behavioral:  Negative for behavioral problems, confusion and self-injury. The patient is not nervous/anxious.      OBJECTIVE: Clinical evaluation of patient reveals nails 1,2,3,4,5 of the right foot and nails 1,2,3,4,5 of the left foot to present with thickness, elongation, discoloration, brittleness, and subungual debris. There was pain with palpation and debridement of the toenails of the bilateral feet. No open lesions noted to either foot today. There is hyperkeratotic tissue formation noted to the ball of the right foot. There is pain with direct palpation of the lesion(s). Debridement of the lesion(s) with a fifteen blade does reveal a central core. The core of the lesion(s) was debrided with a fifteen blade. No signs of bacterial infection are noted to the lesion(s).    The right DP pulse is

## 2024-03-18 RX ORDER — SPIRONOLACTONE 25 MG/1
12.5 TABLET ORAL DAILY
Qty: 30 TABLET | Refills: 3 | Status: SHIPPED | OUTPATIENT
Start: 2024-03-18

## 2024-04-21 SDOH — ECONOMIC STABILITY: TRANSPORTATION INSECURITY
IN THE PAST 12 MONTHS, HAS LACK OF TRANSPORTATION KEPT YOU FROM MEETINGS, WORK, OR FROM GETTING THINGS NEEDED FOR DAILY LIVING?: NO

## 2024-04-21 SDOH — ECONOMIC STABILITY: INCOME INSECURITY: HOW HARD IS IT FOR YOU TO PAY FOR THE VERY BASICS LIKE FOOD, HOUSING, MEDICAL CARE, AND HEATING?: NOT VERY HARD

## 2024-04-21 SDOH — ECONOMIC STABILITY: FOOD INSECURITY: WITHIN THE PAST 12 MONTHS, THE FOOD YOU BOUGHT JUST DIDN'T LAST AND YOU DIDN'T HAVE MONEY TO GET MORE.: NEVER TRUE

## 2024-04-21 SDOH — ECONOMIC STABILITY: FOOD INSECURITY: WITHIN THE PAST 12 MONTHS, YOU WORRIED THAT YOUR FOOD WOULD RUN OUT BEFORE YOU GOT MONEY TO BUY MORE.: NEVER TRUE

## 2024-04-22 ENCOUNTER — OFFICE VISIT (OUTPATIENT)
Dept: INTERNAL MEDICINE CLINIC | Age: 74
End: 2024-04-22
Payer: MEDICARE

## 2024-04-22 VITALS
OXYGEN SATURATION: 96 % | HEIGHT: 73 IN | HEART RATE: 51 BPM | WEIGHT: 232.2 LBS | DIASTOLIC BLOOD PRESSURE: 68 MMHG | SYSTOLIC BLOOD PRESSURE: 126 MMHG | BODY MASS INDEX: 30.77 KG/M2

## 2024-04-22 DIAGNOSIS — N18.9 CHRONIC KIDNEY DISEASE, UNSPECIFIED CKD STAGE: ICD-10-CM

## 2024-04-22 DIAGNOSIS — E11.9 TYPE 2 DIABETES MELLITUS WITHOUT COMPLICATION, WITHOUT LONG-TERM CURRENT USE OF INSULIN (HCC): ICD-10-CM

## 2024-04-22 DIAGNOSIS — I23.6 LEFT VENTRICULAR APICAL THROMBUS FOLLOWING MI (HCC): Primary | ICD-10-CM

## 2024-04-22 DIAGNOSIS — Z13.220 SCREENING FOR HYPERLIPIDEMIA: ICD-10-CM

## 2024-04-22 DIAGNOSIS — I50.22 CHRONIC SYSTOLIC (CONGESTIVE) HEART FAILURE (HCC): ICD-10-CM

## 2024-04-22 PROCEDURE — 3078F DIAST BP <80 MM HG: CPT | Performed by: INTERNAL MEDICINE

## 2024-04-22 PROCEDURE — G8417 CALC BMI ABV UP PARAM F/U: HCPCS | Performed by: INTERNAL MEDICINE

## 2024-04-22 PROCEDURE — 3046F HEMOGLOBIN A1C LEVEL >9.0%: CPT | Performed by: INTERNAL MEDICINE

## 2024-04-22 PROCEDURE — 1123F ACP DISCUSS/DSCN MKR DOCD: CPT | Performed by: INTERNAL MEDICINE

## 2024-04-22 PROCEDURE — 3074F SYST BP LT 130 MM HG: CPT | Performed by: INTERNAL MEDICINE

## 2024-04-22 PROCEDURE — 99214 OFFICE O/P EST MOD 30 MIN: CPT | Performed by: INTERNAL MEDICINE

## 2024-04-22 PROCEDURE — 2022F DILAT RTA XM EVC RTNOPTHY: CPT | Performed by: INTERNAL MEDICINE

## 2024-04-22 PROCEDURE — G8427 DOCREV CUR MEDS BY ELIG CLIN: HCPCS | Performed by: INTERNAL MEDICINE

## 2024-04-22 PROCEDURE — 4004F PT TOBACCO SCREEN RCVD TLK: CPT | Performed by: INTERNAL MEDICINE

## 2024-04-22 PROCEDURE — 3017F COLORECTAL CA SCREEN DOC REV: CPT | Performed by: INTERNAL MEDICINE

## 2024-04-22 ASSESSMENT — ENCOUNTER SYMPTOMS
WHEEZING: 0
FACIAL SWELLING: 0
SHORTNESS OF BREATH: 1
CONSTIPATION: 0
COLOR CHANGE: 0
ABDOMINAL DISTENTION: 0
COUGH: 0
DIARRHEA: 0
ABDOMINAL PAIN: 0
BACK PAIN: 0
APNEA: 0
CHEST TIGHTNESS: 0

## 2024-04-22 ASSESSMENT — PATIENT HEALTH QUESTIONNAIRE - PHQ9
2. FEELING DOWN, DEPRESSED OR HOPELESS: NOT AT ALL
SUM OF ALL RESPONSES TO PHQ QUESTIONS 1-9: 0
1. LITTLE INTEREST OR PLEASURE IN DOING THINGS: NOT AT ALL
SUM OF ALL RESPONSES TO PHQ9 QUESTIONS 1 & 2: 0
SUM OF ALL RESPONSES TO PHQ QUESTIONS 1-9: 0

## 2024-04-22 NOTE — PROGRESS NOTES
Right eye: No discharge.         Left eye: No discharge.      Conjunctiva/sclera: Conjunctivae normal.      Pupils: Pupils are equal, round, and reactive to light.   Neck:      Thyroid: No thyromegaly.      Vascular: No JVD.      Trachea: No tracheal deviation.      Comments: Thick neck present  Cardiovascular:      Rate and Rhythm: Normal rate.      Heart sounds: Normal heart sounds. No murmur heard.     No gallop.   Pulmonary:      Effort: Pulmonary effort is normal. No respiratory distress.      Breath sounds: Normal breath sounds. No stridor. No wheezing or rales.   Abdominal:      General: Bowel sounds are normal. There is no distension.      Palpations: Abdomen is soft.      Tenderness: There is no abdominal tenderness. There is no guarding or rebound.   Musculoskeletal:         General: No tenderness. Normal range of motion.      Cervical back: Normal range of motion.      Right lower leg: No edema.      Left lower leg: No edema.   Skin:     General: Skin is warm and dry.      Findings: No erythema or rash.   Neurological:      Mental Status: He is alert and oriented to person, place, and time.            Assessment & Plan    1. 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 Type 2 diabetes mellitus without complication, without long-term current use of insulin (HCC)  - Microalbumin, Ur; Future  - Hemoglobin A1C; Future    3. Screening for hyperlipidemia  - Lipid Panel; Future    4. Chronic kidney disease, unspecified CKD stage  - Basic Metabolic Panel; Future  - Magnesium; Future    5. Chronic systolic (congestive) heart failure (HCC)  Compensated       Return in about 3 months (around 7/22/2024).    Reviewed prior labs and health maintenance.      Discussed use, benefit, and side effects of prescribed medications.  Barriers to medication compliance addressed.  All patient questions answered.  Pt voiced understanding.         Catarino Landry MD  [facility]  4/22/2024, 11:31

## 2024-04-29 ENCOUNTER — HOSPITAL ENCOUNTER (OUTPATIENT)
Age: 74
Setting detail: SPECIMEN
Discharge: HOME | End: 2024-04-29

## 2024-04-29 DIAGNOSIS — E11.9 TYPE 2 DIABETES MELLITUS WITHOUT COMPLICATION, WITHOUT LONG-TERM CURRENT USE OF INSULIN (HCC): ICD-10-CM

## 2024-04-29 DIAGNOSIS — Z13.220 SCREENING FOR HYPERLIPIDEMIA: ICD-10-CM

## 2024-04-29 DIAGNOSIS — N18.9 CHRONIC KIDNEY DISEASE, UNSPECIFIED CKD STAGE: ICD-10-CM

## 2024-04-29 LAB
ANION GAP SERPL CALCULATED.3IONS-SCNC: 15 MMOL/L (ref 9–16)
BUN SERPL-MCNC: 30 MG/DL (ref 8–23)
CALCIUM SERPL-MCNC: 9.4 MG/DL (ref 8.6–10.4)
CHLORIDE SERPL-SCNC: 103 MMOL/L (ref 98–107)
CHOLEST SERPL-MCNC: 107 MG/DL (ref 0–199)
CHOLESTEROL/HDL RATIO: 3
CO2 SERPL-SCNC: 21 MMOL/L (ref 20–31)
CREAT SERPL-MCNC: 1.7 MG/DL (ref 0.7–1.2)
CREAT UR-MCNC: 89.2 MG/DL (ref 39–259)
EST. AVERAGE GLUCOSE BLD GHB EST-MCNC: 154 MG/DL
GFR SERPL CREATININE-BSD FRML MDRD: 42 ML/MIN/1.73M2
GLUCOSE SERPL-MCNC: 118 MG/DL (ref 74–99)
HBA1C MFR BLD: 7 % (ref 4–6)
HDLC SERPL-MCNC: 37 MG/DL
LDLC SERPL CALC-MCNC: 28 MG/DL (ref 0–100)
MAGNESIUM SERPL-MCNC: 2.3 MG/DL (ref 1.6–2.4)
MICROALBUMIN UR-MCNC: 36 MG/L (ref 0–20)
MICROALBUMIN/CREAT UR-RTO: 40 MCG/MG CREAT (ref 0–17)
POTASSIUM SERPL-SCNC: 4.6 MMOL/L (ref 3.7–5.3)
SODIUM SERPL-SCNC: 139 MMOL/L (ref 136–145)
TRIGL SERPL-MCNC: 209 MG/DL
VLDLC SERPL CALC-MCNC: 42 MG/DL

## 2024-05-07 RX ORDER — EMPAGLIFLOZIN 10 MG/1
10 TABLET, FILM COATED ORAL DAILY
Qty: 90 TABLET | Refills: 3 | Status: SHIPPED | OUTPATIENT
Start: 2024-05-07

## 2024-06-13 ENCOUNTER — OFFICE VISIT (OUTPATIENT)
Dept: PODIATRY | Age: 74
End: 2024-06-13
Payer: MEDICARE

## 2024-06-13 VITALS — WEIGHT: 232 LBS | BODY MASS INDEX: 30.75 KG/M2 | HEIGHT: 73 IN

## 2024-06-13 DIAGNOSIS — M79.675 PAIN OF TOES OF BOTH FEET: ICD-10-CM

## 2024-06-13 DIAGNOSIS — M79.674 PAIN OF TOES OF BOTH FEET: ICD-10-CM

## 2024-06-13 DIAGNOSIS — E11.42 DIABETIC POLYNEUROPATHY ASSOCIATED WITH TYPE 2 DIABETES MELLITUS (HCC): ICD-10-CM

## 2024-06-13 DIAGNOSIS — B35.1 ONYCHOMYCOSIS OF TOENAIL: Primary | ICD-10-CM

## 2024-06-13 DIAGNOSIS — M79.671 PAIN IN RIGHT FOOT: ICD-10-CM

## 2024-06-13 DIAGNOSIS — E11.51 TYPE 2 DIABETES MELLITUS WITH PERIPHERAL VASCULAR DISEASE (HCC): ICD-10-CM

## 2024-06-13 DIAGNOSIS — L98.9 BENIGN SKIN LESION: ICD-10-CM

## 2024-06-13 PROCEDURE — 99999 PR OFFICE/OUTPT VISIT,PROCEDURE ONLY: CPT | Performed by: PODIATRIST

## 2024-06-13 PROCEDURE — 17110 DESTRUCTION B9 LES UP TO 14: CPT | Performed by: PODIATRIST

## 2024-06-13 PROCEDURE — 11721 DEBRIDE NAIL 6 OR MORE: CPT | Performed by: PODIATRIST

## 2024-06-17 ASSESSMENT — ENCOUNTER SYMPTOMS
SHORTNESS OF BREATH: 0
NAUSEA: 0
BACK PAIN: 0
COLOR CHANGE: 0
DIARRHEA: 0

## 2024-06-17 NOTE — PROGRESS NOTES
SUBJECTIVE: Librado Allan is a 73 y.o. male who returns to the office with chief complaint of painful fungal toenails. Patient relates toe nails are thickened/difficult to trim as well as painful with ambulation and with shoe gear.   Chief Complaint   Patient presents with    Nail Problem     B/l nail trim, last seen Catarino Landry MD 4/22/24    Diabetes     Last A1C 7.0     Review of Systems   Constitutional:  Negative for activity change, appetite change, chills, diaphoresis, fatigue and fever.   Respiratory:  Negative for shortness of breath.    Cardiovascular:  Negative for leg swelling.   Gastrointestinal:  Negative for diarrhea and nausea.   Endocrine: Negative for cold intolerance, heat intolerance and polyuria.   Musculoskeletal:  Positive for arthralgias. Negative for back pain, gait problem, joint swelling and myalgias.   Skin:  Negative for color change, pallor, rash and wound.   Allergic/Immunologic: Negative for environmental allergies and food allergies.   Neurological:  Positive for numbness. Negative for dizziness, weakness and light-headedness.   Hematological:  Does not bruise/bleed easily.   Psychiatric/Behavioral:  Negative for behavioral problems, confusion and self-injury. The patient is not nervous/anxious.      OBJECTIVE: Clinical evaluation of patient reveals nails 1,2,3,4,5 of the right foot and nails 1,2,3,4,5 of the left foot to present with thickness, elongation, discoloration, brittleness, and subungual debris. There was pain with palpation and debridement of the toenails of the bilateral feet. No open lesions noted to either foot today. There is hyperkeratotic tissue formation noted to the ball of the right foot. There is pain with direct palpation of the lesion(s). Debridement of the lesion(s) with a fifteen blade does reveal a central core. The core of the lesion(s) was debrided with a fifteen blade. No signs of bacterial infection are noted to the lesion(s).    The right DP pulse

## 2024-08-11 DIAGNOSIS — J44.1 COPD WITH ACUTE EXACERBATION (HCC): ICD-10-CM

## 2024-08-12 RX ORDER — TIOTROPIUM BROMIDE 18 UG/1
CAPSULE ORAL; RESPIRATORY (INHALATION)
Qty: 90 CAPSULE | Refills: 3 | Status: SHIPPED | OUTPATIENT
Start: 2024-08-12

## 2024-08-12 RX ORDER — SPIRONOLACTONE 25 MG/1
12.5 TABLET ORAL DAILY
Qty: 90 TABLET | Refills: 3 | Status: SHIPPED | OUTPATIENT
Start: 2024-08-12

## 2024-08-20 DIAGNOSIS — J44.1 COPD WITH ACUTE EXACERBATION (HCC): ICD-10-CM

## 2024-08-21 RX ORDER — ALBUTEROL SULFATE 90 UG/1
AEROSOL, METERED RESPIRATORY (INHALATION)
Qty: 72 G | Refills: 3 | Status: SHIPPED | OUTPATIENT
Start: 2024-08-21

## 2024-09-09 ENCOUNTER — OFFICE VISIT (OUTPATIENT)
Dept: INTERNAL MEDICINE CLINIC | Age: 74
End: 2024-09-09
Payer: MEDICARE

## 2024-09-09 VITALS
SYSTOLIC BLOOD PRESSURE: 118 MMHG | DIASTOLIC BLOOD PRESSURE: 68 MMHG | OXYGEN SATURATION: 94 % | WEIGHT: 243 LBS | BODY MASS INDEX: 32.2 KG/M2 | HEIGHT: 73 IN | HEART RATE: 84 BPM

## 2024-09-09 DIAGNOSIS — I10 ESSENTIAL HYPERTENSION: ICD-10-CM

## 2024-09-09 DIAGNOSIS — I23.6 LEFT VENTRICULAR APICAL THROMBUS FOLLOWING MI (HCC): Primary | ICD-10-CM

## 2024-09-09 DIAGNOSIS — Z45.02 ICD (IMPLANTABLE CARDIOVERTER-DEFIBRILLATOR) DISCHARGE: ICD-10-CM

## 2024-09-09 DIAGNOSIS — E11.9 TYPE 2 DIABETES MELLITUS WITHOUT COMPLICATION, WITHOUT LONG-TERM CURRENT USE OF INSULIN (HCC): ICD-10-CM

## 2024-09-09 DIAGNOSIS — N18.9 CHRONIC KIDNEY DISEASE, UNSPECIFIED CKD STAGE: ICD-10-CM

## 2024-09-09 PROCEDURE — 1123F ACP DISCUSS/DSCN MKR DOCD: CPT | Performed by: INTERNAL MEDICINE

## 2024-09-09 PROCEDURE — 3074F SYST BP LT 130 MM HG: CPT | Performed by: INTERNAL MEDICINE

## 2024-09-09 PROCEDURE — 4004F PT TOBACCO SCREEN RCVD TLK: CPT | Performed by: INTERNAL MEDICINE

## 2024-09-09 PROCEDURE — 3017F COLORECTAL CA SCREEN DOC REV: CPT | Performed by: INTERNAL MEDICINE

## 2024-09-09 PROCEDURE — 99214 OFFICE O/P EST MOD 30 MIN: CPT | Performed by: INTERNAL MEDICINE

## 2024-09-09 PROCEDURE — 2022F DILAT RTA XM EVC RTNOPTHY: CPT | Performed by: INTERNAL MEDICINE

## 2024-09-09 PROCEDURE — G8417 CALC BMI ABV UP PARAM F/U: HCPCS | Performed by: INTERNAL MEDICINE

## 2024-09-09 PROCEDURE — 3078F DIAST BP <80 MM HG: CPT | Performed by: INTERNAL MEDICINE

## 2024-09-09 PROCEDURE — G8427 DOCREV CUR MEDS BY ELIG CLIN: HCPCS | Performed by: INTERNAL MEDICINE

## 2024-09-09 PROCEDURE — 3051F HG A1C>EQUAL 7.0%<8.0%: CPT | Performed by: INTERNAL MEDICINE

## 2024-09-12 ENCOUNTER — APPOINTMENT (OUTPATIENT)
Dept: CT IMAGING | Age: 74
DRG: 871 | End: 2024-09-12
Payer: MEDICARE

## 2024-09-12 ENCOUNTER — APPOINTMENT (OUTPATIENT)
Age: 74
DRG: 871 | End: 2024-09-12
Payer: MEDICARE

## 2024-09-12 ENCOUNTER — HOSPITAL ENCOUNTER (INPATIENT)
Age: 74
LOS: 2 days | DRG: 871 | End: 2024-09-14
Attending: EMERGENCY MEDICINE | Admitting: INTERNAL MEDICINE
Payer: MEDICARE

## 2024-09-12 ENCOUNTER — APPOINTMENT (OUTPATIENT)
Dept: GENERAL RADIOLOGY | Age: 74
DRG: 871 | End: 2024-09-12
Payer: MEDICARE

## 2024-09-12 DIAGNOSIS — I42.9 CARDIOMYOPATHY, UNSPECIFIED TYPE (HCC): ICD-10-CM

## 2024-09-12 DIAGNOSIS — R57.0 CARDIOGENIC SHOCK (HCC): Primary | ICD-10-CM

## 2024-09-12 DIAGNOSIS — I50.21 ACUTE HFREF (HEART FAILURE WITH REDUCED EJECTION FRACTION) (HCC): ICD-10-CM

## 2024-09-12 DIAGNOSIS — R06.02 SHORTNESS OF BREATH: ICD-10-CM

## 2024-09-12 PROBLEM — I49.9 ARRHYTHMIA: Status: ACTIVE | Noted: 2024-09-12

## 2024-09-12 PROBLEM — I51.3 LV (LEFT VENTRICULAR) MURAL THROMBUS: Status: ACTIVE | Noted: 2023-03-07

## 2024-09-12 PROBLEM — I50.20 HFREF (HEART FAILURE WITH REDUCED EJECTION FRACTION) (HCC): Status: ACTIVE | Noted: 2024-09-12

## 2024-09-12 PROBLEM — I25.10 CORONARY ARTERY DISEASE INVOLVING NATIVE CORONARY ARTERY OF NATIVE HEART WITHOUT ANGINA PECTORIS: Status: ACTIVE | Noted: 2024-09-12

## 2024-09-12 PROBLEM — E87.20 LACTIC ACIDOSIS: Status: ACTIVE | Noted: 2024-09-12

## 2024-09-12 PROBLEM — R79.89 ELEVATED LFTS: Status: ACTIVE | Noted: 2024-09-12

## 2024-09-12 LAB
ALBUMIN SERPL-MCNC: 2.8 G/DL (ref 3.5–5.2)
ALBUMIN SERPL-MCNC: 3 G/DL (ref 3.5–5.2)
ALBUMIN/GLOB SERPL: 1 {RATIO} (ref 1–2.5)
ALBUMIN/GLOB SERPL: 1 {RATIO} (ref 1–2.5)
ALP SERPL-CCNC: 149 U/L (ref 40–129)
ALP SERPL-CCNC: 158 U/L (ref 40–129)
ALT SERPL-CCNC: 227 U/L (ref 10–50)
ALT SERPL-CCNC: 59 U/L (ref 10–50)
ANION GAP SERPL CALCULATED.3IONS-SCNC: 16 MMOL/L (ref 9–16)
ANTI-XA UNFRAC HEPARIN: >2 IU/L
AST SERPL-CCNC: 570 U/L (ref 10–50)
AST SERPL-CCNC: 89 U/L (ref 10–50)
BASOPHILS # BLD: 0.07 K/UL (ref 0–0.2)
BASOPHILS NFR BLD: 1 % (ref 0–2)
BILIRUB DIRECT SERPL-MCNC: 1 MG/DL (ref 0–0.2)
BILIRUB INDIRECT SERPL-MCNC: 0.6 MG/DL (ref 0–1)
BILIRUB SERPL-MCNC: 1 MG/DL (ref 0–1.2)
BILIRUB SERPL-MCNC: 1.6 MG/DL (ref 0–1.2)
BILIRUB UR QL STRIP: NEGATIVE
BUN BLD-MCNC: 31 MG/DL (ref 8–26)
BUN SERPL-MCNC: 34 MG/DL (ref 8–23)
CA-I BLD-SCNC: 1.2 MMOL/L (ref 1.15–1.33)
CALCIUM SERPL-MCNC: 8.6 MG/DL (ref 8.6–10.4)
CASTS #/AREA URNS LPF: ABNORMAL /LPF (ref 0–2)
CASTS #/AREA URNS LPF: ABNORMAL /LPF (ref 0–2)
CHLORIDE BLD-SCNC: 110 MMOL/L (ref 98–107)
CHLORIDE SERPL-SCNC: 108 MMOL/L (ref 98–107)
CLARITY UR: ABNORMAL
CO2 BLD CALC-SCNC: 18 MMOL/L (ref 22–30)
CO2 SERPL-SCNC: 15 MMOL/L (ref 20–31)
COLOR UR: ABNORMAL
CREAT SERPL-MCNC: 3.1 MG/DL (ref 0.7–1.2)
EGFR, POC: 25 ML/MIN/1.73M2
EOSINOPHIL # BLD: 0.09 K/UL (ref 0–0.44)
EOSINOPHILS RELATIVE PERCENT: 1 % (ref 1–4)
EPI CELLS #/AREA URNS HPF: ABNORMAL /HPF (ref 0–5)
ERYTHROCYTE [DISTWIDTH] IN BLOOD BY AUTOMATED COUNT: 16.4 % (ref 11.8–14.4)
ERYTHROCYTE [DISTWIDTH] IN BLOOD BY AUTOMATED COUNT: 16.5 % (ref 11.8–14.4)
GFR, ESTIMATED: 21 ML/MIN/1.73M2
GLOBULIN SER CALC-MCNC: 2.5 G/DL
GLUCOSE BLD-MCNC: 189 MG/DL (ref 74–100)
GLUCOSE SERPL-MCNC: 198 MG/DL (ref 74–99)
GLUCOSE UR STRIP-MCNC: ABNORMAL MG/DL
HCO3 VENOUS: 17.9 MMOL/L (ref 22–29)
HCT VFR BLD AUTO: 38 % (ref 41–53)
HCT VFR BLD AUTO: 38.3 % (ref 40.7–50.3)
HCT VFR BLD AUTO: 39.8 % (ref 40.7–50.3)
HGB BLD-MCNC: 12.6 G/DL (ref 13–17)
HGB BLD-MCNC: 12.7 G/DL (ref 13–17)
HGB UR QL STRIP.AUTO: ABNORMAL
IMM GRANULOCYTES # BLD AUTO: 0.06 K/UL (ref 0–0.3)
IMM GRANULOCYTES NFR BLD: 1 %
INR PPP: 2.3
INR PPP: 2.7
KETONES UR STRIP-MCNC: ABNORMAL MG/DL
LACTIC ACID, SEPSIS WHOLE BLOOD: 5.8 MMOL/L (ref 0.5–1.9)
LACTIC ACID, SEPSIS WHOLE BLOOD: 6.4 MMOL/L (ref 0.5–1.9)
LEUKOCYTE ESTERASE UR QL STRIP: ABNORMAL
LYMPHOCYTES NFR BLD: 2.66 K/UL (ref 1.1–3.7)
LYMPHOCYTES RELATIVE PERCENT: 21 % (ref 24–43)
MAGNESIUM SERPL-MCNC: 2.5 MG/DL (ref 1.6–2.4)
MCH RBC QN AUTO: 34.5 PG (ref 25.2–33.5)
MCH RBC QN AUTO: 35.4 PG (ref 25.2–33.5)
MCHC RBC AUTO-ENTMCNC: 31.7 G/DL (ref 28.4–34.8)
MCHC RBC AUTO-ENTMCNC: 33.2 G/DL (ref 28.4–34.8)
MCV RBC AUTO: 106.7 FL (ref 82.6–102.9)
MCV RBC AUTO: 109 FL (ref 82.6–102.9)
MONOCYTES NFR BLD: 0.89 K/UL (ref 0.1–1.2)
MONOCYTES NFR BLD: 7 % (ref 3–12)
MUCOUS THREADS URNS QL MICRO: ABNORMAL
NEGATIVE BASE EXCESS, VEN: 7 MMOL/L (ref 0–2)
NEUTROPHILS NFR BLD: 69 % (ref 36–65)
NEUTS SEG NFR BLD: 8.78 K/UL (ref 1.5–8.1)
NITRITE UR QL STRIP: NEGATIVE
NRBC BLD-RTO: 0 PER 100 WBC
NRBC BLD-RTO: 0 PER 100 WBC
O2 SAT, VEN: 62.1 % (ref 60–85)
PARTIAL THROMBOPLASTIN TIME: 30.8 SEC (ref 23–36.5)
PARTIAL THROMBOPLASTIN TIME: 32.6 SEC (ref 23–36.5)
PCO2 VENOUS: 33.2 MM HG (ref 41–51)
PH UR STRIP: 5 [PH] (ref 5–8)
PH VENOUS: 7.34 (ref 7.32–7.43)
PLATELET # BLD AUTO: 172 K/UL (ref 138–453)
PLATELET # BLD AUTO: 189 K/UL (ref 138–453)
PMV BLD AUTO: 11.1 FL (ref 8.1–13.5)
PMV BLD AUTO: 11.5 FL (ref 8.1–13.5)
PO2 VENOUS: 33.8 MM HG (ref 30–50)
POC ANION GAP: 13 MMOL/L (ref 7–16)
POC CREATININE: 2.6 MG/DL (ref 0.51–1.19)
POC HEMOGLOBIN (CALC): 13 G/DL (ref 13.5–17.5)
POC LACTIC ACID: 6.2 MMOL/L (ref 0.56–1.39)
POTASSIUM BLD-SCNC: 4.4 MMOL/L (ref 3.5–4.5)
POTASSIUM SERPL-SCNC: 4.5 MMOL/L (ref 3.7–5.3)
PROCALCITONIN SERPL-MCNC: 0.59 NG/ML (ref 0–0.09)
PROT SERPL-MCNC: 5.4 G/DL (ref 6.6–8.7)
PROT SERPL-MCNC: 5.5 G/DL (ref 6.6–8.7)
PROT UR STRIP-MCNC: ABNORMAL MG/DL
PROTHROMBIN TIME: 24.4 SEC (ref 11.7–14.9)
PROTHROMBIN TIME: 28.1 SEC (ref 11.7–14.9)
RBC # BLD AUTO: 3.59 M/UL (ref 4.21–5.77)
RBC # BLD AUTO: 3.65 M/UL (ref 4.21–5.77)
RBC # BLD: ABNORMAL 10*6/UL
RBC # BLD: ABNORMAL 10*6/UL
RBC #/AREA URNS HPF: ABNORMAL /HPF (ref 0–2)
SODIUM BLD-SCNC: 139 MMOL/L (ref 138–146)
SODIUM SERPL-SCNC: 139 MMOL/L (ref 136–145)
SP GR UR STRIP: 1.02 (ref 1–1.03)
T4 FREE SERPL-MCNC: 1.3 NG/DL (ref 0.92–1.68)
TROPONIN I SERPL HS-MCNC: 54 NG/L (ref 0–22)
TROPONIN I SERPL HS-MCNC: 60 NG/L (ref 0–22)
TSH SERPL DL<=0.05 MIU/L-ACNC: 9.39 UIU/ML (ref 0.27–4.2)
UROBILINOGEN UR STRIP-ACNC: NORMAL EU/DL (ref 0–1)
WBC #/AREA URNS HPF: ABNORMAL /HPF (ref 0–5)
WBC OTHER # BLD: 12.6 K/UL (ref 3.5–11.3)
WBC OTHER # BLD: 16.3 K/UL (ref 3.5–11.3)

## 2024-09-12 PROCEDURE — 83735 ASSAY OF MAGNESIUM: CPT

## 2024-09-12 PROCEDURE — 84484 ASSAY OF TROPONIN QUANT: CPT

## 2024-09-12 PROCEDURE — 94761 N-INVAS EAR/PLS OXIMETRY MLT: CPT

## 2024-09-12 PROCEDURE — 70450 CT HEAD/BRAIN W/O DYE: CPT

## 2024-09-12 PROCEDURE — 99291 CRITICAL CARE FIRST HOUR: CPT

## 2024-09-12 PROCEDURE — 84520 ASSAY OF UREA NITROGEN: CPT

## 2024-09-12 PROCEDURE — 96375 TX/PRO/DX INJ NEW DRUG ADDON: CPT

## 2024-09-12 PROCEDURE — 84439 ASSAY OF FREE THYROXINE: CPT

## 2024-09-12 PROCEDURE — 6360000002 HC RX W HCPCS: Performed by: STUDENT IN AN ORGANIZED HEALTH CARE EDUCATION/TRAINING PROGRAM

## 2024-09-12 PROCEDURE — 2580000003 HC RX 258

## 2024-09-12 PROCEDURE — 74176 CT ABD & PELVIS W/O CONTRAST: CPT

## 2024-09-12 PROCEDURE — 80051 ELECTROLYTE PANEL: CPT

## 2024-09-12 PROCEDURE — 87086 URINE CULTURE/COLONY COUNT: CPT

## 2024-09-12 PROCEDURE — 81001 URINALYSIS AUTO W/SCOPE: CPT

## 2024-09-12 PROCEDURE — 84145 PROCALCITONIN (PCT): CPT

## 2024-09-12 PROCEDURE — 82565 ASSAY OF CREATININE: CPT

## 2024-09-12 PROCEDURE — 3E043XZ INTRODUCTION OF VASOPRESSOR INTO CENTRAL VEIN, PERCUTANEOUS APPROACH: ICD-10-PCS | Performed by: INTERNAL MEDICINE

## 2024-09-12 PROCEDURE — 80076 HEPATIC FUNCTION PANEL: CPT

## 2024-09-12 PROCEDURE — 6360000002 HC RX W HCPCS

## 2024-09-12 PROCEDURE — 5A09357 ASSISTANCE WITH RESPIRATORY VENTILATION, LESS THAN 24 CONSECUTIVE HOURS, CONTINUOUS POSITIVE AIRWAY PRESSURE: ICD-10-PCS | Performed by: INTERNAL MEDICINE

## 2024-09-12 PROCEDURE — 2700000000 HC OXYGEN THERAPY PER DAY

## 2024-09-12 PROCEDURE — 04HY32Z INSERTION OF MONITORING DEVICE INTO LOWER ARTERY, PERCUTANEOUS APPROACH: ICD-10-PCS | Performed by: STUDENT IN AN ORGANIZED HEALTH CARE EDUCATION/TRAINING PROGRAM

## 2024-09-12 PROCEDURE — 2500000003 HC RX 250 WO HCPCS: Performed by: STUDENT IN AN ORGANIZED HEALTH CARE EDUCATION/TRAINING PROGRAM

## 2024-09-12 PROCEDURE — 82947 ASSAY GLUCOSE BLOOD QUANT: CPT

## 2024-09-12 PROCEDURE — 85025 COMPLETE CBC W/AUTO DIFF WBC: CPT

## 2024-09-12 PROCEDURE — 96366 THER/PROPH/DIAG IV INF ADDON: CPT

## 2024-09-12 PROCEDURE — 87040 BLOOD CULTURE FOR BACTERIA: CPT

## 2024-09-12 PROCEDURE — 80053 COMPREHEN METABOLIC PANEL: CPT

## 2024-09-12 PROCEDURE — 71045 X-RAY EXAM CHEST 1 VIEW: CPT

## 2024-09-12 PROCEDURE — 96365 THER/PROPH/DIAG IV INF INIT: CPT

## 2024-09-12 PROCEDURE — 83605 ASSAY OF LACTIC ACID: CPT

## 2024-09-12 PROCEDURE — 85610 PROTHROMBIN TIME: CPT

## 2024-09-12 PROCEDURE — 82803 BLOOD GASES ANY COMBINATION: CPT

## 2024-09-12 PROCEDURE — 74174 CTA ABD&PLVS W/CONTRAST: CPT

## 2024-09-12 PROCEDURE — 86901 BLOOD TYPING SEROLOGIC RH(D): CPT

## 2024-09-12 PROCEDURE — 2000000000 HC ICU R&B

## 2024-09-12 PROCEDURE — 85014 HEMATOCRIT: CPT

## 2024-09-12 PROCEDURE — 85027 COMPLETE CBC AUTOMATED: CPT

## 2024-09-12 PROCEDURE — 6360000004 HC RX CONTRAST MEDICATION: Performed by: STUDENT IN AN ORGANIZED HEALTH CARE EDUCATION/TRAINING PROGRAM

## 2024-09-12 PROCEDURE — 82330 ASSAY OF CALCIUM: CPT

## 2024-09-12 PROCEDURE — 2580000003 HC RX 258: Performed by: STUDENT IN AN ORGANIZED HEALTH CARE EDUCATION/TRAINING PROGRAM

## 2024-09-12 PROCEDURE — 85730 THROMBOPLASTIN TIME PARTIAL: CPT

## 2024-09-12 PROCEDURE — 85520 HEPARIN ASSAY: CPT

## 2024-09-12 PROCEDURE — 84443 ASSAY THYROID STIM HORMONE: CPT

## 2024-09-12 PROCEDURE — 86900 BLOOD TYPING SEROLOGIC ABO: CPT

## 2024-09-12 PROCEDURE — 02HV33Z INSERTION OF INFUSION DEVICE INTO SUPERIOR VENA CAVA, PERCUTANEOUS APPROACH: ICD-10-PCS | Performed by: STUDENT IN AN ORGANIZED HEALTH CARE EDUCATION/TRAINING PROGRAM

## 2024-09-12 PROCEDURE — 94660 CPAP INITIATION&MGMT: CPT

## 2024-09-12 RX ORDER — ONDANSETRON 2 MG/ML
4 INJECTION INTRAMUSCULAR; INTRAVENOUS EVERY 6 HOURS PRN
Status: DISCONTINUED | OUTPATIENT
Start: 2024-09-12 | End: 2024-09-14 | Stop reason: HOSPADM

## 2024-09-12 RX ORDER — HEPARIN SODIUM 1000 [USP'U]/ML
4000 INJECTION, SOLUTION INTRAVENOUS; SUBCUTANEOUS ONCE
Status: COMPLETED | OUTPATIENT
Start: 2024-09-12 | End: 2024-09-12

## 2024-09-12 RX ORDER — HEPARIN SODIUM 1000 [USP'U]/ML
2000 INJECTION, SOLUTION INTRAVENOUS; SUBCUTANEOUS PRN
Status: DISCONTINUED | OUTPATIENT
Start: 2024-09-12 | End: 2024-09-12

## 2024-09-12 RX ORDER — 0.9 % SODIUM CHLORIDE 0.9 %
500 INTRAVENOUS SOLUTION INTRAVENOUS ONCE
Status: COMPLETED | OUTPATIENT
Start: 2024-09-12 | End: 2024-09-12

## 2024-09-12 RX ORDER — SODIUM CHLORIDE 9 MG/ML
INJECTION, SOLUTION INTRAVENOUS PRN
Status: DISCONTINUED | OUTPATIENT
Start: 2024-09-12 | End: 2024-09-14 | Stop reason: HOSPADM

## 2024-09-12 RX ORDER — SODIUM CHLORIDE 0.9 % (FLUSH) 0.9 %
5-40 SYRINGE (ML) INJECTION EVERY 12 HOURS SCHEDULED
Status: DISCONTINUED | OUTPATIENT
Start: 2024-09-12 | End: 2024-09-14 | Stop reason: HOSPADM

## 2024-09-12 RX ORDER — SODIUM CHLORIDE, SODIUM LACTATE, POTASSIUM CHLORIDE, AND CALCIUM CHLORIDE .6; .31; .03; .02 G/100ML; G/100ML; G/100ML; G/100ML
1000 INJECTION, SOLUTION INTRAVENOUS ONCE
Status: DISCONTINUED | OUTPATIENT
Start: 2024-09-12 | End: 2024-09-13

## 2024-09-12 RX ORDER — POLYETHYLENE GLYCOL 3350 17 G/17G
17 POWDER, FOR SOLUTION ORAL DAILY PRN
Status: DISCONTINUED | OUTPATIENT
Start: 2024-09-12 | End: 2024-09-14 | Stop reason: HOSPADM

## 2024-09-12 RX ORDER — NOREPINEPHRINE BITARTRATE 0.06 MG/ML
1-100 INJECTION, SOLUTION INTRAVENOUS CONTINUOUS
Status: DISCONTINUED | OUTPATIENT
Start: 2024-09-12 | End: 2024-09-13

## 2024-09-12 RX ORDER — HEPARIN SODIUM 10000 [USP'U]/100ML
5-30 INJECTION, SOLUTION INTRAVENOUS CONTINUOUS
Status: DISCONTINUED | OUTPATIENT
Start: 2024-09-12 | End: 2024-09-12

## 2024-09-12 RX ORDER — ONDANSETRON 4 MG/1
4 TABLET, ORALLY DISINTEGRATING ORAL EVERY 8 HOURS PRN
Status: DISCONTINUED | OUTPATIENT
Start: 2024-09-12 | End: 2024-09-14 | Stop reason: HOSPADM

## 2024-09-12 RX ORDER — IOPAMIDOL 755 MG/ML
100 INJECTION, SOLUTION INTRAVASCULAR
Status: COMPLETED | OUTPATIENT
Start: 2024-09-12 | End: 2024-09-12

## 2024-09-12 RX ORDER — ACETAMINOPHEN 325 MG/1
650 TABLET ORAL EVERY 6 HOURS PRN
Status: DISCONTINUED | OUTPATIENT
Start: 2024-09-12 | End: 2024-09-14 | Stop reason: HOSPADM

## 2024-09-12 RX ORDER — ACETAMINOPHEN 650 MG/1
650 SUPPOSITORY RECTAL EVERY 6 HOURS PRN
Status: DISCONTINUED | OUTPATIENT
Start: 2024-09-12 | End: 2024-09-14 | Stop reason: HOSPADM

## 2024-09-12 RX ORDER — HEPARIN SODIUM 1000 [USP'U]/ML
4000 INJECTION, SOLUTION INTRAVENOUS; SUBCUTANEOUS PRN
Status: DISCONTINUED | OUTPATIENT
Start: 2024-09-12 | End: 2024-09-12

## 2024-09-12 RX ORDER — SODIUM CHLORIDE 0.9 % (FLUSH) 0.9 %
5-40 SYRINGE (ML) INJECTION PRN
Status: DISCONTINUED | OUTPATIENT
Start: 2024-09-12 | End: 2024-09-14 | Stop reason: HOSPADM

## 2024-09-12 RX ORDER — EPINEPHRINE 1 MG/ML
INJECTION, SOLUTION INTRAMUSCULAR; SUBCUTANEOUS
Status: DISCONTINUED
Start: 2024-09-12 | End: 2024-09-12

## 2024-09-12 RX ORDER — MIDODRINE HYDROCHLORIDE 5 MG/1
5 TABLET ORAL 3 TIMES DAILY
Status: DISCONTINUED | OUTPATIENT
Start: 2024-09-12 | End: 2024-09-13

## 2024-09-12 RX ADMIN — HEPARIN SODIUM 9 UNITS/KG/HR: 10000 INJECTION, SOLUTION INTRAVENOUS at 23:15

## 2024-09-12 RX ADMIN — PIPERACILLIN AND TAZOBACTAM 3375 MG: 3; .375 INJECTION, POWDER, LYOPHILIZED, FOR SOLUTION INTRAVENOUS at 22:08

## 2024-09-12 RX ADMIN — VASOPRESSIN 0.03 UNITS/MIN: 0.2 INJECTION INTRAVENOUS at 22:23

## 2024-09-12 RX ADMIN — NOREPINEPHRINE BITARTRATE 5 MCG/MIN: 0.06 INJECTION, SOLUTION INTRAVENOUS at 19:45

## 2024-09-12 RX ADMIN — SODIUM CHLORIDE 500 ML: 0.9 INJECTION, SOLUTION INTRAVENOUS at 19:47

## 2024-09-12 RX ADMIN — SODIUM CHLORIDE, PRESERVATIVE FREE 10 ML: 5 INJECTION INTRAVENOUS at 22:08

## 2024-09-12 RX ADMIN — IOPAMIDOL 100 ML: 755 INJECTION, SOLUTION INTRAVENOUS at 23:41

## 2024-09-12 RX ADMIN — HEPARIN SODIUM 4000 UNITS: 1000 INJECTION INTRAVENOUS; SUBCUTANEOUS at 23:15

## 2024-09-12 RX ADMIN — EPINEPHRINE 1 MCG/MIN: 1 INJECTION INTRAMUSCULAR; INTRAVENOUS; SUBCUTANEOUS at 18:54

## 2024-09-12 ASSESSMENT — ENCOUNTER SYMPTOMS
BACK PAIN: 0
DIARRHEA: 0
CONSTIPATION: 0
ABDOMINAL DISTENTION: 1
COUGH: 0
ABDOMINAL PAIN: 1
SHORTNESS OF BREATH: 0
VOMITING: 0
WHEEZING: 0
SHORTNESS OF BREATH: 1
ABDOMINAL PAIN: 0
STRIDOR: 0
NAUSEA: 0
EYES NEGATIVE: 1
ALLERGIC/IMMUNOLOGIC NEGATIVE: 1
CHEST TIGHTNESS: 0

## 2024-09-13 ENCOUNTER — APPOINTMENT (OUTPATIENT)
Dept: ULTRASOUND IMAGING | Age: 74
DRG: 871 | End: 2024-09-13
Payer: MEDICARE

## 2024-09-13 ENCOUNTER — APPOINTMENT (OUTPATIENT)
Dept: GENERAL RADIOLOGY | Age: 74
DRG: 871 | End: 2024-09-13
Payer: MEDICARE

## 2024-09-13 ENCOUNTER — APPOINTMENT (OUTPATIENT)
Age: 74
DRG: 871 | End: 2024-09-13
Payer: MEDICARE

## 2024-09-13 PROBLEM — R57.0 CARDIOGENIC SHOCK (HCC): Status: ACTIVE | Noted: 2024-09-13

## 2024-09-13 PROBLEM — R65.21 SEPTIC SHOCK (HCC): Status: ACTIVE | Noted: 2024-09-13

## 2024-09-13 PROBLEM — I21.4 NSTEMI (NON-ST ELEVATED MYOCARDIAL INFARCTION) (HCC): Status: ACTIVE | Noted: 2024-09-13

## 2024-09-13 PROBLEM — D72.829 LEUKOCYTOSIS: Status: ACTIVE | Noted: 2024-09-13

## 2024-09-13 PROBLEM — E87.5 HYPERKALEMIA: Status: ACTIVE | Noted: 2024-09-13

## 2024-09-13 PROBLEM — I42.9 CARDIOMYOPATHY (HCC): Status: ACTIVE | Noted: 2024-09-13

## 2024-09-13 PROBLEM — R58 INTRA-ABDOMINAL BLEEDING: Status: ACTIVE | Noted: 2024-09-13

## 2024-09-13 PROBLEM — K66.1 HEMOPERITONEUM: Status: ACTIVE | Noted: 2024-09-13

## 2024-09-13 PROBLEM — A41.9 SEPTIC SHOCK (HCC): Status: ACTIVE | Noted: 2024-09-13

## 2024-09-13 PROBLEM — K72.00 SHOCK LIVER: Status: ACTIVE | Noted: 2024-09-13

## 2024-09-13 PROBLEM — N18.9 ACUTE KIDNEY INJURY SUPERIMPOSED ON CKD (HCC): Status: ACTIVE | Noted: 2018-12-03

## 2024-09-13 PROBLEM — R74.01 TRANSAMINITIS: Status: ACTIVE | Noted: 2024-09-13

## 2024-09-13 LAB
A1AT SERPL-MCNC: 164 MG/DL (ref 90–200)
ABO + RH BLD: NORMAL
ABO + RH BLD: NORMAL
ALBUMIN PERCENT: ABNORMAL %
ALBUMIN SERPL-MCNC: 2.7 G/DL (ref 3.5–5.2)
ALBUMIN SERPL-MCNC: 2.9 G/DL (ref 3.5–5.2)
ALBUMIN SERPL-MCNC: 2.9 G/DL (ref 3.5–5.2)
ALBUMIN SERPL-MCNC: 3 G/DL (ref 3.5–5.2)
ALBUMIN SERPL-MCNC: ABNORMAL G/DL
ALBUMIN/GLOB SERPL: 1 {RATIO} (ref 1–2.5)
ALP SERPL-CCNC: 1183 U/L (ref 40–129)
ALP SERPL-CCNC: 243 U/L (ref 40–129)
ALP SERPL-CCNC: 671 U/L (ref 40–129)
ALP SERPL-CCNC: 834 U/L (ref 40–129)
ALPHA 2 PERCENT: ABNORMAL %
ALPHA1 GLOB SERPL ELPH-MCNC: ABNORMAL %
ALPHA1 GLOB SERPL ELPH-MCNC: ABNORMAL G/DL
ALPHA2 GLOB SERPL ELPH-MCNC: ABNORMAL G/DL
ALT SERPL-CCNC: 1794 U/L (ref 10–50)
ALT SERPL-CCNC: 3488 U/L (ref 10–50)
ALT SERPL-CCNC: 3908 U/L (ref 10–50)
ALT SERPL-CCNC: 4594 U/L (ref 10–50)
ANION GAP SERPL CALCULATED.3IONS-SCNC: 22 MMOL/L (ref 9–16)
ANION GAP SERPL CALCULATED.3IONS-SCNC: 22 MMOL/L (ref 9–16)
ANION GAP SERPL CALCULATED.3IONS-SCNC: 23 MMOL/L (ref 9–16)
ANION GAP SERPL CALCULATED.3IONS-SCNC: 25 MMOL/L (ref 9–16)
ANION GAP SERPL CALCULATED.3IONS-SCNC: 26 MMOL/L (ref 9–16)
APAP SERPL-MCNC: <5 UG/ML (ref 10–30)
ARM BAND NUMBER: NORMAL
AST SERPL-CCNC: >7000 U/L (ref 10–50)
B-GLOBULIN SERPL ELPH-MCNC: ABNORMAL %
B-GLOBULIN SERPL ELPH-MCNC: ABNORMAL G/DL
BASOPHILS # BLD: 0 K/UL (ref 0–0.2)
BASOPHILS NFR BLD: 0 % (ref 0–2)
BILIRUB DIRECT SERPL-MCNC: 1.3 MG/DL (ref 0–0.2)
BILIRUB INDIRECT SERPL-MCNC: 0.7 MG/DL (ref 0–1)
BILIRUB SERPL-MCNC: 1.9 MG/DL (ref 0–1.2)
BILIRUB SERPL-MCNC: 1.9 MG/DL (ref 0–1.2)
BILIRUB SERPL-MCNC: 2 MG/DL (ref 0–1.2)
BILIRUB SERPL-MCNC: 2.3 MG/DL (ref 0–1.2)
BLOOD BANK SAMPLE EXPIRATION: NORMAL
BLOOD GROUP ANTIBODIES SERPL: NEGATIVE
BNP SERPL-MCNC: 1734 PG/ML (ref 0–300)
BODY TEMPERATURE: 37
BUN BLD-MCNC: 30 MG/DL (ref 8–26)
BUN SERPL-MCNC: 37 MG/DL (ref 8–23)
BUN SERPL-MCNC: 38 MG/DL (ref 8–23)
BUN SERPL-MCNC: 39 MG/DL (ref 8–23)
BUN SERPL-MCNC: 40 MG/DL (ref 8–23)
C3 SERPL-MCNC: 87 MG/DL (ref 90–180)
C4 SERPL-MCNC: 20 MG/DL (ref 10–40)
CA-I BLD-SCNC: 1.06 MMOL/L (ref 1.13–1.33)
CA-I BLD-SCNC: 1.07 MMOL/L (ref 1.15–1.33)
CALCIUM SERPL-MCNC: 8 MG/DL (ref 8.6–10.4)
CALCIUM SERPL-MCNC: 8.3 MG/DL (ref 8.6–10.4)
CALCIUM SERPL-MCNC: 8.5 MG/DL (ref 8.6–10.4)
CALCIUM SERPL-MCNC: 8.5 MG/DL (ref 8.6–10.4)
CERULOPLASMIN SERPL-MCNC: 21 MG/DL (ref 15–30)
CHLORIDE BLD-SCNC: 109 MMOL/L (ref 98–107)
CHLORIDE SERPL-SCNC: 102 MMOL/L (ref 98–107)
CHLORIDE SERPL-SCNC: 103 MMOL/L (ref 98–107)
CHLORIDE SERPL-SCNC: 103 MMOL/L (ref 98–107)
CHLORIDE SERPL-SCNC: 104 MMOL/L (ref 98–107)
CHLORIDE SERPL-SCNC: 105 MMOL/L (ref 98–107)
CHLORIDE UR-SCNC: 94 MMOL/L
CK SERPL-CCNC: 302 U/L (ref 39–308)
CLOT ANGLE.KAOLIN INDUCED BLD RES TEG: 43.8 DEG (ref 63–78)
CMV IGM SERPL QL IA: 0.2
CO2 BLD CALC-SCNC: 8 MMOL/L (ref 22–30)
CO2 SERPL-SCNC: 10 MMOL/L (ref 20–31)
CO2 SERPL-SCNC: 11 MMOL/L (ref 20–31)
CO2 SERPL-SCNC: 9 MMOL/L (ref 20–31)
COHGB MFR BLD: 2.4 % (ref 0–5)
CREAT SERPL-MCNC: 3.5 MG/DL (ref 0.7–1.2)
CREAT SERPL-MCNC: 3.5 MG/DL (ref 0.7–1.2)
CREAT SERPL-MCNC: 3.7 MG/DL (ref 0.7–1.2)
CREAT SERPL-MCNC: 3.7 MG/DL (ref 0.7–1.2)
CREAT UR-MCNC: 38.9 MG/DL (ref 39–259)
CREAT UR-MCNC: 38.9 MG/DL (ref 39–259)
CRITICAL ACTION: NORMAL
CRITICAL NOTIFICATION DATE/TIME: NORMAL
CRITICAL NOTIFICATION: NORMAL
CRITICAL VALUE READ BACK: YES
ECHO AV MEAN GRADIENT: 3 MMHG
ECHO AV MEAN VELOCITY: 0.8 M/S
ECHO AV PEAK GRADIENT: 5 MMHG
ECHO AV PEAK VELOCITY: 1.2 M/S
ECHO AV VELOCITY RATIO: 0.83
ECHO AV VTI: 21.2 CM
ECHO BSA: 2.38 M2
ECHO EST RA PRESSURE: 8 MMHG
ECHO LA AREA 2C: 35.1 CM2
ECHO LA AREA 4C: 27.2 CM2
ECHO LA MAJOR AXIS: 7.5 CM
ECHO LA MINOR AXIS: 8.1 CM
ECHO LA VOL BP: 103 ML (ref 18–58)
ECHO LA VOL MOD A2C: 125 ML (ref 18–58)
ECHO LA VOL MOD A4C: 80 ML (ref 18–58)
ECHO LA VOL/BSA BIPLANE: 44 ML/M2 (ref 16–34)
ECHO LA VOLUME INDEX MOD A2C: 53 ML/M2 (ref 16–34)
ECHO LA VOLUME INDEX MOD A4C: 34 ML/M2 (ref 16–34)
ECHO LV E' LATERAL VELOCITY: 8 CM/S
ECHO LV E' SEPTAL VELOCITY: 4 CM/S
ECHO LV EDV A2C: 252 ML
ECHO LV EDV A4C: 306 ML
ECHO LV EDV INDEX A4C: 131 ML/M2
ECHO LV EDV NDEX A2C: 108 ML/M2
ECHO LV EJECTION FRACTION A2C: 23 %
ECHO LV EJECTION FRACTION A4C: 32 %
ECHO LV EJECTION FRACTION BIPLANE: 30 % (ref 55–100)
ECHO LV ESV A2C: 196 ML
ECHO LV ESV A4C: 209 ML
ECHO LV ESV INDEX A2C: 84 ML/M2
ECHO LV ESV INDEX A4C: 89 ML/M2
ECHO LVOT AV VTI INDEX: 0.93
ECHO LVOT MEAN GRADIENT: 2 MMHG
ECHO LVOT PEAK GRADIENT: 4 MMHG
ECHO LVOT PEAK VELOCITY: 1 M/S
ECHO LVOT VTI: 19.8 CM
ECHO MV A VELOCITY: 0.71 M/S
ECHO MV E DECELERATION TIME (DT): 170 MS
ECHO MV E VELOCITY: 0.54 M/S
ECHO MV E/A RATIO: 0.76
ECHO MV E/E' LATERAL: 6.75
ECHO MV E/E' RATIO (AVERAGED): 10.13
ECHO MV E/E' SEPTAL: 13.5
ECHO MV LVOT VTI INDEX: 1.59
ECHO MV MAX VELOCITY: 0.6 M/S
ECHO MV MEAN GRADIENT: 1 MMHG
ECHO MV MEAN VELOCITY: 0.4 M/S
ECHO MV PEAK GRADIENT: 1 MMHG
ECHO MV VTI: 31.5 CM
ECHO RIGHT VENTRICULAR SYSTOLIC PRESSURE (RVSP): 38 MMHG
ECHO RV FREE WALL PEAK S': 13 CM/S
ECHO RV INTERNAL DIMENSION: 4.2 CM
ECHO RV TAPSE: 1.4 CM (ref 1.7–?)
ECHO TV REGURGITANT MAX VELOCITY: 2.74 M/S
ECHO TV REGURGITANT PEAK GRADIENT: 30 MMHG
EGFR, POC: 26 ML/MIN/1.73M2
EKG ATRIAL RATE: 60 BPM
EKG ATRIAL RATE: 60 BPM
EKG P AXIS: 74 DEGREES
EKG P-R INTERVAL: 252 MS
EKG Q-T INTERVAL: 520 MS
EKG Q-T INTERVAL: 550 MS
EKG QRS DURATION: 120 MS
EKG QRS DURATION: 172 MS
EKG QTC CALCULATION (BAZETT): 520 MS
EKG QTC CALCULATION (BAZETT): 550 MS
EKG R AXIS: -18 DEGREES
EKG R AXIS: -46 DEGREES
EKG T AXIS: 102 DEGREES
EKG T AXIS: 104 DEGREES
EKG VENTRICULAR RATE: 60 BPM
EKG VENTRICULAR RATE: 60 BPM
EOSINOPHIL # BLD: 0 K/UL (ref 0–0.4)
EOSINOPHIL,URINE: NORMAL
EOSINOPHILS RELATIVE PERCENT: 0 % (ref 1–4)
ERYTHROCYTE [DISTWIDTH] IN BLOOD BY AUTOMATED COUNT: 16.4 % (ref 11.8–14.4)
FERRITIN SERPL-MCNC: ABNORMAL NG/ML (ref 30–400)
FIBRINOGEN, FUNCTIONAL TEG: 26.7 MM (ref 15–32)
FIBRINOGEN, FUNCTIONAL TEG: 28.5 MM (ref 15–32)
FIO2 ON VENT: ABNORMAL %
FIO2: 4
FREE KAPPA/LAMBDA RATIO: 3.2 (ref 0.22–1.74)
GAMMA GLOB SERPL ELPH-MCNC: ABNORMAL G/DL
GAMMA GLOBULIN %: ABNORMAL %
GFR, ESTIMATED: 16 ML/MIN/1.73M2
GFR, ESTIMATED: 16 ML/MIN/1.73M2
GFR, ESTIMATED: 17 ML/MIN/1.73M2
GFR, ESTIMATED: 18 ML/MIN/1.73M2
GLOBULIN SER CALC-MCNC: 2.6 G/DL
GLUCOSE BLD-MCNC: 115 MG/DL (ref 74–100)
GLUCOSE BLD-MCNC: 161 MG/DL (ref 75–110)
GLUCOSE BLD-MCNC: 171 MG/DL (ref 75–110)
GLUCOSE BLD-MCNC: 191 MG/DL (ref 75–110)
GLUCOSE SERPL-MCNC: 152 MG/DL (ref 74–99)
GLUCOSE SERPL-MCNC: 169 MG/DL (ref 74–99)
GLUCOSE SERPL-MCNC: 172 MG/DL (ref 74–99)
GLUCOSE SERPL-MCNC: 200 MG/DL (ref 74–99)
HAV IGM SERPL QL IA: NONREACTIVE
HBV CORE IGM SERPL QL IA: NONREACTIVE
HBV SURFACE AG SERPL QL IA: NONREACTIVE
HCO3 VENOUS: 8.2 MMOL/L (ref 24–30)
HCT VFR BLD AUTO: 29.8 % (ref 40.7–50.3)
HCT VFR BLD AUTO: 31.3 % (ref 40.7–50.3)
HCT VFR BLD AUTO: 32 % (ref 41–53)
HCT VFR BLD AUTO: 33.4 % (ref 40.7–50.3)
HCT VFR BLD AUTO: 35.2 % (ref 40.7–50.3)
HCT VFR BLD AUTO: 36.8 % (ref 40.7–50.3)
HCV AB SERPL QL IA: NONREACTIVE
HGB BLD-MCNC: 10.5 G/DL (ref 13–17)
HGB BLD-MCNC: 11.1 G/DL (ref 13–17)
HGB BLD-MCNC: 12.2 G/DL (ref 13–17)
HGB BLD-MCNC: 9.6 G/DL (ref 13–17)
HGB BLD-MCNC: 9.8 G/DL (ref 13–17)
IMM GRANULOCYTES # BLD AUTO: 0 K/UL (ref 0–0.3)
IMM GRANULOCYTES NFR BLD: 0 %
INR PPP: 2.8
INR PPP: 2.9
IRON SATN MFR SERPL: 57 % (ref 20–55)
IRON SERPL-MCNC: 126 UG/DL (ref 61–157)
KAPPA LC FREE SER-MCNC: 137 MG/L
KINETICS TEG: >5 MIN (ref 0.8–2.1)
LACTIC ACID, WHOLE BLOOD: 13.1 MMOL/L (ref 0.7–2.1)
LACTIC ACID, WHOLE BLOOD: 8.4 MMOL/L (ref 0.7–2.1)
LACTIC ACID, WHOLE BLOOD: 9.1 MMOL/L (ref 0.7–2.1)
LAMBDA LC FREE SERPL-MCNC: 42.8 MG/L (ref 4.2–27.7)
LY30 (LYSIS) TEG: 0 % (ref 0–2.6)
LYMPHOCYTES NFR BLD: 1.14 K/UL (ref 1–4.8)
LYMPHOCYTES RELATIVE PERCENT: 5 % (ref 24–44)
Lab: NORMAL
M PROTEIN 2 SERPL ELPH-MCNC: ABNORMAL G/DL
M PROTEIN SERPL ELPH-MCNC: ABNORMAL G/DL
M SPIKE 1, URINE: NORMAL G/DL
M SPIKE 2, URINE: NORMAL G/DL
M-SPIKE 1,%: NORMAL %
M-SPIKE 2 %: NORMAL %
MA (MAX CLOT) TEG: 61.1 MM (ref 52–69)
MA(MAX CLOT) RAPID TEG: 66.2 MM (ref 52–70)
MA(MAX CLOT) RAPID TEG: 66.4 MM (ref 52–70)
MAGNESIUM SERPL-MCNC: 2.4 MG/DL (ref 1.6–2.4)
MAGNESIUM SERPL-MCNC: 2.4 MG/DL (ref 1.6–2.4)
MAGNESIUM SERPL-MCNC: 2.5 MG/DL (ref 1.6–2.4)
MCH RBC QN AUTO: 35.1 PG (ref 25.2–33.5)
MCHC RBC AUTO-ENTMCNC: 33.2 G/DL (ref 28.4–34.8)
MCV RBC AUTO: 105.7 FL (ref 82.6–102.9)
MICROORGANISM SPEC CULT: NORMAL
MONOCYTES NFR BLD: 10 % (ref 1–7)
MONOCYTES NFR BLD: 2.28 K/UL (ref 0.1–0.8)
MORPHOLOGY: ABNORMAL
MORPHOLOGY: ABNORMAL
MRSA, DNA, NASAL: NEGATIVE
MYOGLOBIN SERPL-MCNC: 385 NG/ML (ref 28–72)
NEGATIVE BASE EXCESS, ART: 19.6 MMOL/L (ref 0–2)
NEGATIVE BASE EXCESS, VEN: 19.8 MMOL/L (ref 0–2)
NEUTROPHILS NFR BLD: 85 % (ref 36–66)
NEUTS SEG NFR BLD: 19.38 K/UL (ref 1.8–7.7)
NRBC BLD-RTO: 0 PER 100 WBC
O2 SAT, VEN: 98.5 % (ref 60–85)
P E INTERPRETATION, U: NORMAL
PARTIAL THROMBOPLASTIN TIME: 44.7 SEC (ref 23–36.5)
PATHOLOGIST: ABNORMAL
PATHOLOGIST: NORMAL
PATIENT TEMP: 35.7
PCO2 VENOUS: 26.5 MM HG (ref 39–55)
PERFORMING LOCATION: ABNORMAL
PH VENOUS: 7.12 (ref 7.32–7.42)
PHOSPHATE SERPL-MCNC: 7 MG/DL (ref 2.5–4.5)
PHOSPHATE SERPL-MCNC: 7.1 MG/DL (ref 2.5–4.5)
PLATELET # BLD AUTO: 191 K/UL (ref 138–453)
PMV BLD AUTO: 11.3 FL (ref 8.1–13.5)
PO2 VENOUS: 203 MM HG (ref 30–50)
POC ANION GAP: 20 MMOL/L (ref 7–16)
POC CREATININE: 2.5 MG/DL (ref 0.51–1.19)
POC HCO3: 8.6 MMOL/L (ref 21–28)
POC HEMOGLOBIN (CALC): 10.9 G/DL (ref 13.5–17.5)
POC LACTIC ACID: 13.5 MMOL/L (ref 0.56–1.39)
POC O2 SATURATION: 90.4 % (ref 94–98)
POC PCO2: 27.8 MM HG (ref 35–48)
POC PH: 7.1 (ref 7.35–7.45)
POC PO2: 79 MM HG (ref 83–108)
POTASSIUM BLD-SCNC: 5.3 MMOL/L (ref 3.5–4.5)
POTASSIUM SERPL-SCNC: 5.7 MMOL/L (ref 3.7–5.3)
POTASSIUM SERPL-SCNC: 5.9 MMOL/L (ref 3.7–5.3)
POTASSIUM SERPL-SCNC: 6 MMOL/L (ref 3.7–5.3)
PROT PATTERN SERPL ELPH-IMP: ABNORMAL
PROT SERPL-MCNC: 4.8 G/DL (ref 6.6–8.7)
PROT SERPL-MCNC: 5.3 G/DL (ref 6.6–8.7)
PROT SERPL-MCNC: 5.3 G/DL (ref 6.6–8.7)
PROT SERPL-MCNC: 5.5 G/DL (ref 6.6–8.7)
PROT SERPL-MCNC: 5.5 G/DL (ref 6.6–8.7)
PROTHROMBIN TIME: 28.6 SEC (ref 11.7–14.9)
PROTHROMBIN TIME: 29.3 SEC (ref 11.7–14.9)
RBC # BLD AUTO: 3.48 M/UL (ref 4.21–5.77)
REACTION TIME TEG W HEPARIN: 16 MIN (ref 4.3–8.3)
REACTION TIME TEG: >17 MIN (ref 4.6–9.1)
REACTION TIME TEG: >17 MIN (ref 4.6–9.1)
SERVICE CMNT-IMP: NORMAL
SODIUM BLD-SCNC: 136 MMOL/L (ref 138–146)
SODIUM SERPL-SCNC: 136 MMOL/L (ref 136–145)
SODIUM SERPL-SCNC: 137 MMOL/L (ref 136–145)
SODIUM SERPL-SCNC: 138 MMOL/L (ref 136–145)
SODIUM UR-SCNC: 98 MMOL/L
SODIUM UR-SCNC: 99 MMOL/L
SPECIMEN DESCRIPTION: NORMAL
SPECIMEN DESCRIPTION: NORMAL
SPECIMEN TYPE: NORMAL
TIBC SERPL-MCNC: 222 UG/DL (ref 250–450)
TOTAL PROT. SUM,%: ABNORMAL %
TOTAL PROT. SUM: ABNORMAL G/DL
TOTAL PROTEIN, URINE: 53 MG/DL
TOTAL PROTEIN, URINE: 54 MG/DL
TROPONIN I SERPL HS-MCNC: 89 NG/L (ref 0–22)
UNSATURATED IRON BINDING CAPACITY: 96 UG/DL (ref 112–347)
URINE TOTAL PROTEIN CREATININE RATIO: 1.39
URINE TOTAL PROTEIN: 54 MG/DL
VOLUME: NORMAL
WBC OTHER # BLD: 22.8 K/UL (ref 3.5–11.3)

## 2024-09-13 PROCEDURE — 99222 1ST HOSP IP/OBS MODERATE 55: CPT | Performed by: STUDENT IN AN ORGANIZED HEALTH CARE EDUCATION/TRAINING PROGRAM

## 2024-09-13 PROCEDURE — 82570 ASSAY OF URINE CREATININE: CPT

## 2024-09-13 PROCEDURE — 80076 HEPATIC FUNCTION PANEL: CPT

## 2024-09-13 PROCEDURE — P9012 CRYOPRECIPITATE EACH UNIT: HCPCS

## 2024-09-13 PROCEDURE — 6370000000 HC RX 637 (ALT 250 FOR IP)

## 2024-09-13 PROCEDURE — 82390 ASSAY OF CERULOPLASMIN: CPT

## 2024-09-13 PROCEDURE — 99291 CRITICAL CARE FIRST HOUR: CPT | Performed by: STUDENT IN AN ORGANIZED HEALTH CARE EDUCATION/TRAINING PROGRAM

## 2024-09-13 PROCEDURE — 05HM33Z INSERTION OF INFUSION DEVICE INTO RIGHT INTERNAL JUGULAR VEIN, PERCUTANEOUS APPROACH: ICD-10-PCS | Performed by: STUDENT IN AN ORGANIZED HEALTH CARE EDUCATION/TRAINING PROGRAM

## 2024-09-13 PROCEDURE — 83550 IRON BINDING TEST: CPT

## 2024-09-13 PROCEDURE — 6360000002 HC RX W HCPCS

## 2024-09-13 PROCEDURE — 86850 RBC ANTIBODY SCREEN: CPT

## 2024-09-13 PROCEDURE — 76705 ECHO EXAM OF ABDOMEN: CPT

## 2024-09-13 PROCEDURE — 36620 INSERTION CATHETER ARTERY: CPT

## 2024-09-13 PROCEDURE — 71045 X-RAY EXAM CHEST 1 VIEW: CPT

## 2024-09-13 PROCEDURE — 37799 UNLISTED PX VASCULAR SURGERY: CPT

## 2024-09-13 PROCEDURE — 82805 BLOOD GASES W/O2 SATURATION: CPT

## 2024-09-13 PROCEDURE — 84156 ASSAY OF PROTEIN URINE: CPT

## 2024-09-13 PROCEDURE — 83521 IG LIGHT CHAINS FREE EACH: CPT

## 2024-09-13 PROCEDURE — 85390 FIBRINOLYSINS SCREEN I&R: CPT

## 2024-09-13 PROCEDURE — 85347 COAGULATION TIME ACTIVATED: CPT

## 2024-09-13 PROCEDURE — 86334 IMMUNOFIX E-PHORESIS SERUM: CPT

## 2024-09-13 PROCEDURE — 36415 COLL VENOUS BLD VENIPUNCTURE: CPT

## 2024-09-13 PROCEDURE — 86694 HERPES SIMPLEX NES ANTBDY: CPT

## 2024-09-13 PROCEDURE — 86665 EPSTEIN-BARR CAPSID VCA: CPT

## 2024-09-13 PROCEDURE — 87205 SMEAR GRAM STAIN: CPT

## 2024-09-13 PROCEDURE — 84155 ASSAY OF PROTEIN SERUM: CPT

## 2024-09-13 PROCEDURE — 94640 AIRWAY INHALATION TREATMENT: CPT

## 2024-09-13 PROCEDURE — 6360000002 HC RX W HCPCS: Performed by: STUDENT IN AN ORGANIZED HEALTH CARE EDUCATION/TRAINING PROGRAM

## 2024-09-13 PROCEDURE — 85730 THROMBOPLASTIN TIME PARTIAL: CPT

## 2024-09-13 PROCEDURE — 84520 ASSAY OF UREA NITROGEN: CPT

## 2024-09-13 PROCEDURE — 99222 1ST HOSP IP/OBS MODERATE 55: CPT | Performed by: INTERNAL MEDICINE

## 2024-09-13 PROCEDURE — 83874 ASSAY OF MYOGLOBIN: CPT

## 2024-09-13 PROCEDURE — 82436 ASSAY OF URINE CHLORIDE: CPT

## 2024-09-13 PROCEDURE — 90945 DIALYSIS ONE EVALUATION: CPT

## 2024-09-13 PROCEDURE — 82803 BLOOD GASES ANY COMBINATION: CPT

## 2024-09-13 PROCEDURE — 80143 DRUG ASSAY ACETAMINOPHEN: CPT

## 2024-09-13 PROCEDURE — 86645 CMV ANTIBODY IGM: CPT

## 2024-09-13 PROCEDURE — 83540 ASSAY OF IRON: CPT

## 2024-09-13 PROCEDURE — 86901 BLOOD TYPING SEROLOGIC RH(D): CPT

## 2024-09-13 PROCEDURE — 93976 VASCULAR STUDY: CPT

## 2024-09-13 PROCEDURE — 84484 ASSAY OF TROPONIN QUANT: CPT

## 2024-09-13 PROCEDURE — 93306 TTE W/DOPPLER COMPLETE: CPT | Performed by: INTERNAL MEDICINE

## 2024-09-13 PROCEDURE — 5A1D90Z PERFORMANCE OF URINARY FILTRATION, CONTINUOUS, GREATER THAN 18 HOURS PER DAY: ICD-10-PCS | Performed by: INTERNAL MEDICINE

## 2024-09-13 PROCEDURE — 86376 MICROSOMAL ANTIBODY EACH: CPT

## 2024-09-13 PROCEDURE — 86160 COMPLEMENT ANTIGEN: CPT

## 2024-09-13 PROCEDURE — 86038 ANTINUCLEAR ANTIBODIES: CPT

## 2024-09-13 PROCEDURE — 85576 BLOOD PLATELET AGGREGATION: CPT

## 2024-09-13 PROCEDURE — 82565 ASSAY OF CREATININE: CPT

## 2024-09-13 PROCEDURE — 51702 INSERT TEMP BLADDER CATH: CPT

## 2024-09-13 PROCEDURE — 2500000003 HC RX 250 WO HCPCS: Performed by: INTERNAL MEDICINE

## 2024-09-13 PROCEDURE — 2000000000 HC ICU R&B

## 2024-09-13 PROCEDURE — 36430 TRANSFUSION BLD/BLD COMPNT: CPT

## 2024-09-13 PROCEDURE — 86927 PLASMA FRESH FROZEN: CPT

## 2024-09-13 PROCEDURE — 36556 INSERT NON-TUNNEL CV CATH: CPT | Performed by: STUDENT IN AN ORGANIZED HEALTH CARE EDUCATION/TRAINING PROGRAM

## 2024-09-13 PROCEDURE — 82103 ALPHA-1-ANTITRYPSIN TOTAL: CPT

## 2024-09-13 PROCEDURE — 2500000003 HC RX 250 WO HCPCS

## 2024-09-13 PROCEDURE — 87641 MR-STAPH DNA AMP PROBE: CPT

## 2024-09-13 PROCEDURE — 85014 HEMATOCRIT: CPT

## 2024-09-13 PROCEDURE — 80051 ELECTROLYTE PANEL: CPT

## 2024-09-13 PROCEDURE — 85018 HEMOGLOBIN: CPT

## 2024-09-13 PROCEDURE — 74018 RADEX ABDOMEN 1 VIEW: CPT

## 2024-09-13 PROCEDURE — 83516 IMMUNOASSAY NONANTIBODY: CPT

## 2024-09-13 PROCEDURE — 2580000003 HC RX 258

## 2024-09-13 PROCEDURE — 82330 ASSAY OF CALCIUM: CPT

## 2024-09-13 PROCEDURE — 80053 COMPREHEN METABOLIC PANEL: CPT

## 2024-09-13 PROCEDURE — 85025 COMPLETE CBC W/AUTO DIFF WBC: CPT

## 2024-09-13 PROCEDURE — 76775 US EXAM ABDO BACK WALL LIM: CPT

## 2024-09-13 PROCEDURE — 86335 IMMUNFIX E-PHORSIS/URINE/CSF: CPT

## 2024-09-13 PROCEDURE — 30233K1 TRANSFUSION OF NONAUTOLOGOUS FROZEN PLASMA INTO PERIPHERAL VEIN, PERCUTANEOUS APPROACH: ICD-10-PCS | Performed by: INTERNAL MEDICINE

## 2024-09-13 PROCEDURE — 83605 ASSAY OF LACTIC ACID: CPT

## 2024-09-13 PROCEDURE — 2500000003 HC RX 250 WO HCPCS: Performed by: STUDENT IN AN ORGANIZED HEALTH CARE EDUCATION/TRAINING PROGRAM

## 2024-09-13 PROCEDURE — 84166 PROTEIN E-PHORESIS/URINE/CSF: CPT

## 2024-09-13 PROCEDURE — 84300 ASSAY OF URINE SODIUM: CPT

## 2024-09-13 PROCEDURE — 84165 PROTEIN E-PHORESIS SERUM: CPT

## 2024-09-13 PROCEDURE — C8929 TTE W OR WO FOL WCON,DOPPLER: HCPCS

## 2024-09-13 PROCEDURE — 83880 ASSAY OF NATRIURETIC PEPTIDE: CPT

## 2024-09-13 PROCEDURE — 2580000003 HC RX 258: Performed by: INTERNAL MEDICINE

## 2024-09-13 PROCEDURE — 83735 ASSAY OF MAGNESIUM: CPT

## 2024-09-13 PROCEDURE — P9017 PLASMA 1 DONOR FRZ W/IN 8 HR: HCPCS

## 2024-09-13 PROCEDURE — 80074 ACUTE HEPATITIS PANEL: CPT

## 2024-09-13 PROCEDURE — 82550 ASSAY OF CK (CPK): CPT

## 2024-09-13 PROCEDURE — 86225 DNA ANTIBODY NATIVE: CPT

## 2024-09-13 PROCEDURE — 30233M1 TRANSFUSION OF NONAUTOLOGOUS PLASMA CRYOPRECIPITATE INTO PERIPHERAL VEIN, PERCUTANEOUS APPROACH: ICD-10-PCS | Performed by: INTERNAL MEDICINE

## 2024-09-13 PROCEDURE — 86235 NUCLEAR ANTIGEN ANTIBODY: CPT

## 2024-09-13 PROCEDURE — 82947 ASSAY GLUCOSE BLOOD QUANT: CPT

## 2024-09-13 PROCEDURE — 2580000003 HC RX 258: Performed by: STUDENT IN AN ORGANIZED HEALTH CARE EDUCATION/TRAINING PROGRAM

## 2024-09-13 PROCEDURE — 94660 CPAP INITIATION&MGMT: CPT

## 2024-09-13 PROCEDURE — 99223 1ST HOSP IP/OBS HIGH 75: CPT | Performed by: INTERNAL MEDICINE

## 2024-09-13 PROCEDURE — 93005 ELECTROCARDIOGRAM TRACING: CPT

## 2024-09-13 PROCEDURE — 93005 ELECTROCARDIOGRAM TRACING: CPT | Performed by: STUDENT IN AN ORGANIZED HEALTH CARE EDUCATION/TRAINING PROGRAM

## 2024-09-13 PROCEDURE — 85610 PROTHROMBIN TIME: CPT

## 2024-09-13 PROCEDURE — 84100 ASSAY OF PHOSPHORUS: CPT

## 2024-09-13 PROCEDURE — 82728 ASSAY OF FERRITIN: CPT

## 2024-09-13 PROCEDURE — 85384 FIBRINOGEN ACTIVITY: CPT

## 2024-09-13 PROCEDURE — 80048 BASIC METABOLIC PNL TOTAL CA: CPT

## 2024-09-13 RX ORDER — SODIUM CHLORIDE 9 MG/ML
50 INJECTION, SOLUTION INTRAVENOUS ONCE
Status: COMPLETED | OUTPATIENT
Start: 2024-09-13 | End: 2024-09-13

## 2024-09-13 RX ORDER — NOREPINEPHRINE BITARTRATE 0.06 MG/ML
1-100 INJECTION, SOLUTION INTRAVENOUS CONTINUOUS
Status: DISCONTINUED | OUTPATIENT
Start: 2024-09-13 | End: 2024-09-14

## 2024-09-13 RX ORDER — DEXTROSE MONOHYDRATE 25 G/50ML
12.5 INJECTION, SOLUTION INTRAVENOUS PRN
Status: DISCONTINUED | OUTPATIENT
Start: 2024-09-13 | End: 2024-09-14 | Stop reason: HOSPADM

## 2024-09-13 RX ORDER — FENTANYL CITRATE 50 UG/ML
50 INJECTION, SOLUTION INTRAMUSCULAR; INTRAVENOUS ONCE
Status: COMPLETED | OUTPATIENT
Start: 2024-09-13 | End: 2024-09-13

## 2024-09-13 RX ORDER — DEXTROSE MONOHYDRATE 100 MG/ML
INJECTION, SOLUTION INTRAVENOUS CONTINUOUS PRN
Status: DISCONTINUED | OUTPATIENT
Start: 2024-09-13 | End: 2024-09-14 | Stop reason: HOSPADM

## 2024-09-13 RX ORDER — CALCIUM GLUCONATE 20 MG/ML
1000 INJECTION, SOLUTION INTRAVENOUS ONCE
Status: COMPLETED | OUTPATIENT
Start: 2024-09-13 | End: 2024-09-13

## 2024-09-13 RX ORDER — MIDAZOLAM HYDROCHLORIDE 2 MG/2ML
0.5 INJECTION, SOLUTION INTRAMUSCULAR; INTRAVENOUS ONCE
Status: DISCONTINUED | OUTPATIENT
Start: 2024-09-13 | End: 2024-09-13 | Stop reason: SDUPTHER

## 2024-09-13 RX ORDER — SODIUM CHLORIDE 9 MG/ML
INJECTION, SOLUTION INTRAVENOUS PRN
Status: DISCONTINUED | OUTPATIENT
Start: 2024-09-13 | End: 2024-09-13

## 2024-09-13 RX ORDER — MIDAZOLAM HYDROCHLORIDE 1 MG/ML
INJECTION INTRAMUSCULAR; INTRAVENOUS
Status: COMPLETED
Start: 2024-09-13 | End: 2024-09-13

## 2024-09-13 RX ORDER — MIDAZOLAM HYDROCHLORIDE 2 MG/2ML
0.5 INJECTION, SOLUTION INTRAMUSCULAR; INTRAVENOUS ONCE
Status: COMPLETED | OUTPATIENT
Start: 2024-09-13 | End: 2024-09-13

## 2024-09-13 RX ORDER — IPRATROPIUM BROMIDE AND ALBUTEROL SULFATE 2.5; .5 MG/3ML; MG/3ML
1 SOLUTION RESPIRATORY (INHALATION)
Status: DISCONTINUED | OUTPATIENT
Start: 2024-09-13 | End: 2024-09-14 | Stop reason: HOSPADM

## 2024-09-13 RX ORDER — MIDODRINE HYDROCHLORIDE 5 MG/1
10 TABLET ORAL 3 TIMES DAILY
Status: DISCONTINUED | OUTPATIENT
Start: 2024-09-13 | End: 2024-09-14 | Stop reason: HOSPADM

## 2024-09-13 RX ORDER — HEPARIN SODIUM 1000 [USP'U]/ML
1300 INJECTION, SOLUTION INTRAVENOUS; SUBCUTANEOUS PRN
Status: DISCONTINUED | OUTPATIENT
Start: 2024-09-13 | End: 2024-09-14 | Stop reason: HOSPADM

## 2024-09-13 RX ORDER — GLUCAGON 1 MG/ML
1 KIT INJECTION PRN
Status: DISCONTINUED | OUTPATIENT
Start: 2024-09-13 | End: 2024-09-14 | Stop reason: HOSPADM

## 2024-09-13 RX ORDER — FENTANYL CITRATE 50 UG/ML
25 INJECTION, SOLUTION INTRAMUSCULAR; INTRAVENOUS ONCE
Status: COMPLETED | OUTPATIENT
Start: 2024-09-13 | End: 2024-09-13

## 2024-09-13 RX ORDER — HEPARIN SODIUM 1000 [USP'U]/ML
1400 INJECTION, SOLUTION INTRAVENOUS; SUBCUTANEOUS PRN
Status: DISCONTINUED | OUTPATIENT
Start: 2024-09-13 | End: 2024-09-14 | Stop reason: HOSPADM

## 2024-09-13 RX ORDER — FENTANYL CITRATE 50 UG/ML
25 INJECTION, SOLUTION INTRAMUSCULAR; INTRAVENOUS EVERY 4 HOURS PRN
Status: DISCONTINUED | OUTPATIENT
Start: 2024-09-13 | End: 2024-09-13

## 2024-09-13 RX ORDER — CALCIUM CHLORIDE, MAGNESIUM CHLORIDE, DEXTROSE MONOHYDRATE, LACTIC ACID, SODIUM CHLORIDE, SODIUM BICARBONATE AND POTASSIUM CHLORIDE 5.15; 2.03; 22; 5.4; 6.46; 3.09; .157 G/L; G/L; G/L; G/L; G/L; G/L; G/L
INJECTION INTRAVENOUS CONTINUOUS
Status: DISCONTINUED | OUTPATIENT
Start: 2024-09-13 | End: 2024-09-14 | Stop reason: HOSPADM

## 2024-09-13 RX ORDER — FENTANYL CITRATE 50 UG/ML
50 INJECTION, SOLUTION INTRAMUSCULAR; INTRAVENOUS EVERY 4 HOURS PRN
Status: DISCONTINUED | OUTPATIENT
Start: 2024-09-13 | End: 2024-09-14 | Stop reason: HOSPADM

## 2024-09-13 RX ADMIN — DEXTROSE MONOHYDRATE 12.5 G: 25 INJECTION, SOLUTION INTRAVENOUS at 22:26

## 2024-09-13 RX ADMIN — SODIUM CHLORIDE: 9 INJECTION, SOLUTION INTRAVENOUS at 04:01

## 2024-09-13 RX ADMIN — MIDAZOLAM HYDROCHLORIDE 0.5 MG: 2 INJECTION, SOLUTION INTRAMUSCULAR; INTRAVENOUS at 04:32

## 2024-09-13 RX ADMIN — DEXTROSE MONOHYDRATE 250 ML: 100 INJECTION, SOLUTION INTRAVENOUS at 11:20

## 2024-09-13 RX ADMIN — FENTANYL CITRATE 25 MCG: 50 INJECTION, SOLUTION INTRAMUSCULAR; INTRAVENOUS at 01:10

## 2024-09-13 RX ADMIN — WATER 60 MG: 1 INJECTION INTRAMUSCULAR; INTRAVENOUS; SUBCUTANEOUS at 10:03

## 2024-09-13 RX ADMIN — CALCIUM CHLORIDE, MAGNESIUM CHLORIDE, DEXTROSE MONOHYDRATE, LACTIC ACID, SODIUM CHLORIDE, SODIUM BICARBONATE AND POTASSIUM CHLORIDE: 5.15; 2.03; 22; 5.4; 6.46; 3.09; .157 INJECTION INTRAVENOUS at 23:24

## 2024-09-13 RX ADMIN — CALCIUM CHLORIDE, MAGNESIUM CHLORIDE, DEXTROSE MONOHYDRATE, LACTIC ACID, SODIUM CHLORIDE, SODIUM BICARBONATE AND POTASSIUM CHLORIDE: 5.15; 2.03; 22; 5.4; 6.46; 3.09; .157 INJECTION INTRAVENOUS at 17:10

## 2024-09-13 RX ADMIN — VASOPRESSIN 0.04 UNITS/MIN: 0.2 INJECTION INTRAVENOUS at 12:41

## 2024-09-13 RX ADMIN — INSULIN HUMAN 10 UNITS: 100 INJECTION, SOLUTION PARENTERAL at 11:22

## 2024-09-13 RX ADMIN — FENTANYL CITRATE 25 MCG: 50 INJECTION, SOLUTION INTRAMUSCULAR; INTRAVENOUS at 20:36

## 2024-09-13 RX ADMIN — FENTANYL CITRATE 50 MCG: 50 INJECTION, SOLUTION INTRAMUSCULAR; INTRAVENOUS at 12:37

## 2024-09-13 RX ADMIN — PANTOPRAZOLE SODIUM 40 MG: 40 INJECTION, POWDER, FOR SOLUTION INTRAVENOUS at 08:10

## 2024-09-13 RX ADMIN — PIPERACILLIN AND TAZOBACTAM 3375 MG: 3; .375 INJECTION, POWDER, LYOPHILIZED, FOR SOLUTION INTRAVENOUS at 16:32

## 2024-09-13 RX ADMIN — PERFLUTREN 1.5 ML: 6.52 INJECTION, SUSPENSION INTRAVENOUS at 16:38

## 2024-09-13 RX ADMIN — IPRATROPIUM BROMIDE AND ALBUTEROL SULFATE 1 DOSE: .5; 2.5 SOLUTION RESPIRATORY (INHALATION) at 16:08

## 2024-09-13 RX ADMIN — CALCIUM CHLORIDE, MAGNESIUM CHLORIDE, DEXTROSE MONOHYDRATE, LACTIC ACID, SODIUM CHLORIDE, SODIUM BICARBONATE AND POTASSIUM CHLORIDE: 5.15; 2.03; 22; 5.4; 6.46; 3.09; .157 INJECTION INTRAVENOUS at 23:22

## 2024-09-13 RX ADMIN — CALCIUM CHLORIDE, MAGNESIUM CHLORIDE, DEXTROSE MONOHYDRATE, LACTIC ACID, SODIUM CHLORIDE, SODIUM BICARBONATE AND POTASSIUM CHLORIDE: 5.15; 2.03; 22; 5.4; 6.46; 3.09; .157 INJECTION INTRAVENOUS at 17:09

## 2024-09-13 RX ADMIN — MIDAZOLAM HYDROCHLORIDE 0.5 MG: 1 INJECTION, SOLUTION INTRAMUSCULAR; INTRAVENOUS at 04:32

## 2024-09-13 RX ADMIN — SODIUM ZIRCONIUM CYCLOSILICATE 10 G: 10 POWDER, FOR SUSPENSION ORAL at 11:50

## 2024-09-13 RX ADMIN — VANCOMYCIN HYDROCHLORIDE 2000 MG: 1 INJECTION, POWDER, LYOPHILIZED, FOR SOLUTION INTRAVENOUS at 10:36

## 2024-09-13 RX ADMIN — NOREPINEPHRINE BITARTRATE 45 MCG/MIN: 0.06 INJECTION, SOLUTION INTRAVENOUS at 20:38

## 2024-09-13 RX ADMIN — CALCIUM GLUCONATE 1000 MG: 20 INJECTION, SOLUTION INTRAVENOUS at 11:45

## 2024-09-13 RX ADMIN — CALCIUM CHLORIDE, MAGNESIUM CHLORIDE, DEXTROSE MONOHYDRATE, LACTIC ACID, SODIUM CHLORIDE, SODIUM BICARBONATE AND POTASSIUM CHLORIDE: 5.15; 2.03; 22; 5.4; 6.46; 3.09; .157 INJECTION INTRAVENOUS at 23:25

## 2024-09-13 RX ADMIN — PHYTONADIONE 10 MG: 10 INJECTION, EMULSION INTRAMUSCULAR; INTRAVENOUS; SUBCUTANEOUS at 17:51

## 2024-09-13 RX ADMIN — DEXTROSE MONOHYDRATE 250 ML: 100 INJECTION, SOLUTION INTRAVENOUS at 05:48

## 2024-09-13 RX ADMIN — PIPERACILLIN AND TAZOBACTAM 3375 MG: 3; .375 INJECTION, POWDER, LYOPHILIZED, FOR SOLUTION INTRAVENOUS at 06:30

## 2024-09-13 RX ADMIN — CALCIUM GLUCONATE 1000 MG: 20 INJECTION, SOLUTION INTRAVENOUS at 05:24

## 2024-09-13 RX ADMIN — SODIUM CHLORIDE, PRESERVATIVE FREE 10 ML: 5 INJECTION INTRAVENOUS at 08:10

## 2024-09-13 RX ADMIN — VASOPRESSIN 0.04 UNITS/MIN: 0.2 INJECTION INTRAVENOUS at 00:09

## 2024-09-13 RX ADMIN — SODIUM CHLORIDE, PRESERVATIVE FREE 10 ML: 5 INJECTION INTRAVENOUS at 21:24

## 2024-09-13 RX ADMIN — MIDODRINE HYDROCHLORIDE 5 MG: 5 TABLET ORAL at 12:37

## 2024-09-13 RX ADMIN — PROTHROMBIN COMPLEX CONCENTRATE (HUMAN) 2000 UNITS: 25.5; 16.5; 24; 22; 22; 26 POWDER, FOR SOLUTION INTRAVENOUS at 04:03

## 2024-09-13 RX ADMIN — MIDODRINE HYDROCHLORIDE 5 MG: 5 TABLET ORAL at 08:10

## 2024-09-13 RX ADMIN — NOREPINEPHRINE BITARTRATE 25 MCG/MIN: 0.06 INJECTION, SOLUTION INTRAVENOUS at 12:03

## 2024-09-13 RX ADMIN — FENTANYL CITRATE 50 MCG: 50 INJECTION, SOLUTION INTRAMUSCULAR; INTRAVENOUS at 22:37

## 2024-09-13 RX ADMIN — CALCIUM CHLORIDE, MAGNESIUM CHLORIDE, DEXTROSE MONOHYDRATE, LACTIC ACID, SODIUM CHLORIDE, SODIUM BICARBONATE AND POTASSIUM CHLORIDE: 5.15; 2.03; 22; 5.4; 6.46; 3.09; .157 INJECTION INTRAVENOUS at 17:08

## 2024-09-13 RX ADMIN — IPRATROPIUM BROMIDE AND ALBUTEROL SULFATE 1 DOSE: .5; 2.5 SOLUTION RESPIRATORY (INHALATION) at 19:57

## 2024-09-13 RX ADMIN — SODIUM CHLORIDE 50 ML: 9 INJECTION, SOLUTION INTRAVENOUS at 04:39

## 2024-09-13 RX ADMIN — NOREPINEPHRINE BITARTRATE 40 MCG/MIN: 0.06 INJECTION, SOLUTION INTRAVENOUS at 03:54

## 2024-09-13 RX ADMIN — FENTANYL CITRATE 25 MCG: 50 INJECTION, SOLUTION INTRAMUSCULAR; INTRAVENOUS at 17:33

## 2024-09-13 RX ADMIN — SODIUM BICARBONATE: 84 INJECTION, SOLUTION INTRAVENOUS at 16:12

## 2024-09-13 RX ADMIN — FENTANYL CITRATE 50 MCG: 50 INJECTION, SOLUTION INTRAMUSCULAR; INTRAVENOUS at 03:52

## 2024-09-13 RX ADMIN — VASOPRESSIN 0.04 UNITS/MIN: 0.2 INJECTION INTRAVENOUS at 21:09

## 2024-09-13 RX ADMIN — VASOPRESSIN 0.04 UNITS/MIN: 0.2 INJECTION INTRAVENOUS at 05:26

## 2024-09-13 RX ADMIN — INSULIN HUMAN 10 UNITS: 100 INJECTION, SOLUTION PARENTERAL at 05:50

## 2024-09-13 RX ADMIN — INSULIN HUMAN 6 UNITS: 100 INJECTION, SOLUTION PARENTERAL at 22:26

## 2024-09-13 RX ADMIN — SODIUM BICARBONATE: 84 INJECTION, SOLUTION INTRAVENOUS at 02:14

## 2024-09-13 RX ADMIN — CALCIUM GLUCONATE 1000 MG: 20 INJECTION, SOLUTION INTRAVENOUS at 20:48

## 2024-09-13 RX ADMIN — WATER 60 MG: 1 INJECTION INTRAMUSCULAR; INTRAVENOUS; SUBCUTANEOUS at 21:10

## 2024-09-13 RX ADMIN — SODIUM BICARBONATE 50 MEQ: 84 INJECTION, SOLUTION INTRAVENOUS at 21:45

## 2024-09-13 ASSESSMENT — PAIN DESCRIPTION - ORIENTATION
ORIENTATION: MID;UPPER
ORIENTATION: MID

## 2024-09-13 ASSESSMENT — PAIN SCALES - GENERAL
PAINLEVEL_OUTOF10: 4
PAINLEVEL_OUTOF10: 7
PAINLEVEL_OUTOF10: 9
PAINLEVEL_OUTOF10: 8
PAINLEVEL_OUTOF10: 10
PAINLEVEL_OUTOF10: 9
PAINLEVEL_OUTOF10: 10
PAINLEVEL_OUTOF10: 9
PAINLEVEL_OUTOF10: 5
PAINLEVEL_OUTOF10: 0
PAINLEVEL_OUTOF10: 8

## 2024-09-13 ASSESSMENT — PAIN DESCRIPTION - DESCRIPTORS
DESCRIPTORS: SHOOTING
DESCRIPTORS: ACHING;SORE;DISCOMFORT
DESCRIPTORS: ACHING
DESCRIPTORS: SHARP

## 2024-09-13 ASSESSMENT — PAIN DESCRIPTION - LOCATION
LOCATION: ABDOMEN

## 2024-09-14 ENCOUNTER — APPOINTMENT (OUTPATIENT)
Dept: GENERAL RADIOLOGY | Age: 74
DRG: 871 | End: 2024-09-14
Payer: MEDICARE

## 2024-09-14 VITALS
SYSTOLIC BLOOD PRESSURE: 98 MMHG | BODY MASS INDEX: 32.2 KG/M2 | TEMPERATURE: 95.5 F | OXYGEN SATURATION: 96 % | DIASTOLIC BLOOD PRESSURE: 43 MMHG | HEIGHT: 73 IN | WEIGHT: 242.95 LBS

## 2024-09-14 LAB
ALBUMIN SERPL-MCNC: 2.6 G/DL (ref 3.5–5.2)
ALBUMIN/GLOB SERPL: 1 {RATIO} (ref 1–2.5)
ALP SERPL-CCNC: 1291 U/L (ref 40–129)
ALT SERPL-CCNC: 4775 U/L (ref 10–50)
ANA SER QL IA: POSITIVE
ANION GAP SERPL CALCULATED.3IONS-SCNC: 27 MMOL/L (ref 9–16)
AST SERPL-CCNC: >7000 U/L (ref 10–50)
BASOPHILS # BLD: 0 K/UL (ref 0–0.2)
BASOPHILS NFR BLD: 0 % (ref 0–2)
BILIRUB SERPL-MCNC: 2.7 MG/DL (ref 0–1.2)
BLOOD BANK BLOOD PRODUCT EXPIRATION DATE: NORMAL
BLOOD BANK DISPENSE STATUS: NORMAL
BLOOD BANK ISBT PRODUCT BLOOD TYPE: 5100
BLOOD BANK PRODUCT CODE: NORMAL
BLOOD BANK UNIT TYPE AND RH: NORMAL
BPU ID: NORMAL
BUN BLD-MCNC: 23 MG/DL (ref 8–26)
BUN SERPL-MCNC: 26 MG/DL (ref 8–23)
CA-I BLD-SCNC: 1.01 MMOL/L (ref 1.15–1.33)
CA-I BLD-SCNC: 1.02 MMOL/L (ref 1.13–1.33)
CA-I BLD-SCNC: 1.09 MMOL/L (ref 1.13–1.33)
CALCIUM SERPL-MCNC: 8.4 MG/DL (ref 8.6–10.4)
CHLORIDE BLD-SCNC: 105 MMOL/L (ref 98–107)
CHLORIDE SERPL-SCNC: 100 MMOL/L (ref 98–107)
CO2 BLD CALC-SCNC: 16 MMOL/L (ref 22–30)
CO2 SERPL-SCNC: 16 MMOL/L (ref 20–31)
COMPONENT: NORMAL
CREAT SERPL-MCNC: 2.8 MG/DL (ref 0.7–1.2)
D DIMER PPP FEU-MCNC: >20 UG/ML FEU (ref 0–0.57)
DSDNA IGG SER QL IA: 47 IU/ML
EGFR, POC: 31 ML/MIN/1.73M2
EOSINOPHIL # BLD: 0 K/UL (ref 0–0.4)
EOSINOPHILS RELATIVE PERCENT: 0 % (ref 1–4)
ERYTHROCYTE [DISTWIDTH] IN BLOOD BY AUTOMATED COUNT: 16.7 % (ref 11.8–14.4)
FIBRINOGEN PPP-MCNC: 167 MG/DL (ref 203–521)
FIBRINOGEN, FUNCTIONAL TEG: 28.2 MM (ref 15–32)
FIO2: 100
FIO2: 60
FIO2: 60
GFR, ESTIMATED: 23 ML/MIN/1.73M2
GLUCOSE BLD-MCNC: 101 MG/DL (ref 75–110)
GLUCOSE BLD-MCNC: 125 MG/DL (ref 75–110)
GLUCOSE BLD-MCNC: 161 MG/DL (ref 75–110)
GLUCOSE BLD-MCNC: 53 MG/DL (ref 74–100)
GLUCOSE BLD-MCNC: 67 MG/DL (ref 74–100)
GLUCOSE BLD-MCNC: 94 MG/DL (ref 74–100)
GLUCOSE SERPL-MCNC: 62 MG/DL (ref 74–99)
HCT VFR BLD AUTO: 27 % (ref 41–53)
HCT VFR BLD AUTO: 30.5 % (ref 40.7–50.3)
HGB BLD-MCNC: 9.4 G/DL (ref 13–17)
IMM GRANULOCYTES # BLD AUTO: 0.15 K/UL (ref 0–0.3)
IMM GRANULOCYTES NFR BLD: 1 %
INR PPP: 5.4
LACTIC ACID, WHOLE BLOOD: 13.5 MMOL/L (ref 0.7–2.1)
LY30 (LYSIS) TEG: 0 % (ref 0–2.6)
LYMPHOCYTES NFR BLD: 0.92 K/UL (ref 1–4.8)
LYMPHOCYTES RELATIVE PERCENT: 6 % (ref 24–44)
MA(MAX CLOT) RAPID TEG: 63.7 MM (ref 52–70)
MAGNESIUM SERPL-MCNC: 2.3 MG/DL (ref 1.6–2.4)
MAGNESIUM SERPL-MCNC: 2.3 MG/DL (ref 1.6–2.4)
MCH RBC QN AUTO: 35.3 PG (ref 25.2–33.5)
MCHC RBC AUTO-ENTMCNC: 30.8 G/DL (ref 28.4–34.8)
MCV RBC AUTO: 114.7 FL (ref 82.6–102.9)
MONOCYTES NFR BLD: 0.62 K/UL (ref 0.1–0.8)
MONOCYTES NFR BLD: 4 % (ref 1–7)
MORPHOLOGY: ABNORMAL
MORPHOLOGY: ABNORMAL
NEGATIVE BASE EXCESS, ART: 12.1 MMOL/L (ref 0–2)
NEGATIVE BASE EXCESS, ART: 15.5 MMOL/L (ref 0–2)
NEGATIVE BASE EXCESS, ART: 16.9 MMOL/L (ref 0–2)
NEUTROPHILS NFR BLD: 89 % (ref 36–66)
NEUTS SEG NFR BLD: 13.71 K/UL (ref 1.8–7.7)
NRBC BLD-RTO: 0.1 PER 100 WBC
NUCLEAR IGG SER IA-RTO: 0.2 U/ML
NUCLEATED RED BLOOD CELLS: 1 PER 100 WBC
PATIENT TEMP: 34.9
PATIENT TEMP: 35.4
PATIENT TEMP: 35.7
PHOSPHATE SERPL-MCNC: 6.6 MG/DL (ref 2.5–4.5)
PHOSPHATE SERPL-MCNC: 7.3 MG/DL (ref 2.5–4.5)
PLATELET # BLD AUTO: 225 K/UL (ref 138–453)
PMV BLD AUTO: 11.7 FL (ref 8.1–13.5)
POC ANION GAP: 20 MMOL/L (ref 7–16)
POC CREATININE: 2.2 MG/DL (ref 0.51–1.19)
POC HCO3: 12.3 MMOL/L (ref 21–28)
POC HCO3: 12.7 MMOL/L (ref 21–28)
POC HCO3: 15.8 MMOL/L (ref 21–28)
POC HEMOGLOBIN (CALC): 9.3 G/DL (ref 13.5–17.5)
POC LACTIC ACID: 14.3 MMOL/L (ref 0.56–1.39)
POC LACTIC ACID: 14.7 MMOL/L (ref 0.56–1.39)
POC LACTIC ACID: 17.8 MMOL/L (ref 0.56–1.39)
POC O2 SATURATION: 93.2 % (ref 94–98)
POC O2 SATURATION: 97.9 % (ref 94–98)
POC O2 SATURATION: 99.9 % (ref 94–98)
POC PCO2 TEMP: 37.6 MM HG
POC PCO2 TEMP: 40.5 MM HG
POC PCO2: 37.9 MM HG (ref 35–48)
POC PCO2: 41.2 MM HG (ref 35–48)
POC PCO2: 43.4 MM HG (ref 35–48)
POC PH TEMP: 7.11
POC PH TEMP: 7.19
POC PH: 7.08 (ref 7.35–7.45)
POC PH: 7.13 (ref 7.35–7.45)
POC PH: 7.17 (ref 7.35–7.45)
POC PO2 TEMP: 119.5 MM HG
POC PO2 TEMP: 81.3 MM HG
POC PO2: 129 MM HG (ref 83–108)
POC PO2: 341.1 MM HG (ref 83–108)
POC PO2: 92.7 MM HG (ref 83–108)
POTASSIUM BLD-SCNC: 4.8 MMOL/L (ref 3.5–4.5)
POTASSIUM SERPL-SCNC: 5.2 MMOL/L (ref 3.7–5.3)
PROT SERPL-MCNC: 4.6 G/DL (ref 6.6–8.7)
PROTHROMBIN TIME: 47.7 SEC (ref 11.7–14.9)
RBC # BLD AUTO: 2.66 M/UL (ref 4.21–5.77)
REACTION TIME TEG: >17 MIN (ref 4.6–9.1)
SODIUM BLD-SCNC: 140 MMOL/L (ref 138–146)
SODIUM SERPL-SCNC: 143 MMOL/L (ref 136–145)
TRANSFUSION STATUS: NORMAL
UNIT DIVISION: 0
UNIT ISSUE DATE/TIME: NORMAL
VANCOMYCIN SERPL-MCNC: 11.3 UG/ML (ref 5–40)
WBC OTHER # BLD: 15.4 K/UL (ref 3.5–11.3)

## 2024-09-14 PROCEDURE — 80202 ASSAY OF VANCOMYCIN: CPT

## 2024-09-14 PROCEDURE — 94640 AIRWAY INHALATION TREATMENT: CPT

## 2024-09-14 PROCEDURE — 6360000002 HC RX W HCPCS

## 2024-09-14 PROCEDURE — 2580000003 HC RX 258: Performed by: INTERNAL MEDICINE

## 2024-09-14 PROCEDURE — 2580000003 HC RX 258

## 2024-09-14 PROCEDURE — 99291 CRITICAL CARE FIRST HOUR: CPT | Performed by: STUDENT IN AN ORGANIZED HEALTH CARE EDUCATION/TRAINING PROGRAM

## 2024-09-14 PROCEDURE — 6370000000 HC RX 637 (ALT 250 FOR IP)

## 2024-09-14 PROCEDURE — 82803 BLOOD GASES ANY COMBINATION: CPT

## 2024-09-14 PROCEDURE — 85014 HEMATOCRIT: CPT

## 2024-09-14 PROCEDURE — 2500000003 HC RX 250 WO HCPCS

## 2024-09-14 PROCEDURE — 80053 COMPREHEN METABOLIC PANEL: CPT

## 2024-09-14 PROCEDURE — 82947 ASSAY GLUCOSE BLOOD QUANT: CPT

## 2024-09-14 PROCEDURE — 2500000003 HC RX 250 WO HCPCS: Performed by: INTERNAL MEDICINE

## 2024-09-14 PROCEDURE — 85390 FIBRINOLYSINS SCREEN I&R: CPT

## 2024-09-14 PROCEDURE — 83735 ASSAY OF MAGNESIUM: CPT

## 2024-09-14 PROCEDURE — 6370000000 HC RX 637 (ALT 250 FOR IP): Performed by: INTERNAL MEDICINE

## 2024-09-14 PROCEDURE — 2700000000 HC OXYGEN THERAPY PER DAY

## 2024-09-14 PROCEDURE — 85025 COMPLETE CBC W/AUTO DIFF WBC: CPT

## 2024-09-14 PROCEDURE — 6360000002 HC RX W HCPCS: Performed by: STUDENT IN AN ORGANIZED HEALTH CARE EDUCATION/TRAINING PROGRAM

## 2024-09-14 PROCEDURE — 80051 ELECTROLYTE PANEL: CPT

## 2024-09-14 PROCEDURE — 94002 VENT MGMT INPAT INIT DAY: CPT

## 2024-09-14 PROCEDURE — 82330 ASSAY OF CALCIUM: CPT

## 2024-09-14 PROCEDURE — 85347 COAGULATION TIME ACTIVATED: CPT

## 2024-09-14 PROCEDURE — 84100 ASSAY OF PHOSPHORUS: CPT

## 2024-09-14 PROCEDURE — 37799 UNLISTED PX VASCULAR SURGERY: CPT

## 2024-09-14 PROCEDURE — 82565 ASSAY OF CREATININE: CPT

## 2024-09-14 PROCEDURE — 85379 FIBRIN DEGRADATION QUANT: CPT

## 2024-09-14 PROCEDURE — 0BH17EZ INSERTION OF ENDOTRACHEAL AIRWAY INTO TRACHEA, VIA NATURAL OR ARTIFICIAL OPENING: ICD-10-PCS | Performed by: STUDENT IN AN ORGANIZED HEALTH CARE EDUCATION/TRAINING PROGRAM

## 2024-09-14 PROCEDURE — 94761 N-INVAS EAR/PLS OXIMETRY MLT: CPT

## 2024-09-14 PROCEDURE — 84520 ASSAY OF UREA NITROGEN: CPT

## 2024-09-14 PROCEDURE — 71045 X-RAY EXAM CHEST 1 VIEW: CPT

## 2024-09-14 PROCEDURE — 99231 SBSQ HOSP IP/OBS SF/LOW 25: CPT | Performed by: INTERNAL MEDICINE

## 2024-09-14 PROCEDURE — 83605 ASSAY OF LACTIC ACID: CPT

## 2024-09-14 PROCEDURE — 85384 FIBRINOGEN ACTIVITY: CPT

## 2024-09-14 PROCEDURE — 85610 PROTHROMBIN TIME: CPT

## 2024-09-14 PROCEDURE — 6360000002 HC RX W HCPCS: Performed by: INTERNAL MEDICINE

## 2024-09-14 PROCEDURE — 93005 ELECTROCARDIOGRAM TRACING: CPT | Performed by: STUDENT IN AN ORGANIZED HEALTH CARE EDUCATION/TRAINING PROGRAM

## 2024-09-14 PROCEDURE — 85576 BLOOD PLATELET AGGREGATION: CPT

## 2024-09-14 PROCEDURE — 5A1935Z RESPIRATORY VENTILATION, LESS THAN 24 CONSECUTIVE HOURS: ICD-10-PCS | Performed by: STUDENT IN AN ORGANIZED HEALTH CARE EDUCATION/TRAINING PROGRAM

## 2024-09-14 RX ORDER — CALCIUM GLUCONATE 20 MG/ML
2000 INJECTION, SOLUTION INTRAVENOUS ONCE
Status: COMPLETED | OUTPATIENT
Start: 2024-09-14 | End: 2024-09-14

## 2024-09-14 RX ORDER — DEXTROSE MONOHYDRATE 25 G/50ML
25 INJECTION, SOLUTION INTRAVENOUS PRN
Status: DISCONTINUED | OUTPATIENT
Start: 2024-09-14 | End: 2024-09-14 | Stop reason: HOSPADM

## 2024-09-14 RX ORDER — CALCIUM GLUCONATE 20 MG/ML
1000 INJECTION, SOLUTION INTRAVENOUS ONCE
Status: COMPLETED | OUTPATIENT
Start: 2024-09-14 | End: 2024-09-14

## 2024-09-14 RX ORDER — PHENYLEPHRINE HCL IN 0.9% NACL 50MG/250ML
10-300 PLASTIC BAG, INJECTION (ML) INTRAVENOUS CONTINUOUS
Status: DISCONTINUED | OUTPATIENT
Start: 2024-09-14 | End: 2024-09-14 | Stop reason: HOSPADM

## 2024-09-14 RX ORDER — ROCURONIUM BROMIDE 10 MG/ML
100 INJECTION, SOLUTION INTRAVENOUS ONCE
Status: COMPLETED | OUTPATIENT
Start: 2024-09-14 | End: 2024-09-14

## 2024-09-14 RX ORDER — ETOMIDATE 2 MG/ML
30 INJECTION INTRAVENOUS ONCE
Status: COMPLETED | OUTPATIENT
Start: 2024-09-14 | End: 2024-09-14

## 2024-09-14 RX ORDER — ATROPINE SULFATE 10 MG/ML
2 SOLUTION/ DROPS OPHTHALMIC EVERY 4 HOURS PRN
Status: DISCONTINUED | OUTPATIENT
Start: 2024-09-14 | End: 2024-09-14 | Stop reason: HOSPADM

## 2024-09-14 RX ORDER — NOREPINEPHRINE BITARTRATE 0.06 MG/ML
1-100 INJECTION, SOLUTION INTRAVENOUS CONTINUOUS
Status: DISCONTINUED | OUTPATIENT
Start: 2024-09-14 | End: 2024-09-14 | Stop reason: HOSPADM

## 2024-09-14 RX ORDER — MORPHINE SULFATE 2 MG/ML
2 INJECTION, SOLUTION INTRAMUSCULAR; INTRAVENOUS
Status: DISCONTINUED | OUTPATIENT
Start: 2024-09-14 | End: 2024-09-14 | Stop reason: HOSPADM

## 2024-09-14 RX ORDER — LORAZEPAM 2 MG/ML
0.5 INJECTION INTRAMUSCULAR
Status: DISCONTINUED | OUTPATIENT
Start: 2024-09-14 | End: 2024-09-14 | Stop reason: HOSPADM

## 2024-09-14 RX ORDER — MIDAZOLAM HYDROCHLORIDE 1 MG/ML
1-10 INJECTION, SOLUTION INTRAVENOUS CONTINUOUS
Status: DISCONTINUED | OUTPATIENT
Start: 2024-09-14 | End: 2024-09-14 | Stop reason: HOSPADM

## 2024-09-14 RX ORDER — MINERAL OIL AND WHITE PETROLATUM 150; 830 MG/G; MG/G
OINTMENT OPHTHALMIC PRN
Status: DISCONTINUED | OUTPATIENT
Start: 2024-09-14 | End: 2024-09-14 | Stop reason: HOSPADM

## 2024-09-14 RX ORDER — MORPHINE SULFATE 4 MG/ML
4 INJECTION, SOLUTION INTRAMUSCULAR; INTRAVENOUS
Status: DISCONTINUED | OUTPATIENT
Start: 2024-09-14 | End: 2024-09-14 | Stop reason: HOSPADM

## 2024-09-14 RX ADMIN — ETOMIDATE 30 MG: 2 INJECTION, SOLUTION INTRAVENOUS at 00:59

## 2024-09-14 RX ADMIN — Medication 300 MCG/MIN: at 08:21

## 2024-09-14 RX ADMIN — MIDODRINE HYDROCHLORIDE 10 MG: 5 TABLET ORAL at 08:22

## 2024-09-14 RX ADMIN — NOREPINEPHRINE BITARTRATE 100 MCG/MIN: 0.06 INJECTION, SOLUTION INTRAVENOUS at 04:45

## 2024-09-14 RX ADMIN — SODIUM BICARBONATE: 84 INJECTION, SOLUTION INTRAVENOUS at 09:30

## 2024-09-14 RX ADMIN — IPRATROPIUM BROMIDE AND ALBUTEROL SULFATE 1 DOSE: .5; 2.5 SOLUTION RESPIRATORY (INHALATION) at 08:26

## 2024-09-14 RX ADMIN — PIPERACILLIN AND TAZOBACTAM 3375 MG: 3; .375 INJECTION, POWDER, LYOPHILIZED, FOR SOLUTION INTRAVENOUS at 06:25

## 2024-09-14 RX ADMIN — DEXTROSE MONOHYDRATE 25 G: 25 INJECTION, SOLUTION INTRAVENOUS at 06:16

## 2024-09-14 RX ADMIN — CALCIUM GLUCONATE 2000 MG: 20 INJECTION, SOLUTION INTRAVENOUS at 06:28

## 2024-09-14 RX ADMIN — SODIUM CHLORIDE: 9 INJECTION, SOLUTION INTRAVENOUS at 08:04

## 2024-09-14 RX ADMIN — EPINEPHRINE 1 MCG/MIN: 1 INJECTION INTRAMUSCULAR; INTRAVENOUS; SUBCUTANEOUS at 06:36

## 2024-09-14 RX ADMIN — ROCURONIUM BROMIDE 100 MG: 10 INJECTION, SOLUTION INTRAVENOUS at 01:00

## 2024-09-14 RX ADMIN — NOREPINEPHRINE BITARTRATE 100 MCG/MIN: 0.06 INJECTION, SOLUTION INTRAVENOUS at 07:40

## 2024-09-14 RX ADMIN — CALCIUM GLUCONATE 1000 MG: 20 INJECTION, SOLUTION INTRAVENOUS at 00:23

## 2024-09-14 RX ADMIN — NOREPINEPHRINE BITARTRATE 85 MCG/MIN: 0.06 INJECTION, SOLUTION INTRAVENOUS at 03:37

## 2024-09-14 RX ADMIN — Medication 100 MEQ: at 01:12

## 2024-09-14 RX ADMIN — PANTOPRAZOLE SODIUM 40 MG: 40 INJECTION, POWDER, FOR SOLUTION INTRAVENOUS at 08:22

## 2024-09-14 RX ADMIN — CALCIUM CHLORIDE, MAGNESIUM CHLORIDE, DEXTROSE MONOHYDRATE, LACTIC ACID, SODIUM CHLORIDE, SODIUM BICARBONATE AND POTASSIUM CHLORIDE: 5.15; 2.03; 22; 5.4; 6.46; 3.09; .157 INJECTION INTRAVENOUS at 06:42

## 2024-09-14 RX ADMIN — WATER 60 MG: 1 INJECTION INTRAMUSCULAR; INTRAVENOUS; SUBCUTANEOUS at 08:22

## 2024-09-14 RX ADMIN — SODIUM BICARBONATE 100 MEQ: 84 INJECTION, SOLUTION INTRAVENOUS at 01:12

## 2024-09-14 RX ADMIN — SODIUM BICARBONATE: 84 INJECTION, SOLUTION INTRAVENOUS at 01:45

## 2024-09-14 RX ADMIN — SODIUM BICARBONATE 100 MEQ: 84 INJECTION, SOLUTION INTRAVENOUS at 05:59

## 2024-09-14 RX ADMIN — NOREPINEPHRINE BITARTRATE 100 MCG/MIN: 0.06 INJECTION, SOLUTION INTRAVENOUS at 09:52

## 2024-09-14 RX ADMIN — VASOPRESSIN 0.04 UNITS/MIN: 0.2 INJECTION INTRAVENOUS at 06:12

## 2024-09-14 RX ADMIN — Medication 2 MG/HR: at 01:03

## 2024-09-14 RX ADMIN — SODIUM CHLORIDE, PRESERVATIVE FREE 10 ML: 5 INJECTION INTRAVENOUS at 08:07

## 2024-09-14 RX ADMIN — VANCOMYCIN HYDROCHLORIDE 1750 MG: 10 INJECTION, POWDER, LYOPHILIZED, FOR SOLUTION INTRAVENOUS at 09:46

## 2024-09-14 RX ADMIN — Medication 30 MCG/MIN: at 04:56

## 2024-09-14 RX ADMIN — PIPERACILLIN AND TAZOBACTAM 3375 MG: 3; .375 INJECTION, POWDER, LYOPHILIZED, FOR SOLUTION INTRAVENOUS at 00:22

## 2024-09-14 RX ADMIN — CALCIUM CHLORIDE, MAGNESIUM CHLORIDE, DEXTROSE MONOHYDRATE, LACTIC ACID, SODIUM CHLORIDE, SODIUM BICARBONATE AND POTASSIUM CHLORIDE: 5.15; 2.03; 22; 5.4; 6.46; 3.09; .157 INJECTION INTRAVENOUS at 06:43

## 2024-09-14 RX ADMIN — CALCIUM CHLORIDE, MAGNESIUM CHLORIDE, DEXTROSE MONOHYDRATE, LACTIC ACID, SODIUM CHLORIDE, SODIUM BICARBONATE AND POTASSIUM CHLORIDE: 5.15; 2.03; 22; 5.4; 6.46; 3.09; .157 INJECTION INTRAVENOUS at 06:41

## 2024-09-14 RX ADMIN — PHYTONADIONE 5 MG: 10 INJECTION, EMULSION INTRAMUSCULAR; INTRAVENOUS; SUBCUTANEOUS at 08:07

## 2024-09-14 ASSESSMENT — ENCOUNTER SYMPTOMS
APNEA: 0
DIARRHEA: 0
FACIAL SWELLING: 0
BACK PAIN: 0
ABDOMINAL DISTENTION: 0
WHEEZING: 0
COLOR CHANGE: 0
ABDOMINAL PAIN: 0
CONSTIPATION: 0
CHEST TIGHTNESS: 0
COUGH: 0
SHORTNESS OF BREATH: 1

## 2024-09-14 ASSESSMENT — PULMONARY FUNCTION TESTS
PIF_VALUE: 24
PIF_VALUE: 21
PIF_VALUE: 22

## 2024-09-15 LAB
ALBUMIN SERPL-MCNC: 2.8 G/DL (ref 3.5–5.2)
ALBUMIN/GLOB SERPL: 1 {RATIO} (ref 1–2.5)
ALP SERPL-CCNC: 1293 U/L (ref 40–129)
ALT SERPL-CCNC: 4829 U/L (ref 10–50)
ANION GAP SERPL CALCULATED.3IONS-SCNC: 30 MMOL/L (ref 9–16)
AST SERPL-CCNC: >7000 U/L (ref 10–50)
BILIRUB SERPL-MCNC: 2.6 MG/DL (ref 0–1.2)
BUN SERPL-MCNC: 31 MG/DL (ref 8–23)
CALCIUM SERPL-MCNC: 8.3 MG/DL (ref 8.6–10.4)
CHLORIDE SERPL-SCNC: 100 MMOL/L (ref 98–107)
CO2 SERPL-SCNC: 7 MMOL/L (ref 20–31)
CREAT SERPL-MCNC: 3 MG/DL (ref 0.7–1.2)
GFR, ESTIMATED: 21 ML/MIN/1.73M2
GLUCOSE SERPL-MCNC: 131 MG/DL (ref 74–99)
MICROORGANISM SPEC CULT: NORMAL
MICROORGANISM SPEC CULT: NORMAL
POTASSIUM SERPL-SCNC: 5.4 MMOL/L (ref 3.7–5.3)
PROT SERPL-MCNC: 5.2 G/DL (ref 6.6–8.7)
SERVICE CMNT-IMP: NORMAL
SERVICE CMNT-IMP: NORMAL
SODIUM SERPL-SCNC: 137 MMOL/L (ref 136–145)
SPECIMEN DESCRIPTION: NORMAL
SPECIMEN DESCRIPTION: NORMAL

## 2024-09-16 ENCOUNTER — TELEPHONE (OUTPATIENT)
Dept: PULMONOLOGY | Age: 74
End: 2024-09-16

## 2024-09-16 LAB
BUN BLD-MCNC: 27 MG/DL (ref 6–20)
CA-I BLD-SCNC: 1.07 MMOL/L (ref 1.15–1.33)
CHLORIDE BLD-SCNC: 108 MMOL/L (ref 98–110)
CO2 BLD CALC-SCNC: 7 MMOL/L (ref 20–31)
EBV VCA IGM SER-ACNC: 7 U/ML
EGFR, POC: 20 ML/MIN/1.73M2
EKG ATRIAL RATE: 60 BPM
EKG ATRIAL RATE: 60 BPM
EKG P AXIS: 74 DEGREES
EKG P-R INTERVAL: 252 MS
EKG Q-T INTERVAL: 520 MS
EKG Q-T INTERVAL: 550 MS
EKG QRS DURATION: 120 MS
EKG QRS DURATION: 172 MS
EKG QTC CALCULATION (BAZETT): 520 MS
EKG QTC CALCULATION (BAZETT): 550 MS
EKG R AXIS: -18 DEGREES
EKG R AXIS: -46 DEGREES
EKG T AXIS: 102 DEGREES
EKG T AXIS: 104 DEGREES
EKG VENTRICULAR RATE: 60 BPM
EKG VENTRICULAR RATE: 60 BPM
GLUCOSE BLD-MCNC: 104 MG/DL (ref 74–100)
HCT VFR BLD AUTO: 22 % (ref 41–53)
LKM AB TITR SER IF: NORMAL {TITER}
NEGATIVE BASE EXCESS, ART: 19.7 MMOL/L (ref 0–2)
POC CREATININE: 3.1 MG/DL (ref 0.51–1.19)
POC HCO3: 8 MMOL/L (ref 21–28)
POC HEMOGLOBIN (CALC): 7.4 G/DL (ref 13.5–17.5)
POC LACTIC ACID: 14.3 MMOL/L (ref 0.56–1.39)
POC O2 SATURATION: 95.1 % (ref 94–98)
POC PCO2: 23.9 MM HG (ref 35–48)
POC PH: 7.11 (ref 7.35–7.45)
POC PO2: 99.8 MM HG (ref 83–108)
POTASSIUM BLD-SCNC: 5.2 MMOL/L (ref 3.5–5.1)
SMOOTH MUSCLE ANTIBODY: 7 UNITS (ref 0–19)
SMOOTH MUSCLE ANTIBODY: 8 UNITS (ref 0–19)
SODIUM BLD-SCNC: 111 MMOL/L (ref 136–145)
TCO2 (CALC), ART: 24 MMOL/L (ref 22–29)

## 2024-09-17 LAB
ALBUMIN PERCENT: 46 % (ref 45–65)
ALBUMIN SERPL-MCNC: 2.2 G/DL (ref 3.2–5.2)
ALPHA 2 PERCENT: 14 % (ref 6–13)
ALPHA1 GLOB SERPL ELPH-MCNC: 0.3 G/DL (ref 0.1–0.4)
ALPHA1 GLOB SERPL ELPH-MCNC: 6 % (ref 3–6)
ALPHA2 GLOB SERPL ELPH-MCNC: 0.7 G/DL (ref 0.5–0.9)
ANA SER QL IA: POSITIVE
ANA SER QL IA: POSITIVE
ANCA MYELOPEROXIDASE: <0.3 AU/ML (ref 0–3.5)
ANCA PROTEINASE 3: <0.7 AU/ML (ref 0–2)
B-GLOBULIN SERPL ELPH-MCNC: 0.9 G/DL (ref 0.5–1.1)
B-GLOBULIN SERPL ELPH-MCNC: 19 % (ref 11–19)
DEPRECATED S PNEUM5 IGG SER-MCNC: <0.4 U/ML
DSDNA IGG SER QL IA: 50 IU/ML
DSDNA IGG SER QL IA: 57 IU/ML
ENA JO1 IGG SER-ACNC: <0.4 U/ML
ENA SCL70 AB SER IA-ACNC: <0.6 U/ML
ENA SM AB SER-ACNC: <0.8 U/ML
ENA SS-A IGG SER QL: <0.3 U/ML
ENA SS-B IGG SER IA-ACNC: <0.3 U/ML
GAMMA GLOB SERPL ELPH-MCNC: 0.7 G/DL (ref 0.5–1.5)
GAMMA GLOBULIN %: 15 % (ref 9–20)
HSV1+2 IGM SER QL IA: 0.21
ITYP INTERPRETATION: NORMAL
ITYP INTERPRETATION: NORMAL
MICROORGANISM SPEC CULT: NORMAL
MITOCHONDRIA M2 IGG SER-ACNC: 0.9 U/ML (ref 0–4)
NUCLEAR IGG SER IA-RTO: 0.3 U/ML
NUCLEAR IGG SER IA-RTO: 0.3 U/ML
P E INTERPRETATION, U: NORMAL
PATH REV: NORMAL
PATHOLOGIST REVIEW: NORMAL
PATHOLOGIST: ABNORMAL
PATHOLOGIST: NORMAL
PROT PATTERN SERPL ELPH-IMP: ABNORMAL
PROT SERPL-MCNC: 4.8 G/DL (ref 6.6–8.7)
SERVICE CMNT-IMP: NORMAL
SPECIMEN DESCRIPTION: NORMAL
SPECIMEN TYPE: NORMAL
SPECIMEN TYPE: NORMAL
TOTAL PROT. SUM,%: 100 % (ref 98–102)
TOTAL PROT. SUM: 4.8 G/DL (ref 6.3–8.2)
TOTAL PROTEIN, URINE: 54 MG/DL
U1 SNRNP IGG SER IA-ACNC: 1 U/ML
U1 SNRNP IGG SER IA-ACNC: 2.7 U/ML
URINE TOTAL PROTEIN: 54 MG/DL

## 2024-09-18 LAB
EKG ATRIAL RATE: 36 BPM
EKG ATRIAL RATE: 75 BPM
EKG Q-T INTERVAL: 462 MS
EKG Q-T INTERVAL: 532 MS
EKG QRS DURATION: 186 MS
EKG QRS DURATION: 80 MS
EKG QTC CALCULATION (BAZETT): 465 MS
EKG QTC CALCULATION (BAZETT): 586 MS
EKG R AXIS: -51 DEGREES
EKG R AXIS: 17 DEGREES
EKG T AXIS: 107 DEGREES
EKG T AXIS: 99 DEGREES
EKG VENTRICULAR RATE: 61 BPM
EKG VENTRICULAR RATE: 73 BPM
MICROORGANISM SPEC CULT: NORMAL
SERVICE CMNT-IMP: NORMAL
SPECIMEN DESCRIPTION: NORMAL

## (undated) DEVICE — SCISSOR SURG METZ CRV TIP

## (undated) DEVICE — TOWEL,OR,DSP,ST,NATURAL,DLX,4/PK,20PK/CS: Brand: MEDLINE

## (undated) DEVICE — STAPLER INT L16CM STD UNIV RELD DISP TRI-STAPLE ENDO GIA

## (undated) DEVICE — TISSUE RETRIEVAL SYSTEM: Brand: INZII RETRIEVAL SYSTEM

## (undated) DEVICE — TROCARS: Brand: KII® BALLOON BLUNT TIP SYSTEM

## (undated) DEVICE — SOLUTION ANTIFOG VIS SYS CLEARIFY LAPSCP

## (undated) DEVICE — TROCAR: Brand: KII® SLEEVE

## (undated) DEVICE — ELECTRODE PT RET AD L9FT HI MOIST COND ADH HYDRGEL CORDED

## (undated) DEVICE — GLOVE ORANGE PI 7 1/2   MSG9075

## (undated) DEVICE — SUTURE SZ 0 27IN 5/8 CIR UR-6  TAPER PT VIOLET ABSRB VICRYL J603H

## (undated) DEVICE — PACK LAP BASIC

## (undated) DEVICE — GLOVE ORANGE PI 8   MSG9080

## (undated) DEVICE — RELOAD STPL 45MM THN VASC TISS WHT W/ GRIPPING SURF

## (undated) DEVICE — GLOVE SURG SZ 8 L11.77IN FNGR THK9.8MIL STRW LTX POLYMER

## (undated) DEVICE — APPLICATOR MEDICATED 26 CC SOLUTION HI LT ORNG CHLORAPREP

## (undated) DEVICE — GLOVE ORANGE PI 7   MSG9070

## (undated) DEVICE — GLOVE SURG SZ 6 THK91MIL LTX FREE SYN POLYISOPRENE ANTI

## (undated) DEVICE — ADHESIVE SKIN CLOSURE TOP 36 CC HI VISC DERMBND MINI

## (undated) DEVICE — Device

## (undated) DEVICE — SUTURE MCRYL SZ 4-0 L18IN ABSRB UD L16MM PC-3 3/8 CIR PRIM Y845G

## (undated) DEVICE — TROCAR: Brand: KII FIOS FIRST ENTRY

## (undated) DEVICE — TOTAL TRAY, 16FR 10ML SIL FOLEY, URN: Brand: MEDLINE

## (undated) DEVICE — GOWN,SIRUS,NONRNF,SETINSLV,XL,20/CS: Brand: MEDLINE

## (undated) DEVICE — GLOVE SURG SZ 65 THK91MIL LTX FREE SYN POLYISOPRENE

## (undated) DEVICE — GOWN,AURORA,NONREINFORCED,LARGE: Brand: MEDLINE

## (undated) DEVICE — DEVICE TRCR 12X9X3IN WHT CLSR DISP OMNICLOSE

## (undated) DEVICE — COVER LT HNDL BLU PLAS